# Patient Record
Sex: MALE | Race: WHITE | NOT HISPANIC OR LATINO | Employment: OTHER | URBAN - METROPOLITAN AREA
[De-identification: names, ages, dates, MRNs, and addresses within clinical notes are randomized per-mention and may not be internally consistent; named-entity substitution may affect disease eponyms.]

---

## 2017-04-10 DIAGNOSIS — E29.1 HYPOGONADISM IN MALE: ICD-10-CM

## 2017-04-10 RX ORDER — TESTOSTERONE GEL, 1% 10 MG/G
50 GEL TRANSDERMAL DAILY
Qty: 30 PACKAGE | Refills: 2 | OUTPATIENT
Start: 2017-04-10 | End: 2017-04-10

## 2017-04-10 RX ORDER — TESTOSTERONE 16.2 MG/G
GEL TRANSDERMAL
Qty: 1 G | Refills: 2 | Status: SHIPPED | OUTPATIENT
Start: 2017-04-10 | End: 2018-03-29

## 2017-04-10 NOTE — TELEPHONE ENCOUNTER
Okay to refill. Can you confirm the Rx. Last prescribed androgel 1%, 1 packet daily. If this is a formulary change, then 1.62% androgel 2 pumps daily is okay.

## 2018-01-24 ENCOUNTER — OFFICE VISIT (OUTPATIENT)
Dept: ORTHOPEDIC SURGERY | Facility: CLINIC | Age: 73
End: 2018-01-24

## 2018-01-24 VITALS
DIASTOLIC BLOOD PRESSURE: 95 MMHG | HEART RATE: 85 BPM | BODY MASS INDEX: 31.49 KG/M2 | SYSTOLIC BLOOD PRESSURE: 124 MMHG | HEIGHT: 67 IN | WEIGHT: 200.62 LBS

## 2018-01-24 DIAGNOSIS — R52 PAIN: ICD-10-CM

## 2018-01-24 DIAGNOSIS — S62.025A CLOSED NONDISPLACED FRACTURE OF MIDDLE THIRD OF SCAPHOID BONE OF LEFT WRIST, INITIAL ENCOUNTER: Primary | ICD-10-CM

## 2018-01-24 PROCEDURE — 25622 CLTX CARPL SCPHD FX W/O MNPJ: CPT | Performed by: ORTHOPAEDIC SURGERY

## 2018-01-24 PROCEDURE — 99204 OFFICE O/P NEW MOD 45 MIN: CPT | Performed by: ORTHOPAEDIC SURGERY

## 2018-01-24 RX ORDER — HYDROCODONE BITARTRATE AND ACETAMINOPHEN 5; 325 MG/1; MG/1
TABLET ORAL
Refills: 0 | COMMUNITY
Start: 2018-01-09 | End: 2018-03-29
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
## 2018-02-23 ENCOUNTER — OFFICE VISIT (OUTPATIENT)
Dept: ORTHOPEDIC SURGERY | Facility: CLINIC | Age: 73
End: 2018-02-23

## 2018-02-23 DIAGNOSIS — S62.025D CLOSED NONDISPLACED FRACTURE OF MIDDLE THIRD OF SCAPHOID OF LEFT WRIST WITH ROUTINE HEALING, SUBSEQUENT ENCOUNTER: Primary | ICD-10-CM

## 2018-02-23 PROCEDURE — 99024 POSTOP FOLLOW-UP VISIT: CPT | Performed by: ORTHOPAEDIC SURGERY
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
## 2018-03-23 ENCOUNTER — OFFICE VISIT (OUTPATIENT)
Dept: ORTHOPEDIC SURGERY | Facility: CLINIC | Age: 73
End: 2018-03-23

## 2018-03-23 VITALS — HEART RATE: 86 BPM | WEIGHT: 200.62 LBS | BODY MASS INDEX: 31.49 KG/M2 | HEIGHT: 67 IN

## 2018-03-23 DIAGNOSIS — M54.50 CHRONIC MIDLINE LOW BACK PAIN WITHOUT SCIATICA: ICD-10-CM

## 2018-03-23 DIAGNOSIS — S62.025D CLOSED NONDISPLACED FRACTURE OF MIDDLE THIRD OF SCAPHOID OF LEFT WRIST WITH ROUTINE HEALING, SUBSEQUENT ENCOUNTER: Primary | ICD-10-CM

## 2018-03-23 DIAGNOSIS — G89.29 CHRONIC MIDLINE LOW BACK PAIN WITHOUT SCIATICA: ICD-10-CM

## 2018-03-23 PROCEDURE — 99024 POSTOP FOLLOW-UP VISIT: CPT | Performed by: ORTHOPAEDIC SURGERY
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
## 2018-03-29 ENCOUNTER — HOSPITAL ENCOUNTER (EMERGENCY)
Facility: HOSPITAL | Age: 73
Discharge: HOME | End: 2018-03-29
Attending: EMERGENCY MEDICINE | Admitting: EMERGENCY MEDICINE

## 2018-03-29 ENCOUNTER — APPOINTMENT (OUTPATIENT)
Dept: GENERAL RADIOLOGY | Facility: HOSPITAL | Age: 73
End: 2018-03-29

## 2018-03-29 VITALS
HEIGHT: 69 IN | OXYGEN SATURATION: 94 % | WEIGHT: 205 LBS | BODY MASS INDEX: 30.36 KG/M2 | DIASTOLIC BLOOD PRESSURE: 72 MMHG | TEMPERATURE: 97.9 F | RESPIRATION RATE: 18 BRPM | HEART RATE: 79 BPM | SYSTOLIC BLOOD PRESSURE: 122 MMHG

## 2018-03-29 DIAGNOSIS — Z87.39 HISTORY OF OSTEOPOROSIS: ICD-10-CM

## 2018-03-29 DIAGNOSIS — S63.502A LEFT WRIST SPRAIN, INITIAL ENCOUNTER: Primary | ICD-10-CM

## 2018-03-29 PROCEDURE — 99283 EMERGENCY DEPT VISIT LOW MDM: CPT

## 2018-03-29 PROCEDURE — 73110 X-RAY EXAM OF WRIST: CPT

## 2018-03-29 RX ORDER — OXYCODONE HYDROCHLORIDE AND ACETAMINOPHEN 5; 325 MG/1; MG/1
1 TABLET ORAL ONCE
Status: COMPLETED | OUTPATIENT
Start: 2018-03-29 | End: 2018-03-29

## 2018-03-29 RX ORDER — AMLODIPINE BESYLATE 10 MG/1
10 TABLET ORAL DAILY
COMMUNITY
End: 2018-10-08 | Stop reason: SDUPTHER

## 2018-03-29 RX ORDER — OMEPRAZOLE 40 MG/1
40 CAPSULE, DELAYED RELEASE ORAL DAILY
COMMUNITY
End: 2018-05-03 | Stop reason: HOSPADM

## 2018-03-29 RX ADMIN — OXYCODONE AND ACETAMINOPHEN 1 TABLET: 5; 325 TABLET ORAL at 12:22

## 2018-04-23 ENCOUNTER — ANESTHESIA EVENT (OUTPATIENT)
Dept: PERIOP | Facility: HOSPITAL | Age: 73
End: 2018-04-23

## 2018-04-23 ENCOUNTER — ANESTHESIA (OUTPATIENT)
Dept: PERIOP | Facility: HOSPITAL | Age: 73
End: 2018-04-23

## 2018-04-23 ENCOUNTER — HOSPITAL ENCOUNTER (INPATIENT)
Facility: HOSPITAL | Age: 73
LOS: 10 days | Discharge: HOME HEALTH | End: 2018-05-03
Attending: EMERGENCY MEDICINE | Admitting: SURGERY

## 2018-04-23 ENCOUNTER — APPOINTMENT (OUTPATIENT)
Dept: CT IMAGING | Facility: HOSPITAL | Age: 73
End: 2018-04-23

## 2018-04-23 ENCOUNTER — APPOINTMENT (OUTPATIENT)
Dept: GENERAL RADIOLOGY | Facility: HOSPITAL | Age: 73
End: 2018-04-23

## 2018-04-23 ENCOUNTER — OFFICE VISIT (OUTPATIENT)
Dept: ORTHOPEDIC SURGERY | Facility: CLINIC | Age: 73
End: 2018-04-23

## 2018-04-23 VITALS
WEIGHT: 208.56 LBS | HEART RATE: 123 BPM | SYSTOLIC BLOOD PRESSURE: 97 MMHG | DIASTOLIC BLOOD PRESSURE: 81 MMHG | HEIGHT: 69 IN | BODY MASS INDEX: 30.89 KG/M2

## 2018-04-23 DIAGNOSIS — S62.025D CLOSED NONDISPLACED FRACTURE OF MIDDLE THIRD OF SCAPHOID OF LEFT WRIST WITH ROUTINE HEALING, SUBSEQUENT ENCOUNTER: Primary | ICD-10-CM

## 2018-04-23 DIAGNOSIS — R19.8 PERFORATED VISCUS: ICD-10-CM

## 2018-04-23 DIAGNOSIS — K63.1 PERFORATED BOWEL (HCC): ICD-10-CM

## 2018-04-23 DIAGNOSIS — R10.30 LOWER ABDOMINAL PAIN: Primary | ICD-10-CM

## 2018-04-23 DIAGNOSIS — Z74.09 IMPAIRED MOBILITY AND ADLS: ICD-10-CM

## 2018-04-23 DIAGNOSIS — Z78.9 IMPAIRED MOBILITY AND ADLS: ICD-10-CM

## 2018-04-23 DIAGNOSIS — D72.829 LEUKOCYTOSIS, UNSPECIFIED TYPE: ICD-10-CM

## 2018-04-23 DIAGNOSIS — K66.8 INTRA-ABDOMINAL FREE AIR OF UNKNOWN ETIOLOGY: ICD-10-CM

## 2018-04-23 DIAGNOSIS — Z74.09 IMPAIRED FUNCTIONAL MOBILITY, BALANCE, GAIT, AND ENDURANCE: ICD-10-CM

## 2018-04-23 PROBLEM — K21.9 GERD (GASTROESOPHAGEAL REFLUX DISEASE): Status: ACTIVE | Noted: 2018-04-23

## 2018-04-23 PROBLEM — J45.909 ASTHMA: Status: ACTIVE | Noted: 2018-04-23

## 2018-04-23 PROBLEM — Z87.19 H/O CHRONIC HEPATITIS: Status: ACTIVE | Noted: 2018-04-23

## 2018-04-23 PROBLEM — K25.5 PERFORATED GASTRIC ULCER (HCC): Status: ACTIVE | Noted: 2018-04-23

## 2018-04-23 PROBLEM — I25.10 CAD (CORONARY ARTERY DISEASE): Status: ACTIVE | Noted: 2018-04-23

## 2018-04-23 PROBLEM — F19.10 POLYSUBSTANCE ABUSE (HCC): Status: ACTIVE | Noted: 2018-04-23

## 2018-04-23 LAB
ABO GROUP BLD: NORMAL
ABO GROUP BLD: NORMAL
ALBUMIN SERPL-MCNC: 4 G/DL (ref 3.2–4.8)
ALBUMIN/GLOB SERPL: 1.3 G/DL (ref 1.5–2.5)
ALP SERPL-CCNC: 81 U/L (ref 25–100)
ALT SERPL W P-5'-P-CCNC: 34 U/L (ref 7–40)
ANION GAP SERPL CALCULATED.3IONS-SCNC: 7 MMOL/L (ref 3–11)
AST SERPL-CCNC: 33 U/L (ref 0–33)
BASOPHILS # BLD AUTO: 0.02 10*3/MM3 (ref 0–0.2)
BASOPHILS NFR BLD AUTO: 0.1 % (ref 0–1)
BILIRUB SERPL-MCNC: 0.4 MG/DL (ref 0.3–1.2)
BLD GP AB SCN SERPL QL: NEGATIVE
BUN BLD-MCNC: 12 MG/DL (ref 9–23)
BUN/CREAT SERPL: 10.9 (ref 7–25)
CALCIUM SPEC-SCNC: 8.7 MG/DL (ref 8.7–10.4)
CHLORIDE SERPL-SCNC: 98 MMOL/L (ref 99–109)
CO2 SERPL-SCNC: 28 MMOL/L (ref 20–31)
CREAT BLD-MCNC: 1.1 MG/DL (ref 0.6–1.3)
DEPRECATED RDW RBC AUTO: 45.8 FL (ref 37–54)
EOSINOPHIL # BLD AUTO: 0.29 10*3/MM3 (ref 0–0.3)
EOSINOPHIL NFR BLD AUTO: 2.1 % (ref 0–3)
ERYTHROCYTE [DISTWIDTH] IN BLOOD BY AUTOMATED COUNT: 13.7 % (ref 11.3–14.5)
GFR SERPL CREATININE-BSD FRML MDRD: 66 ML/MIN/1.73
GLOBULIN UR ELPH-MCNC: 3.1 GM/DL
GLUCOSE BLD-MCNC: 121 MG/DL (ref 70–100)
GLUCOSE BLDC GLUCOMTR-MCNC: 138 MG/DL (ref 70–130)
HCT VFR BLD AUTO: 37.6 % (ref 38.9–50.9)
HGB BLD-MCNC: 12.6 G/DL (ref 13.1–17.5)
HOLD SPECIMEN: NORMAL
HOLD SPECIMEN: NORMAL
IMM GRANULOCYTES # BLD: 0.03 10*3/MM3 (ref 0–0.03)
IMM GRANULOCYTES NFR BLD: 0.2 % (ref 0–0.6)
LIPASE SERPL-CCNC: 21 U/L (ref 6–51)
LYMPHOCYTES # BLD AUTO: 1.79 10*3/MM3 (ref 0.6–4.8)
LYMPHOCYTES NFR BLD AUTO: 12.7 % (ref 24–44)
MCH RBC QN AUTO: 31 PG (ref 27–31)
MCHC RBC AUTO-ENTMCNC: 33.5 G/DL (ref 32–36)
MCV RBC AUTO: 92.6 FL (ref 80–99)
MONOCYTES # BLD AUTO: 1.86 10*3/MM3 (ref 0–1)
MONOCYTES NFR BLD AUTO: 13.2 % (ref 0–12)
NEUTROPHILS # BLD AUTO: 10.08 10*3/MM3 (ref 1.5–8.3)
NEUTROPHILS NFR BLD AUTO: 71.9 % (ref 41–71)
PLATELET # BLD AUTO: 235 10*3/MM3 (ref 150–450)
PMV BLD AUTO: 10.8 FL (ref 6–12)
POTASSIUM BLD-SCNC: 4.1 MMOL/L (ref 3.5–5.5)
PROT SERPL-MCNC: 7.1 G/DL (ref 5.7–8.2)
RBC # BLD AUTO: 4.06 10*6/MM3 (ref 4.2–5.76)
RH BLD: POSITIVE
RH BLD: POSITIVE
SODIUM BLD-SCNC: 133 MMOL/L (ref 132–146)
T&S EXPIRATION DATE: NORMAL
TROPONIN I SERPL-MCNC: 0 NG/ML (ref 0–0.07)
WBC NRBC COR # BLD: 14.04 10*3/MM3 (ref 3.5–10.8)
WHOLE BLOOD HOLD SPECIMEN: NORMAL
WHOLE BLOOD HOLD SPECIMEN: NORMAL

## 2018-04-23 PROCEDURE — 93005 ELECTROCARDIOGRAM TRACING: CPT | Performed by: EMERGENCY MEDICINE

## 2018-04-23 PROCEDURE — 25010000002 PIPERACILLIN-TAZOBACTAM: Performed by: EMERGENCY MEDICINE

## 2018-04-23 PROCEDURE — 0DB80ZZ EXCISION OF SMALL INTESTINE, OPEN APPROACH: ICD-10-PCS | Performed by: SURGERY

## 2018-04-23 PROCEDURE — 84145 PROCALCITONIN (PCT): CPT | Performed by: INTERNAL MEDICINE

## 2018-04-23 PROCEDURE — 99291 CRITICAL CARE FIRST HOUR: CPT | Performed by: INTERNAL MEDICINE

## 2018-04-23 PROCEDURE — 0DN80ZZ RELEASE SMALL INTESTINE, OPEN APPROACH: ICD-10-PCS | Performed by: SURGERY

## 2018-04-23 PROCEDURE — 25010000002 MIDAZOLAM PER 1 MG: Performed by: ANESTHESIOLOGY

## 2018-04-23 PROCEDURE — 85025 COMPLETE CBC W/AUTO DIFF WBC: CPT

## 2018-04-23 PROCEDURE — 86901 BLOOD TYPING SEROLOGIC RH(D): CPT | Performed by: SURGERY

## 2018-04-23 PROCEDURE — 25010000002 NEOSTIGMINE 10 MG/10ML SOLUTION: Performed by: ANESTHESIOLOGY

## 2018-04-23 PROCEDURE — 25010000002 FENTANYL CITRATE (PF) 100 MCG/2ML SOLUTION: Performed by: ANESTHESIOLOGY

## 2018-04-23 PROCEDURE — 25010000002 ONDANSETRON PER 1 MG: Performed by: ANESTHESIOLOGY

## 2018-04-23 PROCEDURE — 25010000002 PIPERACILLIN SOD-TAZOBACTAM PER 1 G: Performed by: ANESTHESIOLOGY

## 2018-04-23 PROCEDURE — 80053 COMPREHEN METABOLIC PANEL: CPT

## 2018-04-23 PROCEDURE — 99024 POSTOP FOLLOW-UP VISIT: CPT | Performed by: ORTHOPAEDIC SURGERY

## 2018-04-23 PROCEDURE — 87040 BLOOD CULTURE FOR BACTERIA: CPT | Performed by: INTERNAL MEDICINE

## 2018-04-23 PROCEDURE — 88307 TISSUE EXAM BY PATHOLOGIST: CPT | Performed by: SURGERY

## 2018-04-23 PROCEDURE — 74176 CT ABD & PELVIS W/O CONTRAST: CPT

## 2018-04-23 PROCEDURE — 84443 ASSAY THYROID STIM HORMONE: CPT | Performed by: INTERNAL MEDICINE

## 2018-04-23 PROCEDURE — 83690 ASSAY OF LIPASE: CPT

## 2018-04-23 PROCEDURE — 25010000002 SUCCINYLCHOLINE PER 20 MG: Performed by: ANESTHESIOLOGY

## 2018-04-23 PROCEDURE — 99285 EMERGENCY DEPT VISIT HI MDM: CPT

## 2018-04-23 PROCEDURE — 25010000002 ONDANSETRON PER 1 MG: Performed by: EMERGENCY MEDICINE

## 2018-04-23 PROCEDURE — 83036 HEMOGLOBIN GLYCOSYLATED A1C: CPT | Performed by: INTERNAL MEDICINE

## 2018-04-23 PROCEDURE — 86850 RBC ANTIBODY SCREEN: CPT | Performed by: SURGERY

## 2018-04-23 PROCEDURE — 86901 BLOOD TYPING SEROLOGIC RH(D): CPT

## 2018-04-23 PROCEDURE — G0432 EIA HIV-1/HIV-2 SCREEN: HCPCS | Performed by: INTERNAL MEDICINE

## 2018-04-23 PROCEDURE — 25010000002 HYDROMORPHONE PER 4 MG: Performed by: EMERGENCY MEDICINE

## 2018-04-23 PROCEDURE — 71045 X-RAY EXAM CHEST 1 VIEW: CPT

## 2018-04-23 PROCEDURE — 82607 VITAMIN B-12: CPT | Performed by: INTERNAL MEDICINE

## 2018-04-23 PROCEDURE — 0DNU0ZZ RELEASE OMENTUM, OPEN APPROACH: ICD-10-PCS | Performed by: SURGERY

## 2018-04-23 PROCEDURE — 82962 GLUCOSE BLOOD TEST: CPT

## 2018-04-23 PROCEDURE — 86900 BLOOD TYPING SEROLOGIC ABO: CPT | Performed by: SURGERY

## 2018-04-23 PROCEDURE — 85610 PROTHROMBIN TIME: CPT | Performed by: INTERNAL MEDICINE

## 2018-04-23 PROCEDURE — 25010000002 PROPOFOL 10 MG/ML EMULSION: Performed by: ANESTHESIOLOGY

## 2018-04-23 PROCEDURE — 93005 ELECTROCARDIOGRAM TRACING: CPT

## 2018-04-23 PROCEDURE — 84484 ASSAY OF TROPONIN QUANT: CPT

## 2018-04-23 PROCEDURE — 0DJ08ZZ INSPECTION OF UPPER INTESTINAL TRACT, VIA NATURAL OR ARTIFICIAL OPENING ENDOSCOPIC: ICD-10-PCS | Performed by: SURGERY

## 2018-04-23 PROCEDURE — 86900 BLOOD TYPING SEROLOGIC ABO: CPT

## 2018-04-23 RX ORDER — MAGNESIUM HYDROXIDE 1200 MG/15ML
LIQUID ORAL AS NEEDED
Status: DISCONTINUED | OUTPATIENT
Start: 2018-04-23 | End: 2018-04-23 | Stop reason: HOSPADM

## 2018-04-23 RX ORDER — IPRATROPIUM BROMIDE AND ALBUTEROL SULFATE 2.5; .5 MG/3ML; MG/3ML
3 SOLUTION RESPIRATORY (INHALATION)
Status: DISCONTINUED | OUTPATIENT
Start: 2018-04-23 | End: 2018-04-23

## 2018-04-23 RX ORDER — DEXMEDETOMIDINE HYDROCHLORIDE 4 UG/ML
INJECTION, SOLUTION INTRAVENOUS
Status: COMPLETED
Start: 2018-04-23 | End: 2018-04-23

## 2018-04-23 RX ORDER — HYDROMORPHONE HYDROCHLORIDE 1 MG/ML
0.25 INJECTION, SOLUTION INTRAMUSCULAR; INTRAVENOUS; SUBCUTANEOUS
Status: DISCONTINUED | OUTPATIENT
Start: 2018-04-23 | End: 2018-04-23

## 2018-04-23 RX ORDER — ACETAMINOPHEN 325 MG/1
650 TABLET ORAL EVERY 4 HOURS PRN
Status: DISCONTINUED | OUTPATIENT
Start: 2018-04-23 | End: 2018-04-23

## 2018-04-23 RX ORDER — HYDROMORPHONE HYDROCHLORIDE 1 MG/ML
0.5 INJECTION, SOLUTION INTRAMUSCULAR; INTRAVENOUS; SUBCUTANEOUS ONCE
Status: COMPLETED | OUTPATIENT
Start: 2018-04-23 | End: 2018-04-23

## 2018-04-23 RX ORDER — DEXMEDETOMIDINE HYDROCHLORIDE 4 UG/ML
.2-1.5 INJECTION, SOLUTION INTRAVENOUS
Status: DISCONTINUED | OUTPATIENT
Start: 2018-04-24 | End: 2018-04-26

## 2018-04-23 RX ORDER — SODIUM CHLORIDE 0.9 % (FLUSH) 0.9 %
10 SYRINGE (ML) INJECTION AS NEEDED
Status: DISCONTINUED | OUTPATIENT
Start: 2018-04-23 | End: 2018-04-23

## 2018-04-23 RX ORDER — NEOSTIGMINE METHYLSULFATE 1 MG/ML
INJECTION, SOLUTION INTRAVENOUS AS NEEDED
Status: DISCONTINUED | OUTPATIENT
Start: 2018-04-23 | End: 2018-04-23 | Stop reason: SURG

## 2018-04-23 RX ORDER — SODIUM CHLORIDE 9 MG/ML
250 INJECTION, SOLUTION INTRAVENOUS CONTINUOUS
Status: DISCONTINUED | OUTPATIENT
Start: 2018-04-23 | End: 2018-04-23

## 2018-04-23 RX ORDER — PROPOFOL 10 MG/ML
VIAL (ML) INTRAVENOUS AS NEEDED
Status: DISCONTINUED | OUTPATIENT
Start: 2018-04-23 | End: 2018-04-23 | Stop reason: SURG

## 2018-04-23 RX ORDER — SUCCINYLCHOLINE CHLORIDE 20 MG/ML
INJECTION INTRAMUSCULAR; INTRAVENOUS AS NEEDED
Status: DISCONTINUED | OUTPATIENT
Start: 2018-04-23 | End: 2018-04-23 | Stop reason: SURG

## 2018-04-23 RX ORDER — MIDAZOLAM HYDROCHLORIDE 1 MG/ML
INJECTION INTRAMUSCULAR; INTRAVENOUS AS NEEDED
Status: DISCONTINUED | OUTPATIENT
Start: 2018-04-23 | End: 2018-04-23 | Stop reason: SURG

## 2018-04-23 RX ORDER — MAGNESIUM SULFATE HEPTAHYDRATE 40 MG/ML
2 INJECTION, SOLUTION INTRAVENOUS AS NEEDED
Status: DISCONTINUED | OUTPATIENT
Start: 2018-04-23 | End: 2018-05-03 | Stop reason: HOSPADM

## 2018-04-23 RX ORDER — PANTOPRAZOLE SODIUM 40 MG/10ML
40 INJECTION, POWDER, LYOPHILIZED, FOR SOLUTION INTRAVENOUS EVERY 12 HOURS SCHEDULED
Status: DISCONTINUED | OUTPATIENT
Start: 2018-04-24 | End: 2018-05-01

## 2018-04-23 RX ORDER — PIPERACILLIN SODIUM, TAZOBACTAM SODIUM 3; .375 G/15ML; G/15ML
3.38 INJECTION, POWDER, LYOPHILIZED, FOR SOLUTION INTRAVENOUS ONCE
Status: DISCONTINUED | OUTPATIENT
Start: 2018-04-23 | End: 2018-04-23 | Stop reason: SDUPTHER

## 2018-04-23 RX ORDER — HEPARIN SODIUM 5000 [USP'U]/ML
5000 INJECTION, SOLUTION INTRAVENOUS; SUBCUTANEOUS EVERY 8 HOURS SCHEDULED
Status: DISCONTINUED | OUTPATIENT
Start: 2018-04-24 | End: 2018-04-23 | Stop reason: SDUPTHER

## 2018-04-23 RX ORDER — SODIUM CHLORIDE 0.9 % (FLUSH) 0.9 %
1-10 SYRINGE (ML) INJECTION AS NEEDED
Status: DISCONTINUED | OUTPATIENT
Start: 2018-04-23 | End: 2018-05-03 | Stop reason: HOSPADM

## 2018-04-23 RX ORDER — SODIUM CHLORIDE 0.9 % (FLUSH) 0.9 %
1-10 SYRINGE (ML) INJECTION AS NEEDED
Status: DISCONTINUED | OUTPATIENT
Start: 2018-04-23 | End: 2018-04-25

## 2018-04-23 RX ORDER — SODIUM CHLORIDE, SODIUM LACTATE, POTASSIUM CHLORIDE, CALCIUM CHLORIDE 600; 310; 30; 20 MG/100ML; MG/100ML; MG/100ML; MG/100ML
INJECTION, SOLUTION INTRAVENOUS CONTINUOUS PRN
Status: DISCONTINUED | OUTPATIENT
Start: 2018-04-23 | End: 2018-04-23 | Stop reason: SURG

## 2018-04-23 RX ORDER — MAGNESIUM SULFATE HEPTAHYDRATE 40 MG/ML
4 INJECTION, SOLUTION INTRAVENOUS AS NEEDED
Status: DISCONTINUED | OUTPATIENT
Start: 2018-04-23 | End: 2018-05-03 | Stop reason: HOSPADM

## 2018-04-23 RX ORDER — ONDANSETRON 2 MG/ML
INJECTION INTRAMUSCULAR; INTRAVENOUS AS NEEDED
Status: DISCONTINUED | OUTPATIENT
Start: 2018-04-23 | End: 2018-04-23 | Stop reason: SURG

## 2018-04-23 RX ORDER — FLUCONAZOLE 2 MG/ML
800 INJECTION, SOLUTION INTRAVENOUS ONCE
Status: COMPLETED | OUTPATIENT
Start: 2018-04-23 | End: 2018-04-24

## 2018-04-23 RX ORDER — FLUCONAZOLE 2 MG/ML
400 INJECTION, SOLUTION INTRAVENOUS DAILY
Status: DISCONTINUED | OUTPATIENT
Start: 2018-04-24 | End: 2018-04-23 | Stop reason: SDUPTHER

## 2018-04-23 RX ORDER — NICOTINE POLACRILEX 4 MG
15 LOZENGE BUCCAL
Status: DISCONTINUED | OUTPATIENT
Start: 2018-04-23 | End: 2018-04-24

## 2018-04-23 RX ORDER — FLUCONAZOLE 2 MG/ML
400 INJECTION, SOLUTION INTRAVENOUS DAILY
Status: COMPLETED | OUTPATIENT
Start: 2018-04-24 | End: 2018-04-29

## 2018-04-23 RX ORDER — PANTOPRAZOLE SODIUM 40 MG/10ML
40 INJECTION, POWDER, LYOPHILIZED, FOR SOLUTION INTRAVENOUS ONCE
Status: COMPLETED | OUTPATIENT
Start: 2018-04-23 | End: 2018-04-23

## 2018-04-23 RX ORDER — FENTANYL CITRATE 50 UG/ML
INJECTION, SOLUTION INTRAMUSCULAR; INTRAVENOUS AS NEEDED
Status: DISCONTINUED | OUTPATIENT
Start: 2018-04-23 | End: 2018-04-23 | Stop reason: SURG

## 2018-04-23 RX ORDER — HEPARIN SODIUM 5000 [USP'U]/ML
5000 INJECTION, SOLUTION INTRAVENOUS; SUBCUTANEOUS EVERY 8 HOURS SCHEDULED
Status: DISCONTINUED | OUTPATIENT
Start: 2018-04-24 | End: 2018-05-03 | Stop reason: HOSPADM

## 2018-04-23 RX ORDER — IPRATROPIUM BROMIDE AND ALBUTEROL SULFATE 2.5; .5 MG/3ML; MG/3ML
3 SOLUTION RESPIRATORY (INHALATION) ONCE
Status: DISCONTINUED | OUTPATIENT
Start: 2018-04-23 | End: 2018-04-23

## 2018-04-23 RX ORDER — PROPOFOL 10 MG/ML
VIAL (ML) INTRAVENOUS CONTINUOUS PRN
Status: DISCONTINUED | OUTPATIENT
Start: 2018-04-23 | End: 2018-04-23

## 2018-04-23 RX ORDER — ONDANSETRON 2 MG/ML
4 INJECTION INTRAMUSCULAR; INTRAVENOUS ONCE
Status: COMPLETED | OUTPATIENT
Start: 2018-04-23 | End: 2018-04-23

## 2018-04-23 RX ORDER — FENTANYL CITRATE 50 UG/ML
50 INJECTION, SOLUTION INTRAMUSCULAR; INTRAVENOUS
Status: DISCONTINUED | OUTPATIENT
Start: 2018-04-23 | End: 2018-04-23

## 2018-04-23 RX ORDER — LIDOCAINE HYDROCHLORIDE 10 MG/ML
INJECTION, SOLUTION INFILTRATION; PERINEURAL AS NEEDED
Status: DISCONTINUED | OUTPATIENT
Start: 2018-04-23 | End: 2018-04-23 | Stop reason: SURG

## 2018-04-23 RX ORDER — ATRACURIUM BESYLATE 10 MG/ML
INJECTION, SOLUTION INTRAVENOUS AS NEEDED
Status: DISCONTINUED | OUTPATIENT
Start: 2018-04-23 | End: 2018-04-23 | Stop reason: SURG

## 2018-04-23 RX ORDER — GLYCOPYRROLATE 0.2 MG/ML
INJECTION INTRAMUSCULAR; INTRAVENOUS AS NEEDED
Status: DISCONTINUED | OUTPATIENT
Start: 2018-04-23 | End: 2018-04-23 | Stop reason: SURG

## 2018-04-23 RX ORDER — SODIUM CHLORIDE, SODIUM LACTATE, POTASSIUM CHLORIDE, CALCIUM CHLORIDE 600; 310; 30; 20 MG/100ML; MG/100ML; MG/100ML; MG/100ML
150 INJECTION, SOLUTION INTRAVENOUS CONTINUOUS
Status: DISCONTINUED | OUTPATIENT
Start: 2018-04-23 | End: 2018-04-23

## 2018-04-23 RX ORDER — SODIUM CHLORIDE 9 MG/ML
100 INJECTION, SOLUTION INTRAVENOUS CONTINUOUS
Status: DISCONTINUED | OUTPATIENT
Start: 2018-04-24 | End: 2018-04-24

## 2018-04-23 RX ORDER — DEXTROSE MONOHYDRATE, SODIUM CHLORIDE, SODIUM LACTATE, POTASSIUM CHLORIDE, CALCIUM CHLORIDE 5; 600; 310; 179; 20 G/100ML; MG/100ML; MG/100ML; MG/100ML; MG/100ML
150 INJECTION, SOLUTION INTRAVENOUS CONTINUOUS
Status: DISCONTINUED | OUTPATIENT
Start: 2018-04-23 | End: 2018-04-23

## 2018-04-23 RX ORDER — DEXTROSE MONOHYDRATE 25 G/50ML
25 INJECTION, SOLUTION INTRAVENOUS
Status: DISCONTINUED | OUTPATIENT
Start: 2018-04-23 | End: 2018-05-03 | Stop reason: HOSPADM

## 2018-04-23 RX ADMIN — TAZOBACTAM SODIUM AND PIPERACILLIN SODIUM 3.38 G: 375; 3 INJECTION, SOLUTION INTRAVENOUS at 22:06

## 2018-04-23 RX ADMIN — DEXMEDETOMIDINE HYDROCHLORIDE 0.2 MCG/KG/HR: 4 INJECTION, SOLUTION INTRAVENOUS at 23:33

## 2018-04-23 RX ADMIN — FENTANYL CITRATE 50 MCG: 50 INJECTION, SOLUTION INTRAMUSCULAR; INTRAVENOUS at 23:05

## 2018-04-23 RX ADMIN — LIDOCAINE HYDROCHLORIDE 50 MG: 10 INJECTION, SOLUTION INFILTRATION; PERINEURAL at 17:31

## 2018-04-23 RX ADMIN — ATRACURIUM BESYLATE 10 MG: 10 INJECTION, SOLUTION INTRAVENOUS at 20:42

## 2018-04-23 RX ADMIN — TAZOBACTAM SODIUM AND PIPERACILLIN SODIUM 4.5 G: .5; 4 INJECTION, POWDER, LYOPHILIZED, FOR SOLUTION INTRAVENOUS at 16:17

## 2018-04-23 RX ADMIN — GLYCOPYRROLATE 0.8 MG: 0.2 INJECTION INTRAMUSCULAR; INTRAVENOUS at 22:18

## 2018-04-23 RX ADMIN — ONDANSETRON 4 MG: 2 INJECTION INTRAMUSCULAR; INTRAVENOUS at 21:38

## 2018-04-23 RX ADMIN — PROPOFOL 150 MG: 10 INJECTION, EMULSION INTRAVENOUS at 17:31

## 2018-04-23 RX ADMIN — SUCCINYLCHOLINE CHLORIDE 140 MG: 20 INJECTION, SOLUTION INTRAMUSCULAR; INTRAVENOUS at 17:31

## 2018-04-23 RX ADMIN — ATRACURIUM BESYLATE 10 MG: 10 INJECTION, SOLUTION INTRAVENOUS at 21:09

## 2018-04-23 RX ADMIN — ATRACURIUM BESYLATE 30 MG: 10 INJECTION, SOLUTION INTRAVENOUS at 17:42

## 2018-04-23 RX ADMIN — HYDROMORPHONE HYDROCHLORIDE 0.5 MG: 10 INJECTION INTRAMUSCULAR; INTRAVENOUS; SUBCUTANEOUS at 15:06

## 2018-04-23 RX ADMIN — FENTANYL CITRATE 150 MCG: 50 INJECTION, SOLUTION INTRAMUSCULAR; INTRAVENOUS at 17:40

## 2018-04-23 RX ADMIN — SODIUM CHLORIDE, POTASSIUM CHLORIDE, SODIUM LACTATE AND CALCIUM CHLORIDE: 600; 310; 30; 20 INJECTION, SOLUTION INTRAVENOUS at 17:20

## 2018-04-23 RX ADMIN — SODIUM CHLORIDE 1000 ML: 9 INJECTION, SOLUTION INTRAVENOUS at 12:45

## 2018-04-23 RX ADMIN — ATRACURIUM BESYLATE 20 MG: 10 INJECTION, SOLUTION INTRAVENOUS at 18:36

## 2018-04-23 RX ADMIN — PANTOPRAZOLE SODIUM 40 MG: 40 INJECTION, POWDER, FOR SOLUTION INTRAVENOUS at 15:07

## 2018-04-23 RX ADMIN — SODIUM CHLORIDE 250 ML/HR: 9 INJECTION, SOLUTION INTRAVENOUS at 15:15

## 2018-04-23 RX ADMIN — FENTANYL CITRATE 50 MCG: 50 INJECTION, SOLUTION INTRAMUSCULAR; INTRAVENOUS at 22:53

## 2018-04-23 RX ADMIN — ONDANSETRON 4 MG: 2 INJECTION INTRAMUSCULAR; INTRAVENOUS at 13:01

## 2018-04-23 RX ADMIN — ATRACURIUM BESYLATE 10 MG: 10 INJECTION, SOLUTION INTRAVENOUS at 19:00

## 2018-04-23 RX ADMIN — MIDAZOLAM HYDROCHLORIDE 2 MG: 1 INJECTION, SOLUTION INTRAMUSCULAR; INTRAVENOUS at 17:31

## 2018-04-23 RX ADMIN — FENTANYL CITRATE 100 MCG: 50 INJECTION, SOLUTION INTRAMUSCULAR; INTRAVENOUS at 17:31

## 2018-04-23 RX ADMIN — NEOSTIGMINE METHYLSULFATE 4 MG: 1 INJECTION, SOLUTION INTRAVENOUS at 22:18

## 2018-04-23 NOTE — ANESTHESIA PROCEDURE NOTES
Airway  Urgency: elective    Airway not difficult    General Information and Staff    Patient location during procedure: OR  Anesthesiologist: CONCHITA CHAUDHARI    Indications and Patient Condition  Indications for airway management: airway protection    Preoxygenated: yes  Mask difficulty assessment: 0 - not attempted    Final Airway Details  Final airway type: endotracheal airway      Successful airway: ETT  Cuffed: yes   Successful intubation technique: direct laryngoscopy  Facilitating devices/methods: cricoid pressure  Endotracheal tube insertion site: oral  Blade: Carmelo  Blade size: #3  ETT size: 8.0 mm  Cormack-Lehane Classification: grade I - full view of glottis  Placement verified by: chest auscultation and capnometry   Measured from: gums  Number of attempts at approach: 1

## 2018-04-23 NOTE — ANESTHESIA PREPROCEDURE EVALUATION
Anesthesia Evaluation     NPO Solid Status: > 8 hours  NPO Liquid Status: > 6 hours           Airway   Mallampati: II  TM distance: >3 FB  Neck ROM: full  No difficulty expected  Dental    (+) edentulous    Pulmonary - normal exam   (+) a smoker Former,   Cardiovascular     ECG reviewed  Rhythm: regular  Rate: normal    (+) hypertension,   (-) pacemaker, angina, murmur, cardiac stents      Neuro/Psych  (-) seizures, TIA, CVA  GI/Hepatic/Renal/Endo    (-) liver disease, no renal disease, diabetes    Musculoskeletal     Abdominal    Substance History      OB/GYN          Other        ROS/Med Hx Other: c 5-6 fusion  Denies CP, SOB or heart problems                  Anesthesia Plan    ASA 3     general   (GA, tap block)  intravenous induction   Anesthetic plan and risks discussed with patient.
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
## 2018-04-24 PROBLEM — R19.8 PERFORATION OF VISCUS: Status: ACTIVE | Noted: 2018-04-23

## 2018-04-24 LAB
ALBUMIN SERPL-MCNC: 2.8 G/DL (ref 3.2–4.8)
ALBUMIN/GLOB SERPL: 1.2 G/DL (ref 1.5–2.5)
ALP SERPL-CCNC: 50 U/L (ref 25–100)
ALT SERPL W P-5'-P-CCNC: 39 U/L (ref 7–40)
AMPHET+METHAMPHET UR QL: NEGATIVE
AMPHETAMINES UR QL: POSITIVE
ANION GAP SERPL CALCULATED.3IONS-SCNC: 6 MMOL/L (ref 3–11)
AST SERPL-CCNC: 50 U/L (ref 0–33)
BACTERIA UR QL AUTO: ABNORMAL /HPF
BARBITURATES UR QL SCN: NEGATIVE
BASOPHILS # BLD AUTO: 0 10*3/MM3 (ref 0–0.2)
BASOPHILS NFR BLD AUTO: 0 % (ref 0–1)
BENZODIAZ UR QL SCN: POSITIVE
BILIRUB SERPL-MCNC: 0.8 MG/DL (ref 0.3–1.2)
BILIRUB UR QL STRIP: NEGATIVE
BUN BLD-MCNC: 12 MG/DL (ref 9–23)
BUN/CREAT SERPL: 13.3 (ref 7–25)
BUPRENORPHINE SERPL-MCNC: POSITIVE NG/ML
CALCIUM SPEC-SCNC: 7.8 MG/DL (ref 8.7–10.4)
CANNABINOIDS SERPL QL: NEGATIVE
CHLORIDE SERPL-SCNC: 106 MMOL/L (ref 99–109)
CLARITY UR: CLEAR
CO2 SERPL-SCNC: 24 MMOL/L (ref 20–31)
COCAINE UR QL: NEGATIVE
COLOR UR: ABNORMAL
CREAT BLD-MCNC: 0.9 MG/DL (ref 0.6–1.3)
DEPRECATED RDW RBC AUTO: 45.4 FL (ref 37–54)
EOSINOPHIL # BLD AUTO: 0 10*3/MM3 (ref 0–0.3)
EOSINOPHIL NFR BLD AUTO: 0 % (ref 0–3)
ERYTHROCYTE [DISTWIDTH] IN BLOOD BY AUTOMATED COUNT: 13.6 % (ref 11.3–14.5)
GFR SERPL CREATININE-BSD FRML MDRD: 83 ML/MIN/1.73
GLOBULIN UR ELPH-MCNC: 2.3 GM/DL
GLUCOSE BLD-MCNC: 161 MG/DL (ref 70–100)
GLUCOSE BLDC GLUCOMTR-MCNC: 132 MG/DL (ref 70–130)
GLUCOSE BLDC GLUCOMTR-MCNC: 161 MG/DL (ref 70–130)
GLUCOSE BLDC GLUCOMTR-MCNC: 228 MG/DL (ref 70–130)
GLUCOSE BLDC GLUCOMTR-MCNC: 99 MG/DL (ref 70–130)
GLUCOSE UR STRIP-MCNC: NEGATIVE MG/DL
HBA1C MFR BLD: 5.8 % (ref 4.8–5.6)
HCT VFR BLD AUTO: 35.2 % (ref 38.9–50.9)
HGB BLD-MCNC: 11.7 G/DL (ref 13.1–17.5)
HGB UR QL STRIP.AUTO: ABNORMAL
HIV1+2 AB SER QL: NORMAL
HYALINE CASTS UR QL AUTO: ABNORMAL /LPF
IMM GRANULOCYTES # BLD: 0.03 10*3/MM3 (ref 0–0.03)
IMM GRANULOCYTES NFR BLD: 0.3 % (ref 0–0.6)
INR PPP: 1.07 (ref 0.91–1.09)
KETONES UR QL STRIP: ABNORMAL
LEUKOCYTE ESTERASE UR QL STRIP.AUTO: NEGATIVE
LYMPHOCYTES # BLD AUTO: 0.65 10*3/MM3 (ref 0.6–4.8)
LYMPHOCYTES NFR BLD AUTO: 5.6 % (ref 24–44)
MAGNESIUM SERPL-MCNC: 1.5 MG/DL (ref 1.3–2.7)
MCH RBC QN AUTO: 30.5 PG (ref 27–31)
MCHC RBC AUTO-ENTMCNC: 33.2 G/DL (ref 32–36)
MCV RBC AUTO: 91.9 FL (ref 80–99)
METHADONE UR QL SCN: NEGATIVE
MONOCYTES # BLD AUTO: 0.47 10*3/MM3 (ref 0–1)
MONOCYTES NFR BLD AUTO: 4 % (ref 0–12)
NEUTROPHILS # BLD AUTO: 10.49 10*3/MM3 (ref 1.5–8.3)
NEUTROPHILS NFR BLD AUTO: 90.4 % (ref 41–71)
NITRITE UR QL STRIP: NEGATIVE
OPIATES UR QL: NEGATIVE
OXYCODONE UR QL SCN: NEGATIVE
PCP UR QL SCN: NEGATIVE
PH UR STRIP.AUTO: 5.5 [PH] (ref 5–8)
PHOSPHATE SERPL-MCNC: 2.7 MG/DL (ref 2.4–5.1)
PLATELET # BLD AUTO: 215 10*3/MM3 (ref 150–450)
PMV BLD AUTO: 11.1 FL (ref 6–12)
POTASSIUM BLD-SCNC: 4.5 MMOL/L (ref 3.5–5.5)
PROCALCITONIN SERPL-MCNC: 0.29 NG/ML
PROPOXYPH UR QL: NEGATIVE
PROT SERPL-MCNC: 5.1 G/DL (ref 5.7–8.2)
PROT UR QL STRIP: ABNORMAL
PROTHROMBIN TIME: 11.2 SECONDS (ref 9.6–11.5)
RBC # BLD AUTO: 3.83 10*6/MM3 (ref 4.2–5.76)
RBC # UR: ABNORMAL /HPF
REF LAB TEST METHOD: ABNORMAL
SODIUM BLD-SCNC: 136 MMOL/L (ref 132–146)
SP GR UR STRIP: 1.03 (ref 1–1.03)
SQUAMOUS #/AREA URNS HPF: ABNORMAL /HPF
TRICYCLICS UR QL SCN: NEGATIVE
TSH SERPL DL<=0.05 MIU/L-ACNC: 0.41 MIU/ML (ref 0.35–5.35)
UROBILINOGEN UR QL STRIP: ABNORMAL
VIT B12 BLD-MCNC: 998 PG/ML (ref 211–911)
WBC NRBC COR # BLD: 11.61 10*3/MM3 (ref 3.5–10.8)
WBC UR QL AUTO: ABNORMAL /HPF

## 2018-04-24 PROCEDURE — 81001 URINALYSIS AUTO W/SCOPE: CPT

## 2018-04-24 PROCEDURE — 80306 DRUG TEST PRSMV INSTRMNT: CPT | Performed by: NURSE PRACTITIONER

## 2018-04-24 PROCEDURE — 83735 ASSAY OF MAGNESIUM: CPT | Performed by: NURSE PRACTITIONER

## 2018-04-24 PROCEDURE — 25010000002 PIPERACILLIN SOD-TAZOBACTAM PER 1 G: Performed by: SURGERY

## 2018-04-24 PROCEDURE — 63710000001 INSULIN REGULAR HUMAN PER 5 UNITS: Performed by: INTERNAL MEDICINE

## 2018-04-24 PROCEDURE — 82962 GLUCOSE BLOOD TEST: CPT

## 2018-04-24 PROCEDURE — 80053 COMPREHEN METABOLIC PANEL: CPT | Performed by: NURSE PRACTITIONER

## 2018-04-24 PROCEDURE — 25010000002 FLUCONAZOLE PER 200 MG: Performed by: INTERNAL MEDICINE

## 2018-04-24 PROCEDURE — 25010000002 HEPARIN (PORCINE) PER 1000 UNITS: Performed by: SURGERY

## 2018-04-24 PROCEDURE — 25010000002 MAGNESIUM SULFATE 2 GM/50ML SOLUTION: Performed by: INTERNAL MEDICINE

## 2018-04-24 PROCEDURE — 85025 COMPLETE CBC W/AUTO DIFF WBC: CPT | Performed by: NURSE PRACTITIONER

## 2018-04-24 PROCEDURE — 84100 ASSAY OF PHOSPHORUS: CPT | Performed by: NURSE PRACTITIONER

## 2018-04-24 PROCEDURE — 25010000002 LORAZEPAM PER 2 MG: Performed by: INTERNAL MEDICINE

## 2018-04-24 PROCEDURE — 25010000002 PIPERACILLIN SOD-TAZOBACTAM PER 1 G

## 2018-04-24 PROCEDURE — 25010000002 HYDROMORPHONE PER 4 MG: Performed by: NURSE PRACTITIONER

## 2018-04-24 PROCEDURE — 97162 PT EVAL MOD COMPLEX 30 MIN: CPT

## 2018-04-24 PROCEDURE — 97165 OT EVAL LOW COMPLEX 30 MIN: CPT

## 2018-04-24 PROCEDURE — 25010000002 THIAMINE PER 100 MG: Performed by: INTERNAL MEDICINE

## 2018-04-24 PROCEDURE — 99233 SBSQ HOSP IP/OBS HIGH 50: CPT | Performed by: INTERNAL MEDICINE

## 2018-04-24 RX ORDER — LORAZEPAM 2 MG/ML
0.5 INJECTION INTRAMUSCULAR EVERY 6 HOURS
Status: DISCONTINUED | OUTPATIENT
Start: 2018-04-24 | End: 2018-04-25

## 2018-04-24 RX ORDER — HYDROMORPHONE HYDROCHLORIDE 1 MG/ML
0.5 INJECTION, SOLUTION INTRAMUSCULAR; INTRAVENOUS; SUBCUTANEOUS
Status: DISCONTINUED | OUTPATIENT
Start: 2018-04-24 | End: 2018-05-03 | Stop reason: HOSPADM

## 2018-04-24 RX ORDER — LORAZEPAM 2 MG/ML
1 INJECTION INTRAMUSCULAR
Status: DISCONTINUED | OUTPATIENT
Start: 2018-04-24 | End: 2018-04-25

## 2018-04-24 RX ORDER — LORAZEPAM 2 MG/ML
2 INJECTION INTRAMUSCULAR
Status: DISCONTINUED | OUTPATIENT
Start: 2018-04-24 | End: 2018-04-25

## 2018-04-24 RX ORDER — LORAZEPAM 1 MG/1
1 TABLET ORAL
Status: DISCONTINUED | OUTPATIENT
Start: 2018-04-24 | End: 2018-04-25

## 2018-04-24 RX ORDER — LORAZEPAM 2 MG/ML
0.5 INJECTION INTRAMUSCULAR EVERY 8 HOURS
Status: DISCONTINUED | OUTPATIENT
Start: 2018-04-25 | End: 2018-04-25

## 2018-04-24 RX ORDER — SODIUM CHLORIDE, SODIUM LACTATE, POTASSIUM CHLORIDE, CALCIUM CHLORIDE 600; 310; 30; 20 MG/100ML; MG/100ML; MG/100ML; MG/100ML
150 INJECTION, SOLUTION INTRAVENOUS ONCE
Status: COMPLETED | OUTPATIENT
Start: 2018-04-24 | End: 2018-04-24

## 2018-04-24 RX ORDER — LORAZEPAM 0.5 MG/1
0.5 TABLET ORAL
Status: DISCONTINUED | OUTPATIENT
Start: 2018-04-24 | End: 2018-04-25

## 2018-04-24 RX ORDER — DEXTROSE MONOHYDRATE, SODIUM CHLORIDE, SODIUM LACTATE, POTASSIUM CHLORIDE, CALCIUM CHLORIDE 5; 600; 310; 179; 20 G/100ML; MG/100ML; MG/100ML; MG/100ML; MG/100ML
125 INJECTION, SOLUTION INTRAVENOUS CONTINUOUS
Status: DISCONTINUED | OUTPATIENT
Start: 2018-04-24 | End: 2018-04-24 | Stop reason: ALTCHOICE

## 2018-04-24 RX ORDER — DIAZEPAM 5 MG/1
5 TABLET ORAL EVERY 6 HOURS SCHEDULED
Status: DISCONTINUED | OUTPATIENT
Start: 2018-04-24 | End: 2018-04-24

## 2018-04-24 RX ORDER — DEXTROSE, SODIUM CHLORIDE, SODIUM LACTATE, POTASSIUM CHLORIDE, AND CALCIUM CHLORIDE 5; .6; .31; .03; .02 G/100ML; G/100ML; G/100ML; G/100ML; G/100ML
125 INJECTION, SOLUTION INTRAVENOUS CONTINUOUS
Status: DISCONTINUED | OUTPATIENT
Start: 2018-04-24 | End: 2018-04-26

## 2018-04-24 RX ORDER — LORAZEPAM 1 MG/1
2 TABLET ORAL
Status: DISCONTINUED | OUTPATIENT
Start: 2018-04-24 | End: 2018-04-25

## 2018-04-24 RX ORDER — LORAZEPAM 2 MG/ML
1 INJECTION INTRAMUSCULAR EVERY 4 HOURS
Status: DISCONTINUED | OUTPATIENT
Start: 2018-04-24 | End: 2018-04-25

## 2018-04-24 RX ORDER — LORAZEPAM 2 MG/ML
0.5 INJECTION INTRAMUSCULAR
Status: DISCONTINUED | OUTPATIENT
Start: 2018-04-24 | End: 2018-04-25

## 2018-04-24 RX ADMIN — PANTOPRAZOLE SODIUM 40 MG: 40 INJECTION, POWDER, FOR SOLUTION INTRAVENOUS at 00:25

## 2018-04-24 RX ADMIN — SODIUM CHLORIDE 100 ML/HR: 9 INJECTION, SOLUTION INTRAVENOUS at 00:36

## 2018-04-24 RX ADMIN — TAZOBACTAM SODIUM AND PIPERACILLIN SODIUM 3.38 G: 375; 3 INJECTION, SOLUTION INTRAVENOUS at 05:57

## 2018-04-24 RX ADMIN — FLUCONAZOLE 400 MG: 2 INJECTION, SOLUTION INTRAVENOUS at 22:32

## 2018-04-24 RX ADMIN — LORAZEPAM 1 MG: 2 INJECTION INTRAMUSCULAR; INTRAVENOUS at 15:26

## 2018-04-24 RX ADMIN — SODIUM CHLORIDE, POTASSIUM CHLORIDE, SODIUM LACTATE AND CALCIUM CHLORIDE 150 ML/HR: 600; 310; 30; 20 INJECTION, SOLUTION INTRAVENOUS at 17:55

## 2018-04-24 RX ADMIN — MAGNESIUM SULFATE HEPTAHYDRATE 2 G: 40 INJECTION, SOLUTION INTRAVENOUS at 10:52

## 2018-04-24 RX ADMIN — HYDROMORPHONE HYDROCHLORIDE 0.5 MG: 1 INJECTION, SOLUTION INTRAMUSCULAR; INTRAVENOUS; SUBCUTANEOUS at 22:32

## 2018-04-24 RX ADMIN — TAZOBACTAM SODIUM AND PIPERACILLIN SODIUM 3.38 G: 375; 3 INJECTION, SOLUTION INTRAVENOUS at 22:01

## 2018-04-24 RX ADMIN — PANTOPRAZOLE SODIUM 40 MG: 40 INJECTION, POWDER, FOR SOLUTION INTRAVENOUS at 08:35

## 2018-04-24 RX ADMIN — THIAMINE HYDROCHLORIDE 100 MG: 100 INJECTION, SOLUTION INTRAMUSCULAR; INTRAVENOUS at 08:35

## 2018-04-24 RX ADMIN — SODIUM CHLORIDE, SODIUM LACTATE, POTASSIUM CHLORIDE, CALCIUM CHLORIDE AND DEXTROSE MONOHYDRATE 125 ML/HR: 5; 600; 310; 30; 20 INJECTION, SOLUTION INTRAVENOUS at 10:29

## 2018-04-24 RX ADMIN — TAZOBACTAM SODIUM AND PIPERACILLIN SODIUM 3.38 G: 375; 3 INJECTION, SOLUTION INTRAVENOUS at 13:48

## 2018-04-24 RX ADMIN — SODIUM CHLORIDE 100 ML/HR: 9 INJECTION, SOLUTION INTRAVENOUS at 05:56

## 2018-04-24 RX ADMIN — TAZOBACTAM SODIUM AND PIPERACILLIN SODIUM 3.38 G: 375; 3 INJECTION, SOLUTION INTRAVENOUS at 00:33

## 2018-04-24 RX ADMIN — PANTOPRAZOLE SODIUM 40 MG: 40 INJECTION, POWDER, FOR SOLUTION INTRAVENOUS at 20:17

## 2018-04-24 RX ADMIN — FLUCONAZOLE 800 MG: 400 INJECTION, SOLUTION INTRAVENOUS at 00:19

## 2018-04-24 RX ADMIN — LORAZEPAM 1 MG: 2 INJECTION INTRAMUSCULAR; INTRAVENOUS at 12:08

## 2018-04-24 RX ADMIN — HEPARIN SODIUM 5000 UNITS: 5000 INJECTION, SOLUTION INTRAVENOUS; SUBCUTANEOUS at 13:47

## 2018-04-24 RX ADMIN — INSULIN HUMAN 2 UNITS: 100 INJECTION, SOLUTION PARENTERAL at 05:56

## 2018-04-24 RX ADMIN — HYDROMORPHONE HYDROCHLORIDE 0.5 MG: 1 INJECTION, SOLUTION INTRAMUSCULAR; INTRAVENOUS; SUBCUTANEOUS at 18:42

## 2018-04-24 RX ADMIN — MAGNESIUM SULFATE HEPTAHYDRATE 2 G: 40 INJECTION, SOLUTION INTRAVENOUS at 06:12

## 2018-04-24 RX ADMIN — HEPARIN SODIUM 5000 UNITS: 5000 INJECTION, SOLUTION INTRAVENOUS; SUBCUTANEOUS at 00:24

## 2018-04-24 RX ADMIN — INSULIN HUMAN 3 UNITS: 100 INJECTION, SOLUTION PARENTERAL at 12:00

## 2018-04-24 RX ADMIN — DEXMEDETOMIDINE HYDROCHLORIDE 0.6 MCG/KG/HR: 4 INJECTION, SOLUTION INTRAVENOUS at 05:06

## 2018-04-24 RX ADMIN — HEPARIN SODIUM 5000 UNITS: 5000 INJECTION, SOLUTION INTRAVENOUS; SUBCUTANEOUS at 05:56

## 2018-04-24 RX ADMIN — DEXTROSE MONOHYDRATE, SODIUM CHLORIDE, SODIUM LACTATE, POTASSIUM CHLORIDE, CALCIUM CHLORIDE 125 ML/HR: 5; 600; 310; 179; 20 INJECTION, SOLUTION INTRAVENOUS at 06:14

## 2018-04-24 RX ADMIN — HEPARIN SODIUM 5000 UNITS: 5000 INJECTION, SOLUTION INTRAVENOUS; SUBCUTANEOUS at 22:02

## 2018-04-24 NOTE — ANESTHESIA PROCEDURE NOTES
Peripheral Block    Patient location during procedure: OR  Reason for block: at surgeon's request and post-op pain management  Performed by  Anesthesiologist: CONCHITA CHAUDHARI  Preanesthetic Checklist  Completed: patient identified, site marked, surgical consent, pre-op evaluation, timeout performed, IV checked, risks and benefits discussed and monitors and equipment checked  Prep:  Pt Position: supine  Sterile barriers:cap, gloves, sterile barriers and mask  Prep: ChloraPrep  Patient monitoring: blood pressure monitoring, continuous pulse oximetry and EKG  Procedure  Sedation:yes  Performed under: general  Guidance:ultrasound guided  ULTRASOUND INTERPRETATION. Using ultrasound guidance a 20 G gauge needle was placed in close proximity to the nerve, at which point, under ultrasound guidance anesthetic was injected in the area of the nerve and spread of the anesthesia was seen on ultrasound in close proximity thereto.  There were no abnormalities seen on ultrasound; a digital image was taken; and the patient tolerated the procedure with no complications. Images:still images obtained    Laterality:Bilateral  Block Type:TAP  Injection Technique:single-shot  Needle Type:short-bevel and echogenic  Needle Gauge:20 G    Medications  Comment:Block Injection:  LA dose divided between Right and Left block       Adjuncts:  Decadron 4mg PSF, Buprenex 0.3mg (Per total volume of LA)  Local Injected:bupivacaine 0.25% Local Amount Injected:60mL  Post Assessment  Injection Assessment: negative aspiration for heme, incremental injection and no paresthesia on injection  Patient Tolerance:comfortable throughout block  Complications:no  Additional Notes      Under Ultrasound guidance, a BBraun 4inch 360 degree needle was advanced with Normal Saline hydro dissection of tissue.  The Internal Oblique and Transversus Abdominus muscles where visualized.  At or before the aponeurosis of Internal Oblique, local anesthetic spread was visualized  in the Transversus Abdominus Plane. Injection was made incrementally with aspiration every 5 mls.  There was no  intravascular injection,  injection pressure was normal, there was no neural injection, and the procedure was completed without difficulty.  Thank You.

## 2018-04-24 NOTE — ANESTHESIA POSTPROCEDURE EVALUATION
Patient: Ronald Bond    Procedure Summary     Date:  04/23/18 Room / Location:   [facility]    Anesthesia Start:  1720 Anesthesia Stop:      Procedures:       LAPAROTOMY EXPLORATORY, SMALL BOWEL RESECTION,  WITH EGD (N/A Abdomen)      COLON RESECTION SMALL BOWEL (N/A Abdomen) Diagnosis:      Surgeon:  Indy Owen MD Provider:  Arron Dodge MD    Anesthesia Type:  general ASA Status:  3          Anesthesia Type: general  Last vitals  BP   (!) 177/112 (04/23/18 2239)   Temp   97.1 °F (36.2 °C) (04/23/18 2239)   Pulse   100 (04/23/18 2239)   Resp   17 (04/23/18 2239)     SpO2   94 % (04/23/18 2239)     Post Anesthesia Care and Evaluation    Patient location during evaluation: PACU  Patient participation: complete - patient cannot participate  Level of consciousness: awake  Pain score: 2  Pain management: adequate  Anesthetic complications: No anesthetic complications  PONV Status: none  Cardiovascular status: acceptable and hemodynamically stable  Respiratory status: acceptable and nasal cannula    Comments: Extubated awake in the OR, to RR, report to RN, stable

## 2018-04-25 LAB
ALBUMIN SERPL-MCNC: 2.5 G/DL (ref 3.2–4.8)
ANION GAP SERPL CALCULATED.3IONS-SCNC: 3 MMOL/L (ref 3–11)
BASOPHILS # BLD AUTO: 0 10*3/MM3 (ref 0–0.2)
BASOPHILS NFR BLD AUTO: 0 % (ref 0–1)
BUN BLD-MCNC: 13 MG/DL (ref 9–23)
BUN/CREAT SERPL: 16.3 (ref 7–25)
CALCIUM SPEC-SCNC: 7.7 MG/DL (ref 8.7–10.4)
CHLORIDE SERPL-SCNC: 107 MMOL/L (ref 99–109)
CO2 SERPL-SCNC: 26 MMOL/L (ref 20–31)
CREAT BLD-MCNC: 0.8 MG/DL (ref 0.6–1.3)
CYTO UR: NORMAL
DEPRECATED RDW RBC AUTO: 46.6 FL (ref 37–54)
EOSINOPHIL # BLD AUTO: 0 10*3/MM3 (ref 0–0.3)
EOSINOPHIL NFR BLD AUTO: 0 % (ref 0–3)
ERYTHROCYTE [DISTWIDTH] IN BLOOD BY AUTOMATED COUNT: 14 % (ref 11.3–14.5)
GFR SERPL CREATININE-BSD FRML MDRD: 95 ML/MIN/1.73
GLUCOSE BLD-MCNC: 139 MG/DL (ref 70–100)
GLUCOSE BLDC GLUCOMTR-MCNC: 101 MG/DL (ref 70–130)
GLUCOSE BLDC GLUCOMTR-MCNC: 109 MG/DL (ref 70–130)
GLUCOSE BLDC GLUCOMTR-MCNC: 118 MG/DL (ref 70–130)
GLUCOSE BLDC GLUCOMTR-MCNC: 145 MG/DL (ref 70–130)
HCT VFR BLD AUTO: 27.4 % (ref 38.9–50.9)
HGB BLD-MCNC: 9.2 G/DL (ref 13.1–17.5)
IMM GRANULOCYTES # BLD: 0.03 10*3/MM3 (ref 0–0.03)
IMM GRANULOCYTES NFR BLD: 0.2 % (ref 0–0.6)
LAB AP CASE REPORT: NORMAL
LAB AP CLINICAL INFORMATION: NORMAL
LAB AP DIAGNOSIS COMMENT: NORMAL
LYMPHOCYTES # BLD AUTO: 0.93 10*3/MM3 (ref 0.6–4.8)
LYMPHOCYTES NFR BLD AUTO: 7.2 % (ref 24–44)
Lab: NORMAL
MAGNESIUM SERPL-MCNC: 2.2 MG/DL (ref 1.3–2.7)
MCH RBC QN AUTO: 31.1 PG (ref 27–31)
MCHC RBC AUTO-ENTMCNC: 33.6 G/DL (ref 32–36)
MCV RBC AUTO: 92.6 FL (ref 80–99)
MONOCYTES # BLD AUTO: 1.03 10*3/MM3 (ref 0–1)
MONOCYTES NFR BLD AUTO: 8 % (ref 0–12)
NEUTROPHILS # BLD AUTO: 10.89 10*3/MM3 (ref 1.5–8.3)
NEUTROPHILS NFR BLD AUTO: 84.8 % (ref 41–71)
PATH REPORT.FINAL DX SPEC: NORMAL
PATH REPORT.GROSS SPEC: NORMAL
PHOSPHATE SERPL-MCNC: 2.7 MG/DL (ref 2.4–5.1)
PLATELET # BLD AUTO: 189 10*3/MM3 (ref 150–450)
PMV BLD AUTO: 10.9 FL (ref 6–12)
POTASSIUM BLD-SCNC: 4.1 MMOL/L (ref 3.5–5.5)
RBC # BLD AUTO: 2.96 10*6/MM3 (ref 4.2–5.76)
SODIUM BLD-SCNC: 136 MMOL/L (ref 132–146)
WBC NRBC COR # BLD: 12.85 10*3/MM3 (ref 3.5–10.8)

## 2018-04-25 PROCEDURE — 99233 SBSQ HOSP IP/OBS HIGH 50: CPT | Performed by: INTERNAL MEDICINE

## 2018-04-25 PROCEDURE — 85025 COMPLETE CBC W/AUTO DIFF WBC: CPT | Performed by: INTERNAL MEDICINE

## 2018-04-25 PROCEDURE — 97110 THERAPEUTIC EXERCISES: CPT

## 2018-04-25 PROCEDURE — 25010000002 PIPERACILLIN SOD-TAZOBACTAM PER 1 G

## 2018-04-25 PROCEDURE — 25010000002 HEPARIN (PORCINE) PER 1000 UNITS: Performed by: SURGERY

## 2018-04-25 PROCEDURE — 25010000002 LORAZEPAM PER 2 MG: Performed by: INTERNAL MEDICINE

## 2018-04-25 PROCEDURE — 97116 GAIT TRAINING THERAPY: CPT

## 2018-04-25 PROCEDURE — 25010000002 HYDROMORPHONE PER 4 MG: Performed by: NURSE PRACTITIONER

## 2018-04-25 PROCEDURE — 82962 GLUCOSE BLOOD TEST: CPT

## 2018-04-25 PROCEDURE — 83735 ASSAY OF MAGNESIUM: CPT | Performed by: INTERNAL MEDICINE

## 2018-04-25 PROCEDURE — 80069 RENAL FUNCTION PANEL: CPT | Performed by: INTERNAL MEDICINE

## 2018-04-25 RX ADMIN — LORAZEPAM 0.5 MG: 2 INJECTION INTRAMUSCULAR; INTRAVENOUS at 07:54

## 2018-04-25 RX ADMIN — METOPROLOL TARTRATE 25 MG: 25 TABLET ORAL at 20:00

## 2018-04-25 RX ADMIN — HYDROMORPHONE HYDROCHLORIDE 0.5 MG: 1 INJECTION, SOLUTION INTRAMUSCULAR; INTRAVENOUS; SUBCUTANEOUS at 11:40

## 2018-04-25 RX ADMIN — HYDROMORPHONE HYDROCHLORIDE 0.5 MG: 1 INJECTION, SOLUTION INTRAMUSCULAR; INTRAVENOUS; SUBCUTANEOUS at 17:54

## 2018-04-25 RX ADMIN — HYDROMORPHONE HYDROCHLORIDE 0.5 MG: 1 INJECTION, SOLUTION INTRAMUSCULAR; INTRAVENOUS; SUBCUTANEOUS at 09:40

## 2018-04-25 RX ADMIN — HYDROMORPHONE HYDROCHLORIDE 0.5 MG: 1 INJECTION, SOLUTION INTRAMUSCULAR; INTRAVENOUS; SUBCUTANEOUS at 04:21

## 2018-04-25 RX ADMIN — HYDROMORPHONE HYDROCHLORIDE 0.5 MG: 1 INJECTION, SOLUTION INTRAMUSCULAR; INTRAVENOUS; SUBCUTANEOUS at 16:07

## 2018-04-25 RX ADMIN — HYDROMORPHONE HYDROCHLORIDE 0.5 MG: 1 INJECTION, SOLUTION INTRAMUSCULAR; INTRAVENOUS; SUBCUTANEOUS at 00:55

## 2018-04-25 RX ADMIN — TAZOBACTAM SODIUM AND PIPERACILLIN SODIUM 3.38 G: 375; 3 INJECTION, SOLUTION INTRAVENOUS at 05:41

## 2018-04-25 RX ADMIN — HYDROMORPHONE HYDROCHLORIDE 0.5 MG: 1 INJECTION, SOLUTION INTRAMUSCULAR; INTRAVENOUS; SUBCUTANEOUS at 13:43

## 2018-04-25 RX ADMIN — HEPARIN SODIUM 5000 UNITS: 5000 INJECTION, SOLUTION INTRAVENOUS; SUBCUTANEOUS at 05:41

## 2018-04-25 RX ADMIN — PANTOPRAZOLE SODIUM 40 MG: 40 INJECTION, POWDER, FOR SOLUTION INTRAVENOUS at 20:00

## 2018-04-25 RX ADMIN — HYDROMORPHONE HYDROCHLORIDE 0.5 MG: 1 INJECTION, SOLUTION INTRAMUSCULAR; INTRAVENOUS; SUBCUTANEOUS at 22:04

## 2018-04-25 RX ADMIN — PANTOPRAZOLE SODIUM 40 MG: 40 INJECTION, POWDER, FOR SOLUTION INTRAVENOUS at 08:06

## 2018-04-25 RX ADMIN — TAZOBACTAM SODIUM AND PIPERACILLIN SODIUM 3.38 G: 375; 3 INJECTION, SOLUTION INTRAVENOUS at 20:00

## 2018-04-25 RX ADMIN — TAZOBACTAM SODIUM AND PIPERACILLIN SODIUM 3.38 G: 375; 3 INJECTION, SOLUTION INTRAVENOUS at 14:17

## 2018-04-25 RX ADMIN — SODIUM CHLORIDE, SODIUM LACTATE, POTASSIUM CHLORIDE, CALCIUM CHLORIDE AND DEXTROSE MONOHYDRATE 125 ML/HR: 5; 600; 310; 30; 20 INJECTION, SOLUTION INTRAVENOUS at 01:08

## 2018-04-25 RX ADMIN — HEPARIN SODIUM 5000 UNITS: 5000 INJECTION, SOLUTION INTRAVENOUS; SUBCUTANEOUS at 14:17

## 2018-04-25 RX ADMIN — HEPARIN SODIUM 5000 UNITS: 5000 INJECTION, SOLUTION INTRAVENOUS; SUBCUTANEOUS at 20:00

## 2018-04-25 RX ADMIN — METOPROLOL TARTRATE 25 MG: 25 TABLET ORAL at 14:18

## 2018-04-25 RX ADMIN — LORAZEPAM 1 MG: 2 INJECTION INTRAMUSCULAR; INTRAVENOUS at 00:05

## 2018-04-25 RX ADMIN — HYDROMORPHONE HYDROCHLORIDE 0.5 MG: 1 INJECTION, SOLUTION INTRAMUSCULAR; INTRAVENOUS; SUBCUTANEOUS at 20:00

## 2018-04-26 LAB
ALBUMIN SERPL-MCNC: 3.1 G/DL (ref 3.2–4.8)
ALBUMIN/GLOB SERPL: 1.3 G/DL (ref 1.5–2.5)
ALP SERPL-CCNC: 50 U/L (ref 25–100)
ALT SERPL W P-5'-P-CCNC: 25 U/L (ref 7–40)
ANION GAP SERPL CALCULATED.3IONS-SCNC: 7 MMOL/L (ref 3–11)
AST SERPL-CCNC: 23 U/L (ref 0–33)
BASOPHILS # BLD AUTO: 0.01 10*3/MM3 (ref 0–0.2)
BASOPHILS NFR BLD AUTO: 0.1 % (ref 0–1)
BILIRUB SERPL-MCNC: 0.3 MG/DL (ref 0.3–1.2)
BUN BLD-MCNC: 9 MG/DL (ref 9–23)
BUN/CREAT SERPL: 11.3 (ref 7–25)
CALCIUM SPEC-SCNC: 7.8 MG/DL (ref 8.7–10.4)
CHLORIDE SERPL-SCNC: 100 MMOL/L (ref 99–109)
CO2 SERPL-SCNC: 32 MMOL/L (ref 20–31)
CREAT BLD-MCNC: 0.8 MG/DL (ref 0.6–1.3)
DEPRECATED RDW RBC AUTO: 46.7 FL (ref 37–54)
EOSINOPHIL # BLD AUTO: 0.13 10*3/MM3 (ref 0–0.3)
EOSINOPHIL NFR BLD AUTO: 1.2 % (ref 0–3)
ERYTHROCYTE [DISTWIDTH] IN BLOOD BY AUTOMATED COUNT: 13.7 % (ref 11.3–14.5)
GFR SERPL CREATININE-BSD FRML MDRD: 95 ML/MIN/1.73
GLOBULIN UR ELPH-MCNC: 2.4 GM/DL
GLUCOSE BLD-MCNC: 110 MG/DL (ref 70–100)
GLUCOSE BLDC GLUCOMTR-MCNC: 103 MG/DL (ref 70–130)
GLUCOSE BLDC GLUCOMTR-MCNC: 104 MG/DL (ref 70–130)
GLUCOSE BLDC GLUCOMTR-MCNC: 111 MG/DL (ref 70–130)
GLUCOSE BLDC GLUCOMTR-MCNC: 99 MG/DL (ref 70–130)
HCT VFR BLD AUTO: 32.6 % (ref 38.9–50.9)
HGB BLD-MCNC: 10.8 G/DL (ref 13.1–17.5)
IMM GRANULOCYTES # BLD: 0.05 10*3/MM3 (ref 0–0.03)
IMM GRANULOCYTES NFR BLD: 0.5 % (ref 0–0.6)
LYMPHOCYTES # BLD AUTO: 1.16 10*3/MM3 (ref 0.6–4.8)
LYMPHOCYTES NFR BLD AUTO: 11.1 % (ref 24–44)
MCH RBC QN AUTO: 30.8 PG (ref 27–31)
MCHC RBC AUTO-ENTMCNC: 33.1 G/DL (ref 32–36)
MCV RBC AUTO: 92.9 FL (ref 80–99)
MONOCYTES # BLD AUTO: 1.16 10*3/MM3 (ref 0–1)
MONOCYTES NFR BLD AUTO: 11.1 % (ref 0–12)
NEUTROPHILS # BLD AUTO: 7.93 10*3/MM3 (ref 1.5–8.3)
NEUTROPHILS NFR BLD AUTO: 76 % (ref 41–71)
PLAT MORPH BLD: NORMAL
PLATELET # BLD AUTO: 237 10*3/MM3 (ref 150–450)
PMV BLD AUTO: 10.5 FL (ref 6–12)
POTASSIUM BLD-SCNC: 3.8 MMOL/L (ref 3.5–5.5)
PROT SERPL-MCNC: 5.5 G/DL (ref 5.7–8.2)
RBC # BLD AUTO: 3.51 10*6/MM3 (ref 4.2–5.76)
RBC MORPH BLD: NORMAL
SODIUM BLD-SCNC: 139 MMOL/L (ref 132–146)
WBC MORPH BLD: NORMAL
WBC NRBC COR # BLD: 10.44 10*3/MM3 (ref 3.5–10.8)

## 2018-04-26 PROCEDURE — 82962 GLUCOSE BLOOD TEST: CPT

## 2018-04-26 PROCEDURE — 85007 BL SMEAR W/DIFF WBC COUNT: CPT | Performed by: INTERNAL MEDICINE

## 2018-04-26 PROCEDURE — 97530 THERAPEUTIC ACTIVITIES: CPT

## 2018-04-26 PROCEDURE — 25010000002 HEPARIN (PORCINE) PER 1000 UNITS: Performed by: SURGERY

## 2018-04-26 PROCEDURE — 85025 COMPLETE CBC W/AUTO DIFF WBC: CPT | Performed by: INTERNAL MEDICINE

## 2018-04-26 PROCEDURE — 25010000002 FLUCONAZOLE PER 200 MG: Performed by: INTERNAL MEDICINE

## 2018-04-26 PROCEDURE — 99233 SBSQ HOSP IP/OBS HIGH 50: CPT | Performed by: INTERNAL MEDICINE

## 2018-04-26 PROCEDURE — 25010000002 PIPERACILLIN SOD-TAZOBACTAM PER 1 G

## 2018-04-26 PROCEDURE — 97110 THERAPEUTIC EXERCISES: CPT

## 2018-04-26 PROCEDURE — 80053 COMPREHEN METABOLIC PANEL: CPT | Performed by: INTERNAL MEDICINE

## 2018-04-26 PROCEDURE — 25010000002 HYDROMORPHONE PER 4 MG: Performed by: NURSE PRACTITIONER

## 2018-04-26 RX ORDER — DEXTROSE, SODIUM CHLORIDE, AND POTASSIUM CHLORIDE 5; .45; .15 G/100ML; G/100ML; G/100ML
75 INJECTION INTRAVENOUS CONTINUOUS
Status: DISCONTINUED | OUTPATIENT
Start: 2018-04-26 | End: 2018-05-01

## 2018-04-26 RX ORDER — NALOXONE HCL 0.4 MG/ML
0.1 VIAL (ML) INJECTION
Status: DISCONTINUED | OUTPATIENT
Start: 2018-04-26 | End: 2018-05-03 | Stop reason: HOSPADM

## 2018-04-26 RX ORDER — METOPROLOL TARTRATE 5 MG/5ML
5 INJECTION INTRAVENOUS EVERY 6 HOURS
Status: DISCONTINUED | OUTPATIENT
Start: 2018-04-26 | End: 2018-04-28

## 2018-04-26 RX ORDER — DIPHENHYDRAMINE HYDROCHLORIDE 50 MG/ML
25 INJECTION INTRAMUSCULAR; INTRAVENOUS EVERY 6 HOURS PRN
Status: DISCONTINUED | OUTPATIENT
Start: 2018-04-26 | End: 2018-05-03 | Stop reason: HOSPADM

## 2018-04-26 RX ADMIN — METOPROLOL TARTRATE 25 MG: 25 TABLET ORAL at 08:31

## 2018-04-26 RX ADMIN — METOPROLOL TARTRATE 5 MG: 1 INJECTION, SOLUTION INTRAVENOUS at 17:07

## 2018-04-26 RX ADMIN — HYDROMORPHONE HYDROCHLORIDE: 10 INJECTION, SOLUTION INTRAMUSCULAR; INTRAVENOUS; SUBCUTANEOUS at 21:25

## 2018-04-26 RX ADMIN — HYDROMORPHONE HYDROCHLORIDE 0.5 MG: 1 INJECTION, SOLUTION INTRAMUSCULAR; INTRAVENOUS; SUBCUTANEOUS at 08:32

## 2018-04-26 RX ADMIN — HEPARIN SODIUM 5000 UNITS: 5000 INJECTION, SOLUTION INTRAVENOUS; SUBCUTANEOUS at 14:38

## 2018-04-26 RX ADMIN — HYDROMORPHONE HYDROCHLORIDE 0.5 MG: 1 INJECTION, SOLUTION INTRAMUSCULAR; INTRAVENOUS; SUBCUTANEOUS at 00:11

## 2018-04-26 RX ADMIN — TAZOBACTAM SODIUM AND PIPERACILLIN SODIUM 3.38 G: 375; 3 INJECTION, SOLUTION INTRAVENOUS at 14:39

## 2018-04-26 RX ADMIN — HEPARIN SODIUM 5000 UNITS: 5000 INJECTION, SOLUTION INTRAVENOUS; SUBCUTANEOUS at 05:12

## 2018-04-26 RX ADMIN — POTASSIUM CHLORIDE, DEXTROSE MONOHYDRATE AND SODIUM CHLORIDE 75 ML/HR: 150; 5; 450 INJECTION, SOLUTION INTRAVENOUS at 12:19

## 2018-04-26 RX ADMIN — PANTOPRAZOLE SODIUM 40 MG: 40 INJECTION, POWDER, FOR SOLUTION INTRAVENOUS at 21:33

## 2018-04-26 RX ADMIN — PANTOPRAZOLE SODIUM 40 MG: 40 INJECTION, POWDER, FOR SOLUTION INTRAVENOUS at 08:31

## 2018-04-26 RX ADMIN — HYDROMORPHONE HYDROCHLORIDE: 10 INJECTION, SOLUTION INTRAMUSCULAR; INTRAVENOUS; SUBCUTANEOUS at 10:06

## 2018-04-26 RX ADMIN — TAZOBACTAM SODIUM AND PIPERACILLIN SODIUM 3.38 G: 375; 3 INJECTION, SOLUTION INTRAVENOUS at 05:12

## 2018-04-26 RX ADMIN — HEPARIN SODIUM 5000 UNITS: 5000 INJECTION, SOLUTION INTRAVENOUS; SUBCUTANEOUS at 21:33

## 2018-04-26 RX ADMIN — HYDROMORPHONE HYDROCHLORIDE 0.5 MG: 1 INJECTION, SOLUTION INTRAMUSCULAR; INTRAVENOUS; SUBCUTANEOUS at 04:15

## 2018-04-26 RX ADMIN — HYDROMORPHONE HYDROCHLORIDE 0.5 MG: 1 INJECTION, SOLUTION INTRAMUSCULAR; INTRAVENOUS; SUBCUTANEOUS at 02:05

## 2018-04-26 RX ADMIN — TAZOBACTAM SODIUM AND PIPERACILLIN SODIUM 3.38 G: 375; 3 INJECTION, SOLUTION INTRAVENOUS at 21:33

## 2018-04-26 RX ADMIN — FLUCONAZOLE 400 MG: 2 INJECTION, SOLUTION INTRAVENOUS at 00:11

## 2018-04-26 RX ADMIN — SODIUM CHLORIDE, SODIUM LACTATE, POTASSIUM CHLORIDE, CALCIUM CHLORIDE AND DEXTROSE MONOHYDRATE 125 ML/HR: 5; 600; 310; 30; 20 INJECTION, SOLUTION INTRAVENOUS at 06:09

## 2018-04-26 RX ADMIN — HYDROMORPHONE HYDROCHLORIDE 0.5 MG: 1 INJECTION, SOLUTION INTRAMUSCULAR; INTRAVENOUS; SUBCUTANEOUS at 06:08

## 2018-04-27 ENCOUNTER — APPOINTMENT (OUTPATIENT)
Dept: GENERAL RADIOLOGY | Facility: HOSPITAL | Age: 73
End: 2018-04-27
Attending: SURGERY

## 2018-04-27 LAB
ALBUMIN SERPL-MCNC: 3.6 G/DL (ref 3.2–4.8)
ALBUMIN/GLOB SERPL: 1.2 G/DL (ref 1.5–2.5)
ALP SERPL-CCNC: 69 U/L (ref 25–100)
ALT SERPL W P-5'-P-CCNC: 47 U/L (ref 7–40)
ANION GAP SERPL CALCULATED.3IONS-SCNC: 4 MMOL/L (ref 3–11)
AST SERPL-CCNC: 61 U/L (ref 0–33)
BASOPHILS # BLD AUTO: 0.03 10*3/MM3 (ref 0–0.2)
BASOPHILS NFR BLD AUTO: 0.3 % (ref 0–1)
BILIRUB SERPL-MCNC: 0.5 MG/DL (ref 0.3–1.2)
BUN BLD-MCNC: 7 MG/DL (ref 9–23)
BUN/CREAT SERPL: 8.8 (ref 7–25)
CA-I SERPL ISE-MCNC: 1.18 MMOL/L (ref 1.12–1.32)
CALCIUM SPEC-SCNC: 8.9 MG/DL (ref 8.7–10.4)
CHLORIDE SERPL-SCNC: 95 MMOL/L (ref 99–109)
CO2 SERPL-SCNC: 38 MMOL/L (ref 20–31)
CREAT BLD-MCNC: 0.8 MG/DL (ref 0.6–1.3)
DEPRECATED RDW RBC AUTO: 46.2 FL (ref 37–54)
EOSINOPHIL # BLD AUTO: 0.65 10*3/MM3 (ref 0–0.3)
EOSINOPHIL NFR BLD AUTO: 7 % (ref 0–3)
ERYTHROCYTE [DISTWIDTH] IN BLOOD BY AUTOMATED COUNT: 13.6 % (ref 11.3–14.5)
GFR SERPL CREATININE-BSD FRML MDRD: 95 ML/MIN/1.73
GLOBULIN UR ELPH-MCNC: 2.9 GM/DL
GLUCOSE BLD-MCNC: 104 MG/DL (ref 70–100)
GLUCOSE BLDC GLUCOMTR-MCNC: 104 MG/DL (ref 70–130)
GLUCOSE BLDC GLUCOMTR-MCNC: 107 MG/DL (ref 70–130)
GLUCOSE BLDC GLUCOMTR-MCNC: 116 MG/DL (ref 70–130)
GLUCOSE BLDC GLUCOMTR-MCNC: 138 MG/DL (ref 70–130)
HCT VFR BLD AUTO: 35.4 % (ref 38.9–50.9)
HGB BLD-MCNC: 11.5 G/DL (ref 13.1–17.5)
IMM GRANULOCYTES # BLD: 0.05 10*3/MM3 (ref 0–0.03)
IMM GRANULOCYTES NFR BLD: 0.5 % (ref 0–0.6)
LYMPHOCYTES # BLD AUTO: 1.67 10*3/MM3 (ref 0.6–4.8)
LYMPHOCYTES NFR BLD AUTO: 18.1 % (ref 24–44)
MAGNESIUM SERPL-MCNC: 1.8 MG/DL (ref 1.3–2.7)
MAGNESIUM SERPL-MCNC: 2.7 MG/DL (ref 1.3–2.7)
MCH RBC QN AUTO: 30.4 PG (ref 27–31)
MCHC RBC AUTO-ENTMCNC: 32.5 G/DL (ref 32–36)
MCV RBC AUTO: 93.7 FL (ref 80–99)
MONOCYTES # BLD AUTO: 1.24 10*3/MM3 (ref 0–1)
MONOCYTES NFR BLD AUTO: 13.4 % (ref 0–12)
NEUTROPHILS # BLD AUTO: 5.65 10*3/MM3 (ref 1.5–8.3)
NEUTROPHILS NFR BLD AUTO: 61.2 % (ref 41–71)
PLATELET # BLD AUTO: 277 10*3/MM3 (ref 150–450)
PMV BLD AUTO: 10.5 FL (ref 6–12)
POTASSIUM BLD-SCNC: 3.2 MMOL/L (ref 3.5–5.5)
POTASSIUM BLD-SCNC: 4.8 MMOL/L (ref 3.5–5.5)
PROT SERPL-MCNC: 6.5 G/DL (ref 5.7–8.2)
RBC # BLD AUTO: 3.78 10*6/MM3 (ref 4.2–5.76)
SODIUM BLD-SCNC: 137 MMOL/L (ref 132–146)
WBC NRBC COR # BLD: 9.24 10*3/MM3 (ref 3.5–10.8)

## 2018-04-27 PROCEDURE — 0 DIATRIZOATE MEGLUMINE & SODIUM PER 1 ML: Performed by: INTERNAL MEDICINE

## 2018-04-27 PROCEDURE — 25010000002 PIPERACILLIN SOD-TAZOBACTAM PER 1 G

## 2018-04-27 PROCEDURE — 84132 ASSAY OF SERUM POTASSIUM: CPT | Performed by: INTERNAL MEDICINE

## 2018-04-27 PROCEDURE — 25010000002 FLUCONAZOLE PER 200 MG: Performed by: INTERNAL MEDICINE

## 2018-04-27 PROCEDURE — 82330 ASSAY OF CALCIUM: CPT | Performed by: INTERNAL MEDICINE

## 2018-04-27 PROCEDURE — 83735 ASSAY OF MAGNESIUM: CPT | Performed by: INTERNAL MEDICINE

## 2018-04-27 PROCEDURE — 99232 SBSQ HOSP IP/OBS MODERATE 35: CPT | Performed by: INTERNAL MEDICINE

## 2018-04-27 PROCEDURE — 25010000003 POTASSIUM CHLORIDE 20 MEQ/250ML SOLUTION: Performed by: INTERNAL MEDICINE

## 2018-04-27 PROCEDURE — 82962 GLUCOSE BLOOD TEST: CPT

## 2018-04-27 PROCEDURE — 85025 COMPLETE CBC W/AUTO DIFF WBC: CPT | Performed by: INTERNAL MEDICINE

## 2018-04-27 PROCEDURE — C1894 INTRO/SHEATH, NON-LASER: HCPCS

## 2018-04-27 PROCEDURE — 74245: CPT

## 2018-04-27 PROCEDURE — 80053 COMPREHEN METABOLIC PANEL: CPT | Performed by: INTERNAL MEDICINE

## 2018-04-27 PROCEDURE — C1751 CATH, INF, PER/CENT/MIDLINE: HCPCS

## 2018-04-27 PROCEDURE — 25010000002 HEPARIN (PORCINE) PER 1000 UNITS: Performed by: SURGERY

## 2018-04-27 PROCEDURE — 02HV33Z INSERTION OF INFUSION DEVICE INTO SUPERIOR VENA CAVA, PERCUTANEOUS APPROACH: ICD-10-PCS | Performed by: INTERNAL MEDICINE

## 2018-04-27 RX ORDER — SODIUM CHLORIDE 0.9 % (FLUSH) 0.9 %
10 SYRINGE (ML) INJECTION AS NEEDED
Status: DISCONTINUED | OUTPATIENT
Start: 2018-04-27 | End: 2018-05-03 | Stop reason: HOSPADM

## 2018-04-27 RX ADMIN — TAZOBACTAM SODIUM AND PIPERACILLIN SODIUM 3.38 G: 375; 3 INJECTION, SOLUTION INTRAVENOUS at 14:15

## 2018-04-27 RX ADMIN — HYDROMORPHONE HYDROCHLORIDE: 10 INJECTION, SOLUTION INTRAMUSCULAR; INTRAVENOUS; SUBCUTANEOUS at 13:23

## 2018-04-27 RX ADMIN — PANTOPRAZOLE SODIUM 40 MG: 40 INJECTION, POWDER, FOR SOLUTION INTRAVENOUS at 09:00

## 2018-04-27 RX ADMIN — POTASSIUM CHLORIDE 20 MEQ: 149 INJECTION, SOLUTION, CONCENTRATE INTRAVENOUS at 13:17

## 2018-04-27 RX ADMIN — TAZOBACTAM SODIUM AND PIPERACILLIN SODIUM 3.38 G: 375; 3 INJECTION, SOLUTION INTRAVENOUS at 05:54

## 2018-04-27 RX ADMIN — METOPROLOL TARTRATE 5 MG: 1 INJECTION, SOLUTION INTRAVENOUS at 17:58

## 2018-04-27 RX ADMIN — PANTOPRAZOLE SODIUM 40 MG: 40 INJECTION, POWDER, FOR SOLUTION INTRAVENOUS at 21:05

## 2018-04-27 RX ADMIN — METOPROLOL TARTRATE 5 MG: 1 INJECTION, SOLUTION INTRAVENOUS at 12:25

## 2018-04-27 RX ADMIN — TAZOBACTAM SODIUM AND PIPERACILLIN SODIUM 3.38 G: 375; 3 INJECTION, SOLUTION INTRAVENOUS at 21:09

## 2018-04-27 RX ADMIN — METOPROLOL TARTRATE 5 MG: 1 INJECTION, SOLUTION INTRAVENOUS at 00:01

## 2018-04-27 RX ADMIN — HEPARIN SODIUM 5000 UNITS: 5000 INJECTION, SOLUTION INTRAVENOUS; SUBCUTANEOUS at 21:05

## 2018-04-27 RX ADMIN — MAGNESIUM SULFATE HEPTAHYDRATE 4 G: 40 INJECTION, SOLUTION INTRAVENOUS at 07:54

## 2018-04-27 RX ADMIN — DIATRIZOATE MEGLUMINE AND DIATRIZOATE SODIUM 240 ML: 660; 100 LIQUID ORAL; RECTAL at 12:04

## 2018-04-27 RX ADMIN — METOPROLOL TARTRATE 5 MG: 1 INJECTION, SOLUTION INTRAVENOUS at 05:56

## 2018-04-27 RX ADMIN — FLUCONAZOLE 400 MG: 2 INJECTION, SOLUTION INTRAVENOUS at 23:55

## 2018-04-27 RX ADMIN — METOPROLOL TARTRATE 5 MG: 1 INJECTION, SOLUTION INTRAVENOUS at 23:55

## 2018-04-27 RX ADMIN — HEPARIN SODIUM 5000 UNITS: 5000 INJECTION, SOLUTION INTRAVENOUS; SUBCUTANEOUS at 15:54

## 2018-04-27 RX ADMIN — HYDROMORPHONE HYDROCHLORIDE: 10 INJECTION, SOLUTION INTRAMUSCULAR; INTRAVENOUS; SUBCUTANEOUS at 22:04

## 2018-04-27 RX ADMIN — DIATRIZOATE MEGLUMINE AND DIATRIZOATE SODIUM 240 ML: 660; 100 LIQUID ORAL; RECTAL at 19:30

## 2018-04-27 RX ADMIN — FLUCONAZOLE 400 MG: 2 INJECTION, SOLUTION INTRAVENOUS at 00:02

## 2018-04-27 RX ADMIN — POTASSIUM CHLORIDE 20 MEQ: 149 INJECTION, SOLUTION, CONCENTRATE INTRAVENOUS at 10:35

## 2018-04-27 RX ADMIN — HEPARIN SODIUM 5000 UNITS: 5000 INJECTION, SOLUTION INTRAVENOUS; SUBCUTANEOUS at 05:56

## 2018-04-27 RX ADMIN — POTASSIUM CHLORIDE, DEXTROSE MONOHYDRATE AND SODIUM CHLORIDE 75 ML/HR: 150; 5; 450 INJECTION, SOLUTION INTRAVENOUS at 15:55

## 2018-04-28 LAB
ANION GAP SERPL CALCULATED.3IONS-SCNC: 6 MMOL/L (ref 3–11)
BASOPHILS # BLD AUTO: 0.02 10*3/MM3 (ref 0–0.2)
BASOPHILS NFR BLD AUTO: 0.2 % (ref 0–1)
BUN BLD-MCNC: 9 MG/DL (ref 9–23)
BUN/CREAT SERPL: 10 (ref 7–25)
CALCIUM SPEC-SCNC: 9 MG/DL (ref 8.7–10.4)
CHLORIDE SERPL-SCNC: 100 MMOL/L (ref 99–109)
CO2 SERPL-SCNC: 33 MMOL/L (ref 20–31)
CREAT BLD-MCNC: 0.9 MG/DL (ref 0.6–1.3)
DEPRECATED RDW RBC AUTO: 47 FL (ref 37–54)
EOSINOPHIL # BLD AUTO: 0.32 10*3/MM3 (ref 0–0.3)
EOSINOPHIL NFR BLD AUTO: 3.5 % (ref 0–3)
ERYTHROCYTE [DISTWIDTH] IN BLOOD BY AUTOMATED COUNT: 13.8 % (ref 11.3–14.5)
GFR SERPL CREATININE-BSD FRML MDRD: 83 ML/MIN/1.73
GLUCOSE BLD-MCNC: 110 MG/DL (ref 70–100)
GLUCOSE BLDC GLUCOMTR-MCNC: 105 MG/DL (ref 70–130)
GLUCOSE BLDC GLUCOMTR-MCNC: 105 MG/DL (ref 70–130)
GLUCOSE BLDC GLUCOMTR-MCNC: 108 MG/DL (ref 70–130)
GLUCOSE BLDC GLUCOMTR-MCNC: 112 MG/DL (ref 70–130)
HCT VFR BLD AUTO: 39.5 % (ref 38.9–50.9)
HGB BLD-MCNC: 12.9 G/DL (ref 13.1–17.5)
IMM GRANULOCYTES # BLD: 0.09 10*3/MM3 (ref 0–0.03)
IMM GRANULOCYTES NFR BLD: 1 % (ref 0–0.6)
LYMPHOCYTES # BLD AUTO: 1.65 10*3/MM3 (ref 0.6–4.8)
LYMPHOCYTES NFR BLD AUTO: 17.9 % (ref 24–44)
MAGNESIUM SERPL-MCNC: 2.6 MG/DL (ref 1.3–2.7)
MCH RBC QN AUTO: 30.7 PG (ref 27–31)
MCHC RBC AUTO-ENTMCNC: 32.7 G/DL (ref 32–36)
MCV RBC AUTO: 94 FL (ref 80–99)
MONOCYTES # BLD AUTO: 1.32 10*3/MM3 (ref 0–1)
MONOCYTES NFR BLD AUTO: 14.3 % (ref 0–12)
NEUTROPHILS # BLD AUTO: 5.89 10*3/MM3 (ref 1.5–8.3)
NEUTROPHILS NFR BLD AUTO: 64.1 % (ref 41–71)
PHOSPHATE SERPL-MCNC: 3.7 MG/DL (ref 2.4–5.1)
PLATELET # BLD AUTO: 313 10*3/MM3 (ref 150–450)
PMV BLD AUTO: 10.5 FL (ref 6–12)
POTASSIUM BLD-SCNC: 3.7 MMOL/L (ref 3.5–5.5)
RBC # BLD AUTO: 4.2 10*6/MM3 (ref 4.2–5.76)
SODIUM BLD-SCNC: 139 MMOL/L (ref 132–146)
WBC NRBC COR # BLD: 9.2 10*3/MM3 (ref 3.5–10.8)

## 2018-04-28 PROCEDURE — 84100 ASSAY OF PHOSPHORUS: CPT | Performed by: INTERNAL MEDICINE

## 2018-04-28 PROCEDURE — 99233 SBSQ HOSP IP/OBS HIGH 50: CPT | Performed by: INTERNAL MEDICINE

## 2018-04-28 PROCEDURE — 83735 ASSAY OF MAGNESIUM: CPT | Performed by: INTERNAL MEDICINE

## 2018-04-28 PROCEDURE — 85025 COMPLETE CBC W/AUTO DIFF WBC: CPT | Performed by: INTERNAL MEDICINE

## 2018-04-28 PROCEDURE — 80048 BASIC METABOLIC PNL TOTAL CA: CPT | Performed by: INTERNAL MEDICINE

## 2018-04-28 PROCEDURE — 25010000002 PIPERACILLIN SOD-TAZOBACTAM PER 1 G

## 2018-04-28 PROCEDURE — 82962 GLUCOSE BLOOD TEST: CPT

## 2018-04-28 PROCEDURE — 25010000002 HEPARIN (PORCINE) PER 1000 UNITS: Performed by: SURGERY

## 2018-04-28 RX ORDER — METOPROLOL TARTRATE 5 MG/5ML
10 INJECTION INTRAVENOUS EVERY 6 HOURS
Status: DISCONTINUED | OUTPATIENT
Start: 2018-04-28 | End: 2018-04-29

## 2018-04-28 RX ADMIN — HYDROMORPHONE HYDROCHLORIDE: 10 INJECTION, SOLUTION INTRAMUSCULAR; INTRAVENOUS; SUBCUTANEOUS at 12:17

## 2018-04-28 RX ADMIN — METOPROLOL TARTRATE 10 MG: 1 INJECTION, SOLUTION INTRAVENOUS at 17:49

## 2018-04-28 RX ADMIN — METOPROLOL TARTRATE 5 MG: 1 INJECTION, SOLUTION INTRAVENOUS at 06:02

## 2018-04-28 RX ADMIN — METOPROLOL TARTRATE 5 MG: 1 INJECTION, SOLUTION INTRAVENOUS at 11:53

## 2018-04-28 RX ADMIN — HEPARIN SODIUM 5000 UNITS: 5000 INJECTION, SOLUTION INTRAVENOUS; SUBCUTANEOUS at 14:00

## 2018-04-28 RX ADMIN — HEPARIN SODIUM 5000 UNITS: 5000 INJECTION, SOLUTION INTRAVENOUS; SUBCUTANEOUS at 06:02

## 2018-04-28 RX ADMIN — PANTOPRAZOLE SODIUM 40 MG: 40 INJECTION, POWDER, FOR SOLUTION INTRAVENOUS at 20:55

## 2018-04-28 RX ADMIN — HYDROMORPHONE HYDROCHLORIDE: 10 INJECTION, SOLUTION INTRAMUSCULAR; INTRAVENOUS; SUBCUTANEOUS at 20:52

## 2018-04-28 RX ADMIN — POTASSIUM CHLORIDE, DEXTROSE MONOHYDRATE AND SODIUM CHLORIDE 75 ML/HR: 150; 5; 450 INJECTION, SOLUTION INTRAVENOUS at 21:03

## 2018-04-28 RX ADMIN — TAZOBACTAM SODIUM AND PIPERACILLIN SODIUM 3.38 G: 375; 3 INJECTION, SOLUTION INTRAVENOUS at 06:02

## 2018-04-28 RX ADMIN — TAZOBACTAM SODIUM AND PIPERACILLIN SODIUM 3.38 G: 375; 3 INJECTION, SOLUTION INTRAVENOUS at 13:58

## 2018-04-28 RX ADMIN — HEPARIN SODIUM 5000 UNITS: 5000 INJECTION, SOLUTION INTRAVENOUS; SUBCUTANEOUS at 21:00

## 2018-04-28 RX ADMIN — POTASSIUM CHLORIDE, DEXTROSE MONOHYDRATE AND SODIUM CHLORIDE 75 ML/HR: 150; 5; 450 INJECTION, SOLUTION INTRAVENOUS at 08:09

## 2018-04-28 RX ADMIN — TAZOBACTAM SODIUM AND PIPERACILLIN SODIUM 3.38 G: 375; 3 INJECTION, SOLUTION INTRAVENOUS at 21:00

## 2018-04-28 RX ADMIN — PANTOPRAZOLE SODIUM 40 MG: 40 INJECTION, POWDER, FOR SOLUTION INTRAVENOUS at 08:08

## 2018-04-29 LAB
ALBUMIN SERPL-MCNC: 3.4 G/DL (ref 3.2–4.8)
ALBUMIN/GLOB SERPL: 1.1 G/DL (ref 1.5–2.5)
ALP SERPL-CCNC: 67 U/L (ref 25–100)
ALT SERPL W P-5'-P-CCNC: 77 U/L (ref 7–40)
ANION GAP SERPL CALCULATED.3IONS-SCNC: 7 MMOL/L (ref 3–11)
AST SERPL-CCNC: 51 U/L (ref 0–33)
BACTERIA SPEC AEROBE CULT: NORMAL
BACTERIA SPEC AEROBE CULT: NORMAL
BASOPHILS # BLD AUTO: 0.04 10*3/MM3 (ref 0–0.2)
BASOPHILS NFR BLD AUTO: 0.4 % (ref 0–1)
BILIRUB SERPL-MCNC: 0.4 MG/DL (ref 0.3–1.2)
BUN BLD-MCNC: 8 MG/DL (ref 9–23)
BUN/CREAT SERPL: 8.9 (ref 7–25)
CALCIUM SPEC-SCNC: 8.8 MG/DL (ref 8.7–10.4)
CHLORIDE SERPL-SCNC: 100 MMOL/L (ref 99–109)
CHOLEST SERPL-MCNC: 182 MG/DL (ref 0–200)
CO2 SERPL-SCNC: 32 MMOL/L (ref 20–31)
CREAT BLD-MCNC: 0.9 MG/DL (ref 0.6–1.3)
CRP SERPL-MCNC: 3.22 MG/DL (ref 0–1)
DEPRECATED RDW RBC AUTO: 46.8 FL (ref 37–54)
EOSINOPHIL # BLD AUTO: 0.77 10*3/MM3 (ref 0–0.3)
EOSINOPHIL NFR BLD AUTO: 7.9 % (ref 0–3)
ERYTHROCYTE [DISTWIDTH] IN BLOOD BY AUTOMATED COUNT: 13.7 % (ref 11.3–14.5)
GFR SERPL CREATININE-BSD FRML MDRD: 83 ML/MIN/1.73
GLOBULIN UR ELPH-MCNC: 3 GM/DL
GLUCOSE BLD-MCNC: 102 MG/DL (ref 70–100)
GLUCOSE BLDC GLUCOMTR-MCNC: 104 MG/DL (ref 70–130)
GLUCOSE BLDC GLUCOMTR-MCNC: 114 MG/DL (ref 70–130)
GLUCOSE BLDC GLUCOMTR-MCNC: 116 MG/DL (ref 70–130)
GLUCOSE BLDC GLUCOMTR-MCNC: 135 MG/DL (ref 70–130)
GLUCOSE BLDC GLUCOMTR-MCNC: 323 MG/DL (ref 70–130)
HCT VFR BLD AUTO: 36.8 % (ref 38.9–50.9)
HGB BLD-MCNC: 12 G/DL (ref 13.1–17.5)
IMM GRANULOCYTES # BLD: 0.08 10*3/MM3 (ref 0–0.03)
IMM GRANULOCYTES NFR BLD: 0.8 % (ref 0–0.6)
LYMPHOCYTES # BLD AUTO: 1.83 10*3/MM3 (ref 0.6–4.8)
LYMPHOCYTES NFR BLD AUTO: 18.7 % (ref 24–44)
MAGNESIUM SERPL-MCNC: 2 MG/DL (ref 1.3–2.7)
MCH RBC QN AUTO: 30.7 PG (ref 27–31)
MCHC RBC AUTO-ENTMCNC: 32.6 G/DL (ref 32–36)
MCV RBC AUTO: 94.1 FL (ref 80–99)
MONOCYTES # BLD AUTO: 1.05 10*3/MM3 (ref 0–1)
MONOCYTES NFR BLD AUTO: 10.7 % (ref 0–12)
NEUTROPHILS # BLD AUTO: 6.08 10*3/MM3 (ref 1.5–8.3)
NEUTROPHILS NFR BLD AUTO: 62.3 % (ref 41–71)
PHOSPHATE SERPL-MCNC: 3.8 MG/DL (ref 2.4–5.1)
PLATELET # BLD AUTO: 297 10*3/MM3 (ref 150–450)
PMV BLD AUTO: 10.3 FL (ref 6–12)
POTASSIUM BLD-SCNC: 3.4 MMOL/L (ref 3.5–5.5)
POTASSIUM BLD-SCNC: 3.7 MMOL/L (ref 3.5–5.5)
PREALB SERPL-MCNC: 14.3 MG/DL (ref 10–40)
PROT SERPL-MCNC: 6.4 G/DL (ref 5.7–8.2)
RBC # BLD AUTO: 3.91 10*6/MM3 (ref 4.2–5.76)
SODIUM BLD-SCNC: 139 MMOL/L (ref 132–146)
TRIGL SERPL-MCNC: 226 MG/DL (ref 0–150)
WBC NRBC COR # BLD: 9.77 10*3/MM3 (ref 3.5–10.8)

## 2018-04-29 PROCEDURE — 3E0336Z INTRODUCTION OF NUTRITIONAL SUBSTANCE INTO PERIPHERAL VEIN, PERCUTANEOUS APPROACH: ICD-10-PCS | Performed by: INTERNAL MEDICINE

## 2018-04-29 PROCEDURE — 86140 C-REACTIVE PROTEIN: CPT

## 2018-04-29 PROCEDURE — 25010000002 FLUCONAZOLE PER 200 MG: Performed by: INTERNAL MEDICINE

## 2018-04-29 PROCEDURE — 25010000002 POTASSIUM CHLORIDE PER 2 MEQ OF POTASSIUM

## 2018-04-29 PROCEDURE — 25010000002 HEPARIN (PORCINE) PER 1000 UNITS: Performed by: SURGERY

## 2018-04-29 PROCEDURE — 25010000002 PIPERACILLIN SOD-TAZOBACTAM PER 1 G

## 2018-04-29 PROCEDURE — 82962 GLUCOSE BLOOD TEST: CPT

## 2018-04-29 PROCEDURE — 84132 ASSAY OF SERUM POTASSIUM: CPT | Performed by: INTERNAL MEDICINE

## 2018-04-29 PROCEDURE — 25010000002 MAGNESIUM SULFATE PER 500 MG OF MAGNESIUM

## 2018-04-29 PROCEDURE — 82465 ASSAY BLD/SERUM CHOLESTEROL: CPT

## 2018-04-29 PROCEDURE — 80053 COMPREHEN METABOLIC PANEL: CPT | Performed by: INTERNAL MEDICINE

## 2018-04-29 PROCEDURE — 25010000002 DIPHENHYDRAMINE PER 50 MG: Performed by: SURGERY

## 2018-04-29 PROCEDURE — 83735 ASSAY OF MAGNESIUM: CPT | Performed by: INTERNAL MEDICINE

## 2018-04-29 PROCEDURE — 84478 ASSAY OF TRIGLYCERIDES: CPT

## 2018-04-29 PROCEDURE — 97116 GAIT TRAINING THERAPY: CPT

## 2018-04-29 PROCEDURE — 25010000002 CALCIUM GLUCONATE PER 10 ML

## 2018-04-29 PROCEDURE — 84134 ASSAY OF PREALBUMIN: CPT

## 2018-04-29 PROCEDURE — 99233 SBSQ HOSP IP/OBS HIGH 50: CPT | Performed by: INTERNAL MEDICINE

## 2018-04-29 PROCEDURE — 85025 COMPLETE CBC W/AUTO DIFF WBC: CPT | Performed by: INTERNAL MEDICINE

## 2018-04-29 PROCEDURE — 84100 ASSAY OF PHOSPHORUS: CPT | Performed by: INTERNAL MEDICINE

## 2018-04-29 PROCEDURE — 25010000002 HYDROMORPHONE PER 4 MG: Performed by: NURSE PRACTITIONER

## 2018-04-29 PROCEDURE — 25010000003 POTASSIUM CHLORIDE 20 MEQ/250ML SOLUTION: Performed by: INTERNAL MEDICINE

## 2018-04-29 RX ORDER — PAROXETINE HYDROCHLORIDE 20 MG/1
20 TABLET, FILM COATED ORAL EVERY MORNING
Status: DISCONTINUED | OUTPATIENT
Start: 2018-04-29 | End: 2018-05-03 | Stop reason: HOSPADM

## 2018-04-29 RX ORDER — AMLODIPINE BESYLATE 10 MG/1
10 TABLET ORAL DAILY
Status: DISCONTINUED | OUTPATIENT
Start: 2018-04-29 | End: 2018-05-03 | Stop reason: HOSPADM

## 2018-04-29 RX ORDER — METOPROLOL TARTRATE 100 MG/1
100 TABLET ORAL 2 TIMES DAILY
Status: DISCONTINUED | OUTPATIENT
Start: 2018-04-29 | End: 2018-05-03 | Stop reason: HOSPADM

## 2018-04-29 RX ADMIN — HYDROMORPHONE HYDROCHLORIDE: 10 INJECTION, SOLUTION INTRAMUSCULAR; INTRAVENOUS; SUBCUTANEOUS at 09:34

## 2018-04-29 RX ADMIN — PANTOPRAZOLE SODIUM 40 MG: 40 INJECTION, POWDER, FOR SOLUTION INTRAVENOUS at 09:35

## 2018-04-29 RX ADMIN — AMLODIPINE BESYLATE 10 MG: 10 TABLET ORAL at 14:03

## 2018-04-29 RX ADMIN — TAZOBACTAM SODIUM AND PIPERACILLIN SODIUM 3.38 G: 375; 3 INJECTION, SOLUTION INTRAVENOUS at 21:47

## 2018-04-29 RX ADMIN — METOPROLOL TARTRATE 10 MG: 1 INJECTION, SOLUTION INTRAVENOUS at 05:49

## 2018-04-29 RX ADMIN — FLUCONAZOLE 400 MG: 2 INJECTION, SOLUTION INTRAVENOUS at 00:02

## 2018-04-29 RX ADMIN — POTASSIUM CHLORIDE 20 MEQ: 149 INJECTION, SOLUTION, CONCENTRATE INTRAVENOUS at 06:24

## 2018-04-29 RX ADMIN — PANTOPRAZOLE SODIUM 40 MG: 40 INJECTION, POWDER, FOR SOLUTION INTRAVENOUS at 21:19

## 2018-04-29 RX ADMIN — METOPROLOL TARTRATE 100 MG: 100 TABLET ORAL at 21:16

## 2018-04-29 RX ADMIN — HEPARIN SODIUM 5000 UNITS: 5000 INJECTION, SOLUTION INTRAVENOUS; SUBCUTANEOUS at 14:04

## 2018-04-29 RX ADMIN — HEPARIN SODIUM 5000 UNITS: 5000 INJECTION, SOLUTION INTRAVENOUS; SUBCUTANEOUS at 21:19

## 2018-04-29 RX ADMIN — HEPARIN SODIUM 5000 UNITS: 5000 INJECTION, SOLUTION INTRAVENOUS; SUBCUTANEOUS at 05:49

## 2018-04-29 RX ADMIN — TAZOBACTAM SODIUM AND PIPERACILLIN SODIUM 3.38 G: 375; 3 INJECTION, SOLUTION INTRAVENOUS at 14:21

## 2018-04-29 RX ADMIN — POTASSIUM CHLORIDE 20 MEQ: 149 INJECTION, SOLUTION, CONCENTRATE INTRAVENOUS at 09:35

## 2018-04-29 RX ADMIN — FLUCONAZOLE 400 MG: 2 INJECTION, SOLUTION INTRAVENOUS at 21:47

## 2018-04-29 RX ADMIN — HYDROMORPHONE HYDROCHLORIDE: 10 INJECTION, SOLUTION INTRAMUSCULAR; INTRAVENOUS; SUBCUTANEOUS at 16:55

## 2018-04-29 RX ADMIN — DIPHENHYDRAMINE HYDROCHLORIDE 25 MG: 50 INJECTION INTRAMUSCULAR; INTRAVENOUS at 21:47

## 2018-04-29 RX ADMIN — SMOFLIPID 100 ML: 6; 6; 5; 3 INJECTION, EMULSION INTRAVENOUS at 17:57

## 2018-04-29 RX ADMIN — POTASSIUM CHLORIDE, DEXTROSE MONOHYDRATE AND SODIUM CHLORIDE 75 ML/HR: 150; 5; 450 INJECTION, SOLUTION INTRAVENOUS at 16:55

## 2018-04-29 RX ADMIN — TAZOBACTAM SODIUM AND PIPERACILLIN SODIUM 3.38 G: 375; 3 INJECTION, SOLUTION INTRAVENOUS at 05:48

## 2018-04-29 RX ADMIN — CALCIUM GLUCONATE: 94 INJECTION, SOLUTION INTRAVENOUS at 17:57

## 2018-04-29 RX ADMIN — METOPROLOL TARTRATE 10 MG: 1 INJECTION, SOLUTION INTRAVENOUS at 00:02

## 2018-04-29 RX ADMIN — HYDROMORPHONE HYDROCHLORIDE 0.5 MG: 1 INJECTION, SOLUTION INTRAMUSCULAR; INTRAVENOUS; SUBCUTANEOUS at 16:22

## 2018-04-29 RX ADMIN — PAROXETINE HYDROCHLORIDE 20 MG: 20 TABLET, FILM COATED ORAL at 14:03

## 2018-04-30 LAB
ALBUMIN SERPL-MCNC: 3.6 G/DL (ref 3.2–4.8)
ALBUMIN/GLOB SERPL: 1.2 G/DL (ref 1.5–2.5)
ALP SERPL-CCNC: 62 U/L (ref 25–100)
ALT SERPL W P-5'-P-CCNC: 56 U/L (ref 7–40)
ANION GAP SERPL CALCULATED.3IONS-SCNC: 8 MMOL/L (ref 3–11)
AST SERPL-CCNC: 31 U/L (ref 0–33)
BASOPHILS # BLD AUTO: 0.04 10*3/MM3 (ref 0–0.2)
BASOPHILS NFR BLD AUTO: 0.4 % (ref 0–1)
BILIRUB SERPL-MCNC: 0.3 MG/DL (ref 0.3–1.2)
BUN BLD-MCNC: 9 MG/DL (ref 9–23)
BUN/CREAT SERPL: 10 (ref 7–25)
CA-I SERPL ISE-MCNC: 1.23 MMOL/L (ref 1.12–1.32)
CA-I SERPL ISE-MCNC: 1.25 MMOL/L (ref 1.12–1.32)
CALCIUM SPEC-SCNC: 9 MG/DL (ref 8.7–10.4)
CHLORIDE SERPL-SCNC: 101 MMOL/L (ref 99–109)
CO2 SERPL-SCNC: 29 MMOL/L (ref 20–31)
CREAT BLD-MCNC: 0.9 MG/DL (ref 0.6–1.3)
CRP SERPL-MCNC: 3.21 MG/DL (ref 0–1)
DEPRECATED RDW RBC AUTO: 44.8 FL (ref 37–54)
EOSINOPHIL # BLD AUTO: 0.97 10*3/MM3 (ref 0–0.3)
EOSINOPHIL NFR BLD AUTO: 9.1 % (ref 0–3)
ERYTHROCYTE [DISTWIDTH] IN BLOOD BY AUTOMATED COUNT: 13.6 % (ref 11.3–14.5)
GFR SERPL CREATININE-BSD FRML MDRD: 83 ML/MIN/1.73
GLOBULIN UR ELPH-MCNC: 3.1 GM/DL
GLUCOSE BLD-MCNC: 114 MG/DL (ref 70–100)
GLUCOSE BLDC GLUCOMTR-MCNC: 120 MG/DL (ref 70–130)
GLUCOSE BLDC GLUCOMTR-MCNC: 123 MG/DL (ref 70–130)
GLUCOSE BLDC GLUCOMTR-MCNC: 141 MG/DL (ref 70–130)
HCT VFR BLD AUTO: 37.2 % (ref 38.9–50.9)
HGB BLD-MCNC: 12.3 G/DL (ref 13.1–17.5)
IMM GRANULOCYTES # BLD: 0.15 10*3/MM3 (ref 0–0.03)
IMM GRANULOCYTES NFR BLD: 1.4 % (ref 0–0.6)
LYMPHOCYTES # BLD AUTO: 1.72 10*3/MM3 (ref 0.6–4.8)
LYMPHOCYTES NFR BLD AUTO: 16.1 % (ref 24–44)
MAGNESIUM SERPL-MCNC: 2 MG/DL (ref 1.3–2.7)
MCH RBC QN AUTO: 30.3 PG (ref 27–31)
MCHC RBC AUTO-ENTMCNC: 33.1 G/DL (ref 32–36)
MCV RBC AUTO: 91.6 FL (ref 80–99)
MONOCYTES # BLD AUTO: 1.23 10*3/MM3 (ref 0–1)
MONOCYTES NFR BLD AUTO: 11.5 % (ref 0–12)
NEUTROPHILS # BLD AUTO: 6.56 10*3/MM3 (ref 1.5–8.3)
NEUTROPHILS NFR BLD AUTO: 61.5 % (ref 41–71)
PHOSPHATE SERPL-MCNC: 3.8 MG/DL (ref 2.4–5.1)
PLATELET # BLD AUTO: 309 10*3/MM3 (ref 150–450)
PMV BLD AUTO: 10 FL (ref 6–12)
POTASSIUM BLD-SCNC: 3.3 MMOL/L (ref 3.5–5.5)
PREALB SERPL-MCNC: 14.9 MG/DL (ref 10–40)
PROT SERPL-MCNC: 6.7 G/DL (ref 5.7–8.2)
RBC # BLD AUTO: 4.06 10*6/MM3 (ref 4.2–5.76)
SODIUM BLD-SCNC: 138 MMOL/L (ref 132–146)
WBC NRBC COR # BLD: 10.67 10*3/MM3 (ref 3.5–10.8)

## 2018-04-30 PROCEDURE — 25010000002 HYDROMORPHONE PER 4 MG: Performed by: NURSE PRACTITIONER

## 2018-04-30 PROCEDURE — 82962 GLUCOSE BLOOD TEST: CPT

## 2018-04-30 PROCEDURE — 25010000002 HEPARIN (PORCINE) PER 1000 UNITS: Performed by: SURGERY

## 2018-04-30 PROCEDURE — 25010000002 CALCIUM GLUCONATE PER 10 ML

## 2018-04-30 PROCEDURE — 25010000003 POTASSIUM CHLORIDE 20 MEQ/250ML SOLUTION: Performed by: INTERNAL MEDICINE

## 2018-04-30 PROCEDURE — 84134 ASSAY OF PREALBUMIN: CPT

## 2018-04-30 PROCEDURE — 82330 ASSAY OF CALCIUM: CPT

## 2018-04-30 PROCEDURE — 86140 C-REACTIVE PROTEIN: CPT

## 2018-04-30 PROCEDURE — 25010000002 DIPHENHYDRAMINE PER 50 MG: Performed by: SURGERY

## 2018-04-30 PROCEDURE — 25010000002 MAGNESIUM SULFATE PER 500 MG OF MAGNESIUM

## 2018-04-30 PROCEDURE — 99233 SBSQ HOSP IP/OBS HIGH 50: CPT | Performed by: HOSPITALIST

## 2018-04-30 PROCEDURE — 80053 COMPREHEN METABOLIC PANEL: CPT | Performed by: INTERNAL MEDICINE

## 2018-04-30 PROCEDURE — 83735 ASSAY OF MAGNESIUM: CPT | Performed by: INTERNAL MEDICINE

## 2018-04-30 PROCEDURE — 97530 THERAPEUTIC ACTIVITIES: CPT

## 2018-04-30 PROCEDURE — 84100 ASSAY OF PHOSPHORUS: CPT | Performed by: INTERNAL MEDICINE

## 2018-04-30 PROCEDURE — 25010000002 POTASSIUM CHLORIDE PER 2 MEQ OF POTASSIUM

## 2018-04-30 PROCEDURE — 85025 COMPLETE CBC W/AUTO DIFF WBC: CPT | Performed by: INTERNAL MEDICINE

## 2018-04-30 PROCEDURE — 25010000002 PIPERACILLIN SOD-TAZOBACTAM PER 1 G

## 2018-04-30 RX ADMIN — HYDROMORPHONE HYDROCHLORIDE 0.5 MG: 1 INJECTION, SOLUTION INTRAMUSCULAR; INTRAVENOUS; SUBCUTANEOUS at 17:43

## 2018-04-30 RX ADMIN — POTASSIUM CHLORIDE 20 MEQ: 149 INJECTION, SOLUTION, CONCENTRATE INTRAVENOUS at 10:41

## 2018-04-30 RX ADMIN — SMOFLIPID 100 ML: 6; 6; 5; 3 INJECTION, EMULSION INTRAVENOUS at 17:26

## 2018-04-30 RX ADMIN — AMLODIPINE BESYLATE 10 MG: 10 TABLET ORAL at 08:23

## 2018-04-30 RX ADMIN — POTASSIUM CHLORIDE 20 MEQ: 149 INJECTION, SOLUTION, CONCENTRATE INTRAVENOUS at 21:37

## 2018-04-30 RX ADMIN — HEPARIN SODIUM 5000 UNITS: 5000 INJECTION, SOLUTION INTRAVENOUS; SUBCUTANEOUS at 06:25

## 2018-04-30 RX ADMIN — TAZOBACTAM SODIUM AND PIPERACILLIN SODIUM 3.38 G: 375; 3 INJECTION, SOLUTION INTRAVENOUS at 14:51

## 2018-04-30 RX ADMIN — HEPARIN SODIUM 5000 UNITS: 5000 INJECTION, SOLUTION INTRAVENOUS; SUBCUTANEOUS at 21:37

## 2018-04-30 RX ADMIN — HEPARIN SODIUM 5000 UNITS: 5000 INJECTION, SOLUTION INTRAVENOUS; SUBCUTANEOUS at 17:26

## 2018-04-30 RX ADMIN — PAROXETINE HYDROCHLORIDE 20 MG: 20 TABLET, FILM COATED ORAL at 06:25

## 2018-04-30 RX ADMIN — DIPHENHYDRAMINE HYDROCHLORIDE 25 MG: 50 INJECTION INTRAMUSCULAR; INTRAVENOUS at 21:46

## 2018-04-30 RX ADMIN — PANTOPRAZOLE SODIUM 40 MG: 40 INJECTION, POWDER, FOR SOLUTION INTRAVENOUS at 08:23

## 2018-04-30 RX ADMIN — CALCIUM GLUCONATE: 94 INJECTION, SOLUTION INTRAVENOUS at 17:26

## 2018-04-30 RX ADMIN — PANTOPRAZOLE SODIUM 40 MG: 40 INJECTION, POWDER, FOR SOLUTION INTRAVENOUS at 21:38

## 2018-04-30 RX ADMIN — METOPROLOL TARTRATE 100 MG: 100 TABLET ORAL at 08:23

## 2018-04-30 RX ADMIN — CALCIUM GLUCONATE: 94 INJECTION, SOLUTION INTRAVENOUS at 10:42

## 2018-04-30 RX ADMIN — HYDROMORPHONE HYDROCHLORIDE: 10 INJECTION, SOLUTION INTRAMUSCULAR; INTRAVENOUS; SUBCUTANEOUS at 11:16

## 2018-04-30 RX ADMIN — TAZOBACTAM SODIUM AND PIPERACILLIN SODIUM 3.38 G: 375; 3 INJECTION, SOLUTION INTRAVENOUS at 06:25

## 2018-04-30 RX ADMIN — TAZOBACTAM SODIUM AND PIPERACILLIN SODIUM 3.38 G: 375; 3 INJECTION, SOLUTION INTRAVENOUS at 21:38

## 2018-04-30 RX ADMIN — HYDROMORPHONE HYDROCHLORIDE: 10 INJECTION, SOLUTION INTRAMUSCULAR; INTRAVENOUS; SUBCUTANEOUS at 18:38

## 2018-04-30 RX ADMIN — METOPROLOL TARTRATE 100 MG: 100 TABLET ORAL at 21:37

## 2018-05-01 LAB
ANION GAP SERPL CALCULATED.3IONS-SCNC: 5 MMOL/L (ref 3–11)
BASOPHILS # BLD AUTO: 0.04 10*3/MM3 (ref 0–0.2)
BASOPHILS NFR BLD AUTO: 0.4 % (ref 0–1)
BUN BLD-MCNC: 19 MG/DL (ref 9–23)
BUN/CREAT SERPL: 21.1 (ref 7–25)
CA-I SERPL ISE-MCNC: 1.27 MMOL/L (ref 1.12–1.32)
CALCIUM SPEC-SCNC: 9 MG/DL (ref 8.7–10.4)
CHLORIDE SERPL-SCNC: 105 MMOL/L (ref 99–109)
CO2 SERPL-SCNC: 27 MMOL/L (ref 20–31)
CREAT BLD-MCNC: 0.9 MG/DL (ref 0.6–1.3)
DEPRECATED RDW RBC AUTO: 45.9 FL (ref 37–54)
EOSINOPHIL # BLD AUTO: 1.01 10*3/MM3 (ref 0–0.3)
EOSINOPHIL NFR BLD AUTO: 9.5 % (ref 0–3)
ERYTHROCYTE [DISTWIDTH] IN BLOOD BY AUTOMATED COUNT: 13.8 % (ref 11.3–14.5)
GFR SERPL CREATININE-BSD FRML MDRD: 83 ML/MIN/1.73
GLUCOSE BLD-MCNC: 121 MG/DL (ref 70–100)
GLUCOSE BLDC GLUCOMTR-MCNC: 114 MG/DL (ref 70–130)
GLUCOSE BLDC GLUCOMTR-MCNC: 118 MG/DL (ref 70–130)
GLUCOSE BLDC GLUCOMTR-MCNC: 128 MG/DL (ref 70–130)
GLUCOSE BLDC GLUCOMTR-MCNC: 130 MG/DL (ref 70–130)
HCT VFR BLD AUTO: 37.4 % (ref 38.9–50.9)
HGB BLD-MCNC: 12.6 G/DL (ref 13.1–17.5)
IMM GRANULOCYTES # BLD: 0.11 10*3/MM3 (ref 0–0.03)
IMM GRANULOCYTES NFR BLD: 1 % (ref 0–0.6)
LYMPHOCYTES # BLD AUTO: 1.68 10*3/MM3 (ref 0.6–4.8)
LYMPHOCYTES NFR BLD AUTO: 15.7 % (ref 24–44)
MCH RBC QN AUTO: 31.3 PG (ref 27–31)
MCHC RBC AUTO-ENTMCNC: 33.7 G/DL (ref 32–36)
MCV RBC AUTO: 93 FL (ref 80–99)
MONOCYTES # BLD AUTO: 1.31 10*3/MM3 (ref 0–1)
MONOCYTES NFR BLD AUTO: 12.3 % (ref 0–12)
NEUTROPHILS # BLD AUTO: 6.63 10*3/MM3 (ref 1.5–8.3)
NEUTROPHILS NFR BLD AUTO: 62.1 % (ref 41–71)
PLATELET # BLD AUTO: 316 10*3/MM3 (ref 150–450)
PMV BLD AUTO: 10.6 FL (ref 6–12)
POTASSIUM BLD-SCNC: 4 MMOL/L (ref 3.5–5.5)
RBC # BLD AUTO: 4.02 10*6/MM3 (ref 4.2–5.76)
SODIUM BLD-SCNC: 137 MMOL/L (ref 132–146)
WBC NRBC COR # BLD: 10.67 10*3/MM3 (ref 3.5–10.8)

## 2018-05-01 PROCEDURE — 82330 ASSAY OF CALCIUM: CPT

## 2018-05-01 PROCEDURE — 82962 GLUCOSE BLOOD TEST: CPT

## 2018-05-01 PROCEDURE — 85025 COMPLETE CBC W/AUTO DIFF WBC: CPT | Performed by: HOSPITALIST

## 2018-05-01 PROCEDURE — 80048 BASIC METABOLIC PNL TOTAL CA: CPT | Performed by: HOSPITALIST

## 2018-05-01 PROCEDURE — 99233 SBSQ HOSP IP/OBS HIGH 50: CPT | Performed by: HOSPITALIST

## 2018-05-01 PROCEDURE — 25010000002 HEPARIN (PORCINE) PER 1000 UNITS: Performed by: SURGERY

## 2018-05-01 RX ORDER — PANTOPRAZOLE SODIUM 40 MG/1
40 TABLET, DELAYED RELEASE ORAL
Status: DISCONTINUED | OUTPATIENT
Start: 2018-05-01 | End: 2018-05-03 | Stop reason: HOSPADM

## 2018-05-01 RX ADMIN — METOPROLOL TARTRATE 100 MG: 100 TABLET ORAL at 20:21

## 2018-05-01 RX ADMIN — PAROXETINE HYDROCHLORIDE 20 MG: 20 TABLET, FILM COATED ORAL at 06:06

## 2018-05-01 RX ADMIN — HYDROMORPHONE HYDROCHLORIDE: 10 INJECTION, SOLUTION INTRAMUSCULAR; INTRAVENOUS; SUBCUTANEOUS at 09:17

## 2018-05-01 RX ADMIN — HEPARIN SODIUM 5000 UNITS: 5000 INJECTION, SOLUTION INTRAVENOUS; SUBCUTANEOUS at 20:21

## 2018-05-01 RX ADMIN — PANTOPRAZOLE SODIUM 40 MG: 40 INJECTION, POWDER, FOR SOLUTION INTRAVENOUS at 08:04

## 2018-05-01 RX ADMIN — HEPARIN SODIUM 5000 UNITS: 5000 INJECTION, SOLUTION INTRAVENOUS; SUBCUTANEOUS at 06:06

## 2018-05-01 RX ADMIN — METOPROLOL TARTRATE 100 MG: 100 TABLET ORAL at 08:04

## 2018-05-01 RX ADMIN — AMLODIPINE BESYLATE 10 MG: 10 TABLET ORAL at 08:04

## 2018-05-01 RX ADMIN — PANTOPRAZOLE SODIUM 40 MG: 40 TABLET, DELAYED RELEASE ORAL at 17:51

## 2018-05-01 RX ADMIN — HYDROMORPHONE HYDROCHLORIDE: 10 INJECTION, SOLUTION INTRAMUSCULAR; INTRAVENOUS; SUBCUTANEOUS at 17:35

## 2018-05-01 RX ADMIN — HEPARIN SODIUM 5000 UNITS: 5000 INJECTION, SOLUTION INTRAVENOUS; SUBCUTANEOUS at 15:11

## 2018-05-01 NOTE — PROGRESS NOTES
[facility] Progress Note    Admission Date: 4/23/2018    Ronald Bond  1945  [id number]    Date: 5/1/2018    Meds:    Anti-Infectives     Ordered     Dose/Rate Route Frequency Start Stop    04/23/18 2340  fluconazole (DIFLUCAN) IVPB 400 mg     Ordering Provider:  Cem Harding MD    400 mg  over 120 Minutes Intravenous Daily 04/24/18 2300 04/29/18 2347    04/24/18 0311  piperacillin-tazobactam (ZOSYN) 3.375 g in iso-osmotic dextrose 50 ml (premix)     Ordering Provider:  Kali Rodriguez RPH    3.375 g  over 4 Hours Intravenous Every 8 Hours 04/24/18 0600 05/01/18 0138    04/23/18 2310  fluconazole (DIFLUCAN) IVPB 800 mg     Ordering Provider:  Cem Harding MD    800 mg  100 mL/hr over 240 Minutes Intravenous Once 04/23/18 2345 04/24/18 0419    04/23/18 2147  piperacillin-tazobactam (ZOSYN) 3.375 g in iso-osmotic dextrose 50 ml (premix)     Ordering Provider:  Indy Owen MD    3.375 g  over 30 Minutes Intravenous Once 04/23/18 2230 04/24/18 0103    04/23/18 1556  piperacillin-tazobactam (ZOSYN) 4.5 g/100 mL 0.9% NS IVPB (mbp)     Ordering Provider:  Abdoul Rain MD    4.5 g Intravenous Once 04/23/18 1558 04/23/18 1708          CC: IA infection       SUBJECTIVE: 4/3018:  Patient is a 72 y.o. male who was admitted with abdominal pain and found to have pneumoperitoneum. He underwent exploratory laparotomy, enteorlysis, small bowel resection on 4/23/18.  Although preoperatively he appeared to have evidence of bowel perforation, no actual perforation was identified as surgery.  He was stabilized and improved transferred to the floor, and we were consulted.  He has been on Zosyn and Fluconazole.  He has been afebrile, wtihout leukocytosis.  Recent upper GI without obstruction, mass, stricture, or perforation.    5/1/18:  Feeling better today.  Was able to ambulate yesterday. Tolerating clear liquids.  His antibiotic therapy has timed out.    ROS:  No f/c/s. No n/v/d. No rash. No new  ADR to Abx.     PE:   Vital Signs  Temp (24hrs), Av.9 °F (36.6 °C), Min:97.6 °F (36.4 °C), Max:98.2 °F (36.8 °C)    Temp  Min: 97.6 °F (36.4 °C)  Max: 98.2 °F (36.8 °C)  BP  Min: 132/85  Max: 134/90  Pulse  Min: 66  Max: 73  Resp  Min: 16  Max: 18  No Data Recorded    GENERAL: Awake and alert, in no acute distress.   HEENT:  No conjunctival injection. No icterus. Oropharynx clear without evidence of thrush or exudate.  NECK: Supple, no JVD     HEART: RRR; No murmur, rubs, gallops.   LUNGS: Clear to auscultation bilaterally without wheezing, rales, rhonchi. Normal respiratory effort. Nonlabored. No dullness.  ABDOMEN: Soft, tender, miuldly distended. Positive bowel sounds. Incision with slight erythema mid incision  EXT:  No cyanosis, clubbing or edema. No cord.  : Genitalia generally unremarkable.  Without Al catheter.  SKIN: Warm and dry without cutaneous eruptions on Inspection/palpation.    NEURO: Alert and oriented x 3, Motor 5/5 bilaterally  Right PICC without redness     Laboratory Data      Results from last 7 days  Lab Units 18  0418   WBC 10*3/mm3 10.67 10.67 9.77   HEMOGLOBIN g/dL 12.6* 12.3* 12.0*   HEMATOCRIT % 37.4* 37.2* 36.8*   PLATELETS 10*3/mm3 316 309 297       Results from last 7 days  Lab Units 18  045   SODIUM mmol/L 137   POTASSIUM mmol/L 4.0   CHLORIDE mmol/L 105   CO2 mmol/L 27.0   BUN mg/dL 19   CREATININE mg/dL 0.90   GLUCOSE mg/dL 121*   CALCIUM mg/dL 9.0       Results from last 7 days  Lab Units 18  044   ALK PHOS U/L 62   BILIRUBIN mg/dL 0.3   ALT (SGPT) U/L 56*   AST (SGOT) U/L 31           Results from last 7 days  Lab Units 18  1339   CRP mg/dL 3.21*                 Estimated Creatinine Clearance: 85.2 mL/min (by C-G formula based on SCr of 0.9 mg/dL).    Microbiology:         :  BC no growth                    Radiology:  Imaging Results (last 72 hours)     ** No results found for the last 72 hours. **               IMPRESSION:  1.  Intra-abdominal infection-secondary to a probable perforated viscus, S/P exploratory laparotomy with no definite focus of perforation identified. I discussed his situation with Dr. Owen.  There was no actual overt peritoneal soilage.  We can discontinue antibiotic therapy.  2.  Hepatitis C, he has been treated with Interferon approximately 20 years ago  3.  Polysubstance abuse  4.  History of endocarditis    RECOMMENDATIONS;  1.  Discontinue antibiotic therapy  2.  Advance diet       Dr. Campbell has obtained the history, performed the physical exam and formulated the above treatment plan.   I discussed his situation with Dr. Owen today.    Obed Campbell MD  5/1/2018  6:13 PM

## 2018-05-01 NOTE — PROGRESS NOTES
[facility] Medicine Services  PROGRESS NOTE    Patient Name: Ronald Bond  : 1945  MRN: [id number]    Date of Admission: 2018  Length of Stay: 8  Primary Care Physician: Elder Shipley MD    Subjective   Subjective     CC:  Abd pain    HPI:  Just abdominal soreness. Tolerating liquids. No f/c. No n/v. Voiding ok. Walking around unit.    Review of Systems    Otherwise ROS is negative except as mentioned in the HPI.    Objective   Objective     Vital Signs:   Temp:  [97.6 °F (36.4 °C)-98.2 °F (36.8 °C)] 97.6 °F (36.4 °C)  Heart Rate:  [66-73] 73  Resp:  [16-18] 16  BP: (132-134)/(85-90) 134/90        Physical Exam:  NAD, alert and oriented  OP clear, dry MM  PERRL  Neck supple  No LAD  RRR  CTAB  +BS, mild distention, tender, but appropriately post-op  Incisions clean, no erythema or drainage  No c/c/e  No rashes  Normal affect  No change     Results Reviewed:  I have personally reviewed current lab, radiology, and data and agree.      Results from last 7 days  Lab Units 18  0418   WBC 10*3/mm3 10.67 10.67 9.77   HEMOGLOBIN g/dL 12.6* 12.3* 12.0*   HEMATOCRIT % 37.4* 37.2* 36.8*   PLATELETS 10*3/mm3 316 309 297       Results from last 7 days  Lab Units 18  0457 187 18  1426 18  0418  18  0516   SODIUM mmol/L 137 138  --  139  < > 137   POTASSIUM mmol/L 4.0 3.3* 3.7 3.4*  < > 3.2*   CHLORIDE mmol/L 105 101  --  100  < > 95*   CO2 mmol/L 27.0 29.0  --  32.0*  < > 38.0*   BUN mg/dL 19 9  --  8*  < > 7*   CREATININE mg/dL 0.90 0.90  --  0.90  < > 0.80   GLUCOSE mg/dL 121* 114*  --  102*  < > 104*   CALCIUM mg/dL 9.0 9.0  --  8.8  < > 8.9   ALT (SGPT) U/L  --  56*  --  77*  --  47*   AST (SGOT) U/L  --  31  --  51*  --  61*   < > = values in this interval not displayed.  Estimated Creatinine Clearance: 85.2 mL/min (by C-G formula based on SCr of 0.9 mg/dL).  No results found for: BNP  No results found  "for: PHART    Microbiology Results Abnormal     Procedure Component Value - Date/Time    Blood Culture - Blood, Blood, Venous Line [[id number]]  (Normal) Collected:  04/23/18 2345    Lab Status:  Final result Specimen:  Blood from Blood, Venous Line Updated:  04/29/18 0131     Blood Culture No growth at 5 days    Blood Culture - Blood, Blood, Venous Line [[id number]]  (Normal) Collected:  04/23/18 2330    Lab Status:  Final result Specimen:  Blood from Blood, Venous Line Updated:  04/29/18 0131     Blood Culture No growth at 5 days          Imaging Results (last 24 hours)     ** No results found for the last 24 hours. **             I have reviewed the medications.    Assessment/Plan   Assessment / Plan     Hospital Problem List     * (Principal)Likely microperforation of viscus    Overview Signed 4/24/2018  3:25 PM by Max Don MD     Laparotomy negative for macro perforation 4/24/18         Depression with anxiety    Hypertension    Asthma    H/O chronic hepatitis    Overview Signed 4/23/2018  4:23 PM by KELLEN Way     s/p treatment. Confirmed clearance of virus by  hepatology         Polysubstance abuse    Overview Addendum 4/23/2018  4:25 PM by KELLEN Way     h/o opioid abuse per chart review, on suboxone now. He admits to using \"anything\" he can get off the street.          GERD (gastroesophageal reflux disease)    CAD (coronary artery disease)    Overview Signed 4/23/2018  9:26 PM by KELLEN Christensen     non-obstructive, 2012 CT chest at  comments on CAD                  Brief Hospital Course to date:  Ronald Bond is a 72 y.o. male here with diarrhea and abdominal pain and found to have pneumoperitoneum and portal venous gas on CT s/p exploratory laparotomy with intraoperative EGD, drain placement, enteroclysis, SBR with primary anastomosis, but no perforation seen.      Assessment & Plan:  Possible perforated viscus s/p surgery  --IV abx per " ID  Nutrition  --ON TPN, but bowel function seemingly return and UGI with no leak  --PO per surgery, tolerating liquids, advance per surgery  Polysubstance abuse  Hep C s/p treatment 20 years ago  GERD  HTN  Depression/Anxiety  Hx of CAD    DVT Prophylaxis:  NICOLE    Mobilize. PO per surgery. Consult [facility] for long term IV abx if necessary.    CODE STATUS: Full Code    Disposition: I expect the patient to be discharged 2-3 days.      Electronically signed by Jared Glasgow MD, 05/01/18, 9:25 AM.

## 2018-05-01 NOTE — PLAN OF CARE
Problem: Fall Risk (Adult)  Goal: Absence of Fall  Outcome: Ongoing (interventions implemented as appropriate)   05/01/18 1849   Fall Risk (Adult)   Absence of Fall making progress toward outcome       Problem: Skin Injury Risk (Adult)  Goal: Skin Health and Integrity  Outcome: Ongoing (interventions implemented as appropriate)   05/01/18 1849   Skin Injury Risk (Adult)   Skin Health and Integrity making progress toward outcome

## 2018-05-01 NOTE — PLAN OF CARE
Problem: Patient Care Overview  Goal: Plan of Care Review  Outcome: Ongoing (interventions implemented as appropriate)   04/30/18 1557 04/30/18 2000 05/01/18 0447   Coping/Psychosocial   Plan of Care Reviewed With --  patient --    Plan of Care Review   Progress no change --  --    OTHER   Outcome Summary --  --  VSS, pt continues on pca for pain control, continues on tpn @ 80, tolerated clears throughout shift w/o N/V, rested well throughout shift, no other concerns/issues     Goal: Discharge Needs Assessment  Outcome: Ongoing (interventions implemented as appropriate)      Problem: Fall Risk (Adult)  Goal: Absence of Fall  Outcome: Ongoing (interventions implemented as appropriate)      Problem: Skin Injury Risk (Adult)  Goal: Skin Health and Integrity  Outcome: Ongoing (interventions implemented as appropriate)      Problem: Pain, Acute (Adult)  Goal: Identify Related Risk Factors and Signs and Symptoms  Outcome: Outcome(s) achieved Date Met: 05/01/18    Goal: Acceptable Pain Control/Comfort Level  Outcome: Ongoing (interventions implemented as appropriate)

## 2018-05-01 NOTE — PAYOR COMM NOTE
"Ronald Bond (72 y.o. Male)     Date of Birth Social Security Number Address Home Phone MRN    1945  [address] [facility]  [address] [place] [address] [phone] [id number]    Gnosticist Marital Status          None Single       Admission Date Admission Type Admitting Provider Attending Provider Department, Room/Bed    4/23/18 Emergency Jared Glasgow MD Russell, Marc P, MD [facility] 5G, S566/1    Discharge Date Discharge Disposition Discharge Destination                       Attending Provider:  Jared Glasgow MD    Allergies:  No Known Allergies    Isolation:  None   Infection:  None   Code Status:  FULL    Ht:  177.8 cm (70\")   Wt:  90.6 kg (199 lb 12.8 oz)    Admission Cmt:  None   Principal Problem:  Likely microperforation of viscus [R19.8] More...                 Active Insurance as of 4/23/2018     Primary Coverage     Payor Plan Insurance Group Employer/Plan Group    ANTHEM MEDICARE REPLACEMENT ANTHEM MEDICARE ADVANTAGE [id number]     Payor Plan Address Payor Plan Phone Number Effective From Effective To    [address] [phone] 1/1/2018     [place] [address]       Subscriber Name Subscriber Birth Date Member ID       RONALD BOND 1945 [id number]           Secondary Coverage     Payor Plan Insurance Group Employer/Plan Group    PASSPORT PASSPORT MEDICAID     Payor Plan Address Payor Plan Phone Number Effective From Effective To    [address] [phone] 1/1/2016     [place] [address]       Subscriber Name Subscriber Birth Date Member ID       RONALD BOND 1945 [id number]                 Emergency Contacts      (Rel.) Home Phone Work Phone Mobile Phone    BethanyOtilia (Sister) [phone] -- --               History & Physical      Cem Harding MD at 4/23/2018  4:40 PM            CRITICAL CARE ADMISSION NOTE    Chief Complaint     Perforated gastric ulcer    History of Present Illness     Ronald Bond is a 72 y.o. male, " "former smoker, Since to [facility] with a complaint initially of diarrhea since yesterday.  At the time he reported 10-12 episodes per day.  He denied blood that time and did not have any recent travel.  He reports a mild abdominal discomfort with this.  He denies a recent history of fever or chills, chest pain or shortness of air, but does report nausea but no vomiting.  Patient initially reported because he \"felt dehydrated and dizzy\".  CT scan was obtained the results of which showed pneumoperitoneum and portal venous gas concerning for perforated ulcer vs other etiologies.  Dr. Scott was notified and the patient is being taken to the operating room.  Patient was seen in the emergency department prior to transfer to operating room.  He was awake, alert, and in mild distress with significant abdominal pain.  Patient admits use of street drugs \"whatever I can get my hands on\", as well as Suboxone.  His drug screen is positive for benzodiazepines, Suboxone, methadone, opiates, oxycodone, and THC in August 2015. He denies use of Alcohol.  White blood cell count is 14.04, hemoglobin is 12.6.    Has a past medical history significant for hypertension, asthma, hepatitis c which was apparently treated, polysubstance abuse, and traumatic SDH.    Problem List, Surgical History, Family, Social History, and ROS     Patient Active Problem List   Diagnosis   • Depression with anxiety   • Hypertension   • Hypogonadism in male   • Lower abdominal pain   • Perforated gastric ulcer   • Asthma   • H/O chronic hepatitis   • Polysubstance abuse   • GERD (gastroesophageal reflux disease)     Past Surgical History:   Procedure Laterality Date   • APPENDECTOMY     • BONE GRAFT Right 2008    right arm   • CERVICAL LAMINECTOMY     • CHOLECYSTECTOMY     • FRACTURE SURGERY Left     left arm fracture repair   • LYSIS OF ABDOMINAL ADHESIONS  10/2011    h/o SBO   • NISSEN FUNDOPLICATION  06/2016   • VENTRAL HERNIA REPAIR  08/2011 " "  • WRIST SURGERY Right 05/2014    Right Wrist partial ulnar head excision       No Known Allergies  No current facility-administered medications on file prior to encounter.      Current Outpatient Prescriptions on File Prior to Encounter   Medication Sig   • amLODIPine (NORVASC) 10 MG tablet Take 10 mg by mouth Daily.   • metoprolol tartrate (LOPRESSOR) 100 MG tablet Take 100 mg by mouth 2 (two) times a day.   • omeprazole (priLOSEC) 40 MG capsule Take 40 mg by mouth Daily.   • PARoxetine (PAXIL) 20 MG tablet Take 20 mg by mouth every morning.     MEDICATION LIST AND ALLERGIES REVIEWED.    Family History   Problem Relation Age of Onset   • Stroke Mother    • No Known Problems Brother    • Prostate cancer Maternal Uncle    • Heart attack Neg Hx      Social History   Substance Use Topics   • Smoking status: Former Smoker     Types: Cigarettes     Quit date: 1968   • Smokeless tobacco: Never Used   • Alcohol use No     Social History     Social History Narrative   • No narrative on file     FAMILY AND SOCIAL HISTORY REVIEWED.    Review of Systems  ALL OTHER SYSTEMS REVIEWED AND ARE NEGATIVE.    Physical Exam and Clinical Information   /75   Pulse 84   Temp 98.2 °F (36.8 °C) (Oral)   Resp 16   Ht 177.8 cm (70\")   Wt 94.3 kg (208 lb)   SpO2 96%   BMI 29.84 kg/m²    Physical Exam:    General Appearance:    Alert, cooperative, mild distress, appears stated age   Head:    Normocephalic, without obvious abnormality, atraumatic   Eyes:    PERRL, conjunctiva/corneas clear        Ears:    Normal external ear canals, both ears   Nose:   Nares normal, septum midline, mucosa normal, no drainage    or sinus tenderness   Throat:   Lips, mucosa, and tongue normal; teeth and gums normal   Neck:   Supple, symmetrical, trachea midline, no adenopathy;        thyroid:  No enlargement/tenderness/nodules; no carotid    bruit or JVD   Lungs:     Clear to auscultation bilaterally, respirations unlabored   Heart:    Regular rate " "and rhythm, S1 and S2 normal, no murmur, rub    or gallop   Abdomen:     Soft, tender, distended, bowel sounds hypoactive all four quadrants, no masses, no organomegaly, guarding   Extremities:   Extremities normal, atraumatic, no cyanosis or edema   Pulses:   2+ and symmetric all extremities   Skin:   Skin color, texture, turgor normal, no rashes or lesions   Lymph nodes:   Cervical, supraclavicular, and axillary nodes normal   Neurologic:   Grossly normal, speech somewhat garbled but easy to understand,WARREN         Results from last 7 days  Lab Units 04/23/18  1124   WBC 10*3/mm3 14.04*   HEMOGLOBIN g/dL 12.6*   PLATELETS 10*3/mm3 235       Results from last 7 days  Lab Units 04/23/18  1124   SODIUM mmol/L 133   POTASSIUM mmol/L 4.1   CO2 mmol/L 28.0   BUN mg/dL 12   CREATININE mg/dL 1.10   GLUCOSE mg/dL 121*     Estimated Creatinine Clearance: 71 mL/min (by C-G formula based on SCr of 1.1 mg/dL).          No results found for: LACTATE     IMAGES:    CT SCAN Pending            EXAMINATION: XR CHEST 1 VW-      INDICATION: Upper abdominal pain triage protocol.      COMPARISON: 08/15/2015.     FINDINGS: Portable chest reveals cardiac and mediastinal silhouettes to  be within normal limits. There is a hiatal hernia identified with no new  focal parenchymal opacification present. Pulmonary vascularity is within  normal limits. The lung fields are clear. Mild elevation of the  hemidiaphragm. Degenerative change is seen within the spine.         IMPRESSION:  No acute cardiopulmonary disease.        I reviewed the patient's results and images.     [facility] Problem List     * (Principal)Perforated gastric ulcer    Polysubstance abuse    Overview Addendum 4/23/2018  4:25 PM by KELLEN Way     h/o opioid abuse per chart review, on suboxone now. He admits to using \"anything\" he can get off the street.          Depression with anxiety    Hypertension    Hypogonadism in male    Lower abdominal pain    " Asthma    H/O chronic hepatitis    Overview Signed 4/23/2018  4:23 PM by KELLEN Way     s/p treatment. Confirmed clearance of virus by [facility]ology         GERD (gastroesophageal reflux disease)        Plan/Recommendations     1. Admit to ICU after Surgery  2. Orders Per Surgery  3. Watch for Withdrawal symptoms  4. Restart home medications when able  5. Am labs, Chest Xray  6. Replace Electrolytes as needed.    Critical Care time spent in direct patient care: 45 minutes (excluding procedure time, if applicable) including high complexity decision making to assess, manipulate, and support vital organ system failure in this individual who has impairment of one or more vital organ systems such that there is a high probability of imminent or life threatening deterioration in the patient’s condition.    KELLEN Wren, [facility]  Pulmonary and Critical Care Service  4/23/2018 5:01 PM    CC: Elder Shipley MD    Please note that portions of this note were completed with a voice recognition program. Efforts were made to edit the dictations, but occasionally words are mistranscribed      Patient seen and examined by me immediately post op in PACU.  71 y/o WM w/ h/o polysubstance abuse, prior ritchie / hernia surgery / Nissen / SBO w/ lysis of adhesions, presented with diarrhea and abdominal pain w/ signs of peritonitis on exam and CT abdomen with pneumoperitoneum and portal venous gas.  Went to OR (op note pending) for E-lap.  No perforation was identified.  Discussed with Dr. Scott, who recommends NPO diet, & IV abx and anti-fungals.  Possibly small perf that sealed vs other etiologies.  Will send C. Diff for his presenting complaint of diarrhea.  He is diffusely tremulous and confused though he is immediately post op.  He could be having withdrawal from one of the many substances he endorced using.  Will use precedex to control behavior.  Don't think these are seizures as he will respond and make  eye contact.  LFT's normal, don't think ammonia level will be altered.  Give IVF, monitor closely in ICU.    Critical Care Time: 60 min    Cem Harding MD  Pulmonology and Critical Care Medicine  04/23/18 11:17 PM  Electronically Signed      Electronically signed by Cem Harding MD at 4/23/2018 11:18 PM          Operative/Procedure Notes (all)      Indy Owen MD at 4/23/2018  5:35 PM  Version 1 of 1         Exploratory Laparotomy + EGD    Ronald Bond     Date of Surgery:4/23/2018    Pre-op Diagnosis:   Pneumoperitoneum       Post-Op Diagnosis Codes:  Pneumoperitoneum presumed secondary to gastric perforation      Procedure(s):  1.  Exploratory laparotomy with intraoperative EGD and drain placement  2.  Small bowel resection and primary anastomosis  3.  Lysis of adhesions    Surgeon(s):  MD Alfonso Wheeler MD    Anesthesia: General with Block    Staff:   Circulator: Mitali Nava RN; Sonia Victor RN; Magaly Hernández RN  Scrub Person: Lenore Laguna; Amalia Dumas; Arsh Lamb, RN  Nursing Assistant: Trevor Gomez  Assistant: MABEL Chadwick    Estimated Blood Loss: 100 mL    Intravenous Fluids:  4300 mL    Urine Voided: 550 mL    Specimens:                ID Type Source Tests Collected by Time   A : SMALL BOWEL Tissue Small Intestine TISSUE PATHOLOGY EXAM Indy Owen MD 4/23/2018 2118         Drains:   Closed/Suction Drain 1 Left Abdomen Bulb 10 Fr. (Active)   Dressing Status Clean;Dry;Intact 4/23/2018 10:39 PM   Drainage Appearance Bloody 4/23/2018 10:39 PM   Status To bulb suction 4/23/2018 10:39 PM       Urethral Catheter Non-latex 16 Fr. (Active)   Daily Indications Selected surgeries ( tract, abdomen) 4/23/2018 10:39 PM   Site Assessment Clean;Skin intact 4/23/2018 10:39 PM   Collection Container Standard drainage bag 4/23/2018 10:39 PM   Securement Method Securing device 4/23/2018 10:39 PM       Findings:   1.   No perforation identified in stomach, small intestine, colon or duodenum  2.  Intraoperative EGD showed gastritis in stomach without perforation    Complications: None      Indy Owen MD     Date: 4/23/2018  Time: 10:56 PM        Electronically signed by Indy Owen MD at 4/23/2018 10:59 PM     Indy Owen MD at 4/23/2018  5:35 PM  Version 2 of 2         General Surgery Operative Note    Ronald Bond      Date of Surgery:4/23/2018     Pre-op Diagnosis:   Pneumoperitoneum       Post-Op Diagnosis Codes:  Pneumoperitoneum presumed secondary to gastric perforation        Procedure(s):  1.  Exploratory laparotomy with intraoperative EGD and drain placement  2.  Small bowel resection and primary anastomosis  3.  Lysis of adhesions     Surgeon(s):  MD Alfonso Wheeler MD     Anesthesia: General with Block     Staff:   Circulator: Mitali Nava RN; Sonia Victor RN; Magaly Hernández RN  Scrub Person: Lenore Laguna; Amalia Dumas; Arsh Lamb RN  Nursing Assistant: Trevor Gomez  Assistant: MABEL Chadwick     Estimated Blood Loss: 100 mL     Intravenous Fluids:  4300 mL     Urine Voided: 550 mL     Specimens:                ID Type Source Tests Collected by Time   A : SMALL BOWEL Tissue Small Intestine TISSUE PATHOLOGY EXAM Indy Owen MD 4/23/2018 2118          Drains:        Closed/Suction Drain 1 Left Abdomen Bulb 10 Fr. (Active)   Dressing Status Clean;Dry;Intact 4/23/2018 10:39 PM   Drainage Appearance Bloody 4/23/2018 10:39 PM   Status To bulb suction 4/23/2018 10:39 PM       Urethral Catheter Non-latex 16 Fr. (Active)   Daily Indications Selected surgeries ( tract, abdomen) 4/23/2018 10:39 PM   Site Assessment Clean;Skin intact 4/23/2018 10:39 PM   Collection Container Standard drainage bag 4/23/2018 10:39 PM   Securement Method Securing device 4/23/2018 10:39 PM         Findings:   1.  No perforation identified in stomach, small  intestine, colon or duodenum  2.  Intraoperative EGD showed gastritis/dusky mucosa in stomach without perforation     Complications: None         History:  Mr. Bond is a 72-year-old male with a history of multiple abdominal surgeries who presents to the emergency department with complaints of lower abdominal pain and diarrhea.  Blood work shows a mild leukocytosis of 14K. His examination is concerning for peritonitis as there is diffuse abdominal tenderness with involuntary guarding. CT scan shows pneumoperitoneum in the upper abdomen with thickening of the stomach as well as the right colon.  There is portal venous gas present.  There is no free fluid or inflammatory stranding around any viscus to suggest the location of the perforation, although the preliminary read is concerning for a gastric perforation.  I have informed the patient of the need for emergency exploratory laparotomy for surgical treatment of what is likely a perforated viscus.  I have counseled him as to the benefits and risks of the procedure, including but not limited to: Bleeding, infection, need for additional procedures or surgeries, injury to the surrounding organs and structures, need for ostomy, cardiopulmonary complications, stroke, deep venous thrombosis, and death. The patient understands these risks and wishes to proceed.  He will be taken to the operating room today for this emergent procedure.    Procedure:  After informed consent was obtained, the patient was brought to the operating room and placed in the supine position the operating room table. A Al catheter was placed. General anesthesia was induced, and the patient was intubated without difficulty. The abdomen was prepped and draped in the standard sterile fashion. Perioperative antibiotics were administered. Timeout was observed, indicating the patient's name and intended procedure.    The patient's prior midline abdominal incision was incised sharply, and the fascia was  entered in the upper abdomen through the patient's previously placed abdominal wall mesh.  The incision was extended down to the lower abdomen. Dense adhesions were encountered to the anterior abdominal wall at the umbilicus, and an enterotomy was made entering the fascia in this location. Using Metzenbaum scissors, the injured small bowel was removed from off the anterior abdominal wall and the enterotomy was sewn closed with a 3-0 Vicryl suture in order to identify it for future repair. At this point, adhesions were then taken down from the both the right and left lateral abdominal walls. The Bookwalter retractor was placed. The transverse colon was quite dilated and this was positioned caudally in order to identify the stomach. There were omental adhesions overlying the upper abdomen to the lateral anterior abdominal wall that prevented full visualization of the stomach. When exploring the abdomen, there was noted to be no purulence, inflammatory rind, or malodorous fluid to suggest an ongoing perforation. Given this absence of operative findings to suggest the location of the perforation, along with the patient's body habitus and extensive adhesions related to multiple prior surgeries, I called Dr. Barron Crawford in for assistance. He assisted with the entirety of the procedure from this point forward. Next, the omental adhesions to the left lateral abdominal wall were taken down. Following this, an adhesiolysis was performed of all the small bowel from the ligament of Treitz distally to the ileocecal valve. The small bowel was run and there were two serosal tears that were identified that were repaired with 3-0 silk suture in a Lembert fashion. At this point, the stomach was visualized. Given the patient's body habitus, it was difficult to gain exposure to the proximal stomach and GE junction. A Sweetheart retractor was placed to retract the liver. Examination of the stomach revealed no inflammatory rind or  ischemic appearing stomach to indicate perforation. Additionally, there was no fluid in the upper abdomen and also indicate a perforation.  The duodenum was kocherized, and there was noted to be no bile staining or succus concerning for a duodenal perforation. The right colon, transverse colon, sigmoid colon and rectum were also inspected and appeared healthy without any evidence of perforation including overlying inflammatory rind or adjacent purulence.  The enterotomy made during opening of the abdomen was then reinspected. I decided to resect this portion of small bowel.  This resection was followed by a stapled side-to-side functional end-to-end anastomosis using a 75 mm blue load of the MICHAEL. The common enterotomy was closed with a blue load of the TA 60. The edges of the staple line of the TA 60 were oversewn. The mesenteric defect was closed with a running 3-0 Vicryl.    Due to there being no perforation evident on exploration, it was decided to perform an intraoperative EGD in order to evaluate the stomach internally for a perforation.  Saline irrigation was placed in the abdomen in order to perform an air leak test.  The EGD was performed by Dr. Crawford. Briefly, the endoscope was inserted into the stomach, and the entirety of the stomach mucosa was examined. The upper portion of the stomach, which was presumed to be the intrathoracic portion of a hiatal hernia visualized on CT scan, was noted to have gastritis with a dusky appearance of the mucosa. There was no jameson perforation identified but given the appearance of the mucosa, concern was raised for this being the site of perforation. The scope was then advanced distally into the first portion of the duodenum. On gastric insufflation, there were no air bubbles within the surrounding saline concerning for an air leak which would indicate a perforation. The endoscope was retroflexed, and no perforation was identified. There was no perforation evident in the  duodenum. The scope was then withdrawn, and the stomach was desufflated.  At this point, the small bowel was run from the ligament of Treitz to the ileocecal valve and reinspected. There were no enterotomies noted. The small bowel anastomosis was intact.  Due to the fact that no perforation was identified, and the most likely source of the perforation was the stomach, a 10 mm flat AMY drain was placed in the left upper quadrant adjacent to the GE junction into the hiatal hernia sac. The AMY drain was secured at the skin with a 0 silk suture. At this point, the operation was concluded. The small bowel was brought into the abdomen. The lap and sponge counts were noted to be correct prior to closing the fascia.  The fascia was then closed with a combination of #1 PDS in a figure of 8 interrupted fashion along with 0 Vicryl simple interrupted sutures that served as internal retention sutures. The wound was closed with staples and cleansed. The incision was covered with a coviderm.  The patient was awakened from anesthesia after TAP blocks were performed and transported to the ICU in stable condition. Of note, all lap, sponge, needle counts were correct at the end of the case.      Indy Owen MD     Date: 4/24/2018  Time: 6:02 PM      Electronically signed by Indy Owen MD at 4/24/2018  6:28 PM     Indy Owen MD at 4/23/2018  5:35 PM  Version 1 of 2         General Surgery Operative Note    Ronald Bond      Date of Surgery:4/23/2018     Pre-op Diagnosis:   Pneumoperitoneum       Post-Op Diagnosis Codes:  Pneumoperitoneum presumed secondary to gastric perforation        Procedure(s):  1.  Exploratory laparotomy with intraoperative EGD and drain placement  2.  Small bowel resection and primary anastomosis  3.  Lysis of adhesions     Surgeon(s):  MD Alfonso Wheeler MD     Anesthesia: General with Block     Staff:   Circulator: Mitali Nava RN; Sonia Victor RN; Magaly Hernández  RN  Scrub Person: Lenore Laguna; Amalia Dumas; Arsh Lamb, RN  Nursing Assistant: Trevor Dean; Marizol Gomez  Assistant: MABEL Chadwick     Estimated Blood Loss: 100 mL     Intravenous Fluids:  4300 mL     Urine Voided: 550 mL     Specimens:                ID Type Source Tests Collected by Time   A : SMALL BOWEL Tissue Small Intestine TISSUE PATHOLOGY EXAM Indy Owen MD 4/23/2018 2118          Drains:        Closed/Suction Drain 1 Left Abdomen Bulb 10 Fr. (Active)   Dressing Status Clean;Dry;Intact 4/23/2018 10:39 PM   Drainage Appearance Bloody 4/23/2018 10:39 PM   Status To bulb suction 4/23/2018 10:39 PM       Urethral Catheter Non-latex 16 Fr. (Active)   Daily Indications Selected surgeries ( tract, abdomen) 4/23/2018 10:39 PM   Site Assessment Clean;Skin intact 4/23/2018 10:39 PM   Collection Container Standard drainage bag 4/23/2018 10:39 PM   Securement Method Securing device 4/23/2018 10:39 PM         Findings:   1.  No perforation identified in stomach, small intestine, colon or duodenum  2.  Intraoperative EGD showed gastritis/dusky mucosa in stomach without perforation     Complications: None         History:  Mr. Bond is a 72-year-old male with a history of multiple abdominal surgeries who presents to the emergency department with complaints of lower abdominal pain and diarrhea.  Blood work shows a mild leukocytosis of 14K. His examination is concerning for peritonitis as there is diffuse abdominal tenderness with involuntary guarding. CT scan shows pneumoperitoneum in the upper abdomen with thickening of the stomach as well as the right colon.  There is portal venous gas present.  There is no free fluid or inflammatory stranding around any viscus to suggest the location of the perforation, although the preliminary read is concerning for a gastric perforation.  I have informed the patient of the need for emergency exploratory laparotomy for surgical treatment of what  is likely a perforated viscus.  I have counseled him as to the benefits and risks of the procedure, including but not limited to: Bleeding, infection, need for additional procedures or surgeries, injury to the surrounding organs and structures, need for ostomy, cardiopulmonary complications, stroke, deep venous thrombosis, and death. The patient understands these risks and wishes to proceed.  He will be taken to the operating room today for this emergent procedure.    Procedure:  After informed consent was obtained, the patient was brought to the operating room and placed in the supine position the operating room table. A Al catheter was placed. General anesthesia was induced, and the patient was intubated without difficulty. The abdomen was prepped and draped in the standard sterile fashion. Perioperative antibiotics were administered. Timeout was observed, indicating the patient's name and intended procedure.    The patient's prior midline abdominal incision was incised sharply, and the fascia was entered in the upper abdomen through the patient's previously placed abdominal wall mesh.  The incision was extended down to the lower abdomen. Dense adhesions were encountered to the anterior abdominal wall at the umbilicus, and an enterotomy was made entering the fascia in this location. Using Metzenbaum scissors, the injured small bowel was removed from off the anterior abdominal wall and the enterotomy was sewn closed with a 3-0 Vicryl suture in order to identify it for future repair. At this point, adhesions were then taken down from the both the right and left lateral abdominal walls. The Bookwalter retractor was placed. The transverse colon was quite dilated and this was positioned caudally in order to identify the stomach. There were omental adhesions overlying the upper abdomen to the lateral anterior abdominal wall that prevented full visualization of the stomach. When exploring the abdomen, there was noted  to be no purulence, inflammatory rind, or malodorous fluid to suggest an ongoing perforation. Given this absence of operative findings to suggest the location of the perforation, along with the patient's body habitus and extensive adhesions related to multiple prior surgeries, I called Dr. Barron Crawford in for assistance. He assisted with the entirety of the procedure from this point forward. Next, the omental adhesions to the left lateral abdominal wall were taken down. Following this, an adhesiolysis was performed of all the small bowel from the ligament of Treitz distally to the ileocecal valve. The small bowel was run and there were two serosal tears that were identified that were repaired with 3-0 silk suture in a Lembert fashion. At this point, the stomach was visualized. Given the patient's body habitus, it was difficult to gain exposure to the proximal stomach and GE junction. A Sweetheart retractor was placed to retract the liver. Examination of the stomach revealed no inflammatory rind or ischemic appearing stomach to indicate perforation. Additionally, there was no fluid in the upper abdomen and also indicate a perforation.  The duodenum was kocherized, and there was noted to be no bile staining or succus concerning for a duodenal perforation. The right colon, transverse colon, sigmoid colon and rectum were also inspected and appeared healthy without any evidence of perforation including overlying inflammatory rind or adjacent purulence.  The enterotomy made during opening of the abdomen was then reinspected. I decided to resect this portion of small bowel.  This resection was followed by a stapled side-to-side functional end-to-end anastomosis using a 75 mm blue load of the MICHAEL. The common enterotomy was closed with a blue load of the TA 60. The edges of the staple line of the TA 60 were oversewn. The mesenteric defect was closed with a running 3-0 Vicryl.    Due to there being no perforation evident on  exploration, it was decided to perform an intraoperative EGD in order to evaluate the stomach internally for a perforation.  Saline irrigation was placed in the abdomen in order to perform an air leak test.  The EGD was performed by Dr. Crawford. Briefly, the endoscope was inserted into the stomach, and the entirety of the stomach mucosa was examined. The upper portion of the stomach, which was presumed to be the intrathoracic portion of a hiatal hernia visualized on CT scan, was noted to have gastritis with a dusky appearance of the mucosa. There was no jameson perforation identified. The scope was then advanced distally into the first portion of the duodenum. On gastric insufflation, there were no air bubbles within the surrounding saline concerning for an air leak which would indicate a perforation. The endoscope was retroflexed, and no perforation was identified. There was no perforation evident in the duodenum. The scope was then withdrawn, and the stomach was desufflated.  At this point, the small bowel was run from the ligament of Treitz to the ileocecal valve and reinspected. There were no enterotomies noted. The small bowel anastomosis was intact.  Due to the fact that no perforation was identified, and the most likely source of the perforation was the stomach, a 10 mm flat AMY drain was placed in the left upper quadrant adjacent to the GE junction into the hiatal hernia sac. The AMY drain was secured at the skin with a 0 silk suture. At this point, the operation was concluded. The small bowel was brought into the abdomen. The lap and sponge counts were noted to be correct prior to closing the fascia.  The fascia was then closed with a combination of #1 PDS in a figure of 8 interrupted fashion along with 0 Vicryl simple interrupted sutures that served as internal retention sutures. The wound was closed with staples and cleansed. The incision was covered with a coviderm.  The patient was awakened from anesthesia  "after TAP blocks were performed and transported to the ICU in stable condition. Of note, all lap, sponge, needle counts were correct at the end of the case.      Indy Owen MD     Date: 4/24/2018  Time: 6:02 PM      Electronically signed by Indy Owen MD at 4/24/2018  6:26 PM          Physician Progress Notes (last 24 hours) (Notes from 4/30/2018  2:16 PM through 5/1/2018  2:16 PM)      Indy Owen MD at 5/1/2018 10:42 AM          General Surgery Daily Progress Note    Subjective:  No complaints.  No nausea with clears.  (+) flatus.    Objective:  /90 (BP Location: Left arm, Patient Position: Lying)   Pulse 73   Temp 97.6 °F (36.4 °C) (Oral)   Resp 16   Ht 177.8 cm (70\")   Wt 90.6 kg (199 lb 12.8 oz) Comment: weighed pt x2  SpO2 96%   BMI 28.67 kg/m²      Physical Exam:  General: NAD, AAO x 3   Abdomen:  Soft, NT,ND.  Incision: c/d/i.  AMY with serosanguinous dressing.      Imaging Results (last 24 hours)     ** No results found for the last 24 hours. **          CBC:    Results from last 7 days  Lab Units 05/01/18  0457   WBC 10*3/mm3 10.67   HEMOGLOBIN g/dL 12.6*   HEMATOCRIT % 37.4*   PLATELETS 10*3/mm3 316       CMP:    Results from last 7 days  Lab Units 05/01/18  0457 04/30/18  0447   SODIUM mmol/L 137 138   POTASSIUM mmol/L 4.0 3.3*   CHLORIDE mmol/L 105 101   CO2 mmol/L 27.0 29.0   BUN mg/dL 19 9   CREATININE mg/dL 0.90 0.90   CALCIUM mg/dL 9.0 9.0   BILIRUBIN mg/dL  --  0.3   ALK PHOS U/L  --  62   ALT (SGPT) U/L  --  56*   AST (SGOT) U/L  --  31   GLUCOSE mg/dL 121* 114*         Assessment  POD #8 for 72 year old male s/p exploratory laparotomy, intraoperative EGD, and SBR for presumed gastric perforation.  No perforation was identified.  He remains clinically stable. UGI showed no leak.  Continue IV antibiotics, antifungal (for presumed UGI perforation), and BID PPI. Will advance diet and discontinue antibiotics today.    Plan:   - Advance to solids  - Discontinue antibiotics and " antifungal  - May discontinue TPN   - Will follow; Anticipate discharge by end of the week    Indy Owen MD - 2018, 10:42 AM            Electronically signed by Indy Owen MD at 2018 10:46 AM     Jared Glasgow MD at 2018  9:25 AM              [facility] Medicine Services  PROGRESS NOTE    Patient Name: Ronald Bond  : 1945  MRN: [id number]    Date of Admission: 2018  Length of Stay: 8  Primary Care Physician: Elder Shipley MD    Subjective   Subjective     CC:  Abd pain    HPI:  Just abdominal soreness. Tolerating liquids. No f/c. No n/v. Voiding ok. Walking around unit.    Review of Systems    Otherwise ROS is negative except as mentioned in the HPI.    Objective   Objective     Vital Signs:   Temp:  [97.6 °F (36.4 °C)-98.2 °F (36.8 °C)] 97.6 °F (36.4 °C)  Heart Rate:  [66-73] 73  Resp:  [16-18] 16  BP: (132-134)/(85-90) 134/90        Physical Exam:  NAD, alert and oriented  OP clear, dry MM  PERRL  Neck supple  No LAD  RRR  CTAB  +BS, mild distention, tender, but appropriately post-op  Incisions clean, no erythema or drainage  No c/c/e  No rashes  Normal affect  No change     Results Reviewed:  I have personally reviewed current lab, radiology, and data and agree.      Results from last 7 days  Lab Units 18   WBC 10*3/mm3 10.67 10.67 9.77   HEMOGLOBIN g/dL 12.6* 12.3* 12.0*   HEMATOCRIT % 37.4* 37.2* 36.8*   PLATELETS 10*3/mm3 316 309 297       Results from last 7 days  Lab Units 18  1426 18  0418  18  0516   SODIUM mmol/L 137 138  --  139  < > 137   POTASSIUM mmol/L 4.0 3.3* 3.7 3.4*  < > 3.2*   CHLORIDE mmol/L 105 101  --  100  < > 95*   CO2 mmol/L 27.0 29.0  --  32.0*  < > 38.0*   BUN mg/dL 19 9  --  8*  < > 7*   CREATININE mg/dL 0.90 0.90  --  0.90  < > 0.80   GLUCOSE mg/dL 121* 114*  --  102*  < > 104*   CALCIUM mg/dL 9.0 9.0  --  8.8  < > 8.9   ALT  "(SGPT) U/L  --  56*  --  77*  --  47*   AST (SGOT) U/L  --  31  --  51*  --  61*   < > = values in this interval not displayed.  Estimated Creatinine Clearance: 85.2 mL/min (by C-G formula based on SCr of 0.9 mg/dL).  No results found for: BNP  No results found for: PHART    Microbiology Results Abnormal     Procedure Component Value - Date/Time    Blood Culture - Blood, Blood, Venous Line [[id number]]  (Normal) Collected:  04/23/18 2345    Lab Status:  Final result Specimen:  Blood from Blood, Venous Line Updated:  04/29/18 0131     Blood Culture No growth at 5 days    Blood Culture - Blood, Blood, Venous Line [[id number]]  (Normal) Collected:  04/23/18 2330    Lab Status:  Final result Specimen:  Blood from Blood, Venous Line Updated:  04/29/18 0131     Blood Culture No growth at 5 days          Imaging Results (last 24 hours)     ** No results found for the last 24 hours. **             I have reviewed the medications.    Assessment/Plan   Assessment / Plan     Hospital Problem List     * (Principal)Likely microperforation of viscus    Overview Signed 4/24/2018  3:25 PM by Max Don MD     Laparotomy negative for macro perforation 4/24/18         Depression with anxiety    Hypertension    Asthma    H/O chronic hepatitis    Overview Signed 4/23/2018  4:23 PM by KELLEN Way     s/p treatment. Confirmed clearance of virus by  hepatology         Polysubstance abuse    Overview Addendum 4/23/2018  4:25 PM by KELLEN Way     h/o opioid abuse per chart review, on suboxone now. He admits to using \"anything\" he can get off the street.          GERD (gastroesophageal reflux disease)    CAD (coronary artery disease)    Overview Signed 4/23/2018  9:26 PM by KELLEN Christensen     non-obstructive, 2012 CT chest at  comments on CAD                  Brief Hospital Course to date:  Ronald Bond is a 72 y.o. male here with diarrhea and abdominal pain and found to have " pneumoperitoneum and portal venous gas on CT s/p exploratory laparotomy with intraoperative EGD, drain placement, enteroclysis, SBR with primary anastomosis, but no perforation seen.      Assessment & Plan:  Possible perforated viscus s/p surgery  --IV abx per ID  Nutrition  --ON TPN, but bowel function seemingly return and UGI with no leak  --PO per surgery, tolerating liquids, advance per surgery  Polysubstance abuse  Hep C s/p treatment 20 years ago  GERD  HTN  Depression/Anxiety  Hx of CAD    DVT Prophylaxis:  NICOLE    Mobilize. PO per surgery. Consult [facility] for long term IV abx if necessary.    CODE STATUS: Full Code    Disposition: I expect the patient to be discharged 2-3 days.      Electronically signed by Jared Glasgow MD, 05/01/18, 9:25 AM.        Electronically signed by Jared Glasgow MD at 5/1/2018  9:26 AM     KELLEN Melissa at 5/1/2018  8:10 AM          [facility] Progress Note    Admission Date: 4/23/2018    Ronald Bond  1945  [id number]    Date: 5/1/2018    Meds:    Anti-Infectives     Ordered     Dose/Rate Route Frequency Start Stop    04/23/18 2340  fluconazole (DIFLUCAN) IVPB 400 mg     Ordering Provider:  Cem Harding MD    400 mg  over 120 Minutes Intravenous Daily 04/24/18 2300 04/29/18 2347    04/24/18 0311  piperacillin-tazobactam (ZOSYN) 3.375 g in iso-osmotic dextrose 50 ml (premix)     Ordering Provider:  Kali Rodriguez RPH    3.375 g  over 4 Hours Intravenous Every 8 Hours 04/24/18 0600 05/01/18 0138    04/23/18 2310  fluconazole (DIFLUCAN) IVPB 800 mg     Ordering Provider:  Cem Harding MD    800 mg  100 mL/hr over 240 Minutes Intravenous Once 04/23/18 2345 04/24/18 0419    04/23/18 2147  piperacillin-tazobactam (ZOSYN) 3.375 g in iso-osmotic dextrose 50 ml (premix)     Ordering Provider:  Indy Owen MD    3.375 g  over 30 Minutes Intravenous Once 04/23/18 2230 04/24/18 0103    04/23/18 1556  piperacillin-tazobactam (ZOSYN) 4.5 g/100 mL  0.9% NS IVPB (mbp)     Ordering Provider:  Abdoul Rain MD    4.5 g Intravenous Once 18 1558 18 1708          CC: IA infection       SUBJECTIVE: 3018:  Patient is a 72 y.o. male who was admitted with abdominal pain and found to have pneumoperitoneum. He underwent exploratory laparotomy, enteorlysis, small bowel resection on 18.  Although preoperatively he appeared to have evidence of bowel perforation, no actual perforation was identified as surgery.  He was stabilized and improved transferred to the floor, and we were consulted.  He has been on Zosyn and Fluconazole.  He has been afebrile, wtihout leukocytosis.  Recent upper GI without obstruction, mass, stricture, or perforation.    18:  Feeling better today.  Was able to ambulate yesterday. Tolerating clears.    ROS:  No f/c/s. No n/v/d. No rash. No new ADR to Abx.     PE:   Vital Signs  Temp (24hrs), Av.9 °F (36.6 °C), Min:97.6 °F (36.4 °C), Max:98.2 °F (36.8 °C)    Temp  Min: 97.6 °F (36.4 °C)  Max: 98.2 °F (36.8 °C)  BP  Min: 132/85  Max: 138/94  Pulse  Min: 66  Max: 81  Resp  Min: 16  Max: 18  No Data Recorded    GENERAL: Awake and alert, in no acute distress.   HEENT:  No conjunctival injection. No icterus. Oropharynx clear without evidence of thrush or exudate.  NECK: Supple, no JVD     HEART: RRR; No murmur, rubs, gallops.   LUNGS: Clear to auscultation bilaterally without wheezing, rales, rhonchi. Normal respiratory effort. Nonlabored. No dullness.  ABDOMEN: Soft, tender, miuldly distended. Positive bowel sounds. Incision with slight erythema mid incision  EXT:  No cyanosis, clubbing or edema. No cord.  : Genitalia generally unremarkable.  Without Al catheter.  SKIN: Warm and dry without cutaneous eruptions on Inspection/palpation.    NEURO: Alert and oriented x 3, Motor 5/5 bilaterally  Right PICC without redness     Laboratory Data      Results from last 7 days  Lab Units 18  0457 18  0447 18  0418    WBC 10*3/mm3 10.67 10.67 9.77   HEMOGLOBIN g/dL 12.6* 12.3* 12.0*   HEMATOCRIT % 37.4* 37.2* 36.8*   PLATELETS 10*3/mm3 316 309 297       Results from last 7 days  Lab Units 05/01/18  0457   SODIUM mmol/L 137   POTASSIUM mmol/L 4.0   CHLORIDE mmol/L 105   CO2 mmol/L 27.0   BUN mg/dL 19   CREATININE mg/dL 0.90   GLUCOSE mg/dL 121*   CALCIUM mg/dL 9.0       Results from last 7 days  Lab Units 04/30/18  0447   ALK PHOS U/L 62   BILIRUBIN mg/dL 0.3   ALT (SGPT) U/L 56*   AST (SGOT) U/L 31           Results from last 7 days  Lab Units 04/30/18  1339   CRP mg/dL 3.21*                 Estimated Creatinine Clearance: 85.2 mL/min (by C-G formula based on SCr of 0.9 mg/dL).    Microbiology:         4/23:  BC no growth                    Radiology:  Imaging Results (last 72 hours)     ** No results found for the last 72 hours. **              IMPRESSION:  1.  Intra-abdominal infection-secondary to a probable perforated viscus, S/P exploratory laparotomy with no definite focus of perforation identified.  I will plan to give him 3-4 more days of intravenous antibiotic therapy and then discontinue antibiotic therapy.  2.  Hepatitis C, he has been treated with Interferon approximately 20 years ago  3.  Polysubstance abuse  4.  History of endocarditis    RECOMMENDATIONS;  1.  Angela Campbell has obtained the history, performed the physical exam and formulated the above treatment plan.     KELLEN Melissa  5/1/2018  8:11 AM        Electronically signed by KELLEN Melissa at 5/1/2018  8:24 AM       Consult Notes (last 24 hours) (Notes from 4/30/2018  2:16 PM through 5/1/2018  2:16 PM)     No notes of this type exist for this encounter.

## 2018-05-01 NOTE — PROGRESS NOTES
"General Surgery Daily Progress Note    Subjective:  No complaints.  No nausea with clears.  (+) flatus.    Objective:  /90 (BP Location: Left arm, Patient Position: Lying)   Pulse 73   Temp 97.6 °F (36.4 °C) (Oral)   Resp 16   Ht 177.8 cm (70\")   Wt 90.6 kg (199 lb 12.8 oz) Comment: weighed pt x2  SpO2 96%   BMI 28.67 kg/m²     Physical Exam:  General: NAD, AAO x 3   Abdomen:  Soft, NT,ND.  Incision: c/d/i.  AMY with serosanguinous dressing.      Imaging Results (last 24 hours)     ** No results found for the last 24 hours. **          CBC:    Results from last 7 days  Lab Units 05/01/18  0457   WBC 10*3/mm3 10.67   HEMOGLOBIN g/dL 12.6*   HEMATOCRIT % 37.4*   PLATELETS 10*3/mm3 316       CMP:    Results from last 7 days  Lab Units 05/01/18  0457 04/30/18  0447   SODIUM mmol/L 137 138   POTASSIUM mmol/L 4.0 3.3*   CHLORIDE mmol/L 105 101   CO2 mmol/L 27.0 29.0   BUN mg/dL 19 9   CREATININE mg/dL 0.90 0.90   CALCIUM mg/dL 9.0 9.0   BILIRUBIN mg/dL  --  0.3   ALK PHOS U/L  --  62   ALT (SGPT) U/L  --  56*   AST (SGOT) U/L  --  31   GLUCOSE mg/dL 121* 114*         Assessment  POD #8 for 72 year old male s/p exploratory laparotomy, intraoperative EGD, and SBR for presumed gastric perforation.  No perforation was identified.  He remains clinically stable. UGI showed no leak.  Continue IV antibiotics, antifungal (for presumed UGI perforation), and BID PPI. Will advance diet and discontinue antibiotics today.    Plan:   - Advance to solids  - Discontinue antibiotics and antifungal  - May discontinue TPN   - Will follow; Anticipate discharge by end of the week    Indy Owen MD - 5/1/2018, 10:42 AM          "

## 2018-05-01 NOTE — PROGRESS NOTES
Continued Stay Note  [facility]     Patient Name: Ronald Bond  MRN: [id number]  Today's Date: 5/1/2018    Admit Date: 4/23/2018    Consent obtained for the participation in the [facility] Transitions Program.  Kira Milan RN        Discharge Plan    No documentation.             Discharge Codes    No documentation.       Expected Discharge Date and Time     Expected Discharge Date Expected Discharge Time    May 2, 2018             Kira Milan RN

## 2018-05-02 LAB
CA-I SERPL ISE-MCNC: 1.26 MMOL/L (ref 1.12–1.32)
GLUCOSE BLDC GLUCOMTR-MCNC: 105 MG/DL (ref 70–130)
GLUCOSE BLDC GLUCOMTR-MCNC: 93 MG/DL (ref 70–130)

## 2018-05-02 PROCEDURE — 82962 GLUCOSE BLOOD TEST: CPT

## 2018-05-02 PROCEDURE — 25010000002 HEPARIN (PORCINE) PER 1000 UNITS: Performed by: SURGERY

## 2018-05-02 PROCEDURE — 25010000002 HYDROMORPHONE PER 4 MG: Performed by: NURSE PRACTITIONER

## 2018-05-02 PROCEDURE — 99232 SBSQ HOSP IP/OBS MODERATE 35: CPT | Performed by: NURSE PRACTITIONER

## 2018-05-02 PROCEDURE — 82330 ASSAY OF CALCIUM: CPT

## 2018-05-02 PROCEDURE — 25010000002 DIPHENHYDRAMINE PER 50 MG: Performed by: SURGERY

## 2018-05-02 RX ORDER — OXYCODONE HYDROCHLORIDE AND ACETAMINOPHEN 5; 325 MG/1; MG/1
1 TABLET ORAL EVERY 6 HOURS PRN
Status: DISCONTINUED | OUTPATIENT
Start: 2018-05-02 | End: 2018-05-03 | Stop reason: HOSPADM

## 2018-05-02 RX ORDER — DOCUSATE SODIUM 100 MG/1
100 CAPSULE, LIQUID FILLED ORAL 2 TIMES DAILY
Status: DISCONTINUED | OUTPATIENT
Start: 2018-05-02 | End: 2018-05-03 | Stop reason: HOSPADM

## 2018-05-02 RX ORDER — OXYCODONE HYDROCHLORIDE AND ACETAMINOPHEN 5; 325 MG/1; MG/1
2 TABLET ORAL EVERY 6 HOURS PRN
Status: DISCONTINUED | OUTPATIENT
Start: 2018-05-02 | End: 2018-05-03 | Stop reason: HOSPADM

## 2018-05-02 RX ADMIN — OXYCODONE AND ACETAMINOPHEN 2 TABLET: 5; 325 TABLET ORAL at 23:08

## 2018-05-02 RX ADMIN — DIPHENHYDRAMINE HYDROCHLORIDE 25 MG: 50 INJECTION INTRAMUSCULAR; INTRAVENOUS at 00:41

## 2018-05-02 RX ADMIN — HEPARIN SODIUM 5000 UNITS: 5000 INJECTION, SOLUTION INTRAVENOUS; SUBCUTANEOUS at 12:41

## 2018-05-02 RX ADMIN — HYDROMORPHONE HYDROCHLORIDE 0.5 MG: 1 INJECTION, SOLUTION INTRAMUSCULAR; INTRAVENOUS; SUBCUTANEOUS at 23:08

## 2018-05-02 RX ADMIN — DIPHENHYDRAMINE HYDROCHLORIDE 25 MG: 50 INJECTION INTRAMUSCULAR; INTRAVENOUS at 23:14

## 2018-05-02 RX ADMIN — HYDROMORPHONE HYDROCHLORIDE 0.5 MG: 1 INJECTION, SOLUTION INTRAMUSCULAR; INTRAVENOUS; SUBCUTANEOUS at 10:46

## 2018-05-02 RX ADMIN — AMLODIPINE BESYLATE 10 MG: 10 TABLET ORAL at 10:14

## 2018-05-02 RX ADMIN — HYDROMORPHONE HYDROCHLORIDE 0.5 MG: 1 INJECTION, SOLUTION INTRAMUSCULAR; INTRAVENOUS; SUBCUTANEOUS at 19:18

## 2018-05-02 RX ADMIN — HYDROMORPHONE HYDROCHLORIDE 0.5 MG: 1 INJECTION, SOLUTION INTRAMUSCULAR; INTRAVENOUS; SUBCUTANEOUS at 21:34

## 2018-05-02 RX ADMIN — HEPARIN SODIUM 5000 UNITS: 5000 INJECTION, SOLUTION INTRAVENOUS; SUBCUTANEOUS at 21:01

## 2018-05-02 RX ADMIN — DOCUSATE SODIUM 100 MG: 100 CAPSULE, LIQUID FILLED ORAL at 10:14

## 2018-05-02 RX ADMIN — OXYCODONE AND ACETAMINOPHEN 2 TABLET: 5; 325 TABLET ORAL at 16:41

## 2018-05-02 RX ADMIN — HYDROMORPHONE HYDROCHLORIDE 0.5 MG: 1 INJECTION, SOLUTION INTRAMUSCULAR; INTRAVENOUS; SUBCUTANEOUS at 16:41

## 2018-05-02 RX ADMIN — DOCUSATE SODIUM 100 MG: 100 CAPSULE, LIQUID FILLED ORAL at 21:01

## 2018-05-02 RX ADMIN — PAROXETINE HYDROCHLORIDE 20 MG: 20 TABLET, FILM COATED ORAL at 06:02

## 2018-05-02 RX ADMIN — PANTOPRAZOLE SODIUM 40 MG: 40 TABLET, DELAYED RELEASE ORAL at 10:14

## 2018-05-02 RX ADMIN — HYDROMORPHONE HYDROCHLORIDE 0.5 MG: 1 INJECTION, SOLUTION INTRAMUSCULAR; INTRAVENOUS; SUBCUTANEOUS at 14:50

## 2018-05-02 RX ADMIN — HEPARIN SODIUM 5000 UNITS: 5000 INJECTION, SOLUTION INTRAVENOUS; SUBCUTANEOUS at 05:51

## 2018-05-02 RX ADMIN — METOPROLOL TARTRATE 100 MG: 100 TABLET ORAL at 10:14

## 2018-05-02 RX ADMIN — HYDROMORPHONE HYDROCHLORIDE 0.5 MG: 1 INJECTION, SOLUTION INTRAMUSCULAR; INTRAVENOUS; SUBCUTANEOUS at 12:41

## 2018-05-02 RX ADMIN — PANTOPRAZOLE SODIUM 40 MG: 40 TABLET, DELAYED RELEASE ORAL at 16:41

## 2018-05-02 RX ADMIN — OXYCODONE AND ACETAMINOPHEN 1 TABLET: 5; 325 TABLET ORAL at 10:13

## 2018-05-02 NOTE — PLAN OF CARE
Problem: Fall Risk (Adult)  Goal: Absence of Fall   05/02/18 1719   Fall Risk (Adult)   Absence of Fall making progress toward outcome       Problem: Skin Injury Risk (Adult)  Intervention: Maintain Head of Bed Elevation Less Than 30 Degrees as Tolerated   05/02/18 1719   Positioning   Head of Bed (HOB) HOB at 20-30 degrees     Intervention: Prevent or Minimize Pressure   05/02/18 1719   Skin Interventions   Pressure Reduction Techniques frequent weight shift encouraged         Problem: Pain, Acute (Adult)  Goal: Acceptable Pain Control/Comfort Level   05/02/18 1719   Pain, Acute (Adult)   Acceptable Pain Control/Comfort Level making progress toward outcome

## 2018-05-02 NOTE — PROGRESS NOTES
"General Surgery Daily Progress Note    Subjective:  No acute events.  Tolerated solids.    Objective:  /79 (BP Location: Left arm, Patient Position: Lying)   Pulse 61   Temp 98.6 °F (37 °C) (Oral)   Resp 18   Ht 177.8 cm (70\")   Wt 90.6 kg (199 lb 12.8 oz) Comment: weighed pt x2  SpO2 96%   BMI 28.67 kg/m²     Physical Exam:  General: NAD, AAO x 3   Abdomen:  Soft, NT,ND.  Incision: c/d/i.  AMY with serosanguinous dressing.      Imaging Results (last 24 hours)     ** No results found for the last 24 hours. **          CBC:    Results from last 7 days  Lab Units 05/01/18  0457   WBC 10*3/mm3 10.67   HEMOGLOBIN g/dL 12.6*   HEMATOCRIT % 37.4*   PLATELETS 10*3/mm3 316       CMP:    Results from last 7 days  Lab Units 05/01/18  0457 04/30/18  0447   SODIUM mmol/L 137 138   POTASSIUM mmol/L 4.0 3.3*   CHLORIDE mmol/L 105 101   CO2 mmol/L 27.0 29.0   BUN mg/dL 19 9   CREATININE mg/dL 0.90 0.90   CALCIUM mg/dL 9.0 9.0   BILIRUBIN mg/dL  --  0.3   ALK PHOS U/L  --  62   ALT (SGPT) U/L  --  56*   AST (SGOT) U/L  --  31   GLUCOSE mg/dL 121* 114*         Assessment  POD #9 for 72 year old male s/p exploratory laparotomy, intraoperative EGD, and SBR for presumed gastric perforation.  No perforation was identified.  He remains clinically stable. UGI showed no leak.  Antibiotics discontinued.  He has been advanced to solids.     Plan:   - Discontinue PCA; Stool softener  - Continue AMY  - Anticipate discharge tomorrow or Friday    Indy Owen MD - 5/2/2018, 8:55 AM          "

## 2018-05-02 NOTE — PROGRESS NOTES
[facility] Progress Note    Admission Date: 4/23/2018    Ronald Bond  1945  [id number]    Date: 5/2/2018    Meds:    Anti-Infectives     Ordered     Dose/Rate Route Frequency Start Stop    04/23/18 2340  fluconazole (DIFLUCAN) IVPB 400 mg     Ordering Provider:  Cem Harding MD    400 mg  over 120 Minutes Intravenous Daily 04/24/18 2300 04/29/18 2347    04/24/18 0311  piperacillin-tazobactam (ZOSYN) 3.375 g in iso-osmotic dextrose 50 ml (premix)     Ordering Provider:  Kali Rodriguez RPH    3.375 g  over 4 Hours Intravenous Every 8 Hours 04/24/18 0600 05/01/18 0138    04/23/18 2310  fluconazole (DIFLUCAN) IVPB 800 mg     Ordering Provider:  Cem Harding MD    800 mg  100 mL/hr over 240 Minutes Intravenous Once 04/23/18 2345 04/24/18 0419    04/23/18 2147  piperacillin-tazobactam (ZOSYN) 3.375 g in iso-osmotic dextrose 50 ml (premix)     Ordering Provider:  Indy Owen MD    3.375 g  over 30 Minutes Intravenous Once 04/23/18 2230 04/24/18 0103    04/23/18 1556  piperacillin-tazobactam (ZOSYN) 4.5 g/100 mL 0.9% NS IVPB (mbp)     Ordering Provider:  Abdoul Rain MD    4.5 g Intravenous Once 04/23/18 1558 04/23/18 1708          CC: IA infection       SUBJECTIVE: 4/3018:  Patient is a 72 y.o. male who was admitted with abdominal pain and found to have pneumoperitoneum. He underwent exploratory laparotomy, enteorlysis, small bowel resection on 4/23/18.  Although preoperatively he appeared to have evidence of bowel perforation, no actual perforation was identified as surgery.  He was stabilized and improved transferred to the floor, and we were consulted.  He has been on Zosyn and Fluconazole.  He has been afebrile, wtihout leukocytosis.  Recent upper GI without obstruction, mass, stricture, or perforation.    5/1/18:  Feeling better today.  Was able to ambulate yesterday. Tolerating clear liquids.  His antibiotic therapy has timed out.  5/2/18: Thinks he is feeling better.  No  fever, chills, sweats.      ROS:  No f/c/s. No n/v/d. No rash. No new ADR to Abx.     PE:   Vital Signs  Temp (24hrs), Av.4 °F (36.9 °C), Min:98.1 °F (36.7 °C), Max:98.6 °F (37 °C)    Temp  Min: 98.1 °F (36.7 °C)  Max: 98.6 °F (37 °C)  BP  Min: 133/79  Max: 133/79  Pulse  Min: 61  Max: 69  Resp  Min: 17  Max: 18  No Data Recorded    GENERAL: Awake and alert, in no acute distress.   HEENT:  No conjunctival injection. No icterus. Oropharynx clear without evidence of thrush or exudate.  NECK: Supple, no JVD     HEART: RRR; No murmur, rubs, gallops.   LUNGS: Clear to auscultation bilaterally without wheezing, rales, rhonchi. Normal respiratory effort. Nonlabored. No dullness.  ABDOMEN: Soft, tender, miuldly distended. Positive bowel sounds. Incision without t erythema  EXT:  No cyanosis, clubbing or edema. No cord.  : Genitalia generally unremarkable.  Without Al catheter.  SKIN: Warm and dry without cutaneous eruptions on Inspection/palpation.    NEURO: Alert and oriented x 3, Motor 5/5 bilaterally  Right PICC without redness     Laboratory Data      Results from last 7 days  Lab Units 18  0418   WBC 10*3/mm3 10.67 10.67 9.77   HEMOGLOBIN g/dL 12.6* 12.3* 12.0*   HEMATOCRIT % 37.4* 37.2* 36.8*   PLATELETS 10*3/mm3 316 309 297       Results from last 7 days  Lab Units 18  045   SODIUM mmol/L 137   POTASSIUM mmol/L 4.0   CHLORIDE mmol/L 105   CO2 mmol/L 27.0   BUN mg/dL 19   CREATININE mg/dL 0.90   GLUCOSE mg/dL 121*   CALCIUM mg/dL 9.0       Results from last 7 days  Lab Units 18   ALK PHOS U/L 62   BILIRUBIN mg/dL 0.3   ALT (SGPT) U/L 56*   AST (SGOT) U/L 31           Results from last 7 days  Lab Units 18  1339   CRP mg/dL 3.21*                 Estimated Creatinine Clearance: 85.2 mL/min (by C-G formula based on SCr of 0.9 mg/dL).    Microbiology:         :  BC no growth                    Radiology:  Imaging Results (last 72 hours)     ** No  results found for the last 72 hours. **              IMPRESSION:  1.  Intra-abdominal infection-secondary to a probable perforated viscus, S/P exploratory laparotomy with no definite focus of perforation identified. I discussed his situation with Dr. Owen.  There was no actual overt peritoneal soilage.    2.  Hepatitis C, he has been treated with Interferon approximately 20 years ago  3.  Polysubstance abuse  4.  History of endocarditis    RECOMMENDATIONS;  1.  Monitor off antibiotics   2.  Advance diet       Dr. Campbell has obtained the history, performed the physical exam and formulated the above treatment plan.       Carlyn Null, KELLEN  5/2/2018  9:22 AM

## 2018-05-02 NOTE — PROGRESS NOTES
Continued Stay Note  [facility]     Patient Name: Ronald Bond  MRN: [id number]  Today's Date: 5/2/2018    Admit Date: 4/23/2018          Discharge Plan     Row Name 05/02/18 1103       Plan    Plan discharge plan    Patient/Family in Agreement with Plan yes    Plan Comments Spoke with pt in room with permission in regards to discharge planning. Pt interested in HH when medically ready for discharge. Pt requests [facility] and will need order. Pt not medically ready for discharge today. CM will cont to follow.              Discharge Codes    No documentation.       Expected Discharge Date and Time     Expected Discharge Date Expected Discharge Time    May 4, 2018             Deena Martinez RN

## 2018-05-02 NOTE — PLAN OF CARE
Problem: Patient Care Overview  Goal: Plan of Care Review  Outcome: Ongoing (interventions implemented as appropriate)   05/02/18 0502   Coping/Psychosocial   Plan of Care Reviewed With patient   OTHER   Outcome Summary VSS, Wound clean and intact, continue on pca for pain control, refused ambulation r/t tired and weakness.        Problem: Fall Risk (Adult)  Goal: Absence of Fall  Outcome: Ongoing (interventions implemented as appropriate)   05/02/18 0502   Fall Risk (Adult)   Absence of Fall achieves outcome       Problem: Pain, Acute (Adult)  Goal: Acceptable Pain Control/Comfort Level  Outcome: Ongoing (interventions implemented as appropriate)   05/02/18 0502   Pain, Acute (Adult)   Acceptable Pain Control/Comfort Level making progress toward outcome

## 2018-05-02 NOTE — PROGRESS NOTES
Adult Nutrition  Assessment/PES    Patient Name:  Ronald Bond  YOB: 1945  MRN: [id number]  Admit Date:  4/23/2018    Assessment Date:  5/2/2018    Comments:    Reason for Assessment   Reason For Assessment follow-up protocol;TF/PN   20 minutes   TPN discontinued, tolerating solids, UGI shows no leak.  Principal Problem:    Likely microperforation of viscus  Active Problems:    Depression with anxiety    Hypertension    Asthma    H/O chronic hepatitis    Polysubstance abuse    GERD (gastroesophageal reflux disease)    CAD (coronary artery disease)            Adult Nutrition Assessment     Row Name 05/02/18 1417       Labs/Procedures/Meds    Lab Results Reviewed Reviewed    Results from last 7 days  Lab Units 05/01/18  0457 04/30/18  0447   SODIUM mmol/L 137 138   POTASSIUM mmol/L 4.0 3.3*   CHLORIDE mmol/L 105 101   CO2 mmol/L 27.0 29.0   BUN mg/dL 19 9   CREATININE mg/dL 0.90 0.90   CALCIUM mg/dL 9.0 9.0   BILIRUBIN mg/dL  --  0.3   ALK PHOS U/L  --  62   ALT (SGPT) U/L  --  56*   AST (SGOT) U/L  --  31   GLUCOSE mg/dL 121* 114*            Nutrition Prescription PO    Current PO Diet Soft Texture    Texture Whole foods    Fluid Consistency Thin       PO Evaluation    Number of Meals 3    % PO Intake 58    Row Name 05/02/18 1413                   Nutrition/Diet History    Food Preferences Pt. requests gatorade every meal. Will drink any flavor except cucumber..          Problem/Interventions:                  Intervention Goal     Row Name 05/02/18 1418       Intervention Goal    General Nutrition support treatment    PO Increase intake            Nutrition Intervention     Row Name 05/02/18 1419       Nutrition Intervention    RD/Tech Action Follow Tx progress;Care plan reviewd   Send gatorade every meal.              Education/Evaluation     Row Name 05/02/18 1419       Education    Education --   MNT discussed with pt.       Monitor/Evaluation    Monitor Per protocol        Electronically signed  by:  Shawanda Hoff, NOEMI  05/02/18 2:27 PM

## 2018-05-02 NOTE — PROGRESS NOTES
[facility] Medicine Services  PROGRESS NOTE    Patient Name: Ronald Bond  : 1945  MRN: [id number]    Date of Admission: 2018  Length of Stay: 9  Primary Care Physician: Elder Shipley MD    Subjective   Subjective   CC:  Abd pain    HPI:  Patient lying in bed with eyes closed, but easily arousable.  TPN stopped yesterday, so patient encouraged to eat, which he said he would do on his own.  Did not eat much on his breakfast.  No adverse events overnight.  No family in the room.    Review of Systems  Gen- No fevers, chills  CV- No chest pain, palpitations  Resp- No cough, dyspnea  GI- No N/V/D, +abd incisional pain  Otherwise ROS is negative except as mentioned in the HPI.    Objective   Objective   Vital Signs:   Temp:  [98.1 °F (36.7 °C)-98.6 °F (37 °C)] 98.6 °F (37 °C)  Heart Rate:  [61-69] 61  Resp:  [17-18] 18  BP: (133)/(79-85) 133/79   Physical Exam:  General: Alert, well-developed well-nourished female in no acute distress    Head: Normocephalic atraumatic    Eyes: PERRLA, EOMI, nonicteric, conjunctiva normal    ENT: Pale, dry mucous membranes    Neck: Supple, nontender, trachea midline without lymphadenopathy, JVD, nuchal rigidity.      Cardiovascular: RRR  no M/R/G +2DP pulses bilaterally    Respiratory: Nonlabored, symmetrical chest expansion, clear to auscultation bilaterally    Abdomen: Firm, tender, distended,  positive bowel sounds in all 4 quadrants     Extremities: FROM in upper and lower extremities bilaterally. Negative for edema/cyanosis.  Negative calf pain    Skin: Pink/warm/dry.  No rash or lesions noted.  Abdominal incision CDI with staples still in place  Neuro: Oriented to person place time and situation, speech is clear, follows all commands, recent and remote memory intact    Psych: Patient is nasty and argumentative.  Depressed affect.      Results Reviewed:  I have personally reviewed current lab, radiology, and data and agree.    Results  from last 7 days  Lab Units 05/01/18  0457 04/30/18  0447 04/29/18  0418   WBC 10*3/mm3 10.67 10.67 9.77   HEMOGLOBIN g/dL 12.6* 12.3* 12.0*   HEMATOCRIT % 37.4* 37.2* 36.8*   PLATELETS 10*3/mm3 316 309 297       Results from last 7 days  Lab Units 05/01/18  0457 04/30/18  0447 04/29/18  1426 04/29/18  0418  04/27/18  0516   SODIUM mmol/L 137 138  --  139  < > 137   POTASSIUM mmol/L 4.0 3.3* 3.7 3.4*  < > 3.2*   CHLORIDE mmol/L 105 101  --  100  < > 95*   CO2 mmol/L 27.0 29.0  --  32.0*  < > 38.0*   BUN mg/dL 19 9  --  8*  < > 7*   CREATININE mg/dL 0.90 0.90  --  0.90  < > 0.80   GLUCOSE mg/dL 121* 114*  --  102*  < > 104*   CALCIUM mg/dL 9.0 9.0  --  8.8  < > 8.9   ALT (SGPT) U/L  --  56*  --  77*  --  47*   AST (SGOT) U/L  --  31  --  51*  --  61*   < > = values in this interval not displayed.  Estimated Creatinine Clearance: 85.2 mL/min (by C-G formula based on SCr of 0.9 mg/dL).  No results found for: BNP  No results found for: PHART    Microbiology Results Abnormal     Procedure Component Value - Date/Time    Blood Culture - Blood, Blood, Venous Line [[id number]]  (Normal) Collected:  04/23/18 2345    Lab Status:  Final result Specimen:  Blood from Blood, Venous Line Updated:  04/29/18 0131     Blood Culture No growth at 5 days    Blood Culture - Blood, Blood, Venous Line [[id number]]  (Normal) Collected:  04/23/18 2330    Lab Status:  Final result Specimen:  Blood from Blood, Venous Line Updated:  04/29/18 0131     Blood Culture No growth at 5 days        Imaging Results (last 24 hours)     ** No results found for the last 24 hours. **        I have reviewed the medications.    Assessment/Plan   Assessment / Plan     Hospital Problem List     * (Principal)Likely microperforation of viscus    Overview Signed 4/24/2018  3:25 PM by Max Don MD     Laparotomy negative for macro perforation 4/24/18         Depression with anxiety    Hypertension    Asthma    H/O chronic hepatitis    Overview Signed  "4/23/2018  4:23 PM by KELLEN Way     s/p treatment. Confirmed clearance of virus by  hepatology         Polysubstance abuse    Overview Addendum 4/23/2018  4:25 PM by KELLEN Way     h/o opioid abuse per chart review, on suboxone now. He admits to using \"anything\" he can get off the street.          GERD (gastroesophageal reflux disease)    CAD (coronary artery disease)    Overview Signed 4/23/2018  9:26 PM by KELLEN Christensen     non-obstructive, 2012 CT chest at  comments on CAD             Brief Hospital Course to date:  Ronald Bond is a 72 y.o. male here with diarrhea and abdominal pain and found to have pneumoperitoneum and portal venous gas on CT s/p exploratory laparotomy with intraoperative EGD, drain placement, enteroclysis, SBR with primary anastomosis, but no perforation seen.    Assessment & Plan:  Possible perforated viscus s/p surgery  --abx and antifungals stopped    Nutrition  --TPN stopped; soft diet started    Polysubstance abuse  Hep C s/p treatment 20 years ago  GERD  HTN  Depression/Anxiety  Hx of CAD    DVT Prophylaxis:  NICOLE    Mobilize. PO per surgery. Consult [facility]     CODE STATUS: Full Code    Disposition: I expect the patient to be discharged 2-3 days.    Electronically signed by KELLEN Girard, 05/02/18, 8:04 AM.  "

## 2018-05-03 VITALS
TEMPERATURE: 98.1 F | HEART RATE: 71 BPM | BODY MASS INDEX: 28.27 KG/M2 | RESPIRATION RATE: 18 BRPM | OXYGEN SATURATION: 96 % | WEIGHT: 197.5 LBS | DIASTOLIC BLOOD PRESSURE: 75 MMHG | SYSTOLIC BLOOD PRESSURE: 117 MMHG | HEIGHT: 70 IN

## 2018-05-03 PROBLEM — R19.8 PERFORATION OF VISCUS: Status: RESOLVED | Noted: 2018-04-23 | Resolved: 2018-05-03

## 2018-05-03 PROCEDURE — 25010000002 HYDROMORPHONE PER 4 MG: Performed by: NURSE PRACTITIONER

## 2018-05-03 PROCEDURE — 25010000002 HEPARIN (PORCINE) PER 1000 UNITS: Performed by: SURGERY

## 2018-05-03 PROCEDURE — 99239 HOSP IP/OBS DSCHRG MGMT >30: CPT | Performed by: NURSE PRACTITIONER

## 2018-05-03 PROCEDURE — 97110 THERAPEUTIC EXERCISES: CPT

## 2018-05-03 PROCEDURE — 97116 GAIT TRAINING THERAPY: CPT

## 2018-05-03 RX ORDER — OXYCODONE HYDROCHLORIDE AND ACETAMINOPHEN 5; 325 MG/1; MG/1
1 TABLET ORAL EVERY 6 HOURS PRN
Qty: 12 TABLET | Refills: 0 | Status: SHIPPED | OUTPATIENT
Start: 2018-05-03 | End: 2018-05-18

## 2018-05-03 RX ORDER — SIMETHICONE 80 MG
80 TABLET,CHEWABLE ORAL 4 TIMES DAILY PRN
Status: DISCONTINUED | OUTPATIENT
Start: 2018-05-03 | End: 2018-05-03 | Stop reason: HOSPADM

## 2018-05-03 RX ORDER — PANTOPRAZOLE SODIUM 40 MG/1
40 TABLET, DELAYED RELEASE ORAL
Qty: 60 TABLET | Refills: 0 | Status: SHIPPED | OUTPATIENT
Start: 2018-05-03 | End: 2018-05-18

## 2018-05-03 RX ORDER — PSEUDOEPHEDRINE HCL 30 MG
1 TABLET ORAL 2 TIMES DAILY
Qty: 30 CAPSULE | Refills: 0 | Status: SHIPPED | OUTPATIENT
Start: 2018-05-03 | End: 2018-05-18

## 2018-05-03 RX ADMIN — DOCUSATE SODIUM 100 MG: 100 CAPSULE, LIQUID FILLED ORAL at 09:04

## 2018-05-03 RX ADMIN — HYDROMORPHONE HYDROCHLORIDE 0.5 MG: 1 INJECTION, SOLUTION INTRAMUSCULAR; INTRAVENOUS; SUBCUTANEOUS at 09:04

## 2018-05-03 RX ADMIN — METOPROLOL TARTRATE 100 MG: 100 TABLET ORAL at 09:04

## 2018-05-03 RX ADMIN — AMLODIPINE BESYLATE 10 MG: 10 TABLET ORAL at 09:04

## 2018-05-03 RX ADMIN — PAROXETINE HYDROCHLORIDE 20 MG: 20 TABLET, FILM COATED ORAL at 06:13

## 2018-05-03 RX ADMIN — OXYCODONE AND ACETAMINOPHEN 2 TABLET: 5; 325 TABLET ORAL at 06:14

## 2018-05-03 RX ADMIN — HEPARIN SODIUM 5000 UNITS: 5000 INJECTION, SOLUTION INTRAVENOUS; SUBCUTANEOUS at 12:39

## 2018-05-03 RX ADMIN — OXYCODONE AND ACETAMINOPHEN 2 TABLET: 5; 325 TABLET ORAL at 12:39

## 2018-05-03 RX ADMIN — PANTOPRAZOLE SODIUM 40 MG: 40 TABLET, DELAYED RELEASE ORAL at 06:13

## 2018-05-03 RX ADMIN — HYDROMORPHONE HYDROCHLORIDE 0.5 MG: 1 INJECTION, SOLUTION INTRAMUSCULAR; INTRAVENOUS; SUBCUTANEOUS at 06:14

## 2018-05-03 RX ADMIN — HEPARIN SODIUM 5000 UNITS: 5000 INJECTION, SOLUTION INTRAVENOUS; SUBCUTANEOUS at 07:24

## 2018-05-03 NOTE — PLAN OF CARE
Problem: Patient Care Overview  Goal: Plan of Care Review  Outcome: Ongoing (interventions implemented as appropriate)   05/03/18 1100   Coping/Psychosocial   Plan of Care Reviewed With patient   Plan of Care Review   Progress improving   OTHER   Outcome Summary patient up ambulating in hallway, no AD needed. Some complaints of weakness and abdominal pain during mobility.

## 2018-05-03 NOTE — THERAPY TREATMENT NOTE
Acute Care - Physical Therapy Treatment Note  [facility]     Patient Name: Ronald Bond  : 1945  MRN: [id number]  Today's Date: 5/3/2018  Onset of Illness/Injury or Date of Surgery: 18  Date of Referral to PT: 18  Referring Physician: MD Mehdi    Admit Date: 2018    Visit Dx:    ICD-10-CM ICD-9-CM   1. Lower abdominal pain R10.30 789.09   2. Perforated viscus R19.8 799.89   3. Intra-abdominal free air of unknown etiology K66.8 568.89   4. Leukocytosis, unspecified type D72.829 288.60   5. Perforated bowel K63.1 569.83   6. Impaired functional mobility, balance, gait, and endurance Z74.09 V49.89   7. Impaired mobility and ADLs Z74.09 799.89     Patient Active Problem List   Diagnosis   • Depression with anxiety   • Hypertension   • Lower abdominal pain   • Likely microperforation of viscus   • Asthma   • H/O chronic hepatitis   • Polysubstance abuse   • GERD (gastroesophageal reflux disease)   • CAD (coronary artery disease)       Therapy Treatment          Rehabilitation Treatment Summary     Row Name 18 1032             Treatment Time/Intention    Discipline physical therapy assistant  -AS      Document Type therapy note (daily note)  -AS      Subjective Information complains of;weakness  -AS      Mode of Treatment physical therapy  -AS      Patient Effort good  -AS      Comment patient needs encouragement to participate in therapy but did agree.  -AS      Recorded by [AS] Jennifer Olvera, PTA 18 1100 18 1100      Row Name 18 1032             Cognitive Assessment/Intervention- PT/OT    Affect/Mental Status (Cognitive) WFL  -AS      Orientation Status (Cognition) oriented x 3  -AS      Follows Commands (Cognition) follows one step commands;over 90% accuracy  -AS      Safety Deficit (Cognitive) mild deficit  -AS      Personal Safety Interventions fall prevention program maintained;gait belt;nonskid shoes/slippers when out of bed;other (see comments)   exit  alarm  -AS      Recorded by [AS] Jennifer Olvera, [facility] 05/03/18 1100 05/03/18 1100      Row Name 05/03/18 1032             Bed Mobility Assessment/Treatment    Supine-Sit [place] (Bed Mobility) supervision  -AS      Sit-Supine [place] (Bed Mobility) supervision  -AS      Assistive Device (Bed Mobility) bed rails;head of bed elevated  -AS      Recorded by [AS] Jennifer Olvera, [facility] 05/03/18 1100 05/03/18 1100      Row Name 05/03/18 1032             Transfer Assessment/Treatment    Sit-Stand [place] (Transfers) verbal cues;contact guard  -AS      Stand-Sit [place] (Transfers) verbal cues;contact guard  -AS      Recorded by [AS] Jennifer Olvera, [facility] 05/03/18 1100 05/03/18 1100      Row Name 05/03/18 1032             Sit-Stand Transfer    Assistive Device (Sit-Stand Transfers) walker, front-wheeled  -AS      Recorded by [AS] Jennifer Olvera, [facility] 05/03/18 1100 05/03/18 1100      Row Name 05/03/18 1032             Stand-Sit Transfer    Assistive Device (Stand-Sit Transfers) walker, front-wheeled  -AS      Recorded by [AS] Jennifer Olvera, [facility] 05/03/18 1100 05/03/18 1100      Row Name 05/03/18 1032             Gait/Stairs Assessment/Training    Gait/Stairs Assessment/Training gait/ambulation assistive device  -AS      [place] Level (Gait) verbal cues;minimum assist (75% patient effort)  -AS      Assistive Device (Gait) other (see comments)   no AD needed  -AS      Distance in Feet (Gait) 250  -AS      Pattern (Gait) step-through  -AS      Comment (Gait/Stairs) patient requested no AD and did well without, no LOB but complaints of weaknesss and abdominal pain  -AS      Recorded by [AS] Jennifer Olvera, [facility] 05/03/18 1100 05/03/18 1100      Row Name 05/03/18 1032             Therapeutic Exercise    Lower Extremity Range of Motion (Therapeutic Exercise) hip flexion/extension, bilateral;knee flexion/extension, bilateral;ankle dorsiflexion/plantar flexion, bilateral  -AS      Exercise  Type (Therapeutic Exercise) AROM (active range of motion)  -AS      Position (Therapeutic Exercise) seated  -AS      Sets/Reps (Therapeutic Exercise) 1/10  -AS      Recorded by [AS] Jennifer Olvera, PTA 05/03/18 1100 05/03/18 1100      Row Name 05/03/18 1032             Positioning and Restraints    Pre-Treatment Position in bed  -AS      Post Treatment Position bed  -AS      In Bed supine;call light within reach;encouraged to call for assist;exit alarm on  -AS      Recorded by [AS] Jennifer Olvera, [facility] 05/03/18 1100 05/03/18 1100      Row Name 05/03/18 1032             Pain Scale: Numbers Pre/Post-Treatment    Pain Scale: Numbers, Pretreatment 3/10  -AS      Pain Scale: Numbers, Post-Treatment 3/10  -AS      Pain Location abdomen  -AS      Pain Intervention(s) Repositioned;Ambulation/increased activity  -AS      Recorded by [AS] Jennifer Olvera, [facility] 05/03/18 1100 05/03/18 1100      Row Name                Wound 04/23/18 2142 Other (See comments) abdomen incision    Wound - Properties Group Date first assessed: 04/23/18 [KM] Time first assessed: 2142 [KM] Side: Other (See comments) [KM] Location: abdomen [KM] Type: incision [KM] Recorded by:  [KM] Sonia Victor RN 04/23/18 2142 04/23/18 2142      User Key  (r) = Recorded By, (t) = Taken By, (c) = Cosigned By    Initials Name Effective Dates Discipline    AS Jennifer Chelle Olvera PTA 06/22/15 -  PT    KM Sonia Victor RN 06/16/16 -  Nurse                     Physical Therapy Education     Title: PT OT SLP Therapies (Active)     Topic: Physical Therapy (Active)     Point: Mobility training (Active)    Learning Progress Summary     Learner Status Readiness Method Response Comment Documented by    Patient Active Acceptance E NR  AS 05/03/18 1100     Done Acceptance E,D TENZIN HASSAN Reviewed gait mechanics, benefits of mobility MJ 04/29/18 1617     Active Acceptance E NR  KR 04/26/18 1459     Active Acceptance E,D NR  LS 04/25/18 1202     Done Acceptance E  NR,VU  AC 04/24/18 2256     Active Acceptance E,D NR   04/24/18 1610          Point: Home exercise program (Active)    Learning Progress Summary     Learner Status Readiness Method Response Comment Documented by    Patient Active Acceptance E NR  AS 05/03/18 1100     Done Acceptance E,D VU,NR Reviewed gait mechanics, benefits of mobility  04/29/18 1617     Active Acceptance E NR  KR 04/26/18 1459     Active Acceptance E,D NR   04/25/18 1202     Done Acceptance E NR,VU   04/24/18 2256     Active Acceptance E,D NR   04/24/18 1610          Point: Body mechanics (Active)    Learning Progress Summary     Learner Status Readiness Method Response Comment Documented by    Patient Active Acceptance E NR  AS 05/03/18 1100     Done Acceptance E,D VU,NR Reviewed gait mechanics, benefits of mobility  04/29/18 1617     Active Acceptance E NR   04/26/18 1459     Active Acceptance E,D NR   04/25/18 1202     Done Acceptance E NR,VU   04/24/18 2256     Active Acceptance E,D NR   04/24/18 1610          Point: Precautions (Active)    Learning Progress Summary     Learner Status Readiness Method Response Comment Documented by    Patient Active Acceptance E NR  AS 05/03/18 1100     Done Acceptance E,D VU,NR Reviewed gait mechanics, benefits of mobility  04/29/18 1617     Active Acceptance E NR   04/26/18 1459     Active Acceptance E,D NR   04/25/18 1202     Done Acceptance E NR,VU   04/24/18 2256     Active Acceptance E,D NR   04/24/18 1610                      User Key     Initials Effective Dates Name Provider Type Discipline    AS 06/22/15 -  Jennifer Olvera, PTA Physical Therapy Assistant PT     06/19/15 -  Isi Londono, PT Physical Therapist PT     07/03/17 -  Sarai Coe, RN Registered Nurse Nurse     04/03/18 -  [place]o [place], PT Physical Therapist PT     04/03/18 -  Martha Camara, PT Physical Therapist PT                    PT Recommendation and Plan     Plan of Care Reviewed With:  patient  Progress: improving  Outcome Summary: patient up ambulating in hallway, no AD needed. Some complaints of weakness and abdominal pain during mobility.          Outcome Measures     Row Name 05/03/18 1032 04/30/18 1505          How much help from another person do you currently need...    Turning from your back to your side while in flat bed without using bedrails? 4  -AS  --     Moving from lying on back to sitting on the side of a flat bed without bedrails? 4  -AS  --     Moving to and from a bed to a chair (including a wheelchair)? 3  -AS  --     Standing up from a chair using your arms (e.g., wheelchair, bedside chair)? 3  -AS  --     Climbing 3-5 steps with a railing? 3  -AS  --     To walk in hospital room? 3  -AS  --     AM-PAC 6 Clicks Score 20  -AS  --        How much help from another is currently needed...    Putting on and taking off regular lower body clothing?  -- 3  -AC     Bathing (including washing, rinsing, and drying)  -- 3  -AC     Toileting (which includes using toilet bed pan or urinal)  -- 2  -AC     Putting on and taking off regular upper body clothing  -- 3  -AC     Taking care of personal grooming (such as brushing teeth)  -- 3  -AC     Eating meals  -- 3  -AC     Score  -- 17  -AC        Functional Assessment    Outcome Measure Options AM-PAC 6 Clicks Basic Mobility (PT)  -AS  --       User Key  (r) = Recorded By, (t) = Taken By, (c) = Cosigned By    Initials Name Provider Type    KRISTIAN Villegas, OT Occupational Therapist    AS Jennifer Olvera, [facility] Physical Therapy Assistant           Time Calculation:         PT Charges     Row Name 05/03/18 1101             Time Calculation    Start Time 1032  -AS      PT Received On 05/03/18  -AS      PT Goal Re-Cert Due Date 05/04/18  -AS         Time Calculation- PT    Total Timed Code Minutes- PT 23 minute(s)  -AS        User Key  (r) = Recorded By, (t) = Taken By, (c) = Cosigned By    Initials Name Provider Type    AS Jennifer Olvera,  PTA Physical Therapy Assistant          Therapy Charges for Today     Code Description Service Date Service Provider Modifiers Qty    19275736801 HC GAIT TRAINING EA 15 MIN 5/3/2018 Jennifer Olvera, CORY GP 1    68470758801 HC PT THER PROC EA 15 MIN 5/3/2018 Jennifer Olvera PTA GP 1          PT G-Codes  Outcome Measure Options: AM-PAC 6 Clicks Basic Mobility (PT)    Jennifer Olvera PTA  5/3/2018

## 2018-05-03 NOTE — PROGRESS NOTES
Continued Stay Note  [facility]     Patient Name: Ronald Bond  MRN: [id number]  Today's Date: 5/3/2018    Admit Date: 4/23/2018          Discharge Plan     Row Name 05/03/18 1459       Plan    Plan discharge plan    Patient/Family in Agreement with Plan yes    Plan Comments Discharge order in epic. Referral made to [facility] per pt request for  for skilled nursing and PT. Spoke with Geoffrey at [facility] and and she accepted referral and will retrieve clinical information from [facility]. Geoffrey is aware pt medically ready for discharge today and will be in contact with pt to arrange home visits. Pt is aware and agreeable. No further discharge needs.     Final Discharge Disposition Code 06 - home with home health care              Discharge Codes    No documentation.       Expected Discharge Date and Time     Expected Discharge Date Expected Discharge Time    May 3, 2018             Deena Martinez RN

## 2018-05-03 NOTE — PROGRESS NOTES
[facility] Progress Note    Admission Date: 4/23/2018    Ronald Bond  1945  [id number]    Date: 5/3/2018    Meds:    Anti-Infectives     Ordered     Dose/Rate Route Frequency Start Stop    04/23/18 2340  fluconazole (DIFLUCAN) IVPB 400 mg     Ordering Provider:  Cem Harding MD    400 mg  over 120 Minutes Intravenous Daily 04/24/18 2300 04/29/18 2347    04/24/18 0311  piperacillin-tazobactam (ZOSYN) 3.375 g in iso-osmotic dextrose 50 ml (premix)     Ordering Provider:  Kali Rodriguez RPH    3.375 g  over 4 Hours Intravenous Every 8 Hours 04/24/18 0600 05/01/18 0138    04/23/18 2310  fluconazole (DIFLUCAN) IVPB 800 mg     Ordering Provider:  Cem Harding MD    800 mg  100 mL/hr over 240 Minutes Intravenous Once 04/23/18 2345 04/24/18 0419    04/23/18 2147  piperacillin-tazobactam (ZOSYN) 3.375 g in iso-osmotic dextrose 50 ml (premix)     Ordering Provider:  Indy Owen MD    3.375 g  over 30 Minutes Intravenous Once 04/23/18 2230 04/24/18 0103    04/23/18 1556  piperacillin-tazobactam (ZOSYN) 4.5 g/100 mL 0.9% NS IVPB (mbp)     Ordering Provider:  Abdoul Rain MD    4.5 g Intravenous Once 04/23/18 1558 04/23/18 1708          CC: IA infection       SUBJECTIVE: 4/3018:  Patient is a 72 y.o. male who was admitted with abdominal pain and found to have pneumoperitoneum. He underwent exploratory laparotomy, enteorlysis, small bowel resection on 4/23/18.  Although preoperatively he appeared to have evidence of bowel perforation, no actual perforation was identified as surgery.  He was stabilized and improved transferred to the floor, and we were consulted.  He has been on Zosyn and Fluconazole.  He has been afebrile, wtihout leukocytosis.  Recent upper GI without obstruction, mass, stricture, or perforation.    5/1/18:  Feeling better today.  Was able to ambulate yesterday. Tolerating clear liquids.  His antibiotic therapy has timed out.  5/2/18: Thinks he is feeling better.  No  fever, chills, sweats.    5/3/18;  Ambulating, eating. Feels better and wants to go home.  Remains afebrile.    ROS:  No f/c/s. No n/v/d. No rash. No new ADR to Abx.     PE:   Vital Signs  Temp (24hrs), Av.3 °F (36.8 °C), Min:98.1 °F (36.7 °C), Max:98.4 °F (36.9 °C)    Temp  Min: 98.1 °F (36.7 °C)  Max: 98.4 °F (36.9 °C)  BP  Min: 107/75  Max: 117/75  Pulse  Min: 59  Max: 71  Resp  Min: 17  Max: 18  No Data Recorded    GENERAL: Awake and alert, in no acute distress.   HEENT:  No conjunctival injection. No icterus. Oropharynx clear without evidence of thrush or exudate.  NECK: Supple, no JVD     HEART: RRR; No murmur, rubs, gallops.   LUNGS: Clear to auscultation bilaterally without wheezing, rales, rhonchi. Normal respiratory effort. Nonlabored. No dullness.  ABDOMEN: Soft, tender, miuldly distended. Positive bowel sounds. Incision without t erythema  EXT:  No cyanosis, clubbing or edema. No cord.  : Genitalia generally unremarkable.  Without Al catheter.  SKIN: Warm and dry without cutaneous eruptions on Inspection/palpation.    NEURO: Alert and oriented x 3, Motor 5/5 bilaterally  Right PICC without redness     Laboratory Data      Results from last 7 days  Lab Units 18  041   WBC 10*3/mm3 10.67 10.67 9.77   HEMOGLOBIN g/dL 12.6* 12.3* 12.0*   HEMATOCRIT % 37.4* 37.2* 36.8*   PLATELETS 10*3/mm3 316 309 297       Results from last 7 days  Lab Units 18   SODIUM mmol/L 137   POTASSIUM mmol/L 4.0   CHLORIDE mmol/L 105   CO2 mmol/L 27.0   BUN mg/dL 19   CREATININE mg/dL 0.90   GLUCOSE mg/dL 121*   CALCIUM mg/dL 9.0       Results from last 7 days  Lab Units 18   ALK PHOS U/L 62   BILIRUBIN mg/dL 0.3   ALT (SGPT) U/L 56*   AST (SGOT) U/L 31           Results from last 7 days  Lab Units 18  1339   CRP mg/dL 3.21*                 Estimated Creatinine Clearance: 84.8 mL/min (by C-G formula based on SCr of 0.9 mg/dL).    Microbiology:         :   BC no growth                    Radiology:  Imaging Results (last 72 hours)     ** No results found for the last 72 hours. **              IMPRESSION:  1.  Intra-abdominal infection-secondary to a probable perforated viscus, S/P exploratory laparotomy with no definite focus of perforation identified.  He is clinically improved and stable off of antibiotic therapy.   2.  Hepatitis C, he has been treated with Interferon approximately 20 years ago  3.  Polysubstance abuse  4.  History of endocarditis    RECOMMENDATIONS;  1.  Continue off of antibiotic therapy  2.  Discharge to home     Dr. Campbell has obtained the history, performed the physical exam and formulated the above treatment plan.       Obed Campbell MD  5/3/2018  6:58 PM

## 2018-05-03 NOTE — DISCHARGE SUMMARY
"    [facility]  DISCHARGE SUMMARY    Patient Name: Ronald Bond  : 1945  MRN: [id number]    Date of Admission: 2018  Date of Discharge:  5/3/2018  Primary Care Physician: Elder Shipley MD    Consults     Date and Time Order Name Status Description    2018 0836 Inpatient Infectious Diseases Consult Completed         Hospital Course     Presenting Problem:   Lower abdominal pain [R10.30]    Active Hospital Problems (** Indicates Principal Problem)    Diagnosis Date Noted   • Asthma [J45.909] 2018   • H/O chronic hepatitis [Z87.19] 2018   • Polysubstance abuse [F19.10] 2018   • GERD (gastroesophageal reflux disease) [K21.9] 2018   • CAD (coronary artery disease) [I25.10] 2018   • Hypertension [I10]    • Depression with anxiety [F41.8]       Resolved Hospital Problems    Diagnosis Date Noted Date Resolved   • **Likely microperforation of viscus [R19.8] 2018          Hospital Course:  Ronald Bond is a 72 y.o. male here with diarrhea and abdominal pain and found to have pneumoperitoneum and portal venous gas on CT s/p exploratory laparotomy with intraoperative EGD, drain placement, enterolysis, SBR with primary anastomosis, but no perforation seen.He was managed by Dr. Sonia Owen with general surgery.   with ID managed antibiotic therapy.  He had a right upper extremity PICC line.  She'll ahead TPN infusing and was able to transition off and slowly advance diet.  Symptoms improved and he remained clinically stable.  Antibiotics were discontinued and monitored off antibiotics per ID.  He since been tolerating solid diet and his AMY drain was discontinued this morning.    Today he seen resting upright in bed awake and alert.  States he feels \"real good\" and is ready to go home.  He is tolerating diet.  He is hemodynamically stable and afebrile.  He is having formed stools.  His midline vertical " "incision is open to air and intact with staples.  AMY has been removed with dry dressing in place.  He's been cleared for discharge from surgery standpoint to follow up in 2 weeks.  Cleared to discharge from an infectious disease standpoint off of antibiotics with a follow-up as needed.  PICC line will be removed prior to discharge.  Activity and wound care per Dr. Owen's recommendations.  Pt is requesting home health and skilled nursing due to weakness which case management has arranged.  Ends are to discharge home today with home health as above.  Follow-up with PCP within 1 week of discharge.  Instructed to return to ER for any worsening of symptoms.     Procedure(s):  LAPAROTOMY EXPLORATORY, SMALL BOWEL RESECTION,  WITH EGD  COLON RESECTION SMALL BOWEL       Day of Discharge     HPI:   Pt is seen resting up in bed in [facility].  NO visitors at bs.  States \"I feel real good\" and is asking for dc home.  Tolerating solid diet.  Sx just dcd his AMY drain.  States no n/v, cp or soa.  Minimal incision pain but controlled on meds.  Having BMs and voiding well.  Ambulating.  Asking for HH/PT on dc.  No new issues.       Review of Systems  Gen- No fevers, chills  CV- No chest pain, palpitations  Resp- No cough, dyspnea  GI- No N/V/D, abd pain    Otherwise ROS is negative except as mentioned in the HPI.    Vital Signs:   Temp:  [98.1 °F (36.7 °C)-98.4 °F (36.9 °C)] 98.1 °F (36.7 °C)  Heart Rate:  [59-71] 71  Resp:  [17-18] 18  BP: (107-117)/(70-75) 117/75     Physical Exam:  Constitutional: No acute distress, awake, alert.  Sitting up in bed.  NO visitors at bs  HENT: NCAT, mucous membranes moist  Respiratory: Clear to auscultation bilaterally, slightly decreased at bases, respiratory effort normal.  Cardiovascular: RRR, no murmurs, rubs, or gallops, palpable pedal pulses bilaterally  Gastrointestinal: Positive bowel sounds, soft, nontender, nondistended.  Obese  Musculoskeletal: No bilateral ankle edema.  WARREN " spont  Psychiatric: Appropriate affect, cooperative  Neurologic: Oriented x 3, strength symmetric in all extremities, Cranial Nerves grossly intact to confrontation, speech clear.  Follows commands   Skin: No rashes.  Midline vertical abd incision c/d/i and sammie with staples in place.  Drsg to lt abd to old AMY site (just pulled this am).  NO drainage noted.        Pertinent  and/or Most Recent Results       Results from last 7 days  Lab Units 05/01/18  0457 04/30/18 0447 04/29/18  1426 04/29/18  0418 04/28/18  0418 04/27/18  1649 04/27/18  0516   WBC 10*3/mm3 10.67 10.67  --  9.77 9.20  --  9.24   HEMOGLOBIN g/dL 12.6* 12.3*  --  12.0* 12.9*  --  11.5*   HEMATOCRIT % 37.4* 37.2*  --  36.8* 39.5  --  35.4*   PLATELETS 10*3/mm3 316 309  --  297 313  --  277   SODIUM mmol/L 137 138  --  139 139  --  137   POTASSIUM mmol/L 4.0 3.3* 3.7 3.4* 3.7 4.8 3.2*   CHLORIDE mmol/L 105 101  --  100 100  --  95*   CO2 mmol/L 27.0 29.0  --  32.0* 33.0*  --  38.0*   BUN mg/dL 19 9  --  8* 9  --  7*   CREATININE mg/dL 0.90 0.90  --  0.90 0.90  --  0.80   GLUCOSE mg/dL 121* 114*  --  102* 110*  --  104*   CALCIUM mg/dL 9.0 9.0  --  8.8 9.0  --  8.9       Results from last 7 days  Lab Units 04/30/18 0447 04/29/18 0418 04/27/18  0516   BILIRUBIN mg/dL 0.3 0.4 0.5   ALK PHOS U/L 62 67 69   ALT (SGPT) U/L 56* 77* 47*   AST (SGOT) U/L 31 51* 61*       Results from last 7 days  Lab Units 04/29/18  1426   CHOLESTEROL mg/dL 182   TRIGLYCERIDES mg/dL 226*         Brief Urine Lab Results  (Last result in the past 365 days)      Color   Clarity   Blood   Leuk Est   Nitrite   Protein   CREAT   Urine HCG        04/24/18 0057 Dark Yellow(A) Clear Moderate (2+)(A) Negative Negative 30 mg/dL (1+)(A)               Microbiology Results Abnormal     Procedure Component Value - Date/Time    Blood Culture - Blood, Blood, Venous Line [[id number]]  (Normal) Collected:  04/23/18 1986    Lab Status:  Final result Specimen:  Blood from Blood, Venous Line  Updated:  04/29/18 0131     Blood Culture No growth at 5 days    Blood Culture - Blood, Blood, Venous Line [[id number]]  (Normal) Collected:  04/23/18 2330    Lab Status:  Final result Specimen:  Blood from Blood, Venous Line Updated:  04/29/18 0131     Blood Culture No growth at 5 days          Imaging Results (all)     Procedure Component Value Units Date/Time    FL Upper GI Single Contrast SBFT [[id number]] Collected:  04/27/18 1608     Updated:  04/27/18 1649    Narrative:       EXAMINATION: FL UPPER GI SINGLE CONTRAST SBFT - 04/27/2018     INDICATION: Evaluate for esophageal, stomach, or duodenal leak.;  R10.30-Lower abdominal pain, unspecified; R19.8-Other specified symptoms  and signs involving the digestive system and abdomen; K66.8-Other  specified disorders of peritoneum; D72.829-Elevated white blood cell  count, unspecified; K63.1-Perforation of intestine (nontraumatic);  Z74.09-Other reduced mobility. 72-year-old patient presents with marked  free intraperitoneal air and marked portal vein air and pneumatosis, and  evaluate for bowel perforation or ischemic bowel disease.     TECHNIQUE: Water-soluble contrast was used.  1 minute 50 seconds of fluoroscopic time was used.     COMPARISON: Compared to previous CT datasets of the abdomen and pelvis  of 04/23/2018.     FINDINGS:   1.  view of the abdomen demonstrates a mildly gaseous abdomen,  however, there is no definite cystic or linear pneumatosis. Pleural  effusions are seen at the lung bases which are minimal. There is a  Ivan-Lees drain in place in the left upper quadrant.     2. The patient ingested contrast in a normal fashion. The swallowing  mechanism is normal. The esophagus is intact and there is no obvious  esophageal leak, esophagitis, fistulization, esophageal obstruction,  encasement, stricture or hernia.     3. The stomach examination is unremarkable. Gastric mass or pneumatosis  is not identified. There is no obvious ulcer and there  is no outlet  obstruction. The pylorus, duodenal bulb and c-loop are otherwise  unremarkable for age. There is mild irregularity of the c-loop but this  is distensible.     4. The small bowel follow-through is normal. Small bowel caliber is  normal. The mucosal folds are normal. There is no small bowel edema,  mass, nodularity, dilatation, transition zone or obstruction. The right  colon is identified within 2 hours and 20 minutes.       Impression:       Normal water-soluble examination of the esophagus  considering the patient's age. There is no esophageal obstruction, mass,  stricture or evidence of perforation and no leak of contrast is seen  from the esophagus.     Swallowing mechanism is normal.     Gastroesophageal junction is unremarkable. The stomach,  pylorus,  duodenum and gastric outlet are normal. There is no evidence of gastric  mass or outlet obstruction and there is no definite ulcer.     Small bowel follow-through mucosal integrity is normal. There is no  evidence of small bowel dilatation, mass, transition zone or  inflammation. Transit time from the ligament of Treitz to the right  colon is slightly less than 2 hours 20 minutes.     Therefore, no acute focal abnormalities are identified and there is no  detectable ischemic disease from a radiographic standpoint.  Specifically, cystic or linear pneumatosis is not appreciated.      DICTATED:     04/27/2018  EDITED/ls :     04/27/2018      This report was finalized on 4/27/2018 4:47 PM by Dr. Kali Devries MD.       CT Abdomen Pelvis Without Contrast [[id number]] Collected:  04/24/18 0754     Updated:  04/25/18 1311    Narrative:       EXAMINATION: CT ABDOMEN AND PELVIS WO CONTRAST-      INDICATION: Diffuse abdominal pain.     TECHNIQUE: Multiple axial CT imaging was obtained of the abdomen and  pelvis without the administration of oral or intravenous contrast.     The radiation dose reduction device was turned on for each scan per the  ALARA (As Low  as Reasonably Achievable) protocol.     COMPARISON: 08/17/2015.     FINDINGS: ABDOMEN: The lung bases reveal atelectatic changes seen at the  lung bases bilaterally. The liver is homogeneous. Pneumobilia is  identified as well is air in the common bile duct. There is spiculated  air seen scattered throughout the upper abdomen representing  extraluminal air. No abnormal wall thickening is seen of the bowel.  Findings are suggesting a perforation likely from a gastric or duodenal  ulcer. There is no significant wall thickening of the stomach or  duodenum. Upper endoscopy is recommended for further evaluation. Kidneys  and adrenal glands are within normal limits. The spleen is homogeneous.  The pancreas is unremarkable. No abdominal or retroperitoneal  lymphadenopathy. No free fluid or free air. No abnormal mass or fluid  collection is identified. Vascular calcification is seen within the  abdominal aorta and iliac vessels. Incidental small umbilical hernia and  ventral abdominal wall hernia containing air and fat with no evidence of  strangulation.     PELVIS: There is fluid seen within the colon suggesting clinical  presentation of diarrhea. No wall thickening identified. No pelvic  adenopathy. The pelvic organs are unremarkable. Bony structures reveal  degenerative changes seen within the spine and pelvis.       Impression:       1. Abnormal extraluminal air seen scattered throughout the upper abdomen  and anterior abdomen suggesting perforation. Findings likely originate  from gastric or duodenal ulcer. Upper endoscopy is recommended for  further evaluation.  2. Ventral abdominal wall hernia and umbilical hernia containing fat and  air with no evidence of strangulation.     D:  04/23/2018  E:  04/24/2018     This report was finalized on 4/25/2018 1:09 PM by Dr. Fatmata Yu MD.       XR Chest 1 View [[id number]] Collected:  04/23/18 1334     Updated:  04/23/18 1532    Narrative:       EXAMINATION: XR CHEST 1  VW-      INDICATION: Upper abdominal pain triage protocol.      COMPARISON: 08/15/2015.     FINDINGS: Portable chest reveals cardiac and mediastinal silhouettes to  be within normal limits. There is a hiatal hernia identified with no new  focal parenchymal opacification present. Pulmonary vascularity is within  normal limits. The lung fields are clear. Mild elevation of the  hemidiaphragm. Degenerative change is seen within the spine.           Impression:       No acute cardiopulmonary disease.     D:  04/23/2018  E:  04/23/2018     This report was finalized on 4/23/2018 3:30 PM by Dr. Fatmata Yu MD.                              Discharge Details      Ronald Bond   Home Medication Instructions YAZMIN:[id number]    Printed on:05/03/18 1234   Medication Information                      amLODIPine (NORVASC) 10 MG tablet  Take 10 mg by mouth Daily. Per Mauricio last filled 12/2017             docusate sodium 100 MG capsule  Take 100 mg by mouth 2 (Two) Times a Day.             metoprolol tartrate (LOPRESSOR) 100 MG tablet  Take 100 mg by mouth 2 (Two) Times a Day. Per Higiniooger not filled this year but patient states he takes this             oxyCODONE-acetaminophen (PERCOCET) 5-325 MG per tablet  Take 1 tablet by mouth Every 6 (Six) Hours As Needed for Moderate Pain .             pantoprazole (PROTONIX) 40 MG EC tablet  Take 1 tablet by mouth 2 (Two) Times a Day Before Meals.             PARoxetine (PAXIL) 20 MG tablet  Take 20 mg by mouth Every Morning. Per Higiniooger not filled this year but patient states he takes this                   Discharge Disposition:  Home or Self Care    Discharge Diet:  Diet Instructions     Diet: Regular, Cardiac, Soft Texture; Thin Liquids, No Restrictions; Whole; Thin       Discharge Diet:   Regular  Cardiac  Soft Texture       Fluid Consistency:  Thin Liquids, No Restrictions    Soft Options:  Whole    Fluid Consistency:  Thin          Discharge Activity:   Activity Instructions      Other Instructions (Specify)       1. You may shower in 48 hours.  Gently rinse your incisions with soapy water.  Do not immerse your incisions in water (i.e...tub baths, hot tubs, etc...) until cleared by a physician.  2.  You may supplement your narcotic pain medications with Ibuprofen.  Take a stool softener when taking narcotic pain medications.  3.  Do not drive while on narcotic pain medications.  4.  No heavy lifting greater than 10 pounds for 6 weeks after your surgery.  No strenuous activity (i.e... pushing, pulling, etc...) for 6 weeks after your surgery.  5.  Call the office for any fevers greater than 101.5, nausea, vomiting, pain not relieved by pain medications, redness/swelling/drainage from your incisions, inability to pass flatus or bowel movements, or any other concerning signs or symptoms.  6.  Follow up with [facility] at [facility] in two weeks (ph: [phone]).            Future Appointments  Date Time Provider Department [place]   5/7/2018 11:15 AM Debra Cai MD [facility]  [facility] None       Additional Instructions for the Follow-ups that You Need to Schedule     Discharge Follow-up with PCP    As directed      Follow Up Details:  1 week         Discharge Follow-up with Specialty: Dr Anaya Owen per her recs in 2 weeks; 2 Weeks    As directed      Specialty:  Dr Anaya Owen per her recs in 2 weeks    Follow Up:  2 Weeks         Discharge Follow-up with Specified Provider: Dr Dancia coyne    As directed      To:  Dr Danica coyne               Time Spent on Discharge:  45 minutes    Electronically signed by KELLEN Ann, 05/03/18, 12:34 PM.

## 2018-05-03 NOTE — PLAN OF CARE
Problem: Patient Care Overview  Goal: Plan of Care Review  Outcome: Ongoing (interventions implemented as appropriate)   04/30/18 1557 05/02/18 0800   Coping/Psychosocial   Plan of Care Reviewed With --  patient   Plan of Care Review   Progress no change --        Problem: Fall Risk (Adult)  Goal: Absence of Fall  Outcome: Ongoing (interventions implemented as appropriate)      Problem: Skin Injury Risk (Adult)  Goal: Skin Health and Integrity  Outcome: Ongoing (interventions implemented as appropriate)      Problem: Surgery Nonspecified (Adult)  Goal: Anesthesia/Sedation Recovery  Outcome: Ongoing (interventions implemented as appropriate)      Problem: Pain, Acute (Adult)  Goal: Acceptable Pain Control/Comfort Level  Outcome: Ongoing (interventions implemented as appropriate)

## 2018-05-03 NOTE — PLAN OF CARE
Problem: Patient Care Overview  Goal: Plan of Care Review  Outcome: Ongoing (interventions implemented as appropriate)   04/30/18 1557 05/02/18 0800   Coping/Psychosocial   Plan of Care Reviewed With --  patient   Plan of Care Review   Progress no change --       04/30/18 1557 05/02/18 0800   Coping/Psychosocial   Plan of Care Reviewed With --  patient   Plan of Care Review   Progress no change --        Problem: Fall Risk (Adult)  Goal: Absence of Fall  Outcome: Ongoing (interventions implemented as appropriate)      Problem: Skin Injury Risk (Adult)  Goal: Skin Health and Integrity  Outcome: Ongoing (interventions implemented as appropriate)      Problem: Surgery Nonspecified (Adult)  Goal: Signs and Symptoms of Listed Potential Problems Will be Absent, Minimized or Managed (Surgery Nonspecified)  Outcome: Outcome(s) achieved Date Met: 05/03/18    Goal: Anesthesia/Sedation Recovery  Outcome: Ongoing (interventions implemented as appropriate)      Problem: Pain, Acute (Adult)  Goal: Acceptable Pain Control/Comfort Level  Outcome: Ongoing (interventions implemented as appropriate)

## 2018-05-03 NOTE — PROGRESS NOTES
Continued Stay Note  [facility]     Patient Name: Ronald Bond  MRN: [id number]  Today's Date: 5/3/2018    Admit Date: 4/23/2018          Discharge Plan     Row Name 05/03/18 1547       Plan    Plan discharge plan    Patient/Family in Agreement with Plan yes    Plan Comments Pt asking for taxi for  transportation home.  Pt's friend in room and reports he has no vehicle to transport pt. Cab voucher left on pt's chart. Primary RN aware.     Final Discharge Disposition Code 06 - home with home health care    Row Name 05/03/18 0929       Plan    Plan discharge plan    Patient/Family in Agreement with Plan yes    Plan Comments Discharge order in epic. Referral made to [facility] per pt request for  for skilled nursing and PT. Spoke with Geoffrey at [facility] and and she accepted referral and will retrieve clinical information from [facility]. Geoffrey is aware pt medically ready for discharge today and will be in contact with pt to arrange home visits. Pt is aware and agreeable. No further discharge needs.     Final Discharge Disposition Code 06 - home with home health care              Discharge Codes    No documentation.       Expected Discharge Date and Time     Expected Discharge Date Expected Discharge Time    May 3, 2018             Deena Martinez, RN

## 2018-05-03 NOTE — PROGRESS NOTES
"General Surgery Daily Progress Note    Subjective:  PCA d/c'd yesterday, and patient with minimal pain, controlled with narcotics.  (+) BM and flatus.    Objective:  /75   Pulse 71   Temp 98.1 °F (36.7 °C) (Oral)   Resp 18   Ht 177.8 cm (70\")   Wt 89.6 kg (197 lb 8 oz)   SpO2 96%   BMI 28.34 kg/m²     Physical Exam:  General: NAD, AAO x 3   Abdomen:  Soft, NT,ND.   Incision: c/d/i.  AMY with serosanguinous drainage.    Imaging Results (last 24 hours)     ** No results found for the last 24 hours. **          CBC:    Results from last 7 days  Lab Units 05/01/18  0457   WBC 10*3/mm3 10.67   HEMOGLOBIN g/dL 12.6*   HEMATOCRIT % 37.4*   PLATELETS 10*3/mm3 316       CMP:    Results from last 7 days  Lab Units 05/01/18  0457 04/30/18  0447   SODIUM mmol/L 137 138   POTASSIUM mmol/L 4.0 3.3*   CHLORIDE mmol/L 105 101   CO2 mmol/L 27.0 29.0   BUN mg/dL 19 9   CREATININE mg/dL 0.90 0.90   CALCIUM mg/dL 9.0 9.0   BILIRUBIN mg/dL  --  0.3   ALK PHOS U/L  --  62   ALT (SGPT) U/L  --  56*   AST (SGOT) U/L  --  31   GLUCOSE mg/dL 121* 114*         Assessment  POD #10 for 72 year old male s/p exploratory laparotomy, intraoperative EGD, and SBR for presumed gastric perforation.  No perforation was identified.  He remains clinically stable. UGI showed no leak.  Antibiotics discontinued.  He has been tolerating solids without a change in character of AMY output.  AMY discontinued.  Please incorporate the following in the patient's discharge summary:    1. You may shower in 48 hours.  Gently rinse your incisions with soapy water.  Do not immerse your incisions in water (i.e...tub baths, hot tubs, etc...) until cleared by a physician.  2.  You may supplement your narcotic pain medications with Ibuprofen.  Take a stool softener when taking narcotic pain medications.  3.  Do not drive while on narcotic pain medications.  4.  No heavy lifting greater than 10 pounds for 6 weeks after your surgery.  No strenuous activity (i.e... " pushing, pulling, etc...) for 6 weeks after your surgery.  5.  Call the office for any fevers greater than 101.5, nausea, vomiting, pain not relieved by pain medications, redness/swelling/drainage from your incisions, inability to pass flatus or bowel movements, or any other concerning signs or symptoms.  6.  Follow up with Dr. Owen at [facility] in two weeks (ph: [phone]).      Plan:   - From a surgical perspective, patient is ok for discharge  - Follow up with me in two weeks.    Indy Owen MD - 5/3/2018, 10:38 AM

## 2018-05-03 NOTE — PLAN OF CARE
Problem: Fall Risk (Adult)  Goal: Absence of Fall   05/03/18 1521   Fall Risk (Adult)   Absence of Fall making progress toward outcome       Problem: Pain, Acute (Adult)  Goal: Acceptable Pain Control/Comfort Level   05/03/18 1521   Pain, Acute (Adult)   Acceptable Pain Control/Comfort Level making progress toward outcome

## 2018-05-04 ENCOUNTER — TRANSITIONAL CARE MANAGEMENT TELEPHONE ENCOUNTER (OUTPATIENT)
Dept: INTERNAL MEDICINE | Facility: CLINIC | Age: 73
End: 2018-05-04

## 2018-05-04 ENCOUNTER — TELEPHONE (OUTPATIENT)
Dept: INTERNAL MEDICINE | Facility: CLINIC | Age: 73
End: 2018-05-04

## 2018-05-18 ENCOUNTER — OFFICE VISIT (OUTPATIENT)
Dept: INTERNAL MEDICINE | Facility: CLINIC | Age: 73
End: 2018-05-18

## 2018-05-18 VITALS
HEIGHT: 70 IN | HEART RATE: 108 BPM | TEMPERATURE: 98.5 F | RESPIRATION RATE: 20 BRPM | DIASTOLIC BLOOD PRESSURE: 90 MMHG | SYSTOLIC BLOOD PRESSURE: 122 MMHG | BODY MASS INDEX: 27.77 KG/M2 | OXYGEN SATURATION: 98 % | WEIGHT: 194 LBS

## 2018-05-18 DIAGNOSIS — Z53.21 PATIENT LEFT WITHOUT BEING SEEN: Primary | ICD-10-CM

## 2018-05-18 NOTE — PROGRESS NOTES
"Patient presented to follow-up after recent hospitalization at [facility].  Upon discussing his past medical history, patient stated that he had previously been taking Lyrica for chronic pain and was requesting a refill today.  He reports that his last PCP had previously referred him to a pain specialist, but he states that \"I didn't fill out the paperwork\" and so he did not establish care with this specialist.  I advised the patient that I do not prescribe Lyrica or other controlled substances.  After telling him this, patient became acutely upset and stated \"how do you stay in business?\"  He then left before the end of his office visit and stated on his way out, \"I won't be coming back here\".  "
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
## 2018-06-04 ENCOUNTER — OFFICE VISIT (OUTPATIENT)
Dept: ORTHOPEDIC SURGERY | Facility: CLINIC | Age: 73
End: 2018-06-04

## 2018-06-04 VITALS — OXYGEN SATURATION: 94 % | HEIGHT: 70 IN | HEART RATE: 88 BPM | BODY MASS INDEX: 27.71 KG/M2 | WEIGHT: 193.56 LBS

## 2018-06-04 DIAGNOSIS — S63.259A FINGER DISLOCATION, INITIAL ENCOUNTER: ICD-10-CM

## 2018-06-04 DIAGNOSIS — R52 PAIN: Primary | ICD-10-CM

## 2018-06-04 DIAGNOSIS — M20.011 MALLET DEFORMITY OF RIGHT MIDDLE FINGER: ICD-10-CM

## 2018-06-04 PROCEDURE — 99214 OFFICE O/P EST MOD 30 MIN: CPT | Performed by: ORTHOPAEDIC SURGERY
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
## 2018-10-08 ENCOUNTER — OFFICE VISIT (OUTPATIENT)
Dept: FAMILY MEDICINE CLINIC | Facility: CLINIC | Age: 73
End: 2018-10-08

## 2018-10-08 VITALS
OXYGEN SATURATION: 97 % | HEART RATE: 74 BPM | BODY MASS INDEX: 24.77 KG/M2 | DIASTOLIC BLOOD PRESSURE: 60 MMHG | WEIGHT: 173 LBS | RESPIRATION RATE: 18 BRPM | TEMPERATURE: 98.3 F | HEIGHT: 70 IN | SYSTOLIC BLOOD PRESSURE: 122 MMHG

## 2018-10-08 DIAGNOSIS — F41.8 DEPRESSION WITH ANXIETY: Primary | ICD-10-CM

## 2018-10-08 DIAGNOSIS — M81.0 OSTEOPOROSIS WITHOUT CURRENT PATHOLOGICAL FRACTURE, UNSPECIFIED OSTEOPOROSIS TYPE: ICD-10-CM

## 2018-10-08 DIAGNOSIS — K21.00 GERD WITH ESOPHAGITIS: ICD-10-CM

## 2018-10-08 DIAGNOSIS — I10 HYPERTENSION, UNSPECIFIED TYPE: ICD-10-CM

## 2018-10-08 DIAGNOSIS — Z59.01 LIVING IN HOMELESS SHELTER: ICD-10-CM

## 2018-10-08 DIAGNOSIS — S62.91XA RIGHT HAND FRACTURE, CLOSED, INITIAL ENCOUNTER: ICD-10-CM

## 2018-10-08 DIAGNOSIS — F19.10 POLYSUBSTANCE ABUSE (HCC): ICD-10-CM

## 2018-10-08 DIAGNOSIS — Z86.19 HISTORY OF POSITIVE HEPATITIS C: ICD-10-CM

## 2018-10-08 DIAGNOSIS — Z76.89 ENCOUNTER TO ESTABLISH CARE: ICD-10-CM

## 2018-10-08 LAB
ALBUMIN SERPL-MCNC: 4.63 G/DL (ref 3.2–4.8)
ALBUMIN/GLOB SERPL: 1.6 G/DL (ref 1.5–2.5)
ALP SERPL-CCNC: 75 U/L (ref 25–100)
ALT SERPL W P-5'-P-CCNC: 10 U/L (ref 7–40)
ANION GAP SERPL CALCULATED.3IONS-SCNC: 9 MMOL/L (ref 3–11)
ARTICHOKE IGE QN: 141 MG/DL (ref 0–130)
AST SERPL-CCNC: 20 U/L (ref 0–33)
BILIRUB BLD-MCNC: NEGATIVE MG/DL
BILIRUB SERPL-MCNC: 0.3 MG/DL (ref 0.3–1.2)
BUN BLD-MCNC: 15 MG/DL (ref 9–23)
BUN/CREAT SERPL: 13.4 (ref 7–25)
CALCIUM SPEC-SCNC: 9.4 MG/DL (ref 8.7–10.4)
CHLORIDE SERPL-SCNC: 96 MMOL/L (ref 99–109)
CHOLEST SERPL-MCNC: 195 MG/DL (ref 0–200)
CLARITY, POC: CLEAR
CO2 SERPL-SCNC: 30 MMOL/L (ref 20–31)
COLOR UR: YELLOW
CREAT BLD-MCNC: 1.12 MG/DL (ref 0.6–1.3)
DEPRECATED RDW RBC AUTO: 47.4 FL (ref 37–54)
ERYTHROCYTE [DISTWIDTH] IN BLOOD BY AUTOMATED COUNT: 15.7 % (ref 11.3–14.5)
GFR SERPL CREATININE-BSD FRML MDRD: 64 ML/MIN/1.73
GLOBULIN UR ELPH-MCNC: 2.9 GM/DL
GLUCOSE BLD-MCNC: 102 MG/DL (ref 70–100)
GLUCOSE UR STRIP-MCNC: NEGATIVE MG/DL
HCT VFR BLD AUTO: 34.8 % (ref 38.9–50.9)
HDLC SERPL-MCNC: 45 MG/DL (ref 40–60)
HGB BLD-MCNC: 10.4 G/DL (ref 13.1–17.5)
KETONES UR QL: NEGATIVE
LEUKOCYTE EST, POC: NEGATIVE
MCH RBC QN AUTO: 24.6 PG (ref 27–31)
MCHC RBC AUTO-ENTMCNC: 29.9 G/DL (ref 32–36)
MCV RBC AUTO: 82.5 FL (ref 80–99)
NITRITE UR-MCNC: NEGATIVE MG/ML
PH UR: 7 [PH] (ref 5–8)
PLATELET # BLD AUTO: 315 10*3/MM3 (ref 150–450)
PMV BLD AUTO: 11.1 FL (ref 6–12)
POTASSIUM BLD-SCNC: 4.7 MMOL/L (ref 3.5–5.5)
PROT SERPL-MCNC: 7.5 G/DL (ref 5.7–8.2)
PROT UR STRIP-MCNC: NEGATIVE MG/DL
RBC # BLD AUTO: 4.22 10*6/MM3 (ref 4.2–5.76)
RBC # UR STRIP: NEGATIVE /UL
SODIUM BLD-SCNC: 135 MMOL/L (ref 132–146)
SP GR UR: 1.01 (ref 1–1.03)
TRIGL SERPL-MCNC: 151 MG/DL (ref 0–150)
TSH SERPL DL<=0.05 MIU/L-ACNC: 1.51 MIU/ML (ref 0.35–5.35)
UROBILINOGEN UR QL: NORMAL
WBC NRBC COR # BLD: 7.53 10*3/MM3 (ref 3.5–10.8)

## 2018-10-08 PROCEDURE — 80053 COMPREHEN METABOLIC PANEL: CPT | Performed by: NURSE PRACTITIONER

## 2018-10-08 PROCEDURE — 85027 COMPLETE CBC AUTOMATED: CPT | Performed by: NURSE PRACTITIONER

## 2018-10-08 PROCEDURE — 80061 LIPID PANEL: CPT | Performed by: NURSE PRACTITIONER

## 2018-10-08 PROCEDURE — 81003 URINALYSIS AUTO W/O SCOPE: CPT | Performed by: NURSE PRACTITIONER

## 2018-10-08 PROCEDURE — 99397 PER PM REEVAL EST PAT 65+ YR: CPT | Performed by: NURSE PRACTITIONER

## 2018-10-08 PROCEDURE — 36415 COLL VENOUS BLD VENIPUNCTURE: CPT | Performed by: NURSE PRACTITIONER

## 2018-10-08 PROCEDURE — 84443 ASSAY THYROID STIM HORMONE: CPT | Performed by: NURSE PRACTITIONER

## 2018-10-08 RX ORDER — PAROXETINE HYDROCHLORIDE 20 MG/1
20 TABLET, FILM COATED ORAL EVERY MORNING
Qty: 30 TABLET | Refills: 3 | Status: SHIPPED | OUTPATIENT
Start: 2018-10-08 | End: 2018-11-05 | Stop reason: SDUPTHER

## 2018-10-08 RX ORDER — DOCUSATE SODIUM 250 MG/1
CAPSULE, LIQUID FILLED ORAL
Refills: 0 | COMMUNITY
Start: 2018-09-20 | End: 2019-03-26 | Stop reason: SDUPTHER

## 2018-10-08 RX ORDER — AMLODIPINE BESYLATE 10 MG/1
10 TABLET ORAL DAILY
Qty: 30 TABLET | Refills: 3 | Status: SHIPPED | OUTPATIENT
Start: 2018-10-08 | End: 2018-10-10

## 2018-10-08 RX ORDER — HYDROCODONE BITARTRATE AND ACETAMINOPHEN 5; 325 MG/1; MG/1
TABLET ORAL
Refills: 0 | COMMUNITY
Start: 2018-07-17 | End: 2018-10-08

## 2018-10-08 SDOH — ECONOMIC STABILITY - HOUSING INSECURITY: SHELTERED HOMELESSNESS: Z59.01

## 2018-10-08 NOTE — PATIENT INSTRUCTIONS

## 2018-10-10 ENCOUNTER — TELEPHONE (OUTPATIENT)
Dept: FAMILY MEDICINE CLINIC | Facility: CLINIC | Age: 73
End: 2018-10-10

## 2018-10-10 DIAGNOSIS — I10 HYPERTENSION, UNSPECIFIED TYPE: Primary | ICD-10-CM

## 2018-10-10 RX ORDER — AMLODIPINE BESYLATE 5 MG/1
5 TABLET ORAL DAILY
Qty: 30 TABLET | Refills: 2 | Status: SHIPPED | OUTPATIENT
Start: 2018-10-10 | End: 2019-02-12 | Stop reason: SDUPTHER

## 2018-10-10 NOTE — TELEPHONE ENCOUNTER
Called [facility]. Reporting patient has only been taking 5 mg of amlodipine daily.     Will change dose to 5 mg daily.     Eri Baez, APRN

## 2018-10-14 ENCOUNTER — TELEPHONE (OUTPATIENT)
Dept: FAMILY MEDICINE CLINIC | Facility: CLINIC | Age: 73
End: 2018-10-14

## 2018-10-14 NOTE — TELEPHONE ENCOUNTER
Called patient discussed his labs. His H/H is low - he denies any noticeable bleeding from his bowels. He reports he had a surgery a month ago.   Discussed all lab results.     He reports he does not want to start taking iron due to constipation from the methadone.     Encourage him to keep his appointment and will recheck and discuss a referral to GI.     He is agreeable.     Unable to mail lab results. Patient has not listed an address.

## 2018-11-05 ENCOUNTER — OFFICE VISIT (OUTPATIENT)
Dept: FAMILY MEDICINE CLINIC | Facility: CLINIC | Age: 73
End: 2018-11-05

## 2018-11-05 VITALS
DIASTOLIC BLOOD PRESSURE: 72 MMHG | OXYGEN SATURATION: 95 % | SYSTOLIC BLOOD PRESSURE: 120 MMHG | HEART RATE: 89 BPM | HEIGHT: 70 IN | BODY MASS INDEX: 23.48 KG/M2 | TEMPERATURE: 97 F | RESPIRATION RATE: 18 BRPM | WEIGHT: 164 LBS

## 2018-11-05 DIAGNOSIS — E78.5 HYPERLIPIDEMIA LDL GOAL <130: Primary | ICD-10-CM

## 2018-11-05 DIAGNOSIS — F41.8 DEPRESSION WITH ANXIETY: ICD-10-CM

## 2018-11-05 DIAGNOSIS — E78.5 HYPERLIPIDEMIA LDL GOAL <130: ICD-10-CM

## 2018-11-05 PROCEDURE — 99213 OFFICE O/P EST LOW 20 MIN: CPT | Performed by: NURSE PRACTITIONER

## 2018-11-05 RX ORDER — ATORVASTATIN CALCIUM 10 MG/1
10 TABLET, FILM COATED ORAL DAILY
Qty: 30 TABLET | Refills: 0 | Status: SHIPPED | OUTPATIENT
Start: 2018-11-05 | End: 2018-11-05

## 2018-11-05 RX ORDER — PAROXETINE HYDROCHLORIDE 20 MG/1
40 TABLET, FILM COATED ORAL EVERY MORNING
Qty: 30 TABLET | Refills: 5 | Status: SHIPPED | OUTPATIENT
Start: 2018-11-05 | End: 2019-05-24

## 2018-11-05 NOTE — PROGRESS NOTES
"Subjective   Ronald Bond is a 73 y.o. male.   Chief Complaint   Patient presents with   • Depression     fu on meds and results from labs.      Depression   Visit Type: follow-up  Patient presents with the following symptoms: excessive worry, palpitations and panic.  Frequency of symptoms: most days   Severity: mild   Sleep quality: good  Nighttime awakenings: occasional  Compliance with medications:  0-25%  Side effects:  Dry mouth   Patient is homeless and lives at [facility].   He states he is eating good, sleeping since moving upstairs.   He reports he has been volunteering at the [employer].     Patient is here to follow up on his lab results.   His hgb and hct are low. He reports he does not want to take any iron supplements due to constipation from the methadone.     He denies any visible bleeding.     The following portions of the patient's history were reviewed and updated as appropriate: allergies, current medications, past family history, past medical history, past social history, past surgical history and problem list.    Review of Systems   Cardiovascular: Positive for palpitations.       Objective   Allergies   Allergen Reactions   • Acetaminophen Other (See Comments)     Pt has Hx hep c     Visit Vitals  /72   Pulse 89   Temp 97 °F (36.1 °C)   Resp 18   Ht 177.8 cm (70\")   Wt 74.4 kg (164 lb)   SpO2 95%   BMI 23.53 kg/m²       Physical Exam   Constitutional: He is oriented to person, place, and time. Vital signs are normal. He appears well-developed and well-nourished. He is cooperative. He does not appear ill.   HENT:   Head: Normocephalic.   Eyes: Pupils are equal, round, and reactive to light.   Cardiovascular: Normal rate and regular rhythm.    Pulmonary/Chest: Effort normal and breath sounds normal.   Abdominal: Soft. Bowel sounds are normal.   Neurological: He is alert and oriented to person, place, and time.   Skin: Skin is warm and dry. Capillary refill takes less than 2 " "seconds.   Psychiatric: He has a normal mood and affect. His behavior is normal.   Vitals reviewed.      Assessment/Plan   Ronald was seen today for depression.    Diagnoses and all orders for this visit:    Hyperlipidemia LDL goal <130  -     Discontinue: atorvastatin (LIPITOR) 10 MG tablet; Take 1 tablet by mouth Daily.  -     aspirin 81 MG tablet; Take 1 tablet by mouth Daily.    Depression with anxiety  -     PARoxetine (PAXIL) 20 MG tablet; Take 2 tablets by mouth Every Morning.  -     Ambulatory Referral to Psychiatry    Patient is declining the atorvastin at this time he would like to follow up in 6 months and have it rechecked.   Patient declined:  Colonoscopy or GI referral and declines taking any iron supplements due to constipation for the methadone.     Patient is here for follow up in laboratory results.   Patient reports he had his Hepatitis A - vaccine and his influenza vaccine.   Follow up for medicare wellness in 4 weeks.     Increasing the paxil to 40 mg daily.     See patient instructions for elevated triglycerides.          Patient Instructions   Food Choices to Lower Your Triglycerides  Triglycerides are a type of fat in your blood. High levels of triglycerides can increase the risk of heart disease and stroke. If your triglyceride levels are high, the foods you eat and your eating habits are very important. Choosing the right foods can help lower your triglycerides.  What general guidelines do I need to follow?  · Lose weight if you are overweight.  · Limit or avoid alcohol.  · Fill one half of your plate with vegetables and green salads.  · Limit fruit to two servings a day. Choose fruit instead of juice.  · Make one fourth of your plate whole grains. Look for the word \"whole\" as the first word in the ingredient list.  · Fill one fourth of your plate with lean protein foods.  · Enjoy fatty fish (such as salmon, mackerel, sardines, and tuna) three times a week.  · Choose healthy fats.  · Limit " foods high in starch and sugar.  · Eat more home-cooked food and less restaurant, buffet, and fast food.  · Limit fried foods.  · Cook foods using methods other than frying.  · Limit saturated fats.  · Check ingredient lists to avoid foods with partially hydrogenated oils (trans fats) in them.  What foods can I eat?  Grains  Whole grains, such as whole wheat or whole grain breads, crackers, cereals, and pasta. Unsweetened oatmeal, bulgur, barley, quinoa, or brown rice. Corn or whole wheat flour tortillas.  Vegetables  Fresh or frozen vegetables (raw, steamed, roasted, or grilled). Green salads.  Fruits  All fresh, canned (in natural juice), or frozen fruits.  Meat and Other Protein Products  Ground beef (85% or leaner), grass-fed beef, or beef trimmed of fat. Skinless chicken or turkey. Ground chicken or turkey. Pork trimmed of fat. All fish and seafood. Eggs. Dried beans, peas, or lentils. Unsalted nuts or seeds. Unsalted canned or dry beans.  Dairy  Low-fat dairy products, such as skim or 1% milk, 2% or reduced-fat cheeses, low-fat ricotta or cottage cheese, or plain low-fat yogurt.  Fats and Oils  Tub margarines without trans fats. Light or reduced-fat mayonnaise and salad dressings. Avocado. Safflower, olive, or canola oils. Natural peanut or almond butter.  The items listed above may not be a complete list of recommended foods or beverages. Contact your dietitian for more options.  What foods are not recommended?  Grains  White bread. White pasta. White rice. Cornbread. Bagels, pastries, and croissants. Crackers that contain trans fat.  Vegetables  White potatoes. Corn. Creamed or fried vegetables. Vegetables in a cheese sauce.  Fruits  Dried fruits. Canned fruit in light or heavy syrup. Fruit juice.  Meat and Other Protein Products  Fatty cuts of meat. Ribs, chicken wings, redmond, sausage, bologna, salami, chitterlings, fatback, hot dogs, bratwurst, and packaged luncheon meats.  Dairy  Whole or 2% milk, cream,  half-and-half, and cream cheese. Whole-fat or sweetened yogurt. Full-fat cheeses. Nondairy creamers and whipped toppings. Processed cheese, cheese spreads, or cheese curds.  Sweets and Desserts  Corn syrup, sugars, honey, and molasses. Candy. Jam and jelly. Syrup. Sweetened cereals. Cookies, pies, cakes, donuts, muffins, and ice cream.  Fats and Oils  Butter, stick margarine, lard, shortening, ghee, or redmond fat. Coconut, palm kernel, or palm oils.  Beverages  Alcohol. Sweetened drinks (such as sodas, lemonade, and fruit drinks or punches).  The items listed above may not be a complete list of foods and beverages to avoid. Contact your dietitian for more information.  This information is not intended to replace advice given to you by your health care provider. Make sure you discuss any questions you have with your health care provider.  Document Released: 10/05/2005 Document Revised: 05/25/2017 Document Reviewed: 10/22/2014  [employer] Interactive Patient Education © 2017 [employer]        Eri Baez APRN

## 2018-11-05 NOTE — PATIENT INSTRUCTIONS
"Food Choices to Lower Your Triglycerides  Triglycerides are a type of fat in your blood. High levels of triglycerides can increase the risk of heart disease and stroke. If your triglyceride levels are high, the foods you eat and your eating habits are very important. Choosing the right foods can help lower your triglycerides.  What general guidelines do I need to follow?  · Lose weight if you are overweight.  · Limit or avoid alcohol.  · Fill one half of your plate with vegetables and green salads.  · Limit fruit to two servings a day. Choose fruit instead of juice.  · Make one fourth of your plate whole grains. Look for the word \"whole\" as the first word in the ingredient list.  · Fill one fourth of your plate with lean protein foods.  · Enjoy fatty fish (such as salmon, mackerel, sardines, and tuna) three times a week.  · Choose healthy fats.  · Limit foods high in starch and sugar.  · Eat more home-cooked food and less restaurant, buffet, and fast food.  · Limit fried foods.  · Cook foods using methods other than frying.  · Limit saturated fats.  · Check ingredient lists to avoid foods with partially hydrogenated oils (trans fats) in them.  What foods can I eat?  Grains  Whole grains, such as whole wheat or whole grain breads, crackers, cereals, and pasta. Unsweetened oatmeal, bulgur, barley, quinoa, or brown rice. Corn or whole wheat flour tortillas.  Vegetables  Fresh or frozen vegetables (raw, steamed, roasted, or grilled). Green salads.  Fruits  All fresh, canned (in natural juice), or frozen fruits.  Meat and Other Protein Products  Ground beef (85% or leaner), grass-fed beef, or beef trimmed of fat. Skinless chicken or turkey. Ground chicken or turkey. Pork trimmed of fat. All fish and seafood. Eggs. Dried beans, peas, or lentils. Unsalted nuts or seeds. Unsalted canned or dry beans.  Dairy  Low-fat dairy products, such as skim or 1% milk, 2% or reduced-fat cheeses, low-fat ricotta or cottage cheese, or " plain low-fat yogurt.  Fats and Oils  Tub margarines without trans fats. Light or reduced-fat mayonnaise and salad dressings. Avocado. Safflower, olive, or canola oils. Natural peanut or almond butter.  The items listed above may not be a complete list of recommended foods or beverages. Contact your dietitian for more options.  What foods are not recommended?  Grains  White bread. White pasta. White rice. Cornbread. Bagels, pastries, and croissants. Crackers that contain trans fat.  Vegetables  White potatoes. Corn. Creamed or fried vegetables. Vegetables in a cheese sauce.  Fruits  Dried fruits. Canned fruit in light or heavy syrup. Fruit juice.  Meat and Other Protein Products  Fatty cuts of meat. Ribs, chicken wings, redmond, sausage, bologna, salami, chitterlings, fatback, hot dogs, bratwurst, and packaged luncheon meats.  Dairy  Whole or 2% milk, cream, half-and-half, and cream cheese. Whole-fat or sweetened yogurt. Full-fat cheeses. Nondairy creamers and whipped toppings. Processed cheese, cheese spreads, or cheese curds.  Sweets and Desserts  Corn syrup, sugars, honey, and molasses. Candy. Jam and jelly. Syrup. Sweetened cereals. Cookies, pies, cakes, donuts, muffins, and ice cream.  Fats and Oils  Butter, stick margarine, lard, shortening, ghee, or redmond fat. Coconut, palm kernel, or palm oils.  Beverages  Alcohol. Sweetened drinks (such as sodas, lemonade, and fruit drinks or punches).  The items listed above may not be a complete list of foods and beverages to avoid. Contact your dietitian for more information.  This information is not intended to replace advice given to you by your health care provider. Make sure you discuss any questions you have with your health care provider.  Document Released: 10/05/2005 Document Revised: 05/25/2017 Document Reviewed: 10/22/2014  [employer] Interactive Patient Education © 2017 [employer]

## 2018-11-06 DIAGNOSIS — F41.8 DEPRESSION WITH ANXIETY: ICD-10-CM

## 2018-11-06 RX ORDER — PAROXETINE HYDROCHLORIDE 40 MG/1
40 TABLET, FILM COATED ORAL EVERY MORNING
Qty: 30 TABLET | Refills: 5 | Status: SHIPPED | OUTPATIENT
Start: 2018-11-06 | End: 2019-03-26 | Stop reason: SDUPTHER

## 2018-12-04 DIAGNOSIS — E78.5 HYPERLIPIDEMIA LDL GOAL <130: ICD-10-CM

## 2018-12-04 RX ORDER — ATORVASTATIN CALCIUM 10 MG/1
10 TABLET, FILM COATED ORAL DAILY
Qty: 30 TABLET | Refills: 5 | Status: SHIPPED | OUTPATIENT
Start: 2018-12-04 | End: 2019-03-26 | Stop reason: SDUPTHER

## 2019-02-12 DIAGNOSIS — I10 HYPERTENSION, UNSPECIFIED TYPE: ICD-10-CM

## 2019-02-12 RX ORDER — AMLODIPINE BESYLATE 5 MG/1
5 TABLET ORAL DAILY
Qty: 30 TABLET | Refills: 2 | Status: SHIPPED | OUTPATIENT
Start: 2019-02-12 | End: 2019-03-26 | Stop reason: SDUPTHER

## 2019-03-26 ENCOUNTER — OFFICE VISIT (OUTPATIENT)
Dept: FAMILY MEDICINE CLINIC | Facility: CLINIC | Age: 74
End: 2019-03-26

## 2019-03-26 ENCOUNTER — TELEPHONE (OUTPATIENT)
Dept: FAMILY MEDICINE CLINIC | Facility: CLINIC | Age: 74
End: 2019-03-26

## 2019-03-26 VITALS
TEMPERATURE: 99.1 F | DIASTOLIC BLOOD PRESSURE: 76 MMHG | SYSTOLIC BLOOD PRESSURE: 134 MMHG | HEART RATE: 82 BPM | HEIGHT: 70 IN | OXYGEN SATURATION: 93 % | BODY MASS INDEX: 24.05 KG/M2 | RESPIRATION RATE: 16 BRPM | WEIGHT: 168 LBS

## 2019-03-26 DIAGNOSIS — E78.5 HYPERLIPIDEMIA LDL GOAL <130: ICD-10-CM

## 2019-03-26 DIAGNOSIS — J40 BRONCHITIS: ICD-10-CM

## 2019-03-26 DIAGNOSIS — R30.0 DYSURIA: ICD-10-CM

## 2019-03-26 DIAGNOSIS — F41.8 DEPRESSION WITH ANXIETY: ICD-10-CM

## 2019-03-26 DIAGNOSIS — I10 HYPERTENSION, UNSPECIFIED TYPE: ICD-10-CM

## 2019-03-26 DIAGNOSIS — Z59.01 LIVING IN HOMELESS SHELTER: Primary | ICD-10-CM

## 2019-03-26 DIAGNOSIS — K59.00 CONSTIPATION, UNSPECIFIED CONSTIPATION TYPE: ICD-10-CM

## 2019-03-26 LAB
BILIRUB BLD-MCNC: NEGATIVE MG/DL
CLARITY, POC: CLEAR
COLOR UR: YELLOW
GLUCOSE UR STRIP-MCNC: NEGATIVE MG/DL
KETONES UR QL: NEGATIVE
LEUKOCYTE EST, POC: NEGATIVE
NITRITE UR-MCNC: NEGATIVE MG/ML
PH UR: 6 [PH] (ref 5–8)
PROT UR STRIP-MCNC: ABNORMAL MG/DL
RBC # UR STRIP: NEGATIVE /UL
SP GR UR: 1.02 (ref 1–1.03)
UROBILINOGEN UR QL: NORMAL

## 2019-03-26 PROCEDURE — 96372 THER/PROPH/DIAG INJ SC/IM: CPT | Performed by: NURSE PRACTITIONER

## 2019-03-26 PROCEDURE — 81003 URINALYSIS AUTO W/O SCOPE: CPT | Performed by: NURSE PRACTITIONER

## 2019-03-26 PROCEDURE — 99214 OFFICE O/P EST MOD 30 MIN: CPT | Performed by: NURSE PRACTITIONER

## 2019-03-26 RX ORDER — ALBUTEROL SULFATE 90 UG/1
2 AEROSOL, METERED RESPIRATORY (INHALATION) EVERY 6 HOURS PRN
Qty: 1 INHALER | Refills: 1 | Status: SHIPPED | OUTPATIENT
Start: 2019-03-26 | End: 2019-03-26 | Stop reason: SDUPTHER

## 2019-03-26 RX ORDER — METHYLPREDNISOLONE ACETATE 40 MG/ML
40 INJECTION, SUSPENSION INTRA-ARTICULAR; INTRALESIONAL; INTRAMUSCULAR; SOFT TISSUE ONCE
Status: COMPLETED | OUTPATIENT
Start: 2019-03-26 | End: 2019-03-26

## 2019-03-26 RX ORDER — BENZONATATE 100 MG/1
100 CAPSULE ORAL 3 TIMES DAILY PRN
Qty: 30 CAPSULE | Refills: 0 | Status: SHIPPED | OUTPATIENT
Start: 2019-03-26 | End: 2019-04-05

## 2019-03-26 RX ORDER — SULFAMETHOXAZOLE AND TRIMETHOPRIM 800; 160 MG/1; MG/1
1 TABLET ORAL 2 TIMES DAILY
Qty: 14 TABLET | Refills: 0 | Status: SHIPPED | OUTPATIENT
Start: 2019-03-26 | End: 2019-03-26 | Stop reason: SDUPTHER

## 2019-03-26 RX ORDER — AMLODIPINE BESYLATE 5 MG/1
5 TABLET ORAL DAILY
Qty: 90 TABLET | Refills: 1 | Status: SHIPPED | OUTPATIENT
Start: 2019-03-26 | End: 2020-01-28 | Stop reason: SDUPTHER

## 2019-03-26 RX ORDER — PREDNISONE 10 MG/1
TABLET ORAL
Qty: 21 TABLET | Refills: 0 | Status: SHIPPED | OUTPATIENT
Start: 2019-03-26 | End: 2019-03-26 | Stop reason: SDUPTHER

## 2019-03-26 RX ORDER — BENZONATATE 100 MG/1
100 CAPSULE ORAL 3 TIMES DAILY PRN
Qty: 30 CAPSULE | Refills: 0 | Status: SHIPPED | OUTPATIENT
Start: 2019-03-26 | End: 2019-03-26 | Stop reason: SDUPTHER

## 2019-03-26 RX ORDER — DOCUSATE SODIUM 250 MG/1
250 CAPSULE, LIQUID FILLED ORAL 2 TIMES DAILY PRN
Qty: 180 CAPSULE | Refills: 1 | Status: SHIPPED | OUTPATIENT
Start: 2019-03-26 | End: 2019-06-24

## 2019-03-26 RX ORDER — PREDNISONE 10 MG/1
TABLET ORAL
Qty: 21 TABLET | Refills: 0 | Status: SHIPPED | OUTPATIENT
Start: 2019-03-26 | End: 2019-05-24

## 2019-03-26 RX ORDER — ATORVASTATIN CALCIUM 10 MG/1
10 TABLET, FILM COATED ORAL DAILY
Qty: 90 TABLET | Refills: 1 | Status: SHIPPED | OUTPATIENT
Start: 2019-03-26 | End: 2019-09-30

## 2019-03-26 RX ORDER — ALBUTEROL SULFATE 90 UG/1
2 AEROSOL, METERED RESPIRATORY (INHALATION) EVERY 6 HOURS PRN
Qty: 1 INHALER | Refills: 1 | Status: SHIPPED | OUTPATIENT
Start: 2019-03-26 | End: 2019-09-30

## 2019-03-26 RX ORDER — PAROXETINE HYDROCHLORIDE 40 MG/1
40 TABLET, FILM COATED ORAL EVERY MORNING
Qty: 90 TABLET | Refills: 1 | Status: SHIPPED | OUTPATIENT
Start: 2019-03-26 | End: 2019-10-08 | Stop reason: SDUPTHER

## 2019-03-26 RX ORDER — SULFAMETHOXAZOLE AND TRIMETHOPRIM 800; 160 MG/1; MG/1
1 TABLET ORAL 2 TIMES DAILY
Qty: 14 TABLET | Refills: 0 | Status: SHIPPED | OUTPATIENT
Start: 2019-03-26 | End: 2019-04-02

## 2019-03-26 RX ADMIN — METHYLPREDNISOLONE ACETATE 40 MG: 40 INJECTION, SUSPENSION INTRA-ARTICULAR; INTRALESIONAL; INTRAMUSCULAR; SOFT TISSUE at 17:51

## 2019-03-26 SDOH — ECONOMIC STABILITY - HOUSING INSECURITY: SHELTERED HOMELESSNESS: Z59.01

## 2019-03-26 NOTE — PATIENT INSTRUCTIONS
Dysuria  Dysuria is pain or discomfort while urinating. The pain or discomfort may be felt in the tube that carries urine out of the bladder (urethra) or in the surrounding tissue of the genitals. The pain may also be felt in the groin area, lower abdomen, and lower back. You may have to urinate frequently or have the sudden feeling that you have to urinate (urgency). Dysuria can affect both men and women, but is more common in women.  Dysuria can be caused by many different things, including:  · Urinary tract infection in women.  · Infection of the kidney or bladder.  · Kidney stones or bladder stones.  · Certain sexually transmitted infections (STIs), such as chlamydia.  · Dehydration.  · Inflammation of the vagina.  · Use of certain medicines.  · Use of certain soaps or scented products that cause irritation.    Follow these instructions at home:  Watch your dysuria for any changes. The following actions may help to reduce any discomfort you are feeling:  · Drink enough fluid to keep your urine clear or pale yellow.  · Empty your bladder often. Avoid holding urine for long periods of time.  · After a bowel movement or urination, women should cleanse from front to back, using each tissue only once.  · Empty your bladder after sexual intercourse.  · Take medicines only as directed by your health care provider.  · If you were prescribed an antibiotic medicine, finish it all even if you start to feel better.  · Avoid caffeine, tea, and alcohol. They can irritate the bladder and make dysuria worse. In men, alcohol may irritate the prostate.  · Keep all follow-up visits as directed by your health care provider. This is important.  · If you had any tests done to find the cause of dysuria, it is your responsibility to obtain your test results. Ask the lab or department performing the test when and how you will get your results. Talk with your health care provider if you have any questions about your results.    Contact a  health care provider if:  · You develop pain in your back or sides.  · You have a fever.  · You have nausea or vomiting.  · You have blood in your urine.  · You are not urinating as often as you usually do.  Get help right away if:  · You pain is severe and not relieved with medicines.  · You are unable to hold down any fluids.  · You or someone else notices a change in your mental function.  · You have a rapid heartbeat at rest.  · You have shaking or chills.  · You feel extremely weak.  This information is not intended to replace advice given to you by your health care provider. Make sure you discuss any questions you have with your health care provider.  Document Released: 09/15/2005 Document Revised: 05/25/2017 Document Reviewed: 08/13/2015  [employer] Interactive Patient Education © 2018 [employer]  Acute Bronchitis, Adult  Acute bronchitis is sudden (acute) swelling of the air tubes (bronchi) in the lungs. Acute bronchitis causes these tubes to fill with mucus, which can make it hard to breathe. It can also cause coughing or wheezing.  In adults, acute bronchitis usually goes away within 2 weeks. A cough caused by bronchitis may last up to 3 weeks. Smoking, allergies, and asthma can make the condition worse. Repeated episodes of bronchitis may cause further lung problems, such as chronic obstructive pulmonary disease (COPD).  What are the causes?  This condition can be caused by germs and by substances that irritate the lungs, including:  · Cold and flu viruses. This condition is most often caused by the same virus that causes a cold.  · Bacteria.  · Exposure to tobacco smoke, dust, fumes, and air pollution.    What increases the risk?  This condition is more likely to develop in people who:  · Have close contact with someone with acute bronchitis.  · Are exposed to lung irritants, such as tobacco smoke, dust, fumes, and vapors.  · Have a weak immune system.  · Have a respiratory condition such as  asthma.    What are the signs or symptoms?  Symptoms of this condition include:  · A cough.  · Coughing up clear, yellow, or green mucus.  · Wheezing.  · Chest congestion.  · Shortness of breath.  · A fever.  · Body aches.  · Chills.  · A sore throat.    How is this diagnosed?  This condition is usually diagnosed with a physical exam. During the exam, your health care provider may order tests, such as chest X-rays, to rule out other conditions. He or she may also:  · Test a sample of your mucus for bacterial infection.  · Check the level of oxygen in your blood. This is done to check for pneumonia.  · Do a chest X-ray or lung function testing to rule out pneumonia and other conditions.  · Perform blood tests.    Your health care provider will also ask about your symptoms and medical history.  How is this treated?  Most cases of acute bronchitis clear up over time without treatment. Your health care provider may recommend:  · Drinking more fluids. Drinking more makes your mucus thinner, which may make it easier to breathe.  · Taking a medicine for a fever or cough.  · Taking an antibiotic medicine.  · Using an inhaler to help improve shortness of breath and to control a cough.  · Using a cool mist vaporizer or humidifier to make it easier to breathe.    Follow these instructions at home:  Medicines  · Take over-the-counter and prescription medicines only as told by your health care provider.  · If you were prescribed an antibiotic, take it as told by your health care provider. Do not stop taking the antibiotic even if you start to feel better.  General instructions  · Get plenty of rest.  · Drink enough fluids to keep your urine pale yellow.  · Avoid smoking and secondhand smoke. Exposure to cigarette smoke or irritating chemicals will make bronchitis worse. If you smoke and you need help quitting, ask your health care provider. Quitting smoking will help your lungs heal faster.  · Use an inhaler, cool mist  vaporizer, or humidifier as told by your health care provider.  · Keep all follow-up visits as told by your health care provider. This is important.  How is this prevented?  To lower your risk of getting this condition again:  · Wash your hands often with soap and water. If soap and water are not available, use hand .  · Avoid contact with people who have cold symptoms.  · Try not to touch your hands to your mouth, nose, or eyes.  · Make sure to get the flu shot every year.    Contact a health care provider if:  · Your symptoms do not improve in 2 weeks of treatment.  Get help right away if:  · You cough up blood.  · You have chest pain.  · You have severe shortness of breath.  · You become dehydrated.  · You faint or keep feeling like you are going to faint.  · You keep vomiting.  · You have a severe headache.  · Your fever or chills gets worse.  This information is not intended to replace advice given to you by your health care provider. Make sure you discuss any questions you have with your health care provider.  Document Released: 01/25/2006 Document Revised: 08/01/2018 Document Reviewed: 06/07/2017  [employer] Interactive Patient Education © 2019 [employer]

## 2019-03-26 NOTE — TELEPHONE ENCOUNTER
Called patient regarding abnormal labs from [facility].He is involved in a testosterone study. His urinalysis is abnormal. Labs were from 03/14/19.     Dr. Ángel Ordoñez has flagged the labs.   Called patient. He has been incontinent of urine one yesterday and is experiencing urgency and frequency.   He is agreeable to come in to be seen and retested for UTI.     Eri Baez, APRN
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
## 2019-05-24 ENCOUNTER — OFFICE VISIT (OUTPATIENT)
Dept: FAMILY MEDICINE CLINIC | Facility: CLINIC | Age: 74
End: 2019-05-24

## 2019-05-24 VITALS
TEMPERATURE: 98.8 F | HEIGHT: 70 IN | WEIGHT: 156.8 LBS | RESPIRATION RATE: 19 BRPM | DIASTOLIC BLOOD PRESSURE: 71 MMHG | HEART RATE: 99 BPM | SYSTOLIC BLOOD PRESSURE: 142 MMHG | BODY MASS INDEX: 22.45 KG/M2 | OXYGEN SATURATION: 97 %

## 2019-05-24 DIAGNOSIS — K21.9 GASTROESOPHAGEAL REFLUX DISEASE, ESOPHAGITIS PRESENCE NOT SPECIFIED: Primary | ICD-10-CM

## 2019-05-24 DIAGNOSIS — F32.A DEPRESSION, UNSPECIFIED DEPRESSION TYPE: ICD-10-CM

## 2019-05-24 DIAGNOSIS — G89.4 CHRONIC PAIN SYNDROME: ICD-10-CM

## 2019-05-24 DIAGNOSIS — K44.9 HIATAL HERNIA: ICD-10-CM

## 2019-05-24 PROCEDURE — 99213 OFFICE O/P EST LOW 20 MIN: CPT | Performed by: NURSE PRACTITIONER

## 2019-05-24 RX ORDER — PANTOPRAZOLE SODIUM 20 MG/1
20 TABLET, DELAYED RELEASE ORAL DAILY
Qty: 30 TABLET | Refills: 5 | Status: SHIPPED | OUTPATIENT
Start: 2019-05-24 | End: 2019-07-25 | Stop reason: SDUPTHER

## 2019-05-24 RX ORDER — BUPROPION HYDROCHLORIDE 150 MG/1
150 TABLET, EXTENDED RELEASE ORAL DAILY
Qty: 30 TABLET | Refills: 1 | Status: SHIPPED | OUTPATIENT
Start: 2019-05-24 | End: 2019-06-23

## 2019-05-24 RX ORDER — ONDANSETRON 4 MG/1
TABLET, ORALLY DISINTEGRATING ORAL
Refills: 0 | COMMUNITY
Start: 2019-05-04 | End: 2020-02-04
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
## 2019-05-31 ENCOUNTER — OUTSIDE FACILITY SERVICE (OUTPATIENT)
Dept: GASTROENTEROLOGY | Facility: CLINIC | Age: 74
End: 2019-05-31

## 2019-05-31 PROCEDURE — 43235 EGD DIAGNOSTIC BRUSH WASH: CPT | Performed by: INTERNAL MEDICINE

## 2019-06-28 ENCOUNTER — TRANSITIONAL CARE MANAGEMENT TELEPHONE ENCOUNTER (OUTPATIENT)
Dept: FAMILY MEDICINE CLINIC | Facility: CLINIC | Age: 74
End: 2019-06-28
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
## 2019-07-01 ENCOUNTER — OFFICE VISIT (OUTPATIENT)
Dept: FAMILY MEDICINE CLINIC | Facility: CLINIC | Age: 74
End: 2019-07-01

## 2019-07-01 VITALS
BODY MASS INDEX: 23.91 KG/M2 | HEIGHT: 70 IN | SYSTOLIC BLOOD PRESSURE: 138 MMHG | HEART RATE: 82 BPM | WEIGHT: 167 LBS | OXYGEN SATURATION: 98 % | TEMPERATURE: 97.8 F | DIASTOLIC BLOOD PRESSURE: 82 MMHG

## 2019-07-01 DIAGNOSIS — S80.12XD TRAUMATIC ECCHYMOSIS OF LEFT LOWER LEG, SUBSEQUENT ENCOUNTER: ICD-10-CM

## 2019-07-01 DIAGNOSIS — L03.116 CELLULITIS OF LEFT LOWER EXTREMITY: Primary | ICD-10-CM

## 2019-07-01 PROBLEM — S80.12XA TRAUMATIC ECCHYMOSIS OF LEFT LOWER LEG: Status: ACTIVE | Noted: 2019-07-01

## 2019-07-01 PROCEDURE — 99213 OFFICE O/P EST LOW 20 MIN: CPT | Performed by: FAMILY MEDICINE

## 2019-07-01 RX ORDER — CEPHALEXIN 250 MG/1
250 CAPSULE ORAL 2 TIMES DAILY
Qty: 20 CAPSULE | Refills: 0 | Status: SHIPPED | OUTPATIENT
Start: 2019-07-01 | End: 2019-07-01 | Stop reason: SDUPTHER

## 2019-07-01 RX ORDER — CEPHALEXIN 250 MG/1
250 CAPSULE ORAL 2 TIMES DAILY
Qty: 20 CAPSULE | Refills: 0 | Status: SHIPPED | OUTPATIENT
Start: 2019-07-01 | End: 2019-07-25
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
## 2019-07-25 ENCOUNTER — OFFICE VISIT (OUTPATIENT)
Dept: FAMILY MEDICINE CLINIC | Facility: CLINIC | Age: 74
End: 2019-07-25

## 2019-07-25 VITALS
BODY MASS INDEX: 22.19 KG/M2 | TEMPERATURE: 97.7 F | WEIGHT: 155 LBS | OXYGEN SATURATION: 96 % | HEIGHT: 70 IN | HEART RATE: 81 BPM | SYSTOLIC BLOOD PRESSURE: 106 MMHG | RESPIRATION RATE: 17 BRPM | DIASTOLIC BLOOD PRESSURE: 60 MMHG

## 2019-07-25 DIAGNOSIS — E78.5 HYPERLIPIDEMIA LDL GOAL <130: ICD-10-CM

## 2019-07-25 DIAGNOSIS — R79.89 LOW TESTOSTERONE IN MALE: ICD-10-CM

## 2019-07-25 DIAGNOSIS — Z76.0 ENCOUNTER FOR MEDICATION REFILL: ICD-10-CM

## 2019-07-25 DIAGNOSIS — R79.89 LOW VITAMIN D LEVEL: ICD-10-CM

## 2019-07-25 DIAGNOSIS — S89.92XS LEFT LEG INJURY, SEQUELA: Primary | ICD-10-CM

## 2019-07-25 DIAGNOSIS — K59.00 CONSTIPATION, UNSPECIFIED CONSTIPATION TYPE: ICD-10-CM

## 2019-07-25 DIAGNOSIS — M79.89 SWOLLEN LEG: ICD-10-CM

## 2019-07-25 DIAGNOSIS — R23.8 SCALP IRRITATION: ICD-10-CM

## 2019-07-25 DIAGNOSIS — Z12.5 SCREENING PSA (PROSTATE SPECIFIC ANTIGEN): ICD-10-CM

## 2019-07-25 DIAGNOSIS — K21.9 GASTROESOPHAGEAL REFLUX DISEASE, ESOPHAGITIS PRESENCE NOT SPECIFIED: ICD-10-CM

## 2019-07-25 DIAGNOSIS — K44.9 HIATAL HERNIA: ICD-10-CM

## 2019-07-25 LAB
BILIRUB BLD-MCNC: ABNORMAL MG/DL
CLARITY, POC: CLEAR
COLOR UR: YELLOW
GLUCOSE UR STRIP-MCNC: NEGATIVE MG/DL
KETONES UR QL: ABNORMAL
LEUKOCYTE EST, POC: NEGATIVE
NITRITE UR-MCNC: NEGATIVE MG/ML
PH UR: 6 [PH] (ref 5–8)
PROT UR STRIP-MCNC: ABNORMAL MG/DL
RBC # UR STRIP: NEGATIVE /UL
SP GR UR: 1.02 (ref 1–1.03)
UROBILINOGEN UR QL: NORMAL

## 2019-07-25 PROCEDURE — 84403 ASSAY OF TOTAL TESTOSTERONE: CPT | Performed by: NURSE PRACTITIONER

## 2019-07-25 PROCEDURE — 99214 OFFICE O/P EST MOD 30 MIN: CPT | Performed by: NURSE PRACTITIONER

## 2019-07-25 PROCEDURE — 80053 COMPREHEN METABOLIC PANEL: CPT | Performed by: NURSE PRACTITIONER

## 2019-07-25 PROCEDURE — 84402 ASSAY OF FREE TESTOSTERONE: CPT | Performed by: NURSE PRACTITIONER

## 2019-07-25 PROCEDURE — 36415 COLL VENOUS BLD VENIPUNCTURE: CPT | Performed by: NURSE PRACTITIONER

## 2019-07-25 PROCEDURE — 81003 URINALYSIS AUTO W/O SCOPE: CPT | Performed by: NURSE PRACTITIONER

## 2019-07-25 PROCEDURE — G0103 PSA SCREENING: HCPCS | Performed by: NURSE PRACTITIONER

## 2019-07-25 RX ORDER — POLYETHYLENE GLYCOL 3350 17 G/17G
17 POWDER, FOR SOLUTION ORAL DAILY
Qty: 30 PACKET | Refills: 5 | Status: SHIPPED | OUTPATIENT
Start: 2019-07-25 | End: 2019-10-22 | Stop reason: SDUPTHER

## 2019-07-25 RX ORDER — PANTOPRAZOLE SODIUM 20 MG/1
20 TABLET, DELAYED RELEASE ORAL DAILY
Qty: 30 TABLET | Refills: 5 | Status: SHIPPED | OUTPATIENT
Start: 2019-07-25 | End: 2019-10-22 | Stop reason: SDUPTHER

## 2019-07-25 RX ORDER — DIAPER,BRIEF,INFANT-TODD,DISP
EACH MISCELLANEOUS 3 TIMES DAILY
Qty: 28 G | Refills: 0 | Status: SHIPPED | OUTPATIENT
Start: 2019-07-25 | End: 2019-09-30
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
## 2019-07-25 NOTE — PATIENT INSTRUCTIONS
Hematoma  A hematoma is a collection of blood. A hematoma can happen:  · Under the skin.  · In an organ.  · In a body space.  · In a joint space.  · In other tissues.    The blood can clot to form a lump that you can see and feel. The lump is often hard and may become sore and tender. Most hematomas get better in a few days to weeks. However, some hematomas may be serious and require medical care. Hematomas can range from very small to very large.  Follow these instructions at home:  Managing pain, stiffness, and swelling  · If directed, apply ice to the affected area:  ? Put ice in a plastic bag.  ? Place a towel between your skin and the bag.  ? Leave the ice on for 20 minutes, 2-3 times a day for the first couple of days.  · After applying ice for a couple of days, you may place warm compresses. Do this as told by your doctor. Remove the heat if your skin turns bright red. This is especially important if you cannot feel pain, heat, or cold. You have a greater risk of getting burned.  · Raise (elevate) the affected area above the level of your heart while you are sitting or lying down.  · Wrap the affected area with an elastic bandage, if told by your doctor. Make sure the bandage is not wrapped too tightly.  · If your hematoma is on a leg and is painful, your doctor may suggest crutches. Use the crutches as told by your doctor.  General instructions  · Take over-the-counter and prescription medicines only as told by your doctor.  · Keep all follow-up visits as told by your doctor. This is important.  Contact a doctor if:  · You have a fever.  · The swelling or bruising gets worse.  · You develop more hematomas.  Get help right away if:  · Your pain gets worse.  · Your pain is not controlled with medicine.  · The skin over the hematoma breaks or starts bleeding.  · Your hematoma is in your chest or belly (abdomen), and you:  ? Pass out.  ? Feel weak.  ? Become short of breath.  · You have a hematoma on your scalp  that is caused by a fall or injury, and you:  ? Have a headache that gets worse.  ? Become less alert or pass out.  Summary  · Hematomas can happen in different parts of your body.  · Most hematomas get better in a few days to weeks. Some may require medical care.  · Contact a doctor if the swelling or bruising gets worse.  This information is not intended to replace advice given to you by your health care provider. Make sure you discuss any questions you have with your health care provider.  Document Released: 01/25/2006 Document Revised: 01/23/2018 Document Reviewed: 01/23/2018  [employer]vier Interactive Patient Education © 2019 Elsevier Inc.

## 2019-07-26 LAB
ALBUMIN SERPL-MCNC: 4.7 G/DL (ref 3.5–5.2)
ALBUMIN/GLOB SERPL: 1.4 G/DL
ALP SERPL-CCNC: 80 U/L (ref 39–117)
ALT SERPL W P-5'-P-CCNC: 10 U/L (ref 1–41)
ANION GAP SERPL CALCULATED.3IONS-SCNC: 12.8 MMOL/L (ref 5–15)
AST SERPL-CCNC: 22 U/L (ref 1–40)
BILIRUB SERPL-MCNC: 0.4 MG/DL (ref 0.2–1.2)
BUN BLD-MCNC: 15 MG/DL (ref 8–23)
BUN/CREAT SERPL: 11 (ref 7–25)
CALCIUM SPEC-SCNC: 9.9 MG/DL (ref 8.6–10.5)
CHLORIDE SERPL-SCNC: 90 MMOL/L (ref 98–107)
CO2 SERPL-SCNC: 32.2 MMOL/L (ref 22–29)
CREAT BLD-MCNC: 1.36 MG/DL (ref 0.76–1.27)
GFR SERPL CREATININE-BSD FRML MDRD: 52 ML/MIN/1.73
GLOBULIN UR ELPH-MCNC: 3.3 GM/DL
GLUCOSE BLD-MCNC: 104 MG/DL (ref 65–99)
POTASSIUM BLD-SCNC: 4.8 MMOL/L (ref 3.5–5.2)
PROT SERPL-MCNC: 8 G/DL (ref 6–8.5)
PSA SERPL-MCNC: 1.93 NG/ML (ref 0–4)
SODIUM BLD-SCNC: 135 MMOL/L (ref 136–145)

## 2019-07-28 LAB
TESTOST FREE SERPL-MCNC: 0.5 PG/ML (ref 6.6–18.1)
TESTOST SERPL-MCNC: <3 NG/DL (ref 264–916)

## 2019-07-30 ENCOUNTER — HOSPITAL ENCOUNTER (OUTPATIENT)
Dept: CARDIOLOGY | Facility: HOSPITAL | Age: 74
Discharge: HOME | End: 2019-07-30
Admitting: NURSE PRACTITIONER

## 2019-07-30 VITALS — BODY MASS INDEX: 22.19 KG/M2 | HEIGHT: 70 IN | WEIGHT: 154.98 LBS

## 2019-07-30 DIAGNOSIS — K59.00 CONSTIPATION, UNSPECIFIED CONSTIPATION TYPE: ICD-10-CM

## 2019-07-30 DIAGNOSIS — E78.5 HYPERLIPIDEMIA LDL GOAL <130: ICD-10-CM

## 2019-07-30 DIAGNOSIS — K44.9 HIATAL HERNIA: ICD-10-CM

## 2019-07-30 DIAGNOSIS — R79.89 LOW TESTOSTERONE IN MALE: ICD-10-CM

## 2019-07-30 DIAGNOSIS — R79.89 LOW VITAMIN D LEVEL: ICD-10-CM

## 2019-07-30 DIAGNOSIS — M79.89 SWOLLEN LEG: ICD-10-CM

## 2019-07-30 DIAGNOSIS — K21.9 GASTROESOPHAGEAL REFLUX DISEASE, ESOPHAGITIS PRESENCE NOT SPECIFIED: ICD-10-CM

## 2019-07-30 DIAGNOSIS — Z12.5 SCREENING PSA (PROSTATE SPECIFIC ANTIGEN): ICD-10-CM

## 2019-07-30 DIAGNOSIS — Z76.0 ENCOUNTER FOR MEDICATION REFILL: ICD-10-CM

## 2019-07-30 DIAGNOSIS — S89.92XS LEFT LEG INJURY, SEQUELA: ICD-10-CM

## 2019-07-30 DIAGNOSIS — R23.8 SCALP IRRITATION: ICD-10-CM

## 2019-07-30 LAB
BH CV LOWER VASCULAR LEFT COMMON FEMORAL AUGMENT: NORMAL
BH CV LOWER VASCULAR LEFT COMMON FEMORAL COMPRESS: NORMAL
BH CV LOWER VASCULAR LEFT COMMON FEMORAL PHASIC: NORMAL
BH CV LOWER VASCULAR LEFT COMMON FEMORAL SPONT: NORMAL
BH CV LOWER VASCULAR LEFT DISTAL FEMORAL AUGMENT: NORMAL
BH CV LOWER VASCULAR LEFT DISTAL FEMORAL COMPRESS: NORMAL
BH CV LOWER VASCULAR LEFT DISTAL FEMORAL PHASIC: NORMAL
BH CV LOWER VASCULAR LEFT DISTAL FEMORAL SPONT: NORMAL
BH CV LOWER VASCULAR LEFT GASTRONEMIUS COMPRESS: NORMAL
BH CV LOWER VASCULAR LEFT GREATER SAPH AK COMPRESS: NORMAL
BH CV LOWER VASCULAR LEFT GREATER SAPH BK COMPRESS: NORMAL
BH CV LOWER VASCULAR LEFT LESSER SAPH COMPRESS: NORMAL
BH CV LOWER VASCULAR LEFT MID FEMORAL AUGMENT: NORMAL
BH CV LOWER VASCULAR LEFT MID FEMORAL COMPRESS: NORMAL
BH CV LOWER VASCULAR LEFT MID FEMORAL PHASIC: NORMAL
BH CV LOWER VASCULAR LEFT MID FEMORAL SPONT: NORMAL
BH CV LOWER VASCULAR LEFT PERONEAL COMPRESS: NORMAL
BH CV LOWER VASCULAR LEFT POPLITEAL AUGMENT: NORMAL
BH CV LOWER VASCULAR LEFT POPLITEAL COMPRESS: NORMAL
BH CV LOWER VASCULAR LEFT POPLITEAL PHASIC: NORMAL
BH CV LOWER VASCULAR LEFT POPLITEAL SPONT: NORMAL
BH CV LOWER VASCULAR LEFT POSTERIOR TIBIAL COMPRESS: NORMAL
BH CV LOWER VASCULAR LEFT PROFUNDA FEMORAL COMPRESS: NORMAL
BH CV LOWER VASCULAR LEFT PROFUNDA FEMORAL PHASIC: NORMAL
BH CV LOWER VASCULAR LEFT PROFUNDA FEMORAL SPONT: NORMAL
BH CV LOWER VASCULAR LEFT PROXIMAL FEMORAL AUGMENT: NORMAL
BH CV LOWER VASCULAR LEFT PROXIMAL FEMORAL COMPRESS: NORMAL
BH CV LOWER VASCULAR LEFT PROXIMAL FEMORAL PHASIC: NORMAL
BH CV LOWER VASCULAR LEFT PROXIMAL FEMORAL SPONT: NORMAL
BH CV LOWER VASCULAR LEFT SAPHENOFEMORAL JUNCTION COMPRESS: NORMAL
BH CV LOWER VASCULAR LEFT SAPHENOFEMORAL JUNCTION PHASIC: NORMAL
BH CV LOWER VASCULAR LEFT SAPHENOFEMORAL JUNCTION SPONT: NORMAL
BH CV LOWER VASCULAR RIGHT COMMON FEMORAL AUGMENT: NORMAL
BH CV LOWER VASCULAR RIGHT COMMON FEMORAL COMPRESS: NORMAL
BH CV LOWER VASCULAR RIGHT COMMON FEMORAL PHASIC: NORMAL
BH CV LOWER VASCULAR RIGHT COMMON FEMORAL SPONT: NORMAL

## 2019-07-30 PROCEDURE — 93971 EXTREMITY STUDY: CPT | Performed by: INTERNAL MEDICINE

## 2019-07-30 PROCEDURE — 93971 EXTREMITY STUDY: CPT

## 2019-08-01 ENCOUNTER — TELEPHONE (OUTPATIENT)
Dept: FAMILY MEDICINE CLINIC | Facility: CLINIC | Age: 74
End: 2019-08-01

## 2019-08-01 NOTE — TELEPHONE ENCOUNTER
Contacted patient with scan results. Patient states he understands and has no questions at this time.

## 2019-08-16 ENCOUNTER — TELEPHONE (OUTPATIENT)
Dept: INTERNAL MEDICINE | Facility: CLINIC | Age: 74
End: 2019-08-16

## 2019-08-16 NOTE — TELEPHONE ENCOUNTER
Form completed and sent to Physician's Office electronically via [employer] messaging for review and signature.     Completed form will be faxed to Local  at [phone]   lvm for pt to return my call to schedule with Dr. Neal.

## 2019-08-30 ENCOUNTER — TELEPHONE (OUTPATIENT)
Dept: INTERNAL MEDICINE | Facility: CLINIC | Age: 74
End: 2019-08-30

## 2019-09-30 ENCOUNTER — OFFICE VISIT (OUTPATIENT)
Dept: ENDOCRINOLOGY | Facility: CLINIC | Age: 74
End: 2019-09-30

## 2019-09-30 VITALS
OXYGEN SATURATION: 99 % | WEIGHT: 158 LBS | HEIGHT: 70 IN | HEART RATE: 88 BPM | BODY MASS INDEX: 22.62 KG/M2 | SYSTOLIC BLOOD PRESSURE: 110 MMHG | DIASTOLIC BLOOD PRESSURE: 90 MMHG

## 2019-09-30 DIAGNOSIS — E29.1 SECONDARY MALE HYPOGONADISM: Primary | ICD-10-CM

## 2019-09-30 PROCEDURE — 99213 OFFICE O/P EST LOW 20 MIN: CPT | Performed by: INTERNAL MEDICINE

## 2019-09-30 RX ORDER — METHADONE HYDROCHLORIDE 40 MG/1
80 TABLET ORAL DAILY
COMMUNITY

## 2019-09-30 RX ORDER — TESTOSTERONE 20.25 MG/1.25G
GEL TOPICAL
Qty: 75 G | Refills: 5 | Status: SHIPPED | OUTPATIENT
Start: 2019-09-30 | End: 2020-03-02 | Stop reason: SDUPTHER

## 2019-10-04 ENCOUNTER — TELEPHONE (OUTPATIENT)
Dept: ENDOCRINOLOGY | Facility: CLINIC | Age: 74
End: 2019-10-04

## 2019-10-08 DIAGNOSIS — F41.8 DEPRESSION WITH ANXIETY: ICD-10-CM

## 2019-10-10 RX ORDER — PAROXETINE HYDROCHLORIDE 40 MG/1
40 TABLET, FILM COATED ORAL EVERY MORNING
Qty: 90 TABLET | Refills: 1 | Status: SHIPPED | OUTPATIENT
Start: 2019-10-10 | End: 2019-10-22 | Stop reason: SDUPTHER

## 2019-10-14 PROBLEM — E29.1 SECONDARY MALE HYPOGONADISM: Status: ACTIVE | Noted: 2019-10-14

## 2019-10-22 ENCOUNTER — OFFICE VISIT (OUTPATIENT)
Dept: FAMILY MEDICINE CLINIC | Facility: CLINIC | Age: 74
End: 2019-10-22

## 2019-10-22 ENCOUNTER — RESULTS ENCOUNTER (OUTPATIENT)
Dept: FAMILY MEDICINE CLINIC | Facility: CLINIC | Age: 74
End: 2019-10-22

## 2019-10-22 VITALS
WEIGHT: 163 LBS | OXYGEN SATURATION: 94 % | HEART RATE: 90 BPM | RESPIRATION RATE: 19 BRPM | TEMPERATURE: 97.6 F | SYSTOLIC BLOOD PRESSURE: 138 MMHG | HEIGHT: 70 IN | DIASTOLIC BLOOD PRESSURE: 88 MMHG | BODY MASS INDEX: 23.34 KG/M2

## 2019-10-22 DIAGNOSIS — Z23 INFLUENZA VACCINE ADMINISTERED: ICD-10-CM

## 2019-10-22 DIAGNOSIS — S89.92XS LEFT LEG INJURY, SEQUELA: ICD-10-CM

## 2019-10-22 DIAGNOSIS — Z12.11 COLON CANCER SCREENING: ICD-10-CM

## 2019-10-22 DIAGNOSIS — K21.9 GASTROESOPHAGEAL REFLUX DISEASE, ESOPHAGITIS PRESENCE NOT SPECIFIED: ICD-10-CM

## 2019-10-22 DIAGNOSIS — K59.00 CONSTIPATION, UNSPECIFIED CONSTIPATION TYPE: ICD-10-CM

## 2019-10-22 DIAGNOSIS — R79.89 ELEVATED SERUM CREATININE: Primary | ICD-10-CM

## 2019-10-22 DIAGNOSIS — Z23 NEED FOR PNEUMOCOCCAL VACCINE: ICD-10-CM

## 2019-10-22 DIAGNOSIS — Z76.0 ENCOUNTER FOR MEDICATION REFILL: ICD-10-CM

## 2019-10-22 DIAGNOSIS — K44.9 HIATAL HERNIA: ICD-10-CM

## 2019-10-22 DIAGNOSIS — R89.9 ABNORMAL LABORATORY TEST: ICD-10-CM

## 2019-10-22 DIAGNOSIS — F41.8 DEPRESSION WITH ANXIETY: ICD-10-CM

## 2019-10-22 PROCEDURE — 36415 COLL VENOUS BLD VENIPUNCTURE: CPT | Performed by: NURSE PRACTITIONER

## 2019-10-22 PROCEDURE — G0008 ADMIN INFLUENZA VIRUS VAC: HCPCS | Performed by: NURSE PRACTITIONER

## 2019-10-22 PROCEDURE — 90653 IIV ADJUVANT VACCINE IM: CPT | Performed by: NURSE PRACTITIONER

## 2019-10-22 PROCEDURE — 99213 OFFICE O/P EST LOW 20 MIN: CPT | Performed by: NURSE PRACTITIONER

## 2019-10-22 RX ORDER — POLYETHYLENE GLYCOL 3350 17 G/17G
17 POWDER, FOR SOLUTION ORAL DAILY
Qty: 90 PACKET | Refills: 2 | Status: SHIPPED | OUTPATIENT
Start: 2019-10-22 | End: 2020-01-28 | Stop reason: SDUPTHER

## 2019-10-22 RX ORDER — PAROXETINE HYDROCHLORIDE 40 MG/1
40 TABLET, FILM COATED ORAL EVERY MORNING
Qty: 90 TABLET | Refills: 2 | Status: SHIPPED | OUTPATIENT
Start: 2019-10-22 | End: 2020-06-24 | Stop reason: SDUPTHER

## 2019-10-22 RX ORDER — PANTOPRAZOLE SODIUM 20 MG/1
20 TABLET, DELAYED RELEASE ORAL DAILY
Qty: 90 TABLET | Refills: 2 | Status: SHIPPED | OUTPATIENT
Start: 2019-10-22 | End: 2020-01-28 | Stop reason: SDUPTHER
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
## 2019-10-23 LAB
ALBUMIN SERPL-MCNC: 4.5 G/DL (ref 3.5–5.2)
ALBUMIN/GLOB SERPL: 1.5 G/DL
ALP SERPL-CCNC: 99 U/L (ref 39–117)
ALT SERPL-CCNC: 7 U/L (ref 1–41)
AST SERPL-CCNC: 16 U/L (ref 1–40)
BASOPHILS # BLD AUTO: 0.06 10*3/MM3 (ref 0–0.2)
BASOPHILS NFR BLD AUTO: 1 % (ref 0–1.5)
BILIRUB SERPL-MCNC: 0.3 MG/DL (ref 0.2–1.2)
BUN SERPL-MCNC: 12 MG/DL (ref 8–23)
BUN/CREAT SERPL: 11.1 (ref 7–25)
CALCIUM SERPL-MCNC: 9 MG/DL (ref 8.6–10.5)
CHLORIDE SERPL-SCNC: 92 MMOL/L (ref 98–107)
CO2 SERPL-SCNC: 34.3 MMOL/L (ref 22–29)
CREAT SERPL-MCNC: 1.08 MG/DL (ref 0.76–1.27)
EOSINOPHIL # BLD AUTO: 0.27 10*3/MM3 (ref 0–0.4)
EOSINOPHIL NFR BLD AUTO: 4.7 % (ref 0.3–6.2)
ERYTHROCYTE [DISTWIDTH] IN BLOOD BY AUTOMATED COUNT: 14.7 % (ref 12.3–15.4)
GLOBULIN SER CALC-MCNC: 3.1 GM/DL
GLUCOSE SERPL-MCNC: 84 MG/DL (ref 65–99)
HCT VFR BLD AUTO: 36.1 % (ref 37.5–51)
HGB BLD-MCNC: 12.1 G/DL (ref 13–17.7)
IMM GRANULOCYTES # BLD AUTO: 0.02 10*3/MM3 (ref 0–0.05)
IMM GRANULOCYTES NFR BLD AUTO: 0.3 % (ref 0–0.5)
LYMPHOCYTES # BLD AUTO: 1.86 10*3/MM3 (ref 0.7–3.1)
LYMPHOCYTES NFR BLD AUTO: 32.3 % (ref 19.6–45.3)
MCH RBC QN AUTO: 30 PG (ref 26.6–33)
MCHC RBC AUTO-ENTMCNC: 33.5 G/DL (ref 31.5–35.7)
MCV RBC AUTO: 89.6 FL (ref 79–97)
MONOCYTES # BLD AUTO: 0.78 10*3/MM3 (ref 0.1–0.9)
MONOCYTES NFR BLD AUTO: 13.6 % (ref 5–12)
NEUTROPHILS # BLD AUTO: 2.76 10*3/MM3 (ref 1.7–7)
NEUTROPHILS NFR BLD AUTO: 48.1 % (ref 42.7–76)
NRBC BLD AUTO-RTO: 0 /100 WBC (ref 0–0.2)
PLATELET # BLD AUTO: 256 10*3/MM3 (ref 140–450)
POTASSIUM SERPL-SCNC: 4.3 MMOL/L (ref 3.5–5.2)
PROT SERPL-MCNC: 7.6 G/DL (ref 6–8.5)
RBC # BLD AUTO: 4.03 10*6/MM3 (ref 4.14–5.8)
SODIUM SERPL-SCNC: 138 MMOL/L (ref 136–145)
WBC # BLD AUTO: 5.75 10*3/MM3 (ref 3.4–10.8)

## 2019-11-20 ENCOUNTER — TELEPHONE (OUTPATIENT)
Dept: FAMILY MEDICINE CLINIC | Facility: CLINIC | Age: 74
End: 2019-11-20

## 2019-11-20 NOTE — TELEPHONE ENCOUNTER
Called pt today with a reminder to complete his Cologuard and return it. VM was left for pt to return my call.

## 2019-12-05 ENCOUNTER — PATIENT OUTREACH (OUTPATIENT)
Dept: CASE MANAGEMENT | Facility: OTHER | Age: 74
End: 2019-12-05

## 2019-12-05 NOTE — OUTREACH NOTE
Care Plan Note      Responses   Annual Wellness Visit:   Patient Refuses at This Time [Educated]   Care Gaps Addressed  Colon Cancer Screening   Colon Cancer Screening Type  Cologuard   Cologuard Status  Patient Will Complete [Has the kit and instructions]   Does patient have depression diagnosis?  Yes [[facility]   Advanced Directives:  Not Interested At This Time   Medication Adherence  Medications understood        The main concerns and/or symptoms the patient would like to address: Had brief conversation with Mr Bond, see above note.  He has [employer] [place] contact information on resources.     Education/instruction provided by Care Coordinator:  Informed of care advisor role, contact number. Pt not interested in future phone calls.    Follow Up Outreach Due: as needed    Abe Loaiza RN  Ambulatory     12/5/2019, 3:41 PM

## 2019-12-05 NOTE — OUTREACH NOTE
Care Coordination Assessment    Assessment completed with:  patient  Enrolled in care management program:  No  Living arrangement:  alone (Comment: pt lives in the shelter)  Support system:  friends, ,   Type of residence:  homeless  Home care services:  No  Equipment used at home:  none  Communication device:  No  Other issues:  visual problem  Bed or wheelchair confined:  No  Inadequate nutrition:  No  Medication adherence problem:  No  Experiencing side effects from current medications:  No  History of fall(s) in last 6 months:  Yes  Difficulty keeping appointments:  No  Family aware of the patient's advance care planning wishes:  No  Roman Catholic or spiritual beliefs that impact treatment:  No  Chronic pain:  Yes  Location of chronic pain:  spine  Chronic pain timing:  intermittent  Chronic pain severity:  3  Limitation of routine activities due to chronic pain:  moderate

## 2020-01-12 ENCOUNTER — EPISODE CHANGES (OUTPATIENT)
Dept: CASE MANAGEMENT | Facility: OTHER | Age: 75
End: 2020-01-12

## 2020-01-15 ENCOUNTER — TELEPHONE (OUTPATIENT)
Dept: FAMILY MEDICINE CLINIC | Facility: CLINIC | Age: 75
End: 2020-01-15

## 2020-01-15 NOTE — TELEPHONE ENCOUNTER
"Attempted contacting patient but \"the subscribed you dialed is not in service....\". If patient calls back please schedule him a appointment to be seen.   "

## 2020-01-16 NOTE — TELEPHONE ENCOUNTER
PATIENT CONTACTED DR. PALOMARES OFFICE TO TRY TO GET [facility] MEDICATION. WE ADVISED PATIENT TO CONTACT PCP OFFICE TO SCHEDULE APPOINTMENT TO DISCUSS GETTING NEW PRESCRIPTION. PATIENTS PHONE NUMBER [phone]. WE WILL UPDATE THE NUMBER IN PATIENTS CHART. PATIENT STATES HE WILL CALL PCP AND SCHEDULE THIS APPOINTMENT.

## 2020-01-28 ENCOUNTER — OFFICE VISIT (OUTPATIENT)
Dept: FAMILY MEDICINE CLINIC | Facility: CLINIC | Age: 75
End: 2020-01-28

## 2020-01-28 VITALS
RESPIRATION RATE: 18 BRPM | HEIGHT: 68 IN | DIASTOLIC BLOOD PRESSURE: 86 MMHG | SYSTOLIC BLOOD PRESSURE: 138 MMHG | BODY MASS INDEX: 26.98 KG/M2 | TEMPERATURE: 97.8 F | HEART RATE: 87 BPM | WEIGHT: 178 LBS | OXYGEN SATURATION: 97 %

## 2020-01-28 DIAGNOSIS — K21.9 GASTROESOPHAGEAL REFLUX DISEASE, ESOPHAGITIS PRESENCE NOT SPECIFIED: ICD-10-CM

## 2020-01-28 DIAGNOSIS — K44.9 HIATAL HERNIA: ICD-10-CM

## 2020-01-28 DIAGNOSIS — K59.00 CONSTIPATION, UNSPECIFIED CONSTIPATION TYPE: ICD-10-CM

## 2020-01-28 DIAGNOSIS — R79.89 LOW VITAMIN D LEVEL: ICD-10-CM

## 2020-01-28 DIAGNOSIS — N52.9 ERECTILE DYSFUNCTION, UNSPECIFIED ERECTILE DYSFUNCTION TYPE: ICD-10-CM

## 2020-01-28 DIAGNOSIS — Z76.0 ENCOUNTER FOR MEDICATION REFILL: ICD-10-CM

## 2020-01-28 DIAGNOSIS — F41.8 DEPRESSION WITH ANXIETY: ICD-10-CM

## 2020-01-28 DIAGNOSIS — I10 HYPERTENSION, UNSPECIFIED TYPE: Primary | ICD-10-CM

## 2020-01-28 LAB
BILIRUB BLD-MCNC: NEGATIVE MG/DL
CLARITY, POC: CLEAR
COLOR UR: YELLOW
GLUCOSE UR STRIP-MCNC: NEGATIVE MG/DL
KETONES UR QL: NEGATIVE
LEUKOCYTE EST, POC: NEGATIVE
NITRITE UR-MCNC: NEGATIVE MG/ML
PH UR: 7 [PH] (ref 5–8)
PROT UR STRIP-MCNC: NEGATIVE MG/DL
RBC # UR STRIP: NEGATIVE /UL
SP GR UR: 1.01 (ref 1–1.03)
UROBILINOGEN UR QL: NORMAL

## 2020-01-28 PROCEDURE — 99214 OFFICE O/P EST MOD 30 MIN: CPT | Performed by: NURSE PRACTITIONER

## 2020-01-28 PROCEDURE — 81003 URINALYSIS AUTO W/O SCOPE: CPT | Performed by: NURSE PRACTITIONER

## 2020-01-28 RX ORDER — AMLODIPINE BESYLATE 5 MG/1
5 TABLET ORAL DAILY
Qty: 90 TABLET | Refills: 2 | Status: SHIPPED | OUTPATIENT
Start: 2020-01-28 | End: 2020-06-08

## 2020-01-28 RX ORDER — PANTOPRAZOLE SODIUM 20 MG/1
20 TABLET, DELAYED RELEASE ORAL DAILY
Qty: 90 TABLET | Refills: 2 | Status: SHIPPED | OUTPATIENT
Start: 2020-01-28 | End: 2020-06-24 | Stop reason: SDUPTHER

## 2020-01-28 RX ORDER — SILDENAFIL 50 MG/1
50 TABLET, FILM COATED ORAL DAILY PRN
Qty: 30 TABLET | Refills: 2 | Status: SHIPPED | OUTPATIENT
Start: 2020-01-28 | End: 2020-02-04

## 2020-01-28 RX ORDER — TESTOSTERONE 16.2 MG/G
GEL TRANSDERMAL
COMMUNITY
Start: 2020-01-07 | End: 2020-01-28 | Stop reason: SDUPTHER

## 2020-01-28 RX ORDER — POLYETHYLENE GLYCOL 3350 17 G/17G
17 POWDER, FOR SOLUTION ORAL DAILY
Qty: 90 PACKET | Refills: 2 | Status: SHIPPED | OUTPATIENT
Start: 2020-01-28 | End: 2020-06-24 | Stop reason: SDUPTHER

## 2020-01-28 NOTE — PATIENT INSTRUCTIONS
Erectile Dysfunction  Erectile dysfunction (ED) is the inability to get or keep an erection in order to have sexual intercourse. Erectile dysfunction may include:  · Inability to get an erection.  · Lack of enough hardness of the erection to allow penetration.  · Loss of the erection before sex is finished.  What are the causes?  This condition may be caused by:  · Certain medicines, such as:  ? Pain relievers.  ? Antihistamines.  ? Antidepressants.  ? Blood pressure medicines.  ? Water pills (diuretics).  ? Ulcer medicines.  ? Muscle relaxants.  ? Drugs.  · Excessive drinking.  · Psychological causes, such as:  ? Anxiety.  ? Depression.  ? Sadness.  ? Exhaustion.  ? Performance fear.  ? Stress.  · Physical causes, such as:  ? Artery problems. This may include diabetes, smoking, liver disease, or atherosclerosis.  ? High blood pressure.  ? Hormonal problems, such as low testosterone.  ? Obesity.  ? Nerve problems. This may include back or pelvic injuries, diabetes mellitus, multiple sclerosis, or Parkinson disease.  What are the signs or symptoms?  Symptoms of this condition include:  · Inability to get an erection.  · Lack of enough hardness of the erection to allow penetration.  · Loss of the erection before sex is finished.  · Normal erections at some times, but with frequent unsatisfactory episodes.  · Low sexual satisfaction in either partner due to erection problems.  · A curved penis occurring with erection. The curve may cause pain or the penis may be too curved to allow for intercourse.  · Never having nighttime erections.  How is this diagnosed?  This condition is often diagnosed by:  · Performing a physical exam to find other diseases or specific problems with the penis.  · Asking you detailed questions about the problem.  · Performing blood tests to check for diabetes mellitus or to measure hormone levels.  · Performing other tests to check for underlying health conditions.  · Performing an ultrasound  exam to check for scarring.  · Performing a test to check blood flow to the penis.  · Doing a sleep study at home to measure nighttime erections.  How is this treated?  This condition may be treated by:  · Medicine taken by mouth to help you achieve an erection (oral medicine).  · Hormone replacement therapy to replace low testosterone levels.  · Medicine that is injected into the penis. Your health care provider may instruct you how to give yourself these injections at home.  · Vacuum pump. This is a pump with a ring on it. The pump and ring are placed on the penis and used to create pressure that helps the penis become erect.  · Penile implant surgery. In this procedure, you may receive:  ? An inflatable implant. This consists of cylinders, a pump, and a reservoir. The cylinders can be inflated with a fluid that helps to create an erection, and they can be deflated after intercourse.  ? A semi-rigid implant. This consists of two silicone rubber rods. The rods provide some rigidity. They are also flexible, so the penis can both curve downward in its normal position and become straight for sexual intercourse.  · Blood vessel surgery, to improve blood flow to the penis. During this procedure, a blood vessel from a different part of the body is placed into the penis to allow blood to flow around (bypass) damaged or blocked blood vessels.  · Lifestyle changes, such as exercising more, losing weight, and quitting smoking.  Follow these instructions at home:  Medicines    · Take over-the-counter and prescription medicines only as told by your health care provider. Do not increase the dosage without first discussing it with your health care provider.  · If you are using self-injections, perform injections as directed by your health care provider. Make sure to avoid any veins that are on the surface of the penis. After giving an injection, apply pressure to the injection site for 5 minutes.  General  instructions  · Exercise regularly, as directed by your health care provider. Work with your health care provider to lose weight, if needed.  · Do not use any products that contain nicotine or tobacco, such as cigarettes and e-cigarettes. If you need help quitting, ask your health care provider.  · Before using a vacuum pump, read the instructions that come with the pump and discuss any questions with your health care provider.  · Keep all follow-up visits as told by your health care provider. This is important.  Contact a health care provider if:  · You feel nauseous.  · You vomit.  Get help right away if:  · You are taking oral or injectable medicines and you have an erection that lasts longer than 4 hours. If your health care provider is unavailable, go to the nearest emergency room for evaluation. An erection that lasts much longer than 4 hours can result in permanent damage to your penis.  · You have severe pain in your groin or abdomen.  · You develop redness or severe swelling of your penis.  · You have redness spreading up into your groin or lower abdomen.  · You are unable to urinate.  · You experience chest pain or a rapid heart beat (palpitations) after taking oral medicines.  Summary  · Erectile dysfunction (ED) is the inability to get or keep an erection during sexual intercourse. This problem can usually be treated successfully.  · This condition is diagnosed based on a physical exam, your symptoms, and tests to determine the cause. Treatment varies depending on the cause, and may include medicines, hormone therapy, surgery, or vacuum pump.  · You may need follow-up visits to make sure that you are using your medicines or devices correctly.  · Get help right away if you are taking or injecting medicines and you have an erection that lasts longer than 4 hours.  This information is not intended to replace advice given to you by your health care provider. Make sure you discuss any questions you have with  your health care provider.  Document Released: 12/15/2001 Document Revised: 01/03/2018 Document Reviewed: 01/03/2018  [employer] Interactive Patient Education © 2019 [employer]  Sildenafil tablets (Erectile Dysfunction)  What is this medicine?  SILDENAFIL (po DEN a rinku) is used to treat erection problems in men.  This medicine may be used for other purposes; ask your health care provider or pharmacist if you have questions.  COMMON BRAND NAME(S): Viagra  What should I tell my health care provider before I take this medicine?  They need to know if you have any of these conditions:  -bleeding disorders  -eye or vision problems, including a rare inherited eye disease called retinitis pigmentosa  -anatomical deformation of the penis, Peyronie's disease, or history of priapism (painful and prolonged erection)  -heart disease, angina, a history of heart attack, irregular heart beats, or other heart problems  -high or low blood pressure  -history of blood diseases, like sickle cell anemia or leukemia  -history of stomach bleeding  -kidney disease  -liver disease  -stroke  -an unusual or allergic reaction to sildenafil, other medicines, foods, dyes, or preservatives  -pregnant or trying to get pregnant  -breast-feeding  How should I use this medicine?  Take this medicine by mouth with a glass of water. Follow the directions on the prescription label. The dose is usually taken 1 hour before sexual activity. You should not take the dose more than once per day. Do not take your medicine more often than directed.  Talk to your pediatrician regarding the use of this medicine in children. This medicine is not used in children for this condition.  Overdosage: If you think you have taken too much of this medicine contact a poison control center or emergency room at once.  NOTE: This medicine is only for you. Do not share this medicine with others.  What if I miss a dose?  This does not apply. Do not take double or extra  doses.  What may interact with this medicine?  Do not take this medicine with any of the following medications:  -cisapride  -nitrates like amyl nitrite, isosorbide dinitrate, isosorbide mononitrate, nitroglycerin  -riociguat  This medicine may also interact with the following medications:  -antiviral medicines for HIV or AIDS  -bosentan  -certain medicines for benign prostatic hyperplasia (BPH)  -certain medicines for blood pressure  -certain medicines for fungal infections like ketoconazole and itraconazole  -cimetidine  -erythromycin  -rifampin  This list may not describe all possible interactions. Give your health care provider a list of all the medicines, herbs, non-prescription drugs, or dietary supplements you use. Also tell them if you smoke, drink alcohol, or use illegal drugs. Some items may interact with your medicine.  What should I watch for while using this medicine?  If you notice any changes in your vision while taking this drug, call your doctor or health care professional as soon as possible. Stop using this medicine and call your health care provider right away if you have a loss of sight in one or both eyes.  Contact your doctor or health care professional right away if you have an erection that lasts longer than 4 hours or if it becomes painful. This may be a sign of a serious problem and must be treated right away to prevent permanent damage.  If you experience symptoms of nausea, dizziness, chest pain or arm pain upon initiation of sexual activity after taking this medicine, you should refrain from further activity and call your doctor or health care professional as soon as possible.  Do not drink alcohol to excess (examples, 5 glasses of wine or 5 shots of whiskey) when taking this medicine. When taken in excess, alcohol can increase your chances of getting a headache or getting dizzy, increasing your heart rate or lowering your blood pressure.  Using this medicine does not protect you or  your partner against HIV infection (the virus that causes AIDS) or other sexually transmitted diseases.  What side effects may I notice from receiving this medicine?  Side effects that you should report to your doctor or health care professional as soon as possible:  -allergic reactions like skin rash, itching or hives, swelling of the face, lips, or tongue  -breathing problems  -changes in hearing  -changes in vision  -chest pain  -fast, irregular heartbeat  -prolonged or painful erection  -seizures  Side effects that usually do not require medical attention (report to your doctor or health care professional if they continue or are bothersome):  -back pain  -dizziness  -flushing  -headache  -indigestion  -muscle aches  -nausea  -stuffy or runny nose  This list may not describe all possible side effects. Call your doctor for medical advice about side effects. You may report side effects to FDA at 6-044-FDA-7748.  Where should I keep my medicine?  Keep out of reach of children.  Store at room temperature between 15 and 30 degrees C (59 and 86 degrees F). Throw away any unused medicine after the expiration date.  NOTE: This sheet is a summary. It may not cover all possible information. If you have questions about this medicine, talk to your doctor, pharmacist, or health care provider.  © 2019 [employer] (2016-11-30 12:00:25)
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
## 2020-01-29 LAB
ALBUMIN SERPL-MCNC: 5.1 G/DL (ref 3.7–4.7)
ALBUMIN/GLOB SERPL: 1.6 {RATIO} (ref 1.2–2.2)
ALP SERPL-CCNC: 98 IU/L (ref 39–117)
ALT SERPL-CCNC: 11 IU/L (ref 0–44)
AST SERPL-CCNC: 23 IU/L (ref 0–40)
BASOPHILS # BLD AUTO: 0.1 X10E3/UL (ref 0–0.2)
BASOPHILS NFR BLD AUTO: 1 %
BILIRUB SERPL-MCNC: 0.4 MG/DL (ref 0–1.2)
BUN SERPL-MCNC: 11 MG/DL (ref 8–27)
BUN/CREAT SERPL: 10 (ref 10–24)
CALCIUM SERPL-MCNC: 9.5 MG/DL (ref 8.6–10.2)
CHLORIDE SERPL-SCNC: 91 MMOL/L (ref 96–106)
CHOLEST SERPL-MCNC: 215 MG/DL (ref 100–199)
CO2 SERPL-SCNC: 28 MMOL/L (ref 20–29)
CREAT SERPL-MCNC: 1.14 MG/DL (ref 0.76–1.27)
EOSINOPHIL # BLD AUTO: 0.2 X10E3/UL (ref 0–0.4)
EOSINOPHIL NFR BLD AUTO: 3 %
ERYTHROCYTE [DISTWIDTH] IN BLOOD BY AUTOMATED COUNT: 13.6 % (ref 11.6–15.4)
GLOBULIN SER CALC-MCNC: 3.1 G/DL (ref 1.5–4.5)
GLUCOSE SERPL-MCNC: 91 MG/DL (ref 65–99)
HCT VFR BLD AUTO: 42 % (ref 37.5–51)
HDLC SERPL-MCNC: 48 MG/DL
HGB BLD-MCNC: 13.7 G/DL (ref 13–17.7)
IMM GRANULOCYTES # BLD AUTO: 0 X10E3/UL (ref 0–0.1)
IMM GRANULOCYTES NFR BLD AUTO: 0 %
LDLC SERPL CALC-MCNC: 132 MG/DL (ref 0–99)
LYMPHOCYTES # BLD AUTO: 2.5 X10E3/UL (ref 0.7–3.1)
LYMPHOCYTES NFR BLD AUTO: 33 %
MCH RBC QN AUTO: 30.1 PG (ref 26.6–33)
MCHC RBC AUTO-ENTMCNC: 32.6 G/DL (ref 31.5–35.7)
MCV RBC AUTO: 92 FL (ref 79–97)
MONOCYTES # BLD AUTO: 0.8 X10E3/UL (ref 0.1–0.9)
MONOCYTES NFR BLD AUTO: 10 %
NEUTROPHILS # BLD AUTO: 3.9 X10E3/UL (ref 1.4–7)
NEUTROPHILS NFR BLD AUTO: 53 %
PLATELET # BLD AUTO: 297 X10E3/UL (ref 150–450)
POTASSIUM SERPL-SCNC: 4.3 MMOL/L (ref 3.5–5.2)
PROT SERPL-MCNC: 8.2 G/DL (ref 6–8.5)
RBC # BLD AUTO: 4.55 X10E6/UL (ref 4.14–5.8)
SODIUM SERPL-SCNC: 138 MMOL/L (ref 134–144)
TRIGL SERPL-MCNC: 177 MG/DL (ref 0–149)
VLDLC SERPL CALC-MCNC: 35 MG/DL (ref 5–40)
WBC # BLD AUTO: 7.5 X10E3/UL (ref 3.4–10.8)

## 2020-01-30 PROCEDURE — 93000 ELECTROCARDIOGRAM COMPLETE: CPT | Performed by: NURSE PRACTITIONER

## 2020-02-04 ENCOUNTER — OFFICE VISIT (OUTPATIENT)
Dept: FAMILY MEDICINE CLINIC | Facility: CLINIC | Age: 75
End: 2020-02-04

## 2020-02-04 VITALS
OXYGEN SATURATION: 95 % | HEART RATE: 96 BPM | SYSTOLIC BLOOD PRESSURE: 132 MMHG | TEMPERATURE: 98.1 F | DIASTOLIC BLOOD PRESSURE: 81 MMHG | HEIGHT: 68 IN | RESPIRATION RATE: 16 BRPM | BODY MASS INDEX: 26.95 KG/M2 | WEIGHT: 177.8 LBS

## 2020-02-04 DIAGNOSIS — H60.01 ABSCESS OF RIGHT EAR CANAL: Primary | ICD-10-CM

## 2020-02-04 PROCEDURE — 69000 DRG XTRNL EAR ABSC/HEM SMPL: CPT | Performed by: NURSE PRACTITIONER

## 2020-02-04 PROCEDURE — 99213 OFFICE O/P EST LOW 20 MIN: CPT | Performed by: NURSE PRACTITIONER

## 2020-02-04 RX ORDER — AMOXICILLIN AND CLAVULANATE POTASSIUM 875; 125 MG/1; MG/1
1 TABLET, FILM COATED ORAL EVERY 12 HOURS SCHEDULED
Qty: 20 TABLET | Refills: 0 | Status: SHIPPED | OUTPATIENT
Start: 2020-02-04 | End: 2020-03-02

## 2020-02-04 NOTE — PROGRESS NOTES
Subjective   Ronald Bond is a 74 y.o. male.     History of Present Illness Patient with right ear pain for one week. Worse in last 2 days. Has a sore in the external canal. He tried to express discharge but was too painful to proceed. Has not seen any other providers for this. No fever. Decreased hearing.    Outpatient Encounter Medications as of 2/4/2020   Medication Sig Dispense Refill   • amLODIPine (NORVASC) 5 MG tablet Take 1 tablet by mouth Daily. 90 tablet 2   • METHADONE HCL PO Take 70 mg by mouth Daily.     • pantoprazole (PROTONIX) 20 MG EC tablet Take 1 tablet by mouth Daily. 90 tablet 2   • PARoxetine (PAXIL) 40 MG tablet Take 1 tablet by mouth Every Morning. 90 tablet 2   • polyethylene glycol (MIRALAX) packet Take 17 g by mouth Daily. 90 packet 2   • Testosterone (ANDROGEL) 20.25 MG/1.25GM (1.62%) gel Apply 2 pumps daily to arms and shoulders daily 75 g 5   • [DISCONTINUED] sildenafil (VIAGRA) 50 MG tablet Take 1 tablet by mouth Daily As Needed for Erectile Dysfunction. 30 tablet 2   • amoxicillin-clavulanate (AUGMENTIN) 875-125 MG per tablet Take 1 tablet by mouth Every 12 (Twelve) Hours. 20 tablet 0   • [DISCONTINUED] ondansetron ODT (ZOFRAN-ODT) 4 MG disintegrating tablet dissolve 1 tablet ON TONGUE every 8 hours  0     No facility-administered encounter medications on file as of 2/4/2020.        The following portions of the patient's history were reviewed and updated as appropriate: allergies, current medications, past family history, past medical history, past social history, past surgical history and problem list.    Review of Systems   Constitutional: Negative for appetite change, fever and unexpected weight loss.   HENT: Positive for ear pain. Negative for nosebleeds, sore throat and trouble swallowing.    Eyes: Negative for visual disturbance.   Respiratory: Negative for cough, shortness of breath and wheezing.    Cardiovascular: Negative for chest pain, palpitations and leg swelling.  "  Gastrointestinal: Negative for abdominal pain, blood in stool, constipation, diarrhea, nausea and vomiting.   Endocrine: Negative for polydipsia, polyphagia and polyuria.   Genitourinary: Negative for dysuria, frequency, hematuria and urinary incontinence.   Musculoskeletal: Negative for arthralgias, gait problem, joint swelling and myalgias.   Skin: Negative for rash.   Neurological: Negative for dizziness, seizures, syncope and numbness.   Hematological: Negative for adenopathy. Does not bruise/bleed easily.   Psychiatric/Behavioral: Negative for sleep disturbance and depressed mood. The patient is not nervous/anxious.        Objective     Visit Vitals  /81 (BP Location: Right arm, Patient Position: Sitting, Cuff Size: Adult)   Pulse 96   Temp 98.1 °F (36.7 °C) (Oral)   Resp 16   Ht 172.7 cm (68\")   Wt 80.6 kg (177 lb 12.8 oz)   SpO2 95%   BMI 27.03 kg/m²         Physical Exam   Constitutional: He is oriented to person, place, and time. He appears well-developed and well-nourished. No distress.   HENT:   Head: Normocephalic and atraumatic.   Right Ear: Tympanic membrane and external ear normal. There is swelling and tenderness.   Left Ear: Tympanic membrane and external ear normal.   Nose: Nose normal.   Mouth/Throat: Oropharynx is clear and moist. No oropharyngeal exudate.   Right external canal on anterior aspect is pea size mass with slight erythema and central pustule.   Eyes: Pupils are equal, round, and reactive to light. Conjunctivae are normal. Right eye exhibits no discharge. Left eye exhibits no discharge. No scleral icterus.   Neck: Neck supple.   Cardiovascular: Normal rate.   Pulmonary/Chest: No respiratory distress. He has no wheezes.   Abdominal: He exhibits no distension and no mass. There is no tenderness.   Musculoskeletal: He exhibits no edema or deformity.   Lymphadenopathy:     He has no cervical adenopathy.   Neurological: He is alert and oriented to person, place, and time. " Coordination normal.   Skin: Skin is warm and dry. Capillary refill takes less than 2 seconds. No rash noted. No erythema.   Psychiatric: He has a normal mood and affect. His speech is normal and behavior is normal. Judgment and thought content normal.   Nursing note and vitals reviewed.    Incision & Drainage  Date/Time: 2/4/2020 2:10 PM  Performed by: Barby Currie DNP, APRN  Authorized by: Barby Currie DNP, APRN   Consent: Verbal consent obtained.  Consent given by: patient  Patient understanding: patient states understanding of the procedure being performed  Patient identity confirmed: verbally with patient  Type: abscess  Body area: head/neck  Location details: right external ear    Sedation:  Patient sedated: no    Needle gauge: 25.  Complexity: simple  Drainage: purulent  Wound treatment: wound left open  Patient tolerance: Patient tolerated the procedure well with no immediate complications  Comments: I was able to express a small amount of thick green discharge but it was too painful for him. This could be an infected cyst.            Assessment/Plan   Ronald was seen today for earache.    Diagnoses and all orders for this visit:    Abscess of right ear canal  -     Ambulatory Referral to ENT (Otolaryngology)  -     amoxicillin-clavulanate (AUGMENTIN) 875-125 MG per tablet; Take 1 tablet by mouth Every 12 (Twelve) Hours.    Use peroxide in ear to keep puncture open to allow drainage.  Refer to ent.  Follow up fever or worsening symptoms.

## 2020-02-20 ENCOUNTER — TELEPHONE (OUTPATIENT)
Dept: FAMILY MEDICINE CLINIC | Facility: CLINIC | Age: 75
End: 2020-02-20

## 2020-03-02 ENCOUNTER — OFFICE VISIT (OUTPATIENT)
Dept: ENDOCRINOLOGY | Facility: CLINIC | Age: 75
End: 2020-03-02

## 2020-03-02 VITALS
HEART RATE: 80 BPM | SYSTOLIC BLOOD PRESSURE: 134 MMHG | HEIGHT: 68 IN | WEIGHT: 179 LBS | OXYGEN SATURATION: 98 % | BODY MASS INDEX: 27.13 KG/M2 | DIASTOLIC BLOOD PRESSURE: 80 MMHG

## 2020-03-02 DIAGNOSIS — Z12.5 SCREENING PSA (PROSTATE SPECIFIC ANTIGEN): ICD-10-CM

## 2020-03-02 DIAGNOSIS — M81.0 OSTEOPOROSIS WITHOUT CURRENT PATHOLOGICAL FRACTURE, UNSPECIFIED OSTEOPOROSIS TYPE: ICD-10-CM

## 2020-03-02 DIAGNOSIS — E29.1 SECONDARY MALE HYPOGONADISM: Primary | ICD-10-CM

## 2020-03-02 PROCEDURE — 99214 OFFICE O/P EST MOD 30 MIN: CPT | Performed by: INTERNAL MEDICINE

## 2020-03-02 RX ORDER — TESTOSTERONE 16.2 MG/G
GEL TRANSDERMAL
COMMUNITY
Start: 2020-02-25 | End: 2020-03-02 | Stop reason: SDUPTHER

## 2020-03-02 RX ORDER — TESTOSTERONE 20.25 MG/1.25G
GEL TOPICAL
Qty: 75 G | Refills: 5 | Status: SHIPPED | OUTPATIENT
Start: 2020-03-02 | End: 2020-09-10
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
## 2020-03-03 LAB
25(OH)D3+25(OH)D2 SERPL-MCNC: 28.5 NG/ML (ref 30–100)
ALBUMIN SERPL-MCNC: 4.4 G/DL (ref 3.5–5.2)
BUN SERPL-MCNC: 12 MG/DL (ref 8–23)
BUN/CREAT SERPL: 12.1 (ref 7–25)
CALCIUM SERPL-MCNC: 9.1 MG/DL (ref 8.6–10.5)
CHLORIDE SERPL-SCNC: 94 MMOL/L (ref 98–107)
CO2 SERPL-SCNC: 33.9 MMOL/L (ref 22–29)
CREAT SERPL-MCNC: 0.99 MG/DL (ref 0.76–1.27)
ERYTHROCYTE [DISTWIDTH] IN BLOOD BY AUTOMATED COUNT: 13.9 % (ref 12.3–15.4)
GLUCOSE SERPL-MCNC: 82 MG/DL (ref 65–99)
HCT VFR BLD AUTO: 41.4 % (ref 37.5–51)
HGB BLD-MCNC: 14 G/DL (ref 13–17.7)
MCH RBC QN AUTO: 31.2 PG (ref 26.6–33)
MCHC RBC AUTO-ENTMCNC: 33.8 G/DL (ref 31.5–35.7)
MCV RBC AUTO: 92.2 FL (ref 79–97)
PHOSPHATE SERPL-MCNC: 3.5 MG/DL (ref 2.5–4.5)
PLATELET # BLD AUTO: 259 10*3/MM3 (ref 140–450)
POTASSIUM SERPL-SCNC: 4.6 MMOL/L (ref 3.5–5.2)
PSA SERPL-MCNC: 1.75 NG/ML (ref 0–4)
PTH-INTACT SERPL-MCNC: 85 PG/ML (ref 15–65)
RBC # BLD AUTO: 4.49 10*6/MM3 (ref 4.14–5.8)
SODIUM SERPL-SCNC: 138 MMOL/L (ref 136–145)
WBC # BLD AUTO: 7.39 10*3/MM3 (ref 3.4–10.8)

## 2020-03-05 ENCOUNTER — TELEPHONE (OUTPATIENT)
Dept: FAMILY MEDICINE CLINIC | Facility: CLINIC | Age: 75
End: 2020-03-05

## 2020-03-05 NOTE — TELEPHONE ENCOUNTER
Called pt left message to return call.      Pt called because he has a knot on the back of his neck and is concerned as to what to do.    He would like a call back with medical advice     [phone]

## 2020-03-11 ENCOUNTER — OFFICE VISIT (OUTPATIENT)
Dept: FAMILY MEDICINE CLINIC | Facility: CLINIC | Age: 75
End: 2020-03-11

## 2020-03-11 VITALS
RESPIRATION RATE: 20 BRPM | SYSTOLIC BLOOD PRESSURE: 140 MMHG | HEART RATE: 90 BPM | OXYGEN SATURATION: 98 % | BODY MASS INDEX: 27.13 KG/M2 | DIASTOLIC BLOOD PRESSURE: 90 MMHG | TEMPERATURE: 97.6 F | HEIGHT: 68 IN | WEIGHT: 179 LBS

## 2020-03-11 DIAGNOSIS — L72.3 INFECTED SEBACEOUS CYST: Primary | ICD-10-CM

## 2020-03-11 DIAGNOSIS — L08.9 INFECTED SEBACEOUS CYST: Primary | ICD-10-CM

## 2020-03-11 PROCEDURE — 99213 OFFICE O/P EST LOW 20 MIN: CPT | Performed by: FAMILY MEDICINE

## 2020-03-11 RX ORDER — CEFUROXIME AXETIL 250 MG/1
250 TABLET ORAL 2 TIMES DAILY
Qty: 14 TABLET | Refills: 0 | Status: SHIPPED | OUTPATIENT
Start: 2020-03-11 | End: 2020-04-21 | Stop reason: SDUPTHER

## 2020-03-11 NOTE — PROGRESS NOTES
"Subjective   Ronald Bond is a 74 y.o. male seen today for Cyst.     Cyst   This is a recurrent problem. The current episode started in the past 7 days. The problem has been gradually improving. Associated symptoms include nausea. Pertinent negatives include no chest pain, chills or fever. Associated symptoms comments: Clear/sticky drainage expressed several times.. Nothing aggravates the symptoms. Treatments tried: Augmentin and self drainage. The treatment provided mild relief.        The following portions of the patient's history were reviewed and updated as appropriate: allergies, current medications, past social history and problem list.    Review of Systems   Constitutional: Negative for chills and fever.   Respiratory: Negative for shortness of breath.    Cardiovascular: Negative for chest pain.   Gastrointestinal: Positive for nausea.   Skin: Positive for wound (infected cyst of the back of the neck.).       Objective   /90   Pulse 90   Temp 97.6 °F (36.4 °C)   Resp 20   Ht 172.7 cm (68\")   Wt 81.2 kg (179 lb)   SpO2 98%   BMI 27.22 kg/m²   Physical Exam   Constitutional: He appears well-developed and well-nourished.   Skin:   Tender Sebaceous cyst 1 inch x 2 inch back of neck.  The lesion has been fully drained.  There is minimal amount of drainage remaining on the skin.  This is cleansed and dressed with a Band-Aid.  There does appear to be some secondary infection.   Nursing note and vitals reviewed.      Assessment/Plan   Problem List Items Addressed This Visit     None      Visit Diagnoses     Infected sebaceous cyst    -  Primary    neck                Do not squeeze the cyst. Use warm compress.    Discontinue Augmentin.    Rx for Cefuroxime 250 mg twice a day #14+0.    Follow up as needed.  Should this recur then early on in the process I recommend referral for surgical excision.          Scribed for Dr Andrade Dubose by Karissa Clark CMA.          I, Andrade Dubose MD, personally " performed the services described in this documentation, as scribed by Karissa Clark in my presence, and is both accurate and complete.        (Please note that portions of this note were completed with a voice recognition program. Efforts were made to edit the dictations,but occasionally words are mis transcribed.)

## 2020-04-07 DIAGNOSIS — I10 HYPERTENSION, UNSPECIFIED TYPE: ICD-10-CM

## 2020-04-07 RX ORDER — AMLODIPINE BESYLATE 5 MG/1
TABLET ORAL
Qty: 90 TABLET | Refills: 2 | OUTPATIENT
Start: 2020-04-07

## 2020-04-21 ENCOUNTER — TELEPHONE (OUTPATIENT)
Dept: FAMILY MEDICINE CLINIC | Facility: CLINIC | Age: 75
End: 2020-04-21

## 2020-04-21 DIAGNOSIS — L72.3 SEBACEOUS CYST: Primary | ICD-10-CM

## 2020-04-21 DIAGNOSIS — L72.3 INFECTED SEBACEOUS CYST: ICD-10-CM

## 2020-04-21 DIAGNOSIS — L08.9 INFECTED SEBACEOUS CYST: ICD-10-CM

## 2020-04-21 RX ORDER — CEFUROXIME AXETIL 250 MG/1
250 TABLET ORAL 2 TIMES DAILY
Qty: 20 TABLET | Refills: 0 | Status: SHIPPED | OUTPATIENT
Start: 2020-04-21 | End: 2020-05-05

## 2020-04-21 NOTE — TELEPHONE ENCOUNTER
Called patient.   He is reporting he has a recurrent cyst on the back of his neck. He was treated on 03/11/20 with ceftin. The cyst was drainage.   He was to apply warm compresses. He completed the antibiotics and it seemed to have gone away. It is back and is getting worse. Over the past week.   Denies fever, or chills.   Dr. Dubose had discussed possible referral to surgery for excision if reoccurred.     Will reorder antibiotics and refer to general surgery.    continue warm compresses.     We are currently under COVID-19 Pandemic and are having a national emergency.     Discussed coming in if worsens prior to getting in with the surgeon.     He is agreeable.   Eri Baez, APRN

## 2020-04-21 NOTE — TELEPHONE ENCOUNTER
Patient states that he had a cyst on his neck about 1 month ago and he was given antibiotics and it was cleared up and it has come back and was wanting to see if he could get another prescription.  He can be reached at [phone]    [facility]

## 2020-04-29 ENCOUNTER — OFFICE VISIT (OUTPATIENT)
Dept: FAMILY MEDICINE CLINIC | Facility: CLINIC | Age: 75
End: 2020-04-29

## 2020-04-29 DIAGNOSIS — R53.1 WEAKNESS GENERALIZED: ICD-10-CM

## 2020-04-29 DIAGNOSIS — R53.83 FATIGUE, UNSPECIFIED TYPE: Primary | ICD-10-CM

## 2020-04-29 PROCEDURE — 99443 PR PHYS/QHP TELEPHONE EVALUATION 21-30 MIN: CPT | Performed by: NURSE PRACTITIONER
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
## 2020-04-30 ENCOUNTER — OFFICE VISIT (OUTPATIENT)
Dept: FAMILY MEDICINE CLINIC | Facility: CLINIC | Age: 75
End: 2020-04-30

## 2020-04-30 VITALS
BODY MASS INDEX: 29.77 KG/M2 | TEMPERATURE: 97.1 F | HEIGHT: 68 IN | OXYGEN SATURATION: 95 % | WEIGHT: 196.4 LBS | RESPIRATION RATE: 16 BRPM | SYSTOLIC BLOOD PRESSURE: 150 MMHG | HEART RATE: 100 BPM | DIASTOLIC BLOOD PRESSURE: 82 MMHG

## 2020-04-30 DIAGNOSIS — R60.0 MILD PERIPHERAL EDEMA: ICD-10-CM

## 2020-04-30 DIAGNOSIS — Z20.2 POSSIBLE EXPOSURE TO STD: ICD-10-CM

## 2020-04-30 DIAGNOSIS — R53.83 FATIGUE, UNSPECIFIED TYPE: Primary | ICD-10-CM

## 2020-04-30 DIAGNOSIS — R53.1 WEAKNESS GENERALIZED: ICD-10-CM

## 2020-04-30 DIAGNOSIS — I10 HYPERTENSION, UNSPECIFIED TYPE: ICD-10-CM

## 2020-04-30 LAB
ALBUMIN SERPL-MCNC: 4.7 G/DL (ref 3.5–5.2)
ALBUMIN/GLOB SERPL: 1.3 G/DL
ALP SERPL-CCNC: 99 U/L (ref 39–117)
ALT SERPL-CCNC: 14 U/L (ref 1–41)
AST SERPL-CCNC: 24 U/L (ref 1–40)
BASOPHILS # BLD AUTO: 0.05 10*3/MM3 (ref 0–0.2)
BASOPHILS NFR BLD AUTO: 0.8 % (ref 0–1.5)
BILIRUB BLD-MCNC: NEGATIVE MG/DL
BILIRUB SERPL-MCNC: 0.3 MG/DL (ref 0.2–1.2)
BUN SERPL-MCNC: 10 MG/DL (ref 8–23)
BUN/CREAT SERPL: 10 (ref 7–25)
CALCIUM SERPL-MCNC: 8.9 MG/DL (ref 8.6–10.5)
CHLORIDE SERPL-SCNC: 94 MMOL/L (ref 98–107)
CLARITY, POC: CLEAR
CO2 SERPL-SCNC: 31.1 MMOL/L (ref 22–29)
COLOR UR: YELLOW
CREAT SERPL-MCNC: 1 MG/DL (ref 0.76–1.27)
EOSINOPHIL # BLD AUTO: 0.36 10*3/MM3 (ref 0–0.4)
EOSINOPHIL NFR BLD AUTO: 5.6 % (ref 0.3–6.2)
ERYTHROCYTE [DISTWIDTH] IN BLOOD BY AUTOMATED COUNT: 13.3 % (ref 12.3–15.4)
GLOBULIN SER CALC-MCNC: 3.5 GM/DL
GLUCOSE SERPL-MCNC: 101 MG/DL (ref 65–99)
GLUCOSE UR STRIP-MCNC: NEGATIVE MG/DL
HCT VFR BLD AUTO: 40.3 % (ref 37.5–51)
HGB BLD-MCNC: 13.7 G/DL (ref 13–17.7)
IMM GRANULOCYTES # BLD AUTO: 0.03 10*3/MM3 (ref 0–0.05)
IMM GRANULOCYTES NFR BLD AUTO: 0.5 % (ref 0–0.5)
KETONES UR QL: NEGATIVE
LEUKOCYTE EST, POC: NEGATIVE
LYMPHOCYTES # BLD AUTO: 1.37 10*3/MM3 (ref 0.7–3.1)
LYMPHOCYTES NFR BLD AUTO: 21.5 % (ref 19.6–45.3)
MCH RBC QN AUTO: 31.5 PG (ref 26.6–33)
MCHC RBC AUTO-ENTMCNC: 34 G/DL (ref 31.5–35.7)
MCV RBC AUTO: 92.6 FL (ref 79–97)
MONOCYTES # BLD AUTO: 0.81 10*3/MM3 (ref 0.1–0.9)
MONOCYTES NFR BLD AUTO: 12.7 % (ref 5–12)
NEUTROPHILS # BLD AUTO: 3.76 10*3/MM3 (ref 1.7–7)
NEUTROPHILS NFR BLD AUTO: 58.9 % (ref 42.7–76)
NITRITE UR-MCNC: NEGATIVE MG/ML
NRBC BLD AUTO-RTO: 0 /100 WBC (ref 0–0.2)
PH UR: 7 [PH] (ref 5–8)
PLATELET # BLD AUTO: 262 10*3/MM3 (ref 140–450)
POTASSIUM SERPL-SCNC: 3.9 MMOL/L (ref 3.5–5.2)
PROT SERPL-MCNC: 8.2 G/DL (ref 6–8.5)
PROT UR STRIP-MCNC: ABNORMAL MG/DL
RBC # BLD AUTO: 4.35 10*6/MM3 (ref 4.14–5.8)
RBC # UR STRIP: NEGATIVE /UL
SODIUM SERPL-SCNC: 139 MMOL/L (ref 136–145)
SP GR UR: 1.02 (ref 1–1.03)
TSH SERPL DL<=0.005 MIU/L-ACNC: 2.02 UIU/ML (ref 0.27–4.2)
UROBILINOGEN UR QL: NORMAL
WBC # BLD AUTO: 6.38 10*3/MM3 (ref 3.4–10.8)

## 2020-04-30 PROCEDURE — 99214 OFFICE O/P EST MOD 30 MIN: CPT | Performed by: NURSE PRACTITIONER

## 2020-04-30 PROCEDURE — 36415 COLL VENOUS BLD VENIPUNCTURE: CPT | Performed by: NURSE PRACTITIONER

## 2020-04-30 PROCEDURE — 81003 URINALYSIS AUTO W/O SCOPE: CPT | Performed by: NURSE PRACTITIONER

## 2020-04-30 RX ORDER — HYDROCHLOROTHIAZIDE 12.5 MG/1
12.5 CAPSULE, GELATIN COATED ORAL DAILY
Qty: 30 CAPSULE | Refills: 5 | Status: SHIPPED | OUTPATIENT
Start: 2020-04-30 | End: 2020-06-24

## 2020-04-30 RX ORDER — SULFAMETHOXAZOLE AND TRIMETHOPRIM 800; 160 MG/1; MG/1
TABLET ORAL
COMMUNITY
Start: 2020-04-23 | End: 2020-05-05
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
## 2020-05-05 ENCOUNTER — TELEPHONE (OUTPATIENT)
Dept: FAMILY MEDICINE CLINIC | Facility: CLINIC | Age: 75
End: 2020-05-05

## 2020-05-05 DIAGNOSIS — N34.1 NONGONOCOCCAL URETHRITIS DUE TO UREAPLASMA UREALYTICUM: Primary | ICD-10-CM

## 2020-05-05 DIAGNOSIS — A49.3 NONGONOCOCCAL URETHRITIS DUE TO UREAPLASMA UREALYTICUM: Primary | ICD-10-CM

## 2020-05-05 RX ORDER — DOXYCYCLINE 100 MG/1
100 CAPSULE ORAL 2 TIMES DAILY
Qty: 14 CAPSULE | Refills: 0 | Status: SHIPPED | OUTPATIENT
Start: 2020-05-05 | End: 2020-05-12
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
## 2020-05-14 ENCOUNTER — TELEPHONE (OUTPATIENT)
Dept: FAMILY MEDICINE CLINIC | Facility: CLINIC | Age: 75
End: 2020-05-14

## 2020-05-14 NOTE — TELEPHONE ENCOUNTER
PT CALLED STATED THAT HIS LEGS ARE SWOLLEN. PT IS CONCERNED. PT WOULD LIKE A CALL BACK.    PT CONTACT [phone]     PLEASE ADVISE

## 2020-05-15 ENCOUNTER — OFFICE VISIT (OUTPATIENT)
Dept: FAMILY MEDICINE CLINIC | Facility: CLINIC | Age: 75
End: 2020-05-15

## 2020-05-15 ENCOUNTER — TELEPHONE (OUTPATIENT)
Dept: FAMILY MEDICINE CLINIC | Facility: CLINIC | Age: 75
End: 2020-05-15

## 2020-05-15 DIAGNOSIS — R60.0 LOCALIZED EDEMA: ICD-10-CM

## 2020-05-15 DIAGNOSIS — I10 ESSENTIAL HYPERTENSION: Primary | ICD-10-CM

## 2020-05-15 PROCEDURE — 99442 PR PHYS/QHP TELEPHONE EVALUATION 11-20 MIN: CPT | Performed by: FAMILY MEDICINE

## 2020-05-15 RX ORDER — FUROSEMIDE 20 MG/1
20 TABLET ORAL 2 TIMES DAILY
Qty: 60 TABLET | Refills: 0 | Status: SHIPPED | OUTPATIENT
Start: 2020-05-15 | End: 2020-06-24 | Stop reason: SDUPTHER

## 2020-05-15 NOTE — PROGRESS NOTES
Ronald Bond is a 74 y.o. male who presents today for Leg Swelling      This is a telephone visit. Patient consented to receive care through telemedicine.  15 minutes was spent discussing symptoms, reviewing chart, and giving patient counseling on developing plan on this telephone visit.    Leg Swelling   This is a new problem. The current episode started more than 1 month ago (6-8 months on anf off. Worsening over the last several weeks. He was prescibed HCTZ to try and decrease swelling. No redness or heat to touch. No wound. No drainage. ). The problem occurs intermittently. The problem has been waxing and waning. Pertinent negatives include no abdominal pain, arthralgias, chest pain, chills, congestion, coughing, diaphoresis, fever, joint swelling, myalgias, nausea, rash or vomiting. Exacerbated by: sitting and standing. He has tried position changes (elevation. HCTZ) for the symptoms. The treatment provided moderate (Leg elevation helps significantly. HCTZ did not seem to help. ) relief.        Review of Systems   Constitutional: Negative for chills, diaphoresis and fever.   HENT: Negative for congestion.    Respiratory: Negative for cough and shortness of breath.    Cardiovascular: Positive for leg swelling. Negative for chest pain and palpitations.   Gastrointestinal: Positive for constipation (2/2 methadone). Negative for abdominal pain, nausea and vomiting.   Musculoskeletal: Negative for arthralgias, joint swelling and myalgias.   Skin: Positive for dry skin. Negative for color change, rash and skin lesions.        The following portions of the patient's history were reviewed and updated as appropriate: allergies, current medications, past family history, past medical history, past social history, past surgical history and problem list.    Current Outpatient Medications on File Prior to Visit   Medication Sig Dispense Refill   • amLODIPine (NORVASC) 5 MG tablet Take 1 tablet by mouth Daily. 90 tablet 2    • hydroCHLOROthiazide (MICROZIDE) 12.5 MG capsule Take 1 capsule by mouth Daily. 30 capsule 5   • METHADONE HCL PO Take 70 mg by mouth Daily.     • pantoprazole (PROTONIX) 20 MG EC tablet Take 1 tablet by mouth Daily. 90 tablet 2   • PARoxetine (PAXIL) 40 MG tablet Take 1 tablet by mouth Every Morning. 90 tablet 2   • polyethylene glycol (MIRALAX) packet Take 17 g by mouth Daily. 90 packet 2   • Testosterone (ANDROGEL) 20.25 MG/1.25GM (1.62%) gel Apply 2 pumps daily to arms and shoulders daily 75 g 5     No current facility-administered medications on file prior to visit.        Allergies   Allergen Reactions   • Acetaminophen Other (See Comments)     Pt has Hx hep c        There were no vitals taken for this visit.     Physical Exam   Constitutional: He is oriented to person, place, and time.   Neurological: He is alert and oriented to person, place, and time.   Psychiatric: He has a normal mood and affect. His behavior is normal.        Results for orders placed or performed in visit on 04/30/20   Comprehensive Metabolic Panel   Result Value Ref Range    Glucose 101 (H) 65 - 99 mg/dL    BUN 10 8 - 23 mg/dL    Creatinine 1.00 0.76 - 1.27 mg/dL    eGFR Non African Am 73 >60 mL/min/1.73    eGFR African Am 89 >60 mL/min/1.73    BUN/Creatinine Ratio 10.0 7.0 - 25.0    Sodium 139 136 - 145 mmol/L    Potassium 3.9 3.5 - 5.2 mmol/L    Chloride 94 (L) 98 - 107 mmol/L    Total CO2 31.1 (H) 22.0 - 29.0 mmol/L    Calcium 8.9 8.6 - 10.5 mg/dL    Total Protein 8.2 6.0 - 8.5 g/dL    Albumin 4.70 3.50 - 5.20 g/dL    Globulin 3.5 gm/dL    A/G Ratio 1.3 g/dL    Total Bilirubin 0.3 0.2 - 1.2 mg/dL    Alkaline Phosphatase 99 39 - 117 U/L    AST (SGOT) 24 1 - 40 U/L    ALT (SGPT) 14 1 - 41 U/L   TSH   Result Value Ref Range    TSH 2.020 0.270 - 4.200 uIU/mL   POC Urinalysis Dipstick, Automated   Result Value Ref Range    Color Yellow Yellow, Straw, Dark Yellow, Raquel    Clarity, UA Clear Clear    Specific Gravity  1.020 1.005 -  1.030    pH, Urine 7.0 5.0 - 8.0    Leukocytes Negative Negative    Nitrite, UA Negative Negative    Protein, POC Trace (A) Negative mg/dL    Glucose, UA Negative Negative, 1000 mg/dL (3+) mg/dL    Ketones, UA Negative Negative    Urobilinogen, UA Normal Normal    Bilirubin Negative Negative    Blood, UA Negative Negative   CBC & Differential   Result Value Ref Range    WBC 6.38 3.40 - 10.80 10*3/mm3    RBC 4.35 4.14 - 5.80 10*6/mm3    Hemoglobin 13.7 13.0 - 17.7 g/dL    Hematocrit 40.3 37.5 - 51.0 %    MCV 92.6 79.0 - 97.0 fL    MCH 31.5 26.6 - 33.0 pg    MCHC 34.0 31.5 - 35.7 g/dL    RDW 13.3 12.3 - 15.4 %    Platelets 262 140 - 450 10*3/mm3    Neutrophil Rel % 58.9 42.7 - 76.0 %    Lymphocyte Rel % 21.5 19.6 - 45.3 %    Monocyte Rel % 12.7 (H) 5.0 - 12.0 %    Eosinophil Rel % 5.6 0.3 - 6.2 %    Basophil Rel % 0.8 0.0 - 1.5 %    Neutrophils Absolute 3.76 1.70 - 7.00 10*3/mm3    Lymphocytes Absolute 1.37 0.70 - 3.10 10*3/mm3    Monocytes Absolute 0.81 0.10 - 0.90 10*3/mm3    Eosinophils Absolute 0.36 0.00 - 0.40 10*3/mm3    Basophils Absolute 0.05 0.00 - 0.20 10*3/mm3    Immature Granulocyte Rel % 0.5 0.0 - 0.5 %    Immature Grans Absolute 0.03 0.00 - 0.05 10*3/mm3    nRBC 0.0 0.0 - 0.2 /100 WBC        Problems Addressed this Visit        Cardiovascular and Mediastinum    Hypertension - Primary     Unable to assess blood pressure through telemedicine visit.  Swelling in lower extremities may partially be due to amlodipine use.  Patient will continue amlodipine at this time and was given trial of Lasix.  He will follow-up with his primary care provider to assess lower extremity edema next week.  Patient may need to discontinue amlodipine and try alternative for blood pressure control if lower extremity edema continues.         Relevant Medications    furosemide (LASIX) 20 MG tablet       Other    Localized edema     Patient has bilateral lower extremity edema that is equal in both ankles and feet per patient.  Due  to this being a telephone visit I was unable to visually assess swelling.  From patient's description it does not appear to be infectious in nature and DVT seems less likely than edema due to venous stasis or amlodipine use.  Patient will try Lasix to decrease swelling.  Patient will follow-up with his PCP next week.  Strong RTC and ED precautions were given.         Relevant Medications    furosemide (LASIX) 20 MG tablet          Return in about 1 week (around 5/22/2020).    Parts of this office note have been dictated by voice recognition software. Grammatical and/or spelling errors may be present. This was an audio enabled telemedicine encounter.      Vicente Natarajan MD   5/15/2020

## 2020-05-15 NOTE — TELEPHONE ENCOUNTER
Pharmacy called to confirm whether pt will need to be on both     furosemide (LASIX) 20 MG tablet    And       hydroCHLOROthiazide (MICROZIDE) 12.5 MG capsule    Pt has an order for   furosemide (LASIX) 20 MG tablet    Please call and advise  [facility] pharmacy  [phone]  FAX: [phone]

## 2020-05-15 NOTE — ASSESSMENT & PLAN NOTE
Patient has bilateral lower extremity edema that is equal in both ankles and feet per patient.  Due to this being a telephone visit I was unable to visually assess swelling.  From patient's description it does not appear to be infectious in nature and DVT seems less likely than edema due to venous stasis or amlodipine use.  Patient will try Lasix to decrease swelling.  Patient will follow-up with his PCP next week.  Strong RTC and ED precautions were given.

## 2020-05-15 NOTE — ASSESSMENT & PLAN NOTE
Unable to assess blood pressure through telemedicine visit.  Swelling in lower extremities may partially be due to amlodipine use.  Patient will continue amlodipine at this time and was given trial of Lasix.  He will follow-up with his primary care provider to assess lower extremity edema next week.  Patient may need to discontinue amlodipine and try alternative for blood pressure control if lower extremity edema continues.

## 2020-05-18 ENCOUNTER — OFFICE VISIT (OUTPATIENT)
Dept: FAMILY MEDICINE CLINIC | Facility: CLINIC | Age: 75
End: 2020-05-18

## 2020-05-18 VITALS
HEART RATE: 91 BPM | OXYGEN SATURATION: 96 % | DIASTOLIC BLOOD PRESSURE: 72 MMHG | BODY MASS INDEX: 29.7 KG/M2 | SYSTOLIC BLOOD PRESSURE: 138 MMHG | TEMPERATURE: 97.8 F | WEIGHT: 196 LBS | HEIGHT: 68 IN

## 2020-05-18 DIAGNOSIS — R60.0 EDEMA OF BOTH LOWER LEGS DUE TO PERIPHERAL VENOUS INSUFFICIENCY: Primary | ICD-10-CM

## 2020-05-18 DIAGNOSIS — I87.2 EDEMA OF BOTH LOWER LEGS DUE TO PERIPHERAL VENOUS INSUFFICIENCY: Primary | ICD-10-CM

## 2020-05-18 PROCEDURE — 99213 OFFICE O/P EST LOW 20 MIN: CPT | Performed by: NURSE PRACTITIONER
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
## 2020-05-18 NOTE — PATIENT INSTRUCTIONS
Edema    Edema is an abnormal buildup of fluids in the body tissues and under the skin. Swelling of the legs, feet, and ankles is a common symptom that becomes more likely as you get older. Swelling is also common in looser tissues, like around the eyes. When the affected area is squeezed, the fluid may move out of that spot and leave a dent for a few moments. This dent is called pitting edema.  There are many possible causes of edema. Eating too much salt (sodium) and being on your feet or sitting for a long time can cause edema in your legs, feet, and ankles. Hot weather may make edema worse. Common causes of edema include:  · Heart failure.  · Liver or kidney disease.  · Weak leg blood vessels.  · Cancer.  · An injury.  · Pregnancy.  · Medicines.  · Being obese.  · Low protein levels in the blood.  Edema is usually painless. Your skin may look swollen or shiny.  Follow these instructions at home:  · Keep the affected body part raised (elevated) above the level of your heart when you are sitting or lying down.  · Do not sit still or stand for long periods of time.  · Do not wear tight clothing. Do not wear garters on your upper legs.  · Exercise your legs to get your circulation going. This helps to move the fluid back into your blood vessels, and it may help the swelling go down.  · Wear elastic bandages or support stockings to reduce swelling as told by your health care provider.  · Eat a low-salt (low-sodium) diet to reduce fluid as told by your health care provider.  · Depending on the cause of your swelling, you may need to limit how much fluid you drink (fluid restriction).  · Take over-the-counter and prescription medicines only as told by your health care provider.  Contact a health care provider if:  · Your edema does not get better with treatment.  · You have heart, liver, or kidney disease and have symptoms of edema.  · You have sudden and unexplained weight gain.  Get help right away if:  · You develop  shortness of breath or chest pain.  · You cannot breathe when you lie down.  · You develop pain, redness, or warmth in the swollen areas.  · You have heart, liver, or kidney disease and suddenly get edema.  · You have a fever and your symptoms suddenly get worse.  Summary  · Edema is an abnormal buildup of fluids in the body tissues and under the skin.  · Eating too much salt (sodium) and being on your feet or sitting for a long time can cause edema in your legs, feet, and ankles.  · Keep the affected body part raised (elevated) above the level of your heart when you are sitting or lying down.  This information is not intended to replace advice given to you by your health care provider. Make sure you discuss any questions you have with your health care provider.  Document Released: 12/18/2006 Document Revised: 01/20/2018 Document Reviewed: 01/20/2018  [employer] Interactive Patient Education © 2020 [employer]

## 2020-06-01 ENCOUNTER — OFFICE VISIT (OUTPATIENT)
Dept: FAMILY MEDICINE CLINIC | Facility: CLINIC | Age: 75
End: 2020-06-01

## 2020-06-01 VITALS
DIASTOLIC BLOOD PRESSURE: 66 MMHG | HEART RATE: 94 BPM | BODY MASS INDEX: 30.31 KG/M2 | SYSTOLIC BLOOD PRESSURE: 118 MMHG | RESPIRATION RATE: 12 BRPM | WEIGHT: 200 LBS | TEMPERATURE: 98.5 F | HEIGHT: 68 IN | OXYGEN SATURATION: 94 %

## 2020-06-01 DIAGNOSIS — R60.9 EDEMA, PERIPHERAL: Primary | ICD-10-CM

## 2020-06-01 DIAGNOSIS — M25.552 PAIN OF BOTH HIP JOINTS: ICD-10-CM

## 2020-06-01 DIAGNOSIS — R29.818 SUSPECTED SLEEP APNEA: ICD-10-CM

## 2020-06-01 DIAGNOSIS — R53.83 FATIGUE, UNSPECIFIED TYPE: ICD-10-CM

## 2020-06-01 DIAGNOSIS — G89.29 OTHER CHRONIC PAIN: ICD-10-CM

## 2020-06-01 DIAGNOSIS — M25.551 PAIN OF BOTH HIP JOINTS: ICD-10-CM

## 2020-06-01 DIAGNOSIS — Z86.79 HX OF BACTERIAL ENDOCARDITIS: ICD-10-CM

## 2020-06-01 PROCEDURE — 36415 COLL VENOUS BLD VENIPUNCTURE: CPT | Performed by: NURSE PRACTITIONER

## 2020-06-01 PROCEDURE — 99214 OFFICE O/P EST MOD 30 MIN: CPT | Performed by: NURSE PRACTITIONER

## 2020-06-01 PROCEDURE — 93000 ELECTROCARDIOGRAM COMPLETE: CPT | Performed by: NURSE PRACTITIONER

## 2020-06-01 RX ORDER — MELOXICAM 7.5 MG/1
7.5 TABLET ORAL DAILY
Qty: 30 TABLET | Refills: 1 | Status: SHIPPED | OUTPATIENT
Start: 2020-06-01 | End: 2020-08-25 | Stop reason: SDUPTHER

## 2020-06-01 NOTE — PATIENT INSTRUCTIONS
Edema    Edema is an abnormal buildup of fluids in the body tissues and under the skin. Swelling of the legs, feet, and ankles is a common symptom that becomes more likely as you get older. Swelling is also common in looser tissues, like around the eyes. When the affected area is squeezed, the fluid may move out of that spot and leave a dent for a few moments. This dent is called pitting edema.  There are many possible causes of edema. Eating too much salt (sodium) and being on your feet or sitting for a long time can cause edema in your legs, feet, and ankles. Hot weather may make edema worse. Common causes of edema include:  · Heart failure.  · Liver or kidney disease.  · Weak leg blood vessels.  · Cancer.  · An injury.  · Pregnancy.  · Medicines.  · Being obese.  · Low protein levels in the blood.  Edema is usually painless. Your skin may look swollen or shiny.  Follow these instructions at home:  · Keep the affected body part raised (elevated) above the level of your heart when you are sitting or lying down.  · Do not sit still or stand for long periods of time.  · Do not wear tight clothing. Do not wear garters on your upper legs.  · Exercise your legs to get your circulation going. This helps to move the fluid back into your blood vessels, and it may help the swelling go down.  · Wear elastic bandages or support stockings to reduce swelling as told by your health care provider.  · Eat a low-salt (low-sodium) diet to reduce fluid as told by your health care provider.  · Depending on the cause of your swelling, you may need to limit how much fluid you drink (fluid restriction).  · Take over-the-counter and prescription medicines only as told by your health care provider.  Contact a health care provider if:  · Your edema does not get better with treatment.  · You have heart, liver, or kidney disease and have symptoms of edema.  · You have sudden and unexplained weight gain.  Get help right away if:  · You develop  shortness of breath or chest pain.  · You cannot breathe when you lie down.  · You develop pain, redness, or warmth in the swollen areas.  · You have heart, liver, or kidney disease and suddenly get edema.  · You have a fever and your symptoms suddenly get worse.  Summary  · Edema is an abnormal buildup of fluids in the body tissues and under the skin.  · Eating too much salt (sodium) and being on your feet or sitting for a long time can cause edema in your legs, feet, and ankles.  · Keep the affected body part raised (elevated) above the level of your heart when you are sitting or lying down.  This information is not intended to replace advice given to you by your health care provider. Make sure you discuss any questions you have with your health care provider.  Document Released: 12/18/2006 Document Revised: 12/21/2018 Document Reviewed: 01/20/2018  [employer] Patient Education © 2020 [employer]
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
## 2020-06-02 LAB
ALBUMIN SERPL-MCNC: 5 G/DL (ref 3.7–4.7)
ALBUMIN/GLOB SERPL: 1.6 {RATIO} (ref 1.2–2.2)
ALP SERPL-CCNC: 96 IU/L (ref 39–117)
ALT SERPL-CCNC: 11 IU/L (ref 0–44)
AST SERPL-CCNC: 26 IU/L (ref 0–40)
BASOPHILS # BLD AUTO: 0.1 X10E3/UL (ref 0–0.2)
BASOPHILS NFR BLD AUTO: 1 %
BILIRUB SERPL-MCNC: 0.2 MG/DL (ref 0–1.2)
BUN SERPL-MCNC: 13 MG/DL (ref 8–27)
BUN/CREAT SERPL: 11 (ref 10–24)
CALCIUM SERPL-MCNC: 9.4 MG/DL (ref 8.6–10.2)
CHLORIDE SERPL-SCNC: 93 MMOL/L (ref 96–106)
CO2 SERPL-SCNC: 27 MMOL/L (ref 20–29)
CREAT SERPL-MCNC: 1.17 MG/DL (ref 0.76–1.27)
CRP SERPL-MCNC: 5 MG/L (ref 0–10)
EOSINOPHIL # BLD AUTO: 0.3 X10E3/UL (ref 0–0.4)
EOSINOPHIL NFR BLD AUTO: 5 %
ERYTHROCYTE [DISTWIDTH] IN BLOOD BY AUTOMATED COUNT: 13.2 % (ref 11.6–15.4)
ERYTHROCYTE [SEDIMENTATION RATE] IN BLOOD BY WESTERGREN METHOD: 45 MM/HR (ref 0–30)
FOLATE SERPL-MCNC: >20 NG/ML
GLOBULIN SER CALC-MCNC: 3.1 G/DL (ref 1.5–4.5)
GLUCOSE SERPL-MCNC: 88 MG/DL (ref 65–99)
HCT VFR BLD AUTO: 40.8 % (ref 37.5–51)
HGB BLD-MCNC: 13.7 G/DL (ref 13–17.7)
IMM GRANULOCYTES # BLD AUTO: 0 X10E3/UL (ref 0–0.1)
IMM GRANULOCYTES NFR BLD AUTO: 0 %
LYMPHOCYTES # BLD AUTO: 1.6 X10E3/UL (ref 0.7–3.1)
LYMPHOCYTES NFR BLD AUTO: 26 %
MAGNESIUM SERPL-MCNC: 2.3 MG/DL (ref 1.6–2.3)
MCH RBC QN AUTO: 31.4 PG (ref 26.6–33)
MCHC RBC AUTO-ENTMCNC: 33.6 G/DL (ref 31.5–35.7)
MCV RBC AUTO: 93 FL (ref 79–97)
MONOCYTES # BLD AUTO: 0.8 X10E3/UL (ref 0.1–0.9)
MONOCYTES NFR BLD AUTO: 12 %
NEUTROPHILS # BLD AUTO: 3.4 X10E3/UL (ref 1.4–7)
NEUTROPHILS NFR BLD AUTO: 56 %
PLATELET # BLD AUTO: 261 X10E3/UL (ref 150–450)
POTASSIUM SERPL-SCNC: 4.5 MMOL/L (ref 3.5–5.2)
PROT SERPL-MCNC: 8.1 G/DL (ref 6–8.5)
RBC # BLD AUTO: 4.37 X10E6/UL (ref 4.14–5.8)
SODIUM SERPL-SCNC: 138 MMOL/L (ref 134–144)
TSH SERPL DL<=0.005 MIU/L-ACNC: 3.21 UIU/ML (ref 0.45–4.5)
VIT B12 SERPL-MCNC: 745 PG/ML (ref 232–1245)
WBC # BLD AUTO: 6.1 X10E3/UL (ref 3.4–10.8)

## 2020-06-04 DIAGNOSIS — I10 HYPERTENSION, UNSPECIFIED TYPE: ICD-10-CM

## 2020-06-08 RX ORDER — AMLODIPINE BESYLATE 5 MG/1
TABLET ORAL
Qty: 90 TABLET | Refills: 2 | Status: SHIPPED | OUTPATIENT
Start: 2020-06-08 | End: 2020-06-24 | Stop reason: SDUPTHER

## 2020-06-24 ENCOUNTER — TELEPHONE (OUTPATIENT)
Dept: FAMILY MEDICINE CLINIC | Facility: CLINIC | Age: 75
End: 2020-06-24

## 2020-06-24 ENCOUNTER — OFFICE VISIT (OUTPATIENT)
Dept: FAMILY MEDICINE CLINIC | Facility: CLINIC | Age: 75
End: 2020-06-24

## 2020-06-24 VITALS
WEIGHT: 201.6 LBS | BODY MASS INDEX: 30.55 KG/M2 | HEART RATE: 82 BPM | RESPIRATION RATE: 16 BRPM | HEIGHT: 68 IN | TEMPERATURE: 97.7 F | OXYGEN SATURATION: 97 % | SYSTOLIC BLOOD PRESSURE: 140 MMHG | DIASTOLIC BLOOD PRESSURE: 80 MMHG

## 2020-06-24 DIAGNOSIS — K44.9 HIATAL HERNIA: ICD-10-CM

## 2020-06-24 DIAGNOSIS — Z76.0 ENCOUNTER FOR MEDICATION REFILL: ICD-10-CM

## 2020-06-24 DIAGNOSIS — I10 HYPERTENSION, UNSPECIFIED TYPE: ICD-10-CM

## 2020-06-24 DIAGNOSIS — F41.8 DEPRESSION WITH ANXIETY: ICD-10-CM

## 2020-06-24 DIAGNOSIS — I10 ESSENTIAL HYPERTENSION: ICD-10-CM

## 2020-06-24 DIAGNOSIS — R60.0 LOCALIZED EDEMA: ICD-10-CM

## 2020-06-24 DIAGNOSIS — R39.9 UTI SYMPTOMS: ICD-10-CM

## 2020-06-24 DIAGNOSIS — R30.0 DYSURIA: ICD-10-CM

## 2020-06-24 DIAGNOSIS — K59.00 CONSTIPATION, UNSPECIFIED CONSTIPATION TYPE: ICD-10-CM

## 2020-06-24 DIAGNOSIS — K21.9 GASTROESOPHAGEAL REFLUX DISEASE, ESOPHAGITIS PRESENCE NOT SPECIFIED: ICD-10-CM

## 2020-06-24 LAB
BILIRUB BLD-MCNC: NEGATIVE MG/DL
CLARITY, POC: CLEAR
COLOR UR: YELLOW
GLUCOSE UR STRIP-MCNC: NEGATIVE MG/DL
KETONES UR QL: NEGATIVE
LEUKOCYTE EST, POC: NEGATIVE
NITRITE UR-MCNC: NEGATIVE MG/ML
PH UR: 7 [PH] (ref 5–8)
PROT UR STRIP-MCNC: NEGATIVE MG/DL
RBC # UR STRIP: ABNORMAL /UL
SP GR UR: 1.02 (ref 1–1.03)
UROBILINOGEN UR QL: NORMAL

## 2020-06-24 PROCEDURE — 81003 URINALYSIS AUTO W/O SCOPE: CPT | Performed by: NURSE PRACTITIONER

## 2020-06-24 PROCEDURE — 99214 OFFICE O/P EST MOD 30 MIN: CPT | Performed by: NURSE PRACTITIONER

## 2020-06-24 PROCEDURE — 80053 COMPREHEN METABOLIC PANEL: CPT | Performed by: NURSE PRACTITIONER

## 2020-06-24 PROCEDURE — 93000 ELECTROCARDIOGRAM COMPLETE: CPT | Performed by: NURSE PRACTITIONER

## 2020-06-24 PROCEDURE — 36415 COLL VENOUS BLD VENIPUNCTURE: CPT | Performed by: NURSE PRACTITIONER

## 2020-06-24 RX ORDER — PANTOPRAZOLE SODIUM 20 MG/1
20 TABLET, DELAYED RELEASE ORAL DAILY
Qty: 90 TABLET | Refills: 2 | Status: SHIPPED | OUTPATIENT
Start: 2020-06-24 | End: 2021-09-16 | Stop reason: SDUPTHER

## 2020-06-24 RX ORDER — AMLODIPINE BESYLATE 5 MG/1
5 TABLET ORAL DAILY
Qty: 90 TABLET | Refills: 1 | Status: SHIPPED | OUTPATIENT
Start: 2020-06-24 | End: 2020-07-21

## 2020-06-24 RX ORDER — FUROSEMIDE 20 MG/1
20 TABLET ORAL 2 TIMES DAILY
Qty: 60 TABLET | Refills: 1 | Status: SHIPPED | OUTPATIENT
Start: 2020-06-24 | End: 2020-07-21 | Stop reason: SDUPTHER

## 2020-06-24 RX ORDER — PAROXETINE HYDROCHLORIDE 40 MG/1
40 TABLET, FILM COATED ORAL EVERY MORNING
Qty: 90 TABLET | Refills: 2 | Status: SHIPPED | OUTPATIENT
Start: 2020-06-24 | End: 2021-04-29 | Stop reason: SDUPTHER

## 2020-06-24 RX ORDER — POLYETHYLENE GLYCOL 3350 17 G/17G
17 POWDER, FOR SOLUTION ORAL DAILY
Qty: 90 PACKET | Refills: 2 | Status: SHIPPED | OUTPATIENT
Start: 2020-06-24 | End: 2021-09-16 | Stop reason: SDUPTHER

## 2020-06-24 NOTE — TELEPHONE ENCOUNTER
PATIENT REQ CALL BACK HE STATED HE FORGOT TO DISCUSS THE EDEMA IN HIS LEGS.     PLEASE CALL PATIENT BACK AND ADVISE [phone]
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
## 2020-06-26 LAB
ALBUMIN SERPL-MCNC: 5 G/DL (ref 3.7–4.7)
ALBUMIN/GLOB SERPL: 1.7 {RATIO} (ref 1.2–2.2)
ALP SERPL-CCNC: 92 IU/L (ref 39–117)
ALT SERPL-CCNC: 15 IU/L (ref 0–44)
AST SERPL-CCNC: 26 IU/L (ref 0–40)
BILIRUB SERPL-MCNC: <0.2 MG/DL (ref 0–1.2)
BUN SERPL-MCNC: 11 MG/DL (ref 8–27)
BUN/CREAT SERPL: 10 (ref 10–24)
CALCIUM SERPL-MCNC: 9 MG/DL (ref 8.6–10.2)
CHLORIDE SERPL-SCNC: 92 MMOL/L (ref 96–106)
CO2 SERPL-SCNC: 29 MMOL/L (ref 20–29)
CREAT SERPL-MCNC: 1.05 MG/DL (ref 0.76–1.27)
GLOBULIN SER CALC-MCNC: 3 G/DL (ref 1.5–4.5)
GLUCOSE SERPL-MCNC: 92 MG/DL (ref 65–99)
POTASSIUM SERPL-SCNC: 4.3 MMOL/L (ref 3.5–5.2)
PROT SERPL-MCNC: 8 G/DL (ref 6–8.5)
SODIUM SERPL-SCNC: 140 MMOL/L (ref 134–144)

## 2020-06-30 ENCOUNTER — TELEPHONE (OUTPATIENT)
Dept: FAMILY MEDICINE CLINIC | Facility: CLINIC | Age: 75
End: 2020-06-30

## 2020-06-30 NOTE — TELEPHONE ENCOUNTER
PATIENT IS REQUESTING A CALL BACK TO DISCUSS WHETHER OR NOT HE SHOULD MAKE AN APPOINTMENT BECAUSE OF EDEMA IN BOTH LEGS. HE STATES HE HAS PINK SPLOTCHES FROM HIS SHINS DOWN AND SWELLING.  PATIENT IS ALSO WANTING TO KNOW RESULTS FROM HIS MOST RECENT LABS.    PLEASE CALL AND ADVISE  [phone]

## 2020-06-30 NOTE — TELEPHONE ENCOUNTER
Attempted to call pt back and see if pt wanted to schedule another appt. No answer and was not able to LVM

## 2020-07-08 ENCOUNTER — TELEPHONE (OUTPATIENT)
Dept: FAMILY MEDICINE CLINIC | Facility: CLINIC | Age: 75
End: 2020-07-08

## 2020-07-08 NOTE — TELEPHONE ENCOUNTER
Called and asked if pt would like to make an appt regarding the swelling in his legs and pt stated that he has an appt scheduled for Friday 07/10.

## 2020-07-10 ENCOUNTER — OFFICE VISIT (OUTPATIENT)
Dept: FAMILY MEDICINE CLINIC | Facility: CLINIC | Age: 75
End: 2020-07-10

## 2020-07-10 VITALS
HEART RATE: 78 BPM | BODY MASS INDEX: 30.43 KG/M2 | HEIGHT: 68 IN | RESPIRATION RATE: 16 BRPM | SYSTOLIC BLOOD PRESSURE: 126 MMHG | OXYGEN SATURATION: 97 % | WEIGHT: 200.8 LBS | DIASTOLIC BLOOD PRESSURE: 74 MMHG | TEMPERATURE: 97.3 F

## 2020-07-10 DIAGNOSIS — Z48.02 ENCOUNTER FOR STAPLE REMOVAL: ICD-10-CM

## 2020-07-10 DIAGNOSIS — B35.3 ATHLETE'S FOOT ON LEFT: ICD-10-CM

## 2020-07-10 DIAGNOSIS — B35.3 ATHLETE'S FOOT ON RIGHT: ICD-10-CM

## 2020-07-10 DIAGNOSIS — B35.1 ONYCHOMYCOSIS OF TOENAIL: ICD-10-CM

## 2020-07-10 DIAGNOSIS — I10 ESSENTIAL HYPERTENSION: Primary | ICD-10-CM

## 2020-07-10 PROCEDURE — 99214 OFFICE O/P EST MOD 30 MIN: CPT | Performed by: NURSE PRACTITIONER

## 2020-07-10 RX ORDER — CLOTRIMAZOLE 1 %
CREAM (GRAM) TOPICAL 2 TIMES DAILY
Qty: 60 G | Refills: 1 | Status: SHIPPED | OUTPATIENT
Start: 2020-07-10
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
## 2020-07-11 LAB
ALBUMIN SERPL-MCNC: 4.7 G/DL (ref 3.7–4.7)
ALBUMIN/GLOB SERPL: 1.3 {RATIO} (ref 1.2–2.2)
ALP SERPL-CCNC: 94 IU/L (ref 39–117)
ALT SERPL-CCNC: 11 IU/L (ref 0–44)
AST SERPL-CCNC: 22 IU/L (ref 0–40)
BASOPHILS # BLD AUTO: 0.1 X10E3/UL (ref 0–0.2)
BASOPHILS NFR BLD AUTO: 1 %
BILIRUB SERPL-MCNC: 0.2 MG/DL (ref 0–1.2)
BUN SERPL-MCNC: 11 MG/DL (ref 8–27)
BUN/CREAT SERPL: 10 (ref 10–24)
CALCIUM SERPL-MCNC: 9.4 MG/DL (ref 8.6–10.2)
CHLORIDE SERPL-SCNC: 97 MMOL/L (ref 96–106)
CO2 SERPL-SCNC: 29 MMOL/L (ref 20–29)
CREAT SERPL-MCNC: 1.09 MG/DL (ref 0.76–1.27)
EOSINOPHIL # BLD AUTO: 0.3 X10E3/UL (ref 0–0.4)
EOSINOPHIL NFR BLD AUTO: 4 %
ERYTHROCYTE [DISTWIDTH] IN BLOOD BY AUTOMATED COUNT: 13 % (ref 11.6–15.4)
GLOBULIN SER CALC-MCNC: 3.7 G/DL (ref 1.5–4.5)
GLUCOSE SERPL-MCNC: 97 MG/DL (ref 65–99)
HCT VFR BLD AUTO: 39.8 % (ref 37.5–51)
HGB BLD-MCNC: 13.9 G/DL (ref 13–17.7)
IMM GRANULOCYTES # BLD AUTO: 0 X10E3/UL (ref 0–0.1)
IMM GRANULOCYTES NFR BLD AUTO: 0 %
LYMPHOCYTES # BLD AUTO: 1.6 X10E3/UL (ref 0.7–3.1)
LYMPHOCYTES NFR BLD AUTO: 19 %
MCH RBC QN AUTO: 32.3 PG (ref 26.6–33)
MCHC RBC AUTO-ENTMCNC: 34.9 G/DL (ref 31.5–35.7)
MCV RBC AUTO: 93 FL (ref 79–97)
MONOCYTES # BLD AUTO: 1.1 X10E3/UL (ref 0.1–0.9)
MONOCYTES NFR BLD AUTO: 13 %
NEUTROPHILS # BLD AUTO: 5.3 X10E3/UL (ref 1.4–7)
NEUTROPHILS NFR BLD AUTO: 63 %
PLATELET # BLD AUTO: 309 X10E3/UL (ref 150–450)
POTASSIUM SERPL-SCNC: 5 MMOL/L (ref 3.5–5.2)
PROT SERPL-MCNC: 8.4 G/DL (ref 6–8.5)
RBC # BLD AUTO: 4.3 X10E6/UL (ref 4.14–5.8)
SODIUM SERPL-SCNC: 139 MMOL/L (ref 134–144)
WBC # BLD AUTO: 8.4 X10E3/UL (ref 3.4–10.8)

## 2020-07-17 NOTE — PATIENT INSTRUCTIONS
Wound Closure Removal, Care After  This sheet gives you information about how to care for yourself after your stitches (sutures), staples, or skin adhesives have been removed. Your health care provider may also give you more specific instructions. If you have problems or questions, contact your health care provider.  What can I expect after the procedure?  After your sutures or staples have been removed or your skin adhesives have fallen off, it is common to have:  · Some discomfort and swelling in the area.  · Slight redness in the area.  Follow these instructions at home:  If you have a bandage:  · Wash your hands with soap and water before you change your bandage (dressing). If soap and water are not available, use hand .  · Change your dressing as told by your health care provider. If your dressing becomes wet or dirty, or develops a bad smell, change it as soon as possible.  · If your dressing sticks to your skin, pour warm, clean water over it until it loosens and can be removed without pulling apart the wound edges. Pat the area dry with a soft, clean towel. Do not rub the wound because that may cause bleeding.  Wound care    · Keep the wound area dry and clean.  · Check your wound every day for signs of infection. Check for:  ? Redness, swelling, or pain.  ? Fluid or blood.  ? Warmth.  ? Pus or a bad smell.  · Wash your hands with soap and water before and after touching your wound.  · Apply cream or ointment only as told by your health care provider. If you are using cream or ointment, wash the area with soap and water 2 times a day to remove all the cream or ointment. Rinse off the soap and pat the area dry with a clean towel.  · If skin glue or adhesive strips were applied after sutures or staples were removed, leave these closures in place until they peel off on their own. If adhesive strip edges start to loosen and curl up, you may trim the loose edges. Do not remove adhesive strips completely  unless your health care provider tells you to do that.  · Continue to protect the wound from injury.  · Do not pick at your wound. Picking can cause an infection.  Bathing  · Do not take baths, swim, or use a hot tub until your health care provider approves.  · Ask your health care provider when it is okay to shower.  · Follow these steps for showering:  ? If you have a dressing, remove it before getting into the shower.  ? In the shower, allow soapy water to get on the wound. Avoid scrubbing the wound.  ? When you get out of the shower, dry the wound by patting it with a clean towel.  ? Reapply a dressing over the wound if needed.  Scar care  · When your wound has completely healed, take actions to help decrease the size of your scar:  ? Wear sunscreen over the scar or cover it with clothing when you are outside. New scars get sunburned easily, which can make scarring worse.  ? Gently massage the scarred area. This can decrease scar thickness.  General instructions  · Take over-the-counter and prescription medicines only as told by your health care provider.  · Keep all follow-up visits as told by your health care provider. This is important.  Contact a health care provider if:  · You have redness, swelling, or pain around your wound.  · You have fluid or blood coming from your wound.  · Your wound feels warm to the touch.  · You have pus or a bad smell coming from your wound.  · Your wound opens up.  · You have chills.  Get help right away if:  · You have a fever.  · You have redness that is spreading from your wound.  Summary  · Change your dressing as told by your health care provider. If your dressing becomes wet or dirty, or develops a bad smell, change it as soon as possible.  · Check your wound every day for signs of infection.  · Wash your hands with soap and water before and after touching your wound.  This information is not intended to replace advice given to you by your health care provider. Make sure  you discuss any questions you have with your health care provider.  Document Released: 11/30/2009 Document Revised: 11/30/2018 Document Reviewed: 10/08/2018  Elsevier Patient Education © 2020 [employer]  Athlete's Foot    Athlete's foot (tinea pedis) is a fungal infection of the skin on your feet. It often occurs on the skin that is between or underneath the toes. It can also occur on the soles of your feet. The infection can spread from person to person (is contagious). It can also spread when a person's bare feet come in contact with the fungus on shower floors or on items such as shoes.  What are the causes?  This condition is caused by a fungus that grows in warm, moist places. You can get athlete's foot by sharing shoes, shower stalls, towels, and wet floors with someone who is infected. Not washing your feet or changing your socks often enough can also lead to athlete's foot.  What increases the risk?  This condition is more likely to develop in:  · Men.  · People who have a weak body defense system (immune system).  · People who have diabetes.  · People who use public showers, such as at a gym.  · People who wear heavy-duty shoes, such as industrial or  shoes.  · Seasons with warm, humid weather.  What are the signs or symptoms?  Symptoms of this condition include:  · Itchy areas between your toes or on the soles of your feet.  · White, flaky, or scaly areas between your toes or on the soles of your feet.  · Very itchy small blisters between your toes or on the soles of your feet.  · Small cuts in your skin. These cuts can become infected.  · Thick or discolored toenails.  How is this diagnosed?  This condition may be diagnosed with a physical exam and a review of your medical history. Your health care provider may also take a skin or toenail sample to examine under a microscope.  How is this treated?  This condition is treated with antifungal medicines. These may be applied as powders, ointments,  or creams. In severe cases, an oral antifungal medicine may be given.  Follow these instructions at home:  Medicines  · Apply or take over-the-counter and prescription medicines only as told by your health care provider.  · Apply your antifungal medicine as told by your health care provider. Do not stop using the antifungal even if your condition improves.  Foot care  · Do not scratch your feet.  · Keep your feet dry:  ? Wear cotton or wool socks. Change your socks every day or if they become wet.  ? Wear shoes that allow air to flow, such as sandals or canvas tennis shoes.  · Wash and dry your feet, including the area between your toes. Also, wash and dry your feet:  ? Every day or as told by your health care provider.  ? After exercising.  General instructions  · Do not let others use towels, shoes, nail clippers, or other personal items that touch your feet.  · Protect your feet by wearing sandals in wet areas, such as locker rooms and shared showers.  · Keep all follow-up visits as told by your health care provider. This is important.  · If you have diabetes, keep your blood sugar under control.  Contact a health care provider if:  · You have a fever.  · You have swelling, soreness, warmth, or redness in your foot.  · Your feet are not getting better with treatment.  · Your symptoms get worse.  · You have new symptoms.  Summary  · Athlete's foot (tinea pedis) is a fungal infection of the skin on your feet. It often occurs on skin that is between or underneath the toes.  · This condition is caused by a fungus that grows in warm, moist places.  · Symptoms include white, flaky, or scaly areas between your toes or on the soles of your feet.  · This condition is treated with antifungal medicines.  · Keep your feet clean. Always dry them thoroughly.  This information is not intended to replace advice given to you by your health care provider. Make sure you discuss any questions you have with your health care  provider.  Document Released: 12/15/2001 Document Revised: 12/13/2018 Document Reviewed: 10/08/2018  [employer]ative [employer] Patient Education © 2020 [employer]  Fungal Nail Infection  A fungal nail infection is a common infection of the toenails or fingernails. This condition affects toenails more often than fingernails. It often affects the great, or big, toes. More than one nail may be infected. The condition can be passed from person to person (is contagious).  What are the causes?  This condition is caused by a fungus. Several types of fungi can cause the infection. These fungi are common in moist and warm areas. If your hands or feet come into contact with the fungus, it may get into a crack in your fingernail or toenail and cause the infection.  What increases the risk?  The following factors may make you more likely to develop this condition:  · Being male.  · Being of older age.  · Living with someone who has the fungus.  · Walking barefoot in areas where the fungus thrives, such as showers or locker rooms.  · Wearing shoes and socks that cause your feet to sweat.  · Having a nail injury or a recent nail surgery.  · Having certain medical conditions, such as:  ? Athlete's foot.  ? Diabetes.  ? Psoriasis.  ? Poor circulation.  ? A weak body defense system (immune system).  What are the signs or symptoms?  Symptoms of this condition include:  · A pale spot on the nail.  · Thickening of the nail.  · A nail that becomes yellow or brown.  · A brittle or ragged nail edge.  · A crumbling nail.  · A nail that has lifted away from the nail bed.  How is this diagnosed?  This condition is diagnosed with a physical exam. Your health care provider may take a scraping or clipping from your nail to test for the fungus.  How is this treated?  Treatment is not needed for mild infections. If you have significant nail changes, treatment may include:  · Antifungal medicines taken by mouth (orally). You may need to take the medicine for  several weeks or several months, and you may not see the results for a long time. These medicines can cause side effects. Ask your health care provider what problems to watch for.  · Antifungal nail polish or nail cream. These may be used along with oral antifungal medicines.  · Laser treatment of the nail.  · Surgery to remove the nail. This may be needed for the most severe infections.  It can take a long time, usually up to a year, for the infection to go away. The infection may also come back.  Follow these instructions at home:  Medicines  · Take or apply over-the-counter and prescription medicines only as told by your health care provider.  · Ask your health care provider about using over-the-counter mentholated ointment on your nails.  Nail care  · Trim your nails often.  · Wash and dry your hands and feet every day.  · Keep your feet dry:  ? Wear absorbent socks, and change your socks frequently.  ? Wear shoes that allow air to circulate, such as sandals or canvas tennis shoes. Throw out old shoes.  · Do not use artificial nails.  · If you go to a nail salon, make sure you choose one that uses clean instruments.  · Use antifungal foot powder on your feet and in your shoes.  General instructions  · Do not share personal items, such as towels or nail clippers.  · Do not walk barefoot in shower rooms or locker rooms.  · Wear rubber gloves if you are working with your hands in wet areas.  · Keep all follow-up visits as told by your health care provider. This is important.  Contact a health care provider if:  Your infection is not getting better or it is getting worse after several months.  Summary  · A fungal nail infection is a common infection of the toenails or fingernails.  · Treatment is not needed for mild infections. If you have significant nail changes, treatment may include taking medicine orally and applying medicine to your nails.  · It can take a long time, usually up to a year, for the infection to  go away. The infection may also come back.  · Take or apply over-the-counter and prescription medicines only as told by your health care provider.  · Follow instructions for taking care of your nails to help prevent infection from coming back or spreading.  This information is not intended to replace advice given to you by your health care provider. Make sure you discuss any questions you have with your health care provider.  Document Released: 12/15/2001 Document Revised: 04/09/2020 Document Reviewed: 05/24/2019  Elsevier Patient Education © 2020 Elsevier Inc.

## 2020-07-21 ENCOUNTER — CONSULT (OUTPATIENT)
Dept: SLEEP MEDICINE | Facility: HOSPITAL | Age: 75
End: 2020-07-21

## 2020-07-21 ENCOUNTER — OFFICE VISIT (OUTPATIENT)
Dept: CARDIOLOGY | Facility: HOSPITAL | Age: 75
End: 2020-07-21

## 2020-07-21 VITALS
OXYGEN SATURATION: 95 % | RESPIRATION RATE: 20 BRPM | HEART RATE: 78 BPM | SYSTOLIC BLOOD PRESSURE: 155 MMHG | DIASTOLIC BLOOD PRESSURE: 95 MMHG | BODY MASS INDEX: 30.67 KG/M2 | HEIGHT: 68 IN | WEIGHT: 202.38 LBS | TEMPERATURE: 96.9 F

## 2020-07-21 VITALS
HEIGHT: 68 IN | WEIGHT: 203.2 LBS | OXYGEN SATURATION: 93 % | SYSTOLIC BLOOD PRESSURE: 146 MMHG | DIASTOLIC BLOOD PRESSURE: 82 MMHG | BODY MASS INDEX: 30.8 KG/M2 | HEART RATE: 79 BPM

## 2020-07-21 DIAGNOSIS — E66.9 OBESITY (BMI 30.0-34.9): ICD-10-CM

## 2020-07-21 DIAGNOSIS — R60.9 PERIPHERAL EDEMA: ICD-10-CM

## 2020-07-21 DIAGNOSIS — G47.52 REM SLEEP BEHAVIOR DISORDER: ICD-10-CM

## 2020-07-21 DIAGNOSIS — R60.0 BILATERAL LOWER EXTREMITY EDEMA: Primary | ICD-10-CM

## 2020-07-21 DIAGNOSIS — I10 ESSENTIAL HYPERTENSION: ICD-10-CM

## 2020-07-21 DIAGNOSIS — G47.10 HYPERSOMNOLENCE: Primary | ICD-10-CM

## 2020-07-21 PROCEDURE — 99214 OFFICE O/P EST MOD 30 MIN: CPT | Performed by: INTERNAL MEDICINE

## 2020-07-21 PROCEDURE — 99214 OFFICE O/P EST MOD 30 MIN: CPT | Performed by: NURSE PRACTITIONER

## 2020-07-21 RX ORDER — FUROSEMIDE 20 MG/1
40 TABLET ORAL 2 TIMES DAILY
Qty: 60 TABLET | Refills: 1
Start: 2020-07-21 | End: 2020-08-25

## 2020-07-21 RX ORDER — CARVEDILOL 6.25 MG/1
6.25 TABLET ORAL 2 TIMES DAILY
Qty: 60 TABLET | Refills: 3 | Status: SHIPPED | OUTPATIENT
Start: 2020-07-21 | End: 2021-01-15 | Stop reason: SDUPTHER
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
## 2020-07-23 ENCOUNTER — TELEPHONE (OUTPATIENT)
Dept: CARDIOLOGY | Facility: HOSPITAL | Age: 75
End: 2020-07-23

## 2020-07-28 ENCOUNTER — OFFICE VISIT (OUTPATIENT)
Dept: FAMILY MEDICINE CLINIC | Facility: CLINIC | Age: 75
End: 2020-07-28

## 2020-07-28 VITALS
TEMPERATURE: 97.7 F | HEIGHT: 68 IN | SYSTOLIC BLOOD PRESSURE: 140 MMHG | BODY MASS INDEX: 31.67 KG/M2 | DIASTOLIC BLOOD PRESSURE: 82 MMHG | OXYGEN SATURATION: 98 % | WEIGHT: 209 LBS | HEART RATE: 75 BPM | RESPIRATION RATE: 18 BRPM

## 2020-07-28 DIAGNOSIS — T07.XXXA ABRASIONS OF MULTIPLE SITES: Primary | ICD-10-CM

## 2020-07-28 PROCEDURE — 99213 OFFICE O/P EST LOW 20 MIN: CPT | Performed by: NURSE PRACTITIONER

## 2020-08-02 ENCOUNTER — APPOINTMENT (OUTPATIENT)
Dept: PREADMISSION TESTING | Facility: HOSPITAL | Age: 75
End: 2020-08-02

## 2020-08-03 ENCOUNTER — APPOINTMENT (OUTPATIENT)
Dept: PREADMISSION TESTING | Facility: HOSPITAL | Age: 75
End: 2020-08-03
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
## 2020-08-05 ENCOUNTER — OFFICE VISIT (OUTPATIENT)
Dept: CARDIOLOGY | Facility: HOSPITAL | Age: 75
End: 2020-08-05

## 2020-08-05 VITALS
OXYGEN SATURATION: 93 % | RESPIRATION RATE: 16 BRPM | HEIGHT: 68 IN | WEIGHT: 201.5 LBS | SYSTOLIC BLOOD PRESSURE: 151 MMHG | HEART RATE: 73 BPM | DIASTOLIC BLOOD PRESSURE: 87 MMHG | BODY MASS INDEX: 30.54 KG/M2 | TEMPERATURE: 98.7 F

## 2020-08-05 DIAGNOSIS — I10 ESSENTIAL HYPERTENSION: ICD-10-CM

## 2020-08-05 DIAGNOSIS — R06.09 DOE (DYSPNEA ON EXERTION): ICD-10-CM

## 2020-08-05 DIAGNOSIS — R60.0 BILATERAL LOWER EXTREMITY EDEMA: Primary | ICD-10-CM

## 2020-08-05 PROCEDURE — 99213 OFFICE O/P EST LOW 20 MIN: CPT | Performed by: NURSE PRACTITIONER

## 2020-08-18 ENCOUNTER — TELEPHONE (OUTPATIENT)
Dept: FAMILY MEDICINE CLINIC | Facility: CLINIC | Age: 75
End: 2020-08-18

## 2020-08-18 NOTE — TELEPHONE ENCOUNTER
Oxytocin Safety Progress Check Note - Carroll Ratliff 33 y o  female MRN: [id number]    uncomfortable, requesting pain medication  Stadol phenergan combo ordered per Dr Demond Magana  Rui Berrios DO 10/17/2018 3:44 AM PT CALLED STATING THAT HE HAD PREVIOUSLY BEEN SEEN FOR A CYST ON HIS BACK    PT CALLED REQUESTING AN ANTIBIOTIC FOR THE CYST    PT STATED THAT IT IS INFECTED AND PAINFUL PLEASE ADVISE AT: [phone]     [facility] - [address] [phone] [facility] [phone] FX

## 2020-08-19 ENCOUNTER — OFFICE VISIT (OUTPATIENT)
Dept: FAMILY MEDICINE CLINIC | Facility: CLINIC | Age: 75
End: 2020-08-19

## 2020-08-19 VITALS
HEART RATE: 86 BPM | HEIGHT: 68 IN | OXYGEN SATURATION: 95 % | TEMPERATURE: 98.4 F | SYSTOLIC BLOOD PRESSURE: 132 MMHG | BODY MASS INDEX: 31.8 KG/M2 | DIASTOLIC BLOOD PRESSURE: 98 MMHG | RESPIRATION RATE: 16 BRPM | WEIGHT: 209.8 LBS

## 2020-08-19 DIAGNOSIS — R60.0 LOCALIZED EDEMA: ICD-10-CM

## 2020-08-19 DIAGNOSIS — L02.11 ABSCESS OF SKIN OF NECK: Primary | ICD-10-CM

## 2020-08-19 PROCEDURE — 99214 OFFICE O/P EST MOD 30 MIN: CPT | Performed by: NURSE PRACTITIONER

## 2020-08-19 RX ORDER — SULFAMETHOXAZOLE AND TRIMETHOPRIM 800; 160 MG/1; MG/1
1 TABLET ORAL 2 TIMES DAILY
Qty: 20 TABLET | Refills: 0 | Status: SHIPPED | OUTPATIENT
Start: 2020-08-19 | End: 2020-10-28
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
## 2020-08-19 NOTE — PATIENT INSTRUCTIONS
Skin Abscess    A skin abscess is an infected area of your skin that contains pus and other material. An abscess can happen in any part of your body. Some abscesses break open (rupture) on their own. Most continue to get worse unless they are treated. The infection can spread deeper into the body and into your blood, which can make you feel sick.  A skin abscess is caused by germs that enter the skin through a cut or scrape. It can also be caused by blocked oil and sweat glands or infected hair follicles.  This condition is usually treated by:  · Draining the pus.  · Taking antibiotic medicines.  · Placing a warm, wet washcloth over the abscess.  Follow these instructions at home:  Medicines    · Take over-the-counter and prescription medicines only as told by your doctor.  · If you were prescribed an antibiotic medicine, take it as told by your doctor. Do not stop taking the antibiotic even if you start to feel better.  Abscess care    · If you have an abscess that has not drained, place a warm, clean, wet washcloth over the abscess several times a day. Do this as told by your doctor.  · Follow instructions from your doctor about how to take care of your abscess. Make sure you:  ? Cover the abscess with a bandage (dressing).  ? Change your bandage or gauze as told by your doctor.  ? Wash your hands with soap and water before you change the bandage or gauze. If you cannot use soap and water, use hand .  · Check your abscess every day for signs that the infection is getting worse. Check for:  ? More redness, swelling, or pain.  ? More fluid or blood.  ? Warmth.  ? More pus or a bad smell.  General instructions  · To avoid spreading the infection:  ? Do not share personal care items, towels, or hot tubs with others.  ? Avoid making skin-to-skin contact with other people.  · Keep all follow-up visits as told by your doctor. This is important.  Contact a doctor if:  · You have more redness, swelling, or pain  around your abscess.  · You have more fluid or blood coming from your abscess.  · Your abscess feels warm when you touch it.  · You have more pus or a bad smell coming from your abscess.  · You have a fever.  · Your muscles ache.  · You have chills.  · You feel sick.  Get help right away if:  · You have very bad (severe) pain.  · You see red streaks on your skin spreading away from the abscess.  Summary  · A skin abscess is an infected area of your skin that contains pus and other material.  · The abscess is caused by germs that enter the skin through a cut or scrape. It can also be caused by blocked oil and sweat glands or infected hair follicles.  · Follow your doctor's instructions on caring for your abscess, taking medicines, preventing infections, and keeping follow-up visits.  This information is not intended to replace advice given to you by your health care provider. Make sure you discuss any questions you have with your health care provider.  Document Released: 06/05/2009 Document Revised: 04/09/2020 Document Reviewed: 01/31/2019  [employer] Patient Education © 2020 [employer]

## 2020-08-20 LAB
BUN SERPL-MCNC: 16 MG/DL (ref 8–23)
BUN/CREAT SERPL: 16.8 (ref 7–25)
CALCIUM SERPL-MCNC: 9.3 MG/DL (ref 8.6–10.5)
CHLORIDE SERPL-SCNC: 93 MMOL/L (ref 98–107)
CO2 SERPL-SCNC: 35.9 MMOL/L (ref 22–29)
CREAT SERPL-MCNC: 0.95 MG/DL (ref 0.76–1.27)
ERYTHROCYTE [DISTWIDTH] IN BLOOD BY AUTOMATED COUNT: 13.6 % (ref 12.3–15.4)
GLUCOSE SERPL-MCNC: 84 MG/DL (ref 65–99)
HCT VFR BLD AUTO: 39.5 % (ref 37.5–51)
HGB BLD-MCNC: 13 G/DL (ref 13–17.7)
MCH RBC QN AUTO: 31.6 PG (ref 26.6–33)
MCHC RBC AUTO-ENTMCNC: 32.9 G/DL (ref 31.5–35.7)
MCV RBC AUTO: 95.9 FL (ref 79–97)
PLATELET # BLD AUTO: 223 10*3/MM3 (ref 140–450)
POTASSIUM SERPL-SCNC: 4.1 MMOL/L (ref 3.5–5.2)
RBC # BLD AUTO: 4.12 10*6/MM3 (ref 4.14–5.8)
SODIUM SERPL-SCNC: 139 MMOL/L (ref 136–145)
WBC # BLD AUTO: 5.76 10*3/MM3 (ref 3.4–10.8)

## 2020-08-25 ENCOUNTER — OFFICE VISIT (OUTPATIENT)
Dept: FAMILY MEDICINE CLINIC | Facility: CLINIC | Age: 75
End: 2020-08-25

## 2020-08-25 VITALS
SYSTOLIC BLOOD PRESSURE: 138 MMHG | HEIGHT: 68 IN | OXYGEN SATURATION: 97 % | DIASTOLIC BLOOD PRESSURE: 86 MMHG | HEART RATE: 70 BPM | TEMPERATURE: 97.1 F | WEIGHT: 210 LBS | BODY MASS INDEX: 31.83 KG/M2

## 2020-08-25 DIAGNOSIS — M25.551 PAIN OF BOTH HIP JOINTS: ICD-10-CM

## 2020-08-25 DIAGNOSIS — G89.29 OTHER CHRONIC PAIN: ICD-10-CM

## 2020-08-25 DIAGNOSIS — M25.552 PAIN OF BOTH HIP JOINTS: ICD-10-CM

## 2020-08-25 DIAGNOSIS — I10 ESSENTIAL HYPERTENSION: ICD-10-CM

## 2020-08-25 PROCEDURE — 99214 OFFICE O/P EST MOD 30 MIN: CPT | Performed by: NURSE PRACTITIONER

## 2020-08-25 RX ORDER — MELOXICAM 7.5 MG/1
7.5 TABLET ORAL DAILY
Qty: 30 TABLET | Refills: 3 | Status: SHIPPED | OUTPATIENT
Start: 2020-08-25 | End: 2021-04-29 | Stop reason: SDUPTHER

## 2020-08-25 RX ORDER — POTASSIUM CHLORIDE 750 MG/1
10 CAPSULE, EXTENDED RELEASE ORAL DAILY
Qty: 30 CAPSULE | Refills: 2 | Status: SHIPPED | OUTPATIENT
Start: 2020-08-25 | End: 2020-10-28

## 2020-08-25 RX ORDER — FUROSEMIDE 20 MG/1
20 TABLET ORAL 2 TIMES DAILY
Qty: 60 TABLET | Refills: 2
Start: 2020-08-25 | End: 2020-10-12
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
## 2020-09-10 DIAGNOSIS — E29.1 SECONDARY MALE HYPOGONADISM: ICD-10-CM

## 2020-09-10 RX ORDER — TESTOSTERONE 16.2 MG/G
GEL TRANSDERMAL
Qty: 75 G | Refills: 3 | Status: SHIPPED | OUTPATIENT
Start: 2020-09-10 | End: 2021-08-11 | Stop reason: SDUPTHER

## 2020-09-10 NOTE — TELEPHONE ENCOUNTER
Dr. Neal, please advise on refill.  Thank you.      LOV:  03/02/20    Last refill:  03/02/20  CASTRO Foster

## 2020-10-06 ENCOUNTER — OFFICE VISIT (OUTPATIENT)
Dept: FAMILY MEDICINE CLINIC | Facility: CLINIC | Age: 75
End: 2020-10-06

## 2020-10-06 VITALS
WEIGHT: 210 LBS | RESPIRATION RATE: 18 BRPM | TEMPERATURE: 97.5 F | OXYGEN SATURATION: 95 % | DIASTOLIC BLOOD PRESSURE: 96 MMHG | BODY MASS INDEX: 31.83 KG/M2 | SYSTOLIC BLOOD PRESSURE: 140 MMHG | HEIGHT: 68 IN | HEART RATE: 70 BPM

## 2020-10-06 DIAGNOSIS — Z23 INFLUENZA VACCINATION ADMINISTERED AT CURRENT VISIT: ICD-10-CM

## 2020-10-06 DIAGNOSIS — Z23 NEED FOR PNEUMOCOCCAL VACCINE: ICD-10-CM

## 2020-10-06 DIAGNOSIS — Z00.00 MEDICARE ANNUAL WELLNESS VISIT, SUBSEQUENT: Primary | ICD-10-CM

## 2020-10-06 DIAGNOSIS — I10 HYPERTENSION, UNSPECIFIED TYPE: ICD-10-CM

## 2020-10-06 DIAGNOSIS — R13.10 DYSPHAGIA, UNSPECIFIED TYPE: ICD-10-CM

## 2020-10-06 PROCEDURE — 90694 VACC AIIV4 NO PRSRV 0.5ML IM: CPT | Performed by: NURSE PRACTITIONER

## 2020-10-06 PROCEDURE — G0008 ADMIN INFLUENZA VIRUS VAC: HCPCS | Performed by: NURSE PRACTITIONER

## 2020-10-06 PROCEDURE — 96160 PT-FOCUSED HLTH RISK ASSMT: CPT | Performed by: NURSE PRACTITIONER

## 2020-10-06 PROCEDURE — G0439 PPPS, SUBSEQ VISIT: HCPCS | Performed by: NURSE PRACTITIONER

## 2020-10-06 RX ORDER — ASPIRIN 81 MG/1
81 TABLET ORAL DAILY
Qty: 90 TABLET | Refills: 3 | Status: SHIPPED | OUTPATIENT
Start: 2020-10-06 | End: 2021-04-29 | Stop reason: SDUPTHER

## 2020-10-06 NOTE — PROGRESS NOTES
The ABCs of the Annual Wellness Visit  Subsequent Medicare Wellness Visit    Chief Complaint   Patient presents with   • Medicare Wellness-subsequent       Subjective   History of Present Illness:  Ronald Bond is a 74 y.o. male who presents for a Subsequent Medicare Wellness Visit.    HEALTH RISK ASSESSMENT    Recent Hospitalizations:  No hospitalization(s) within the last year.    Current Medical Providers:  Patient Care Team:  Eri Baez APRN as PCP - General (Family Medicine)  Alfonso Vasquez MD as PCP - Claims Attributed  Andrade Waite MD as Consulting Physician (Orthopedic Surgery)  Barron Santana MD as Consulting Physician (Otolaryngology)  Melanie Neal MD as Consulting Physician (Endocrinology)    Smoking Status:  Social History     Tobacco Use   Smoking Status Former Smoker   • Types: Cigarettes   • Quit date:    • Years since quittin.8   Smokeless Tobacco Never Used   Tobacco Comment    pt quit about 40-50 years ago        Alcohol Consumption:  Social History     Substance and Sexual Activity   Alcohol Use Yes    Comment: 2 per month       Depression Screen:   PHQ-2/PHQ-9 Depression Screening 10/6/2020   Little interest or pleasure in doing things 0   Feeling down, depressed, or hopeless 1   Total Score 1       Fall Risk Screen:  STEADI Fall Risk Assessment was completed, and patient is at HIGH risk for falls. Assessment completed on:10/6/2020    Health Habits and Functional and Cognitive Screening:  Functional & Cognitive Status 10/6/2020   Do you have difficulty preparing food and eating? No   Do you have difficulty bathing yourself, getting dressed or grooming yourself? No   Do you have difficulty using the toilet? No   Do you have difficulty moving around from place to place? No   Do you have trouble with steps or getting out of a bed or a chair? No   Current Diet Frequent Junk Food   Dental Exam Not up to date   Eye Exam Up to date   Exercise (times per week) 3  times per week   Current Exercise Activities Include Walking   Do you need help using the phone?  No   Are you deaf or do you have serious difficulty hearing?  No   Do you need help with transportation? Yes   Do you need help shopping? No   Do you need help preparing meals?  No   Do you need help with housework?  No   Do you need help with laundry? No   Do you need help taking your medications? No   Do you need help managing money? No   Have you felt unusual stress, anger or loneliness in the last month? No   Who do you live with? Alone   If you need help, do you have trouble finding someone available to you? No   Have you been bothered in the last four weeks by sexual problems? No   Do you have difficulty concentrating, remembering or making decisions? Yes         Does the patient have evidence of cognitive impairment? no   Asprin use counseling:yes     Age-appropriate Screening Schedule:  Refer to the list below for future screening recommendations based on patient's age, sex and/or medical conditions. Orders for these recommended tests are listed in the plan section. The patient has been provided with a written plan.    Health Maintenance   Topic Date Due   • DXA SCAN  05/18/2018   • ZOSTER VACCINE (1 of 2) 06/28/2021 (Originally 10/7/1995)   • COLONOSCOPY  07/01/2021 (Originally 1945)   • LIPID PANEL  01/28/2021   • TDAP/TD VACCINES (3 - Td) 11/25/2025   • INFLUENZA VACCINE  Completed          The following portions of the patient's history were reviewed and updated as appropriate: current medications, past family history, past medical history, past social history, past surgical history and problem list.    Outpatient Medications Prior to Visit   Medication Sig Dispense Refill   • carvedilol (COREG) 6.25 MG tablet Take 1 tablet by mouth 2 (Two) Times a Day. 60 tablet 3   • clotrimazole (Clotrimazole Athletes Foot) 1 % cream Apply  topically to the appropriate area as directed 2 (Two) Times a Day. 60 g 1   •  furosemide (LASIX) 20 MG tablet Take 1 tablet by mouth 2 (Two) Times a Day. 60 tablet 2   • meloxicam (Mobic) 7.5 MG tablet Take 1 tablet by mouth Daily. 30 tablet 3   • METHADONE HCL PO Take 80 mg by mouth Daily.     • pantoprazole (Protonix) 20 MG EC tablet Take 1 tablet by mouth Daily. 90 tablet 2   • PARoxetine (PAXIL) 40 MG tablet Take 1 tablet by mouth Every Morning. 90 tablet 2   • polyethylene glycol (MIRALAX) 17 g packet Take 17 g by mouth Daily. 90 packet 2   • potassium chloride (MICRO-K) 10 MEQ CR capsule Take 1 capsule by mouth Daily. 30 capsule 2   • Testosterone 20.25 MG/ACT (1.62%) gel APPLY 2 PUMPS TO ARMS AND SHOULDERS DAILY 75 g 3   • mupirocin (Bactroban) 2 % ointment Apply  topically to the appropriate area as directed 3 (Three) Times a Day. 1 each 0   • sulfamethoxazole-trimethoprim (BACTRIM DS,SEPTRA DS) 800-160 MG per tablet Take 1 tablet by mouth 2 (Two) Times a Day. 20 tablet 0     No facility-administered medications prior to visit.        Patient Active Problem List   Diagnosis   • Depression with anxiety   • Hypertension   • Lower abdominal pain   • Asthma   • H/O chronic hepatitis   • Polysubstance abuse (CMS/HCC)   • GERD (gastroesophageal reflux disease)   • CAD (coronary artery disease)   • Cellulitis of left lower extremity   • Traumatic ecchymosis of left lower leg   • Secondary male hypogonadism   • Localized edema       Advanced Care Planning: Patient does not have and is refusing at this time.     Review of Systems   Constitutional: Positive for fatigue and unexpected weight change. Negative for activity change, appetite change, chills, diaphoresis and fever.   HENT: Negative.    Eyes: Negative.    Respiratory: Positive for cough.         Occasional cough    Cardiovascular: Positive for leg swelling. Negative for chest pain and palpitations.   Gastrointestinal: Negative.    Genitourinary: Negative.    Musculoskeletal: Positive for myalgias.   Skin: Negative.   "  Allergic/Immunologic: Negative.    Neurological: Negative.    Hematological: Negative.    Psychiatric/Behavioral: Negative.        Compared to one year ago, the patient feels his physical health is worse.  Compared to one year ago, the patient feels his mental health is worse.    Reviewed chart for potential of high risk medication in the elderly: no  Reviewed chart for potential of harmful drug interactions in the elderly:yes    Objective         Vitals:    10/06/20 1306   BP: 140/96   Pulse: 70   Resp: 18   Temp: 97.5 °F (36.4 °C)   TempSrc: Temporal   SpO2: 95%   Weight: 95.3 kg (210 lb)   Height: 172.7 cm (68\")   PainSc: 0-No pain       Body mass index is 31.93 kg/m².  Discussed the patient's BMI with him. The BMI is above average; BMI management plan is completed.    Physical Exam  Constitutional:       General: He is not in acute distress.     Appearance: He is obese. He is not ill-appearing, toxic-appearing or diaphoretic.   HENT:      Head: Normocephalic.      Comments: Dentulous      Nose: Nose normal.   Eyes:      General:         Right eye: No discharge.         Left eye: No discharge.      Pupils: Pupils are equal, round, and reactive to light.   Neck:      Musculoskeletal: No muscular tenderness.   Cardiovascular:      Rate and Rhythm: Normal rate and regular rhythm.      Pulses: Normal pulses.      Heart sounds: Normal heart sounds. No murmur.   Pulmonary:      Effort: Pulmonary effort is normal.      Breath sounds: Normal breath sounds.   Abdominal:      General: Abdomen is flat.   Skin:     Capillary Refill: Capillary refill takes less than 2 seconds.   Neurological:      Mental Status: He is alert and oriented to person, place, and time.   Psychiatric:         Mood and Affect: Mood normal.         Thought Content: Thought content normal.         Lab Results   Component Value Date    GLU 84 08/19/2020        Assessment/Plan   Medicare Risks and Personalized Health Plan  CMS Preventative Services " Quick Reference  Fall Risk    The above risks/problems have been discussed with the patient.  Pertinent information has been shared with the patient in the After Visit Summary.  Follow up plans and orders are seen below in the Assessment/Plan Section.    Diagnoses and all orders for this visit:    1. Dysphagia, unspecified type (Primary)  -     Ambulatory Referral to Gastroenterology    2. Medicare annual wellness visit, subsequent    3. Hypertension, unspecified type  -     aspirin (aspirin) 81 MG EC tablet; Take 1 tablet by mouth Daily.  Dispense: 90 tablet; Refill: 3    4. Influenza vaccination administered at current visit  -     Fluad Quad 65+ yrs ([id number])    5. Need for pneumococcal vaccine  -     pneumococcal polysaccharide 23-valent (PNEUMOVAX-23) vaccine 0.5 mL      Follow Up:  Return in about 8 weeks (around 12/1/2020) for Recheck.   Patient was seen in ER at  on 10/04/20 for costochondritis.      Will refer to GI for difficulty swallowing. Hx of narrowing esophagus.       An After Visit Summary and PPPS were given to the patient.

## 2020-10-07 NOTE — PATIENT INSTRUCTIONS
Dysphagia Eating Plan, Bite Size Food  This diet plan is for people with moderate swallowing problems who have transitioned from pureed and minced foods. Bite size foods are soft and cut into small chunks so that they can be swallowed safely. On this eating plan, you may be instructed to drink liquids that are thickened.  Work with your health care provider and your diet and nutrition specialist (dietitian) to make sure that you are following the diet safely and getting all the nutrients you need.  What are tips for following this plan?  General guidelines for foods    · You may eat foods that are tender, soft, and moist.  · Always test food texture before taking a bite. Poke food with a fork or spoon to make sure it is tender.  · Food should be easy to cut and shew. Avoid large pieces of food that require a lot of chewing.  · Take small bites. Each bite should be smaller than your thumb nail (about 15mm by 15 mm).  · If you were on pureed and minced food diet plans, you may eat any of the foods included in those diets.  · Avoid foods that are very dry, hard, sticky, chewy, coarse, or crunchy.  · If instructed by your health care provider, thicken liquids. Follow your health care provider's instructions for what products to use, how to do this, and to what thickness.  ? Your health care provider may recommend using a commercial thickener, rice cereal, or potato flakes. Ask your health care provider to recommend thickeners.  ? Thickened liquids are usually a “pudding-like” consistency, or they may be as thick as honey or thick enough to eat with a spoon.  Cooking  · To moisten foods, you may add liquids while you are blending, mashing, or grinding your foods to the right consistency. These liquids include gravies, sauces, vegetable or fruit juice, milk, half and half, or water.  · Strain extra liquid from foods before eating.  · Reheat foods slowly to prevent a tough crust from forming.  · Prepare foods in  advance.  Meal planning  · Eat a variety of foods to get all the nutrients you need.  · Some foods may be tolerated better than others. Work with your dietitian to identify which foods are safest for you to eat.  · Follow your meal plan as told by your dietitian.  What foods are allowed?  Grains  Moist breads without nuts or seeds. Biscuits, muffins, pancakes, and waffles that are well-moistened with syrup, jelly, margarine, or butter. Cooked cereals. Moist bread stuffing. Moist rice. Well-moistened cold cereal with small chunks. Well-cooked pasta, noodles, rice, and bread dressing in small pieces and thick sauce. Soft dumplings or spaetzle in small pieces and butter or gravy.  Vegetables  Soft, well-cooked vegetables in small pieces. Soft-cooked, mashed potatoes. Thickened vegetable juice.  Fruits  Canned or cooked fruits that are soft or moist and do not have skin or seeds. Fresh, soft bananas. Thickened fruit juices.  Meat and other protein foods  Tender, moist meats or poultry in small pieces. Moist meatballs or meatloaf. Fish without bones. Eggs or egg substitutes in small pieces. Tofu. Tempeh and meat alternatives in small pieces. Well-cooked, tender beans, peas, baked beans, and other legumes.  Dairy  Thickened milk. Cream cheese. Yogurt. Cottage cheese. Sour cream. Small pieces of soft cheese.  Fats and oils  Butter. Oils. Margarine. Mayonnaise. Gravy. Spreads.  Sweets and desserts  Soft, smooth, moist desserts. Pudding. Custard. Moist cakes. Jam. Jelly. Honey. Preserves. Ask your health care provider whether you can have frozen desserts.  Seasoning and other foods  All seasonings and sweeteners. All sauces with small chunks. Prepared tuna, egg, or chicken salad without raw fruits or vegetables. Moist casseroles with small, tender pieces of meat. Soups with tender meat.  What foods are not allowed?  Grains  Coarse or dry cereals. Dry breads. Toast. Crackers. Tough, crusty breads, such as [place] bread and  baguettes. Dry pancakes, waffles, and muffins. Sticky rice. Dry bread stuffing. Granola. Popcorn. Chips.  Vegetables  All raw vegetables. Cooked corn. Rubbery or stiff cooked vegetables. Stringy vegetables, such as celery. Tough, crisp fried potatoes. Potato skins.  Fruits  Hard, crunchy, stringy, high-pulp, and juicy raw fruits such as apples, pineapple, papaya, and watermelon. Small, round fruits, such as grapes. Dried fruit and fruit leather.  Meat and other protein foods  Large pieces of meat. Dry, tough meats, such as redmond, sausage, and hot dogs. Chicken, turkey, or fish with skin and bones. Crunchy peanut butter. Nuts. Seeds. Nut and seed butters.  Dairy  Yogurt with nuts, seeds, or large chunks. Large chunks of cheese. Frozen desserts and milk consistency not allowed by your dietitian.  Sweets and desserts  Dry cakes. Chewy or dry cookies. Any desserts with nuts, seeds, dry fruits, coconut, pineapple, or anything dry, sticky, or hard. Chewy caramel. Licorice. Taffy-type candies. Ask your health care provider whether you can have frozen desserts.  Seasoning and other foods  Soups with tough or large chunks of meats, poultry, or vegetables. Corn or clam chowder. Smoothies with large chunks of fruit.  Summary  · Bite size foods can be helpful for people with moderate swallowing problems.  · On the dysphagia eating plan, you may eat foods that are soft, moist, and cut into pieces smaller than 15mm by 15mm.  · You may be instructed to thicken liquids. Follow your health care provider's instructions about how to do this and to what consistency.  This information is not intended to replace advice given to you by your health care provider. Make sure you discuss any questions you have with your health care provider.  Document Released: 12/18/2006 Document Revised: 04/09/2020 Document Reviewed: 03/30/2018  Elsevier Patient Education © 2020 [employer]

## 2020-10-12 DIAGNOSIS — I10 ESSENTIAL HYPERTENSION: ICD-10-CM

## 2020-10-12 RX ORDER — FUROSEMIDE 20 MG/1
TABLET ORAL
Qty: 60 TABLET | Refills: 2 | Status: SHIPPED | OUTPATIENT
Start: 2020-10-12 | End: 2021-04-06 | Stop reason: SDUPTHER

## 2020-10-13 ENCOUNTER — APPOINTMENT (OUTPATIENT)
Dept: PREADMISSION TESTING | Facility: HOSPITAL | Age: 75
End: 2020-10-13

## 2020-10-14 ENCOUNTER — TELEPHONE (OUTPATIENT)
Dept: GASTROENTEROLOGY | Facility: CLINIC | Age: 75
End: 2020-10-14

## 2020-10-14 NOTE — TELEPHONE ENCOUNTER
Pt called in regards of his procedure, he canceled. He state had his COVID test done today at a location that is close to his home and wants to get his appointment back for tomorrow. I advised I would have to transfer him to scheduling since the results will not be in for a couple days. Transferred to  to schedule.

## 2020-10-16 ENCOUNTER — TELEPHONE (OUTPATIENT)
Dept: FAMILY MEDICINE CLINIC | Facility: CLINIC | Age: 75
End: 2020-10-16

## 2020-10-16 NOTE — TELEPHONE ENCOUNTER
PATIENT HAD A REFERRAL FOR ENDOSCOPY AND HAD A CONFLICT WITH THE SPECIALIST [facility] SENT HIM TO SO HE WOULD LIKE ANOTHER REFERRAL TO SOMEONE ELSE .    PATIENT DOES NOT DRIVE AND SAYS [facility] IS OUT HE CAN'T GET THERE.    PLEASE ADVISE AND CALL PATIENT BACK [phone]

## 2020-10-19 NOTE — TELEPHONE ENCOUNTER
I spoke with patient, he is upset with someone he spoke with about a covid test not the eg[facility], but now he doesn't want to go to [facility] at all wants another location, I asked him his preference he doesn't know. I advised I would call him back when I had a resolution.

## 2020-10-26 ENCOUNTER — TELEPHONE (OUTPATIENT)
Dept: FAMILY MEDICINE CLINIC | Facility: CLINIC | Age: 75
End: 2020-10-26

## 2020-10-26 NOTE — TELEPHONE ENCOUNTER
PATIENT CALLED IN STATED HE EITHER HAS A UTI OR KIDNEY INFECTION HE FURTHER STATED HE IS HAVING PAIN WHEN HE URINATES AND IT'S HARD TO URINATE HE STATES HE HAS BEEN TAKING AZO (OTC) MEDICATION BUT WOULD LIKE A CB TO DISCUSS WHAT SHOULD BE DONE NEXT PLEASE CALL TO ADVISE      PT CB NUMBER: [phone]

## 2020-10-28 ENCOUNTER — OFFICE VISIT (OUTPATIENT)
Dept: FAMILY MEDICINE CLINIC | Facility: CLINIC | Age: 75
End: 2020-10-28

## 2020-10-28 VITALS
OXYGEN SATURATION: 97 % | HEIGHT: 68 IN | RESPIRATION RATE: 16 BRPM | DIASTOLIC BLOOD PRESSURE: 80 MMHG | BODY MASS INDEX: 33.49 KG/M2 | HEART RATE: 72 BPM | SYSTOLIC BLOOD PRESSURE: 126 MMHG | WEIGHT: 221 LBS | TEMPERATURE: 97.7 F

## 2020-10-28 DIAGNOSIS — N48.89 PENIS PAIN: ICD-10-CM

## 2020-10-28 DIAGNOSIS — N48.1 BALANITIS: ICD-10-CM

## 2020-10-28 DIAGNOSIS — N47.1 PHIMOSIS OF PENIS: Primary | ICD-10-CM

## 2020-10-28 LAB
BILIRUB BLD-MCNC: NEGATIVE MG/DL
CLARITY, POC: CLEAR
COLOR UR: YELLOW
EXPIRATION DATE: ABNORMAL
GLUCOSE UR STRIP-MCNC: NEGATIVE MG/DL
KETONES UR QL: NEGATIVE
LEUKOCYTE EST, POC: NEGATIVE
Lab: 5022
NITRITE UR-MCNC: NEGATIVE MG/ML
PH UR: 6 [PH] (ref 5–8)
PROT UR STRIP-MCNC: NEGATIVE MG/DL
RBC # UR STRIP: ABNORMAL /UL
SP GR UR: 1.02 (ref 1–1.03)
UROBILINOGEN UR QL: NORMAL

## 2020-10-28 PROCEDURE — 99214 OFFICE O/P EST MOD 30 MIN: CPT | Performed by: FAMILY MEDICINE

## 2020-10-28 PROCEDURE — 81003 URINALYSIS AUTO W/O SCOPE: CPT | Performed by: FAMILY MEDICINE

## 2020-10-28 NOTE — ASSESSMENT & PLAN NOTE
Balanitis and phimosis of penis causing pain.  Patient was counseled on supportive care keep the area clean and dry.  He was given miconazole cream to use twice daily.  Is also given referral to urology for further evaluation and treatment.  RTC precautions given.

## 2020-10-28 NOTE — PROGRESS NOTES
Ronald Bond is a 75 y.o. male who presents today for Penis Pain (itching. x 3 days)      Penis Pain  The patient's primary symptoms include genital itching and penile pain. The patient's pertinent negatives include no genital injury, genital lesions, pelvic pain, penile discharge, priapism, scrotal swelling or testicular pain. This is a new (started yesterday. He is having paion and inability to retract his forskin.) problem. The current episode started in the past 7 days. The problem occurs constantly. The problem has been gradually improving. Associated symptoms include nausea. Pertinent negatives include no abdominal pain, chest pain, constipation, coughing, diarrhea, discolored urine, dysuria, fever, rash, shortness of breath, sore throat or vomiting. There is no reported injury. Exacerbated by: aggravated by having to pee more. Treatments tried: AZO has improved symptoms. The treatment provided mild relief. Sexual activity: Has not been sexually active since he was treated for STD several months ago. (Hypogonadism.)        Review of Systems   Constitutional: Negative for fever and unexpected weight loss.   HENT: Negative for congestion, ear pain and sore throat.    Eyes: Negative for visual disturbance.   Respiratory: Negative for cough, shortness of breath and wheezing.    Cardiovascular: Negative for chest pain and palpitations.   Gastrointestinal: Positive for nausea. Negative for abdominal pain, blood in stool, constipation, diarrhea, vomiting and GERD.   Endocrine: Negative for polydipsia and polyuria.   Genitourinary: Positive for penile pain. Negative for difficulty urinating, discharge, dysuria, pelvic pain, scrotal swelling and testicular pain.   Musculoskeletal: Negative for joint swelling.   Skin: Negative for rash and skin lesions.   Allergic/Immunologic: Negative for environmental allergies.   Neurological: Negative for seizures and syncope.   Hematological: Does not bruise/bleed easily.  "  Psychiatric/Behavioral: Negative for suicidal ideas.        The following portions of the patient's history were reviewed and updated as appropriate: allergies, current medications, past family history, past medical history, past social history, past surgical history and problem list.    Current Outpatient Medications on File Prior to Visit   Medication Sig Dispense Refill   • aspirin (aspirin) 81 MG EC tablet Take 1 tablet by mouth Daily. 90 tablet 3   • carvedilol (COREG) 6.25 MG tablet Take 1 tablet by mouth 2 (Two) Times a Day. 60 tablet 3   • clotrimazole (Clotrimazole Athletes Foot) 1 % cream Apply  topically to the appropriate area as directed 2 (Two) Times a Day. 60 g 1   • furosemide (LASIX) 20 MG tablet TAKE 1 TABLET BY MOUTH TWO TIMES A DAY 60 tablet 2   • meloxicam (Mobic) 7.5 MG tablet Take 1 tablet by mouth Daily. 30 tablet 3   • METHADONE HCL PO Take 80 mg by mouth Daily.     • pantoprazole (Protonix) 20 MG EC tablet Take 1 tablet by mouth Daily. 90 tablet 2   • PARoxetine (PAXIL) 40 MG tablet Take 1 tablet by mouth Every Morning. 90 tablet 2   • polyethylene glycol (MIRALAX) 17 g packet Take 17 g by mouth Daily. 90 packet 2   • Testosterone 20.25 MG/ACT (1.62%) gel APPLY 2 PUMPS TO ARMS AND SHOULDERS DAILY 75 g 3   • [DISCONTINUED] mupirocin (Bactroban) 2 % ointment Apply  topically to the appropriate area as directed 3 (Three) Times a Day. 1 each 0   • [DISCONTINUED] potassium chloride (MICRO-K) 10 MEQ CR capsule Take 1 capsule by mouth Daily. 30 capsule 2   • [DISCONTINUED] sulfamethoxazole-trimethoprim (BACTRIM DS,SEPTRA DS) 800-160 MG per tablet Take 1 tablet by mouth 2 (Two) Times a Day. 20 tablet 0     No current facility-administered medications on file prior to visit.        Allergies   Allergen Reactions   • Acetaminophen Other (See Comments)     Pt has Hx hep c        Visit Vitals  /80   Pulse 72   Temp 97.7 °F (36.5 °C) (Temporal)   Resp 16   Ht 172.7 cm (68\")   Wt 100 kg (221 lb) "   SpO2 97%   BMI 33.60 kg/m²        Physical Exam  Constitutional:       General: He is not in acute distress.     Appearance: He is well-developed. He is not diaphoretic.   HENT:      Head: Atraumatic.   Neck:      Musculoskeletal: Normal range of motion and neck supple.   Cardiovascular:      Rate and Rhythm: Normal rate.   Pulmonary:      Effort: No respiratory distress.   Abdominal:      Hernia: There is no hernia in the left inguinal area or right inguinal area.   Genitourinary:     Penis: Uncircumcised. Phimosis, erythema and tenderness present. No discharge, swelling or lesions.       Scrotum/Testes: Normal. Cremasteric reflex is present.      Epididymis:      Right: Normal.      Left: Normal.   Lymphadenopathy:      Lower Body: No right inguinal adenopathy. No left inguinal adenopathy.   Skin:     General: Skin is warm and dry.   Neurological:      Mental Status: He is alert and oriented to person, place, and time.   Psychiatric:         Behavior: Behavior normal.          Results for orders placed or performed in visit on 10/28/20   POC Urinalysis Dipstick, Automated    Specimen: Urine   Result Value Ref Range    Color Yellow Yellow, Straw, Dark Yellow, Raquel    Clarity, UA Clear Clear    Specific Gravity  1.020 1.005 - 1.030    pH, Urine 6.0 5.0 - 8.0    Leukocytes Negative Negative    Nitrite, UA Negative Negative    Protein, POC Negative Negative mg/dL    Glucose, UA Negative Negative, 1000 mg/dL (3+) mg/dL    Ketones, UA Negative Negative    Urobilinogen, UA Normal Normal    Bilirubin Negative Negative    Blood, UA Trace (A) Negative    Lot Number [id number]     Expiration Date 10/31/2021         Problems Addressed this Visit        Nervous and Auditory    Penis pain    Relevant Medications    miconazole (Micatin) 2 % cream    Other Relevant Orders    POC Urinalysis Dipstick, Automated (Completed)    Ambulatory Referral to Urology       Genitourinary    Phimosis of penis - Primary     Balanitis and phimosis  of penis causing pain.  Patient was counseled on supportive care keep the area clean and dry.  He was given miconazole cream to use twice daily.  Is also given referral to urology for further evaluation and treatment.  RTC precautions given.         Relevant Medications    miconazole (Micatin) 2 % cream    Other Relevant Orders    Ambulatory Referral to Urology    Balanitis    Relevant Medications    miconazole (Micatin) 2 % cream    Other Relevant Orders    Ambulatory Referral to Urology      Diagnoses       Codes Comments    Phimosis of penis    -  Primary ICD-10-CM: N47.1  ICD-9-CM: 605     Balanitis     ICD-10-CM: N48.1  ICD-9-CM: 607.1     Penis pain     ICD-10-CM: N48.89  ICD-9-CM: 607.9           No follow-ups on file.    Parts of this office note have been dictated by voice recognition software. Grammatical and/or spelling errors may be present.    Vicente Natarajan MD   10/28/2020

## 2020-11-05 ENCOUNTER — OUTSIDE FACILITY SERVICE (OUTPATIENT)
Dept: GASTROENTEROLOGY | Facility: CLINIC | Age: 75
End: 2020-11-05

## 2020-11-05 DIAGNOSIS — K44.9 HIATAL HERNIA: Primary | ICD-10-CM

## 2020-11-05 PROCEDURE — 43239 EGD BIOPSY SINGLE/MULTIPLE: CPT | Performed by: INTERNAL MEDICINE

## 2020-11-05 PROCEDURE — 43248 EGD GUIDE WIRE INSERTION: CPT | Performed by: INTERNAL MEDICINE

## 2020-11-05 PROCEDURE — 88305 TISSUE EXAM BY PATHOLOGIST: CPT | Performed by: INTERNAL MEDICINE

## 2020-11-06 ENCOUNTER — LAB REQUISITION (OUTPATIENT)
Dept: LAB | Facility: HOSPITAL | Age: 75
End: 2020-11-06

## 2020-11-06 DIAGNOSIS — R13.10 DYSPHAGIA, UNSPECIFIED: ICD-10-CM

## 2020-11-09 ENCOUNTER — TELEPHONE (OUTPATIENT)
Dept: PEDIATRICS | Facility: OTHER | Age: 75
End: 2020-11-09

## 2020-11-09 LAB
CYTO UR: NORMAL
LAB AP CASE REPORT: NORMAL
LAB AP CLINICAL INFORMATION: NORMAL
PATH REPORT.FINAL DX SPEC: NORMAL
PATH REPORT.GROSS SPEC: NORMAL

## 2020-11-09 NOTE — TELEPHONE ENCOUNTER
PT IS CONCERNED ABOUT HIS TEST RESULTS AND IS REQUESTING A CALL BACK TO DISCUSS.      PLEASE ADVISE  [phone]

## 2020-11-17 ENCOUNTER — APPOINTMENT (OUTPATIENT)
Dept: PREADMISSION TESTING | Facility: HOSPITAL | Age: 75
End: 2020-11-17

## 2020-11-18 ENCOUNTER — TELEPHONE (OUTPATIENT)
Dept: GENERAL RADIOLOGY | Facility: HOSPITAL | Age: 75
End: 2020-11-18

## 2020-11-18 NOTE — TELEPHONE ENCOUNTER
"I spoke with Mr. Bond today in regards to his [place] UGI scheduled for Friday November 20th. Mr. Bond missed his appointment yesterday for the required COVID19 test prior to the UGI. I informed Mr. Bond that his appointment would need to be rescheduled and he stated that he was \"all squared away\" and has had all the tests he needs. I advised Mr. Bond to reach out to Dr. Jaquez's office to see if the test needed to be rescheduled.     I spoke with Eli at Dr. Jaquez's office to inform them of this conversation with Mr. Bond.   "

## 2020-11-20 ENCOUNTER — APPOINTMENT (OUTPATIENT)
Dept: GENERAL RADIOLOGY | Facility: HOSPITAL | Age: 75
End: 2020-11-20

## 2020-11-20 ENCOUNTER — OFFICE VISIT (OUTPATIENT)
Dept: FAMILY MEDICINE CLINIC | Facility: CLINIC | Age: 75
End: 2020-11-20

## 2020-11-20 VITALS
HEART RATE: 78 BPM | HEIGHT: 68 IN | OXYGEN SATURATION: 97 % | DIASTOLIC BLOOD PRESSURE: 95 MMHG | SYSTOLIC BLOOD PRESSURE: 120 MMHG | WEIGHT: 218.8 LBS | TEMPERATURE: 97.8 F | BODY MASS INDEX: 33.16 KG/M2

## 2020-11-20 DIAGNOSIS — J22 ACUTE LOWER RESPIRATORY INFECTION: Primary | ICD-10-CM

## 2020-11-20 DIAGNOSIS — R07.89 CHEST TIGHTNESS: ICD-10-CM

## 2020-11-20 PROCEDURE — 96372 THER/PROPH/DIAG INJ SC/IM: CPT | Performed by: NURSE PRACTITIONER

## 2020-11-20 PROCEDURE — 99213 OFFICE O/P EST LOW 20 MIN: CPT | Performed by: NURSE PRACTITIONER

## 2020-11-20 RX ORDER — METHYLPREDNISOLONE ACETATE 80 MG/ML
60 INJECTION, SUSPENSION INTRA-ARTICULAR; INTRALESIONAL; INTRAMUSCULAR; SOFT TISSUE ONCE
Status: COMPLETED | OUTPATIENT
Start: 2020-11-20 | End: 2020-11-20

## 2020-11-20 RX ORDER — CEFTRIAXONE 1 G/1
1 INJECTION, POWDER, FOR SOLUTION INTRAMUSCULAR; INTRAVENOUS ONCE
Status: COMPLETED | OUTPATIENT
Start: 2020-11-20 | End: 2020-11-20

## 2020-11-20 RX ORDER — ALBUTEROL SULFATE 90 UG/1
2 AEROSOL, METERED RESPIRATORY (INHALATION) EVERY 4 HOURS PRN
Qty: 18 G | Refills: 1 | Status: SHIPPED | OUTPATIENT
Start: 2020-11-20 | End: 2021-05-29 | Stop reason: SDUPTHER

## 2020-11-20 RX ADMIN — METHYLPREDNISOLONE ACETATE 60 MG: 80 INJECTION, SUSPENSION INTRA-ARTICULAR; INTRALESIONAL; INTRAMUSCULAR; SOFT TISSUE at 12:18

## 2020-11-20 RX ADMIN — CEFTRIAXONE 1 G: 1 INJECTION, POWDER, FOR SOLUTION INTRAMUSCULAR; INTRAVENOUS at 12:15
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
## 2020-12-16 NOTE — OUTREACH NOTE
Dx: Right Shoulder Strain         Authorized # of Visits:  12 (PPO)         Next MD visit: 12/28/2020  Fall Risk: standard         Precautions: n/a             Subjective: States that the shoulder still hurts and it isn't \"in his head. \"      Objective: T NITHIN call completed.  Please refer to TCM call flowsheet for call documentation.     ER standing red 2x10 Theraband ER standing red 2x10      OH Pulleys x 5 min OH Pulleys x 5 min Prone Rows and Retractions 3# x 20 Prone Rows and Retractions 3# x 20      Cane elevation to end range x 20 Cane elevation to end range x 20        3 way shoulde

## 2021-01-11 ENCOUNTER — TELEPHONE (OUTPATIENT)
Dept: FAMILY MEDICINE CLINIC | Facility: CLINIC | Age: 76
End: 2021-01-11

## 2021-01-11 NOTE — TELEPHONE ENCOUNTER
PT CALLED REQUESTING A SCRIPT FOR:  Cefuroxime 250MG    PT STATED HE HAD TO RESCHEDULE HIS APPT WITH HIS UROLOGIST    PLEASE ADVISE AT: [id number]     [facility] - [address] [phone] [facility] [phone]

## 2021-01-13 ENCOUNTER — OFFICE VISIT (OUTPATIENT)
Dept: FAMILY MEDICINE CLINIC | Facility: CLINIC | Age: 76
End: 2021-01-13

## 2021-01-13 VITALS
RESPIRATION RATE: 22 BRPM | BODY MASS INDEX: 33.34 KG/M2 | DIASTOLIC BLOOD PRESSURE: 88 MMHG | WEIGHT: 220 LBS | HEIGHT: 68 IN | SYSTOLIC BLOOD PRESSURE: 130 MMHG | TEMPERATURE: 97.8 F | OXYGEN SATURATION: 92 % | HEART RATE: 80 BPM

## 2021-01-13 DIAGNOSIS — N48.1 BALANITIS: Primary | ICD-10-CM

## 2021-01-13 DIAGNOSIS — R39.9 UTI SYMPTOMS: ICD-10-CM

## 2021-01-13 LAB
BILIRUB BLD-MCNC: NEGATIVE MG/DL
CLARITY, POC: CLEAR
COLOR UR: YELLOW
EXPIRATION DATE: ABNORMAL
GLUCOSE UR STRIP-MCNC: NEGATIVE MG/DL
KETONES UR QL: NEGATIVE
LEUKOCYTE EST, POC: NEGATIVE
Lab: 5042
NITRITE UR-MCNC: NEGATIVE MG/ML
PH UR: 6.5 [PH] (ref 5–8)
PROT UR STRIP-MCNC: NEGATIVE MG/DL
RBC # UR STRIP: ABNORMAL /UL
SP GR UR: 1.01 (ref 1–1.03)
UROBILINOGEN UR QL: NORMAL

## 2021-01-13 PROCEDURE — 99214 OFFICE O/P EST MOD 30 MIN: CPT | Performed by: NURSE PRACTITIONER

## 2021-01-13 PROCEDURE — 81003 URINALYSIS AUTO W/O SCOPE: CPT | Performed by: NURSE PRACTITIONER

## 2021-01-13 NOTE — PATIENT INSTRUCTIONS
Balanitis    Balanitis is swelling and irritation (inflammation) of the head of the penis (glans penis). The condition may also cause inflammation of the skin around the glans penis (foreskin) in men who have not been circumcised. It may develop because of an infection or another medical condition.  Balanitis occurs most often among men who have not had their foreskin removed (uncircumcised men). Balanitis sometimes causes scarring of the penis or foreskin, which can require surgery. Untreated balanitis can increase the risk of penile cancer.  What are the causes?  Common causes of this condition include:  · Poor personal hygiene, especially in uncircumcised men. Not cleaning the glans penis and foreskin well can result in buildup of bacteria, viruses, and yeast, which can lead to infection and inflammation.  · Irritation and lack of air flow due to fluid (smegma) that can build up on the glans penis.  Other causes include:  · Chemical irritation from products such as soaps or shower gels (especially those that have fragrance), condoms, personal lubricants, petroleum jelly, spermicides, or fabric softeners.  · Skin conditions, such as eczema, dermatitis, and psoriasis.  · Allergies to medicines, such as tetracycline and sulfa drugs.  · Certain medical conditions, including liver cirrhosis, congestive heart failure, diabetes, and kidney disease.  · Infections, such as candidiasis, HPV (human papillomavirus), herpes simplex, gonorrhea, and syphilis.  · Severe obesity.  What increases the risk?  The following factors may make you more likely to develop this condition:  · Having diabetes. This is the most common risk factor.  · Having a tight foreskin that is difficult to pull back (retract) past the glans.  · Having sexual intercourse without using a condom.  What are the signs or symptoms?  Symptoms of this condition include:  · Discharge from under the foreskin.  · A bad smell.  · Pain or difficulty retracting the  foreskin.  · Tenderness, redness, and swelling of the glans.  · A rash or sores on the glans or foreskin.  · Itchiness.  · Inability to get an erection due to pain.  · Difficulty urinating.  · Scarring of the penis or foreskin, in some cases.  How is this diagnosed?  This condition may be diagnosed based on:  · A physical exam.  · Testing a swab of discharge to check for bacterial or fungal infection.  · Blood tests:  ? To check for viruses that can cause balanitis.  ? To check your blood sugar (glucose) level. High blood glucose could be a sign of diabetes, which can cause balanitis.  How is this treated?  Treatment for balanitis depends on the cause. Treatment may include:  · Improving personal hygiene. Your health care provider may recommend sitting in a bath of warm water that is deep enough to cover your hips and buttocks (sitz bath).  · Medicines such as:  ? Creams or ointments to reduce swelling (steroids) or to treat an infection.  ? Antibiotic medicine.  ? Antifungal medicine.  · Surgery to remove or cut the foreskin (circumcision). This may be done if you have scarring on the foreskin that makes it difficult to retract.  · Controlling other medical problems that may be causing your condition or making it worse.  Follow these instructions at home:  · Do not have sex until the condition clears up, or until your health care provider approves.  · Keep your penis clean and dry. Take sitz baths as recommended by your health care provider.  · Avoid products that irritate your skin or make symptoms worse, such as soaps and shower gels that have fragrance.  · Take over-the-counter and prescription medicines only as told by your health care provider.  ? If you were prescribed an antibiotic medicine or a cream or ointment, use it as told by your health care provider. Do not stop using your medicine, cream, or ointment even if you start to feel better.  ? Do not drive or use heavy machinery while taking prescription  pain medicine.  Contact a health care provider if:  · Your symptoms get worse or do not improve with home care.  · You develop chills or a fever.  · You have trouble urinating.  · You cannot retract your foreskin.  Get help right away if:  · You develop severe pain.  · You are unable to urinate.  Summary  · Balanitis is inflammation of the head of the penis (glans penis) caused by irritation or infection.  · Balanitis causes pain, redness, and swelling of the glans penis.  · This condition is most common among uncircumcised men who do not keep their glans penis clean and in men who have diabetes.  · Treatment may include creams or ointments.  · Good hygiene is important for prevention. This includes pulling back the foreskin when washing your penis.  This information is not intended to replace advice given to you by your health care provider. Make sure you discuss any questions you have with your health care provider.  Document Revised: 11/30/2018 Document Reviewed: 11/06/2017  eWave Interactive Patient Education © 2020 Elsevier Inc.  Miconazole skin gel, lotion, ointment, or solution  What is this medicine?  MICONAZOLE (mi TONY a zolosvaldo) is an antifungal medicine. It is used to treat certain kinds of fungal or yeast infections of the skin.  This medicine may be used for other purposes; ask your health care provider or pharmacist if you have questions.  COMMON BRAND NAME(S): AZOLEN TINCTURE, Fungoid, Triple Paste AF, Zeasorb Athlete's Foot  What should I tell my health care provider before I take this medicine?  They need to know if you have any of these conditions:  · diabetes  · HIV or AIDS  · immune system problems  · other chronic health condition  · recent chemotherapy treatments  · an unusual or allergic reaction to miconazole, other medicines, foods, dyes or preservatives  · pregnant or trying to get pregnant  · breast-feeding  How should I use this medicine?  This medicine is for external use only. Do not take by mouth.  Follow the directions on the label. Wash hands before and after use. If treating hands, only wash hands before use. Cleanse and dry affected area thoroughly. Apply a thin layer of this medicine to the affected skin and surrounding area. Do not get this medicine in your eyes. If you do, rinse out with plenty of cool tap water. You can cover the area with a sterile gauze dressing (bandage). Do not use an airtight bandage (such as a plastic-covered bandage). Do not use your medicine more often than directed. Use this medicine for the full amount of time recommended on the package or by your doctor or health care professional even if you begin to feel better. Do not use for more than 4 weeks without advice.  Talk to your pediatrician regarding the use of this medicine in children. Special care may be needed.  Overdosage: If you think you have taken too much of this medicine contact a poison control center or emergency room at once.  NOTE: This medicine is only for you. Do not share this medicine with others.  What if I miss a dose?  If you miss a dose, use it as soon as you can. If it is almost time for your next dose, use only that dose. Do not use double or extra doses.  What may interact with this medicine?  Interactions are not expected. Do not use any other skin products on the affected area without asking your doctor or health care professional.  This list may not describe all possible interactions. Give your health care provider a list of all the medicines, herbs, non-prescription drugs, or dietary supplements you use. Also tell them if you smoke, drink alcohol, or use illegal drugs. Some items may interact with your medicine.  What should I watch for while using this medicine?  Tell your doctor or healthcare professional if your symptoms do not start to get better or if they get worse. You may have a skin infection that does not respond to this medicine.  If you are using this medicine for 'jock itch' be sure to  dry the groin completely after bathing. Do not wear underwear that is tight-fitting or made from synthetic fibers like kamille or nylon. Wear loose-fitting, cotton underwear.  If you are using this medicine for athlete's foot be sure to dry your feet carefully after bathing, especially between the toes. Do not wear socks made from wool or synthetic materials like kamille or nylon. Wear clean cotton socks and change them at least once a day, change them more if your feet sweat a lot. Also, try to wear sandals or shoes that are well-ventilated.  What side effects may I notice from receiving this medicine?  Side effects that you should report to your doctor or health care professional as soon as possible:  · allergic reactions like skin rash, itching or hives, swelling of the face, lips, or tongue  · increased inflammation, redness, or pain at the affected area  Side effects that usually do not require medical attention (report to your doctor or health care professional if they continue or are bothersome):  · mild skin irritation, burning, or itching at the affected area  This list may not describe all possible side effects. Call your doctor for medical advice about side effects. You may report side effects to FDA at 8-175-FDA-5432.  Where should I keep my medicine?  Keep out of the reach of children.  Store at room temperature between 15 and 30 degrees C (59 and 86 degrees F). Throw away any unused medicine after the expiration date.  NOTE: This sheet is a summary. It may not cover all possible information. If you have questions about this medicine, talk to your doctor, pharmacist, or health care provider.  © 2020 Elsevier/Gold Standard (2009-07-08 15:11:44)

## 2021-01-15 DIAGNOSIS — Z76.0 MEDICATION REFILL: Primary | ICD-10-CM

## 2021-01-15 RX ORDER — CARVEDILOL 6.25 MG/1
TABLET ORAL
Qty: 60 TABLET | Refills: 3 | OUTPATIENT
Start: 2021-01-15

## 2021-01-15 RX ORDER — CARVEDILOL 6.25 MG/1
6.25 TABLET ORAL 2 TIMES DAILY
Qty: 60 TABLET | Refills: 3 | Status: SHIPPED | OUTPATIENT
Start: 2021-01-15 | End: 2021-04-29 | Stop reason: SDUPTHER
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
## 2021-04-06 DIAGNOSIS — I10 ESSENTIAL HYPERTENSION: ICD-10-CM

## 2021-04-06 DIAGNOSIS — E29.1 SECONDARY MALE HYPOGONADISM: ICD-10-CM

## 2021-04-06 RX ORDER — TESTOSTERONE 16.2 MG/G
GEL TRANSDERMAL
Qty: 75 G | Refills: 3 | OUTPATIENT
Start: 2021-04-06

## 2021-04-07 NOTE — TELEPHONE ENCOUNTER
Caller: Ronald Bond    Relationship: Self    Best call back number: [phone]    Medication needed:   Requested Prescriptions     Pending Prescriptions Disp Refills   • furosemide (LASIX) 20 MG tablet 60 tablet 2     Sig: Take 1 tablet by mouth 2 (Two) Times a Day.       When do you need the refill by: ASAP    What additional details did the patient provide when requesting the medication: OUT OF MEDICATION; STATED HE KNEW HE HAD TO MAKE AN APPOINTMENT BUT WAS MOVING AND WOULD DO SO AS SOON AS HE COULD GET SETTLED. ALSO, WOULD LIKE 90 DAY SUPPLY IF POSSIBLE.    Does the patient have less than a 3 day supply:  [x] Yes  [] No    What is the patient's preferred pharmacy: [facility] - [address] [phone] [facility] [phone]

## 2021-04-08 RX ORDER — FUROSEMIDE 20 MG/1
20 TABLET ORAL 2 TIMES DAILY
Qty: 60 TABLET | Refills: 0 | Status: SHIPPED | OUTPATIENT
Start: 2021-04-08 | End: 2021-04-12

## 2021-04-12 DIAGNOSIS — I10 ESSENTIAL HYPERTENSION: ICD-10-CM

## 2021-04-12 RX ORDER — FUROSEMIDE 20 MG/1
TABLET ORAL
Qty: 60 TABLET | Refills: 0 | Status: SHIPPED | OUTPATIENT
Start: 2021-04-12 | End: 2021-04-29 | Stop reason: SDUPTHER

## 2021-04-29 ENCOUNTER — OFFICE VISIT (OUTPATIENT)
Dept: FAMILY MEDICINE CLINIC | Facility: CLINIC | Age: 76
End: 2021-04-29

## 2021-04-29 VITALS
TEMPERATURE: 97.7 F | BODY MASS INDEX: 32.98 KG/M2 | RESPIRATION RATE: 18 BRPM | OXYGEN SATURATION: 97 % | HEIGHT: 68 IN | DIASTOLIC BLOOD PRESSURE: 89 MMHG | WEIGHT: 217.6 LBS | SYSTOLIC BLOOD PRESSURE: 136 MMHG | HEART RATE: 64 BPM

## 2021-04-29 DIAGNOSIS — I10 HYPERTENSION, UNSPECIFIED TYPE: ICD-10-CM

## 2021-04-29 DIAGNOSIS — I10 ESSENTIAL HYPERTENSION: ICD-10-CM

## 2021-04-29 DIAGNOSIS — M25.552 PAIN OF BOTH HIP JOINTS: ICD-10-CM

## 2021-04-29 DIAGNOSIS — Z13.220 ENCOUNTER FOR SCREENING FOR LIPID DISORDER: ICD-10-CM

## 2021-04-29 DIAGNOSIS — Z12.5 SCREENING PSA (PROSTATE SPECIFIC ANTIGEN): Primary | ICD-10-CM

## 2021-04-29 DIAGNOSIS — G89.29 OTHER CHRONIC PAIN: ICD-10-CM

## 2021-04-29 DIAGNOSIS — Z76.0 MEDICATION REFILL: ICD-10-CM

## 2021-04-29 DIAGNOSIS — K59.00 CONSTIPATION, UNSPECIFIED CONSTIPATION TYPE: ICD-10-CM

## 2021-04-29 DIAGNOSIS — F41.8 DEPRESSION WITH ANXIETY: ICD-10-CM

## 2021-04-29 DIAGNOSIS — M25.551 PAIN OF BOTH HIP JOINTS: ICD-10-CM

## 2021-04-29 PROCEDURE — 99214 OFFICE O/P EST MOD 30 MIN: CPT | Performed by: NURSE PRACTITIONER

## 2021-04-29 RX ORDER — MELOXICAM 7.5 MG/1
7.5 TABLET ORAL DAILY
Qty: 90 TABLET | Refills: 1 | Status: SHIPPED | OUTPATIENT
Start: 2021-04-29 | End: 2021-09-16 | Stop reason: SDUPTHER

## 2021-04-29 RX ORDER — ASPIRIN 81 MG/1
81 TABLET ORAL DAILY
Qty: 90 TABLET | Refills: 3 | Status: SHIPPED | OUTPATIENT
Start: 2021-04-29 | End: 2021-09-16 | Stop reason: SDUPTHER

## 2021-04-29 RX ORDER — PAROXETINE HYDROCHLORIDE 40 MG/1
40 TABLET, FILM COATED ORAL EVERY MORNING
Qty: 90 TABLET | Refills: 1 | Status: SHIPPED | OUTPATIENT
Start: 2021-04-29 | End: 2021-06-09 | Stop reason: SDUPTHER

## 2021-04-29 RX ORDER — FUROSEMIDE 20 MG/1
20 TABLET ORAL 2 TIMES DAILY
Qty: 180 TABLET | Refills: 1 | Status: SHIPPED | OUTPATIENT
Start: 2021-04-29 | End: 2021-05-29 | Stop reason: SDUPTHER

## 2021-04-29 RX ORDER — CARVEDILOL 6.25 MG/1
6.25 TABLET ORAL 2 TIMES DAILY
Qty: 180 TABLET | Refills: 1 | Status: SHIPPED | OUTPATIENT
Start: 2021-04-29 | End: 2021-05-29 | Stop reason: SDUPTHER

## 2021-04-30 LAB
ALBUMIN SERPL-MCNC: 4.6 G/DL (ref 3.5–5.2)
ALBUMIN/GLOB SERPL: 1.5 G/DL
ALP SERPL-CCNC: 81 U/L (ref 39–117)
ALT SERPL-CCNC: 22 U/L (ref 1–41)
AST SERPL-CCNC: 20 U/L (ref 1–40)
BILIRUB SERPL-MCNC: 0.3 MG/DL (ref 0–1.2)
BUN SERPL-MCNC: 13 MG/DL (ref 8–23)
BUN/CREAT SERPL: 12.4 (ref 7–25)
CALCIUM SERPL-MCNC: 9.9 MG/DL (ref 8.6–10.5)
CHLORIDE SERPL-SCNC: 99 MMOL/L (ref 98–107)
CHOLEST SERPL-MCNC: 226 MG/DL (ref 0–200)
CO2 SERPL-SCNC: 37.1 MMOL/L (ref 22–29)
CREAT SERPL-MCNC: 1.05 MG/DL (ref 0.76–1.27)
GLOBULIN SER CALC-MCNC: 3 GM/DL
GLUCOSE SERPL-MCNC: 94 MG/DL (ref 65–99)
HDLC SERPL-MCNC: 43 MG/DL (ref 40–60)
LDLC SERPL CALC-MCNC: 147 MG/DL (ref 0–100)
POTASSIUM SERPL-SCNC: 4.5 MMOL/L (ref 3.5–5.2)
PROT SERPL-MCNC: 7.6 G/DL (ref 6–8.5)
PSA SERPL-MCNC: 0.83 NG/ML (ref 0–4)
SODIUM SERPL-SCNC: 144 MMOL/L (ref 136–145)
TRIGL SERPL-MCNC: 197 MG/DL (ref 0–150)
VLDLC SERPL CALC-MCNC: 36 MG/DL (ref 5–40)

## 2021-05-04 DIAGNOSIS — E78.5 HYPERLIPIDEMIA LDL GOAL <100: Primary | ICD-10-CM

## 2021-05-04 RX ORDER — SIMVASTATIN 10 MG
10 TABLET ORAL NIGHTLY
Qty: 30 TABLET | Refills: 5 | Status: SHIPPED | OUTPATIENT
Start: 2021-05-04 | End: 2021-09-02

## 2021-05-10 ENCOUNTER — TELEPHONE (OUTPATIENT)
Dept: GASTROENTEROLOGY | Facility: CLINIC | Age: 76
End: 2021-05-10

## 2021-05-27 ENCOUNTER — TELEPHONE (OUTPATIENT)
Dept: FAMILY MEDICINE CLINIC | Facility: CLINIC | Age: 76
End: 2021-05-27

## 2021-05-27 NOTE — TELEPHONE ENCOUNTER
Caller: Ronald Bond    Relationship: Self    Best call back number: [phone]    What is the best time to reach you: ANYTIME    Who are you requesting to speak with (clinical staff, provider,  specific staff member): TERESA PONCE    Do you know the name of the person who called: PATIENT    What was the call regarding: PATIENT STATES HE IS STILL HAVING A LOT OF SWELLING IN HIS LEGS AS WELL AS REDNESS.  HE STATES THE REDNESS IS WORSE AT HIS ANKLES AND WORSENS THE MORE HE WALKS AND HIS FEET HURT.    Do you require a callback: YES

## 2021-05-28 NOTE — TELEPHONE ENCOUNTER
Patient advised needs to be seen. Scheduled for tomorrow due to limited availability. He did not want [facility] or [facility] due to insurance coverage.

## 2021-05-28 NOTE — TELEPHONE ENCOUNTER
Patient needs to be seen in clinic to be evaluated or he needs to go to the urgent treatment center or ED to be seen and evaluated.

## 2021-05-29 ENCOUNTER — OFFICE VISIT (OUTPATIENT)
Dept: FAMILY MEDICINE CLINIC | Facility: CLINIC | Age: 76
End: 2021-05-29

## 2021-05-29 VITALS
OXYGEN SATURATION: 99 % | HEIGHT: 70 IN | TEMPERATURE: 98.7 F | SYSTOLIC BLOOD PRESSURE: 172 MMHG | WEIGHT: 222.8 LBS | BODY MASS INDEX: 31.9 KG/M2 | RESPIRATION RATE: 18 BRPM | DIASTOLIC BLOOD PRESSURE: 90 MMHG | HEART RATE: 80 BPM

## 2021-05-29 DIAGNOSIS — J22 ACUTE LOWER RESPIRATORY INFECTION: ICD-10-CM

## 2021-05-29 DIAGNOSIS — Z76.0 MEDICATION REFILL: ICD-10-CM

## 2021-05-29 DIAGNOSIS — I25.119 CORONARY ARTERY DISEASE INVOLVING NATIVE HEART WITH ANGINA PECTORIS, UNSPECIFIED VESSEL OR LESION TYPE (HCC): ICD-10-CM

## 2021-05-29 DIAGNOSIS — M20.62 TOE DEFORMITY, ACQUIRED, LEFT: ICD-10-CM

## 2021-05-29 DIAGNOSIS — R60.9 PERIPHERAL EDEMA: Primary | ICD-10-CM

## 2021-05-29 DIAGNOSIS — B35.1 ONYCHOMYCOSIS: ICD-10-CM

## 2021-05-29 DIAGNOSIS — R07.89 CHEST TIGHTNESS: ICD-10-CM

## 2021-05-29 DIAGNOSIS — I10 ESSENTIAL HYPERTENSION: ICD-10-CM

## 2021-05-29 DIAGNOSIS — B18.2 CHRONIC HEPATITIS C WITHOUT HEPATIC COMA (HCC): ICD-10-CM

## 2021-05-29 DIAGNOSIS — R06.02 SHORTNESS OF BREATH ON EXERTION: ICD-10-CM

## 2021-05-29 PROCEDURE — 99214 OFFICE O/P EST MOD 30 MIN: CPT | Performed by: NURSE PRACTITIONER

## 2021-05-29 NOTE — PATIENT INSTRUCTIONS
Peripheral Edema    Peripheral edema is swelling that is caused by a buildup of fluid. Peripheral edema most often affects the lower legs, ankles, and feet. It can also develop in the arms, hands, and face. The area of the body that has peripheral edema will look swollen. It may also feel heavy or warm. Your clothes may start to feel tight. Pressing on the area may make a temporary dent in your skin. You may not be able to move your swollen arm or leg as much as usual.  There are many causes of peripheral edema. It can happen because of a complication of other conditions such as congestive heart failure, kidney disease, or a problem with your blood circulation. It also can be a side effect of certain medicines or because of an infection. It often happens to women during pregnancy. Sometimes, the cause is not known.  Follow these instructions at home:  Managing pain, stiffness, and swelling    · Raise (elevate) your legs while you are sitting or lying down.  · Move around often to prevent stiffness and to lessen swelling.  · Do not sit or stand for long periods of time.  · Wear support stockings as told by your health care provider.  Medicines  · Take over-the-counter and prescription medicines only as told by your health care provider.  · Your health care provider may prescribe medicine to help your body get rid of excess water (diuretic).  General instructions  · Pay attention to any changes in your symptoms.  · Follow instructions from your health care provider about limiting salt (sodium) in your diet. Sometimes, eating less salt may reduce swelling.  · Moisturize skin daily to help prevent skin from cracking and draining.  · Keep all follow-up visits as told by your health care provider. This is important.  Contact a health care provider if you have:  · A fever.  · Edema that starts suddenly or is getting worse, especially if you are pregnant or have a medical condition.  · Swelling in only one leg.  · Increased  swelling, redness, or pain in one or both of your legs.  · Drainage or sores at the area where you have edema.  Get help right away if you:  · Develop shortness of breath, especially when you are lying down.  · Have pain in your chest or abdomen.  · Feel weak.  · Feel faint.  Summary  · Peripheral edema is swelling that is caused by a buildup of fluid. Peripheral edema most often affects the lower legs, ankles, and feet.  · Move around often to prevent stiffness and to lessen swelling. Do not sit or stand for long periods of time.  · Pay attention to any changes in your symptoms.  · Contact a health care provider if you have edema that starts suddenly or is getting worse, especially if you are pregnant or have a medical condition.  · Get help right away if you develop shortness of breath, especially when lying down.  This information is not intended to replace advice given to you by your health care provider. Make sure you discuss any questions you have with your health care provider.  Document Revised: 09/11/2019 Document Reviewed: 09/11/2019  [employer] Patient Education © 2021 [employer]    Coronary Artery Disease, Male  Coronary artery disease (CAD) is a condition in which the arteries that lead to the heart (coronary arteries) become narrow or blocked. The narrowing or blockage can lead to decreased blood flow to the heart. Prolonged reduced blood flow can cause a heart attack (myocardial infarction or MI). This condition may also be called coronary heart disease.  Because CAD is the leading cause of death in men, it is important to understand what causes this condition and how it is treated.  What are the causes?  CAD is most often caused by atherosclerosis. This is the buildup of fat and cholesterol (plaque) on the inside of the arteries. Over time, the plaque may narrow or block the artery, reducing blood flow to the heart. Plaque can also become weak and break off within a coronary artery and cause a  sudden blockage. Other less common causes of CAD include:  · A blood clot or a piece of a blood clot or other substance that blocks the flow of blood in a coronary artery (embolism).  · A tearing of the artery (spontaneous coronary artery dissection).  · An enlargement of an artery (aneurysm).  · Inflammation (vasculitis) in the artery wall.  What increases the risk?  The following factors may make you more likely to develop this condition:  · Age. Men over age 45 are at a greater risk of CAD.  · Family history of CAD.  · Gender. Men often develop CAD earlier in life than women.  · High blood pressure (hypertension).  · Diabetes.  · High cholesterol levels.  · Tobacco use.  · Excessive alcohol use.  · Lack of exercise.  · A diet high in saturated and trans fats, such as fried food and processed meat.  Other possible risk factors include:  · High stress levels.  · Depression.  · Obesity.  · Sleep apnea.  What are the signs or symptoms?  Many people do not have any symptoms during the early stages of CAD. As the condition progresses, symptoms may include:  · Chest pain (angina). The pain can:  ? Feel like crushing or squeezing, or like a tightness, pressure, fullness, or heaviness in the chest.  ? Last more than a few minutes or can stop and recur. The pain tends to get worse with exercise or stress and to fade with rest.  · Pain in the arms, neck, jaw, ear, or back.  · Unexplained heartburn or indigestion.  · Shortness of breath.  · Nausea or vomiting.  · Sudden light-headedness.  · Sudden cold sweats.  · Fluttering or fast heartbeat (palpitations).  How is this diagnosed?  This condition is diagnosed based on:  · Your family and medical history.  · A physical exam.  · Tests, including:  ? A test to check the electrical signals in your heart (electrocardiogram).  ? Exercise stress test. This looks for signs of blockage when the heart is stressed with exercise, such as running on a treadmill.  ? Pharmacologic stress  test. This test looks for signs of blockage when the heart is being stressed with a medicine.  ? Blood tests.  ? Coronary angiogram. This is a procedure to look at the coronary arteries to see if there is any blockage. During this test, a dye is injected into your arteries so they appear on an X-ray.  ? Coronary artery CT scan. This CT scan helps detect calcium deposits in your coronary arteries. Calcium deposits are an indicator of CAD.  ? A test that uses sound waves to take a picture of your heart (echocardiogram).  ? Chest X-ray.  How is this treated?  This condition may be treated by:  · Healthy lifestyle changes to reduce risk factors.  · Medicines such as:  ? Antiplatelet medicines and blood-thinning medicines, such as aspirin. These help to prevent blood clots.  ? Nitroglycerin.  ? Blood pressure medicines.  ? Cholesterol-lowering medicine.  · Coronary angioplasty and stenting. During this procedure, a thin, flexible tube is inserted through a blood vessel and into a blocked artery. A balloon or similar device on the end of the tube is inflated to open up the artery. In some cases, a small, mesh tube (stent) is inserted into the artery to keep it open.  · Coronary artery bypass surgery. During this surgery, veins or arteries from other parts of the body are used to create a bypass around the blockage and allow blood to reach your heart.  Follow these instructions at home:  Medicines  · Take over-the-counter and prescription medicines only as told by your health care provider.  · Do not take the following medicines unless your health care provider approves:  ? NSAIDs, such as ibuprofen, naproxen, or celecoxib.  ? Vitamin supplements that contain vitamin A, vitamin E, or both.  Lifestyle  · Follow an exercise program approved by your health care provider. Aim for 150 minutes of moderate exercise or 75 minutes of vigorous exercise each week.  · Maintain a healthy weight or lose weight as approved by your health  care provider.  · Learn to manage stress or try to limit your stress. Ask your health care provider for suggestions if you need help.  · Get screened for depression and seek treatment, if needed.  · Do not use any products that contain nicotine or tobacco, such as cigarettes, e-cigarettes, and chewing tobacco. If you need help quitting, ask your health care provider.  · Do not use illegal drugs.  Eating and drinking    · Follow a heart-healthy diet. A dietitian can help educate you about healthy food options and changes. In general, eat plenty of fruits and vegetables, lean meats, and whole grains.  · Avoid foods high in:  ? Sugar.  ? Salt (sodium).  ? Saturated fat, such as processed or fatty meat.  ? Trans fat, such as fried foods.  · Use healthy cooking methods such as roasting, grilling, broiling, baking, poaching, steaming, or stir-frying.  · Do not drink alcohol if your health care provider tells you not to drink.  · If you drink alcohol:  ? Limit how much you have to 0-2 drinks per day.  ? Be aware of how much alcohol is in your drink. In the [place] one drink equals one 12 oz bottle of beer (355 mL), one 5 oz glass of wine (148 mL), or one 1½ oz glass of hard liquor (44 mL).  General instructions  · Manage any other health conditions, such as hypertension and diabetes. These conditions affect your heart.  · Your health care provider may ask you to monitor your blood pressure. Ideally, your blood pressure should be below 130/80.  · Keep all follow-up visits as told by your health care provider. This is important.  Get help right away if:  · You have pain in your chest, neck, ear, arm, jaw, stomach, or back that:  ? Lasts more than a few minutes.  ? Is recurring.  ? Is not relieved by taking medicine under your tongue (sublingual nitroglycerin).  · You have profuse sweating without cause.  · You have unexplained:  ? Heartburn or indigestion.  ? Shortness of breath or difficulty breathing.  ? Fluttering or fast  heartbeat (palpitations).  ? Nausea or vomiting.  ? Fatigue.  ? Feelings of nervousness or anxiety.  ? Weakness.  ? Diarrhea.  · You have sudden light-headedness or dizziness.  · You faint.  · You feel like hurting yourself or think about taking your own life.  These symptoms may represent a serious problem that is an emergency. Do not wait to see if the symptoms will go away. Get medical help right away. Call your local emergency services (911 in the [place] Do not drive yourself to the hospital.  Summary  · Coronary artery disease (CAD) is a condition in which the arteries that lead to the heart (coronary arteries) become narrow or blocked. The narrowing or blockage can lead to a heart attack.  · Many people do not have any symptoms during the early stages of CAD.  · CAD can be treated with lifestyle changes, medicines, surgery, or a combination of these treatments.  This information is not intended to replace advice given to you by your health care provider. Make sure you discuss any questions you have with your health care provider.  Document Revised: 09/06/2019 Document Reviewed: 08/27/2019  [employer] Patient Education © 2021 [employer]    Hepatitis C  Hepatitis C is a liver infection that is caused by the hepatitis C virus (HCV). The virus infects and causes inflammation in the liver. Hepatitis C can lead to:  · A condition where the liver cannot work anymore (liver failure).  · Scarring of the liver (cirrhosis).  · Liver cancer.  People with hepatitis C often do not know for months or years that they have it. This is because they often do not have symptoms or may have only mild symptoms.  What are the causes?  This condition is caused by HCV. The virus can spread from person to person (is contagious) through:  · Contact with an infected person's blood, semen, or vaginal fluids.  · Childbirth. A woman who has hepatitis C can pass it to her baby during birth.  · Donated blood (blood transfusion) or a donated  body organ (organ transplantation) if received in the [place] before 1992.  What increases the risk?  The following factors may make you more likely to develop this condition:  · Having contact with needles or syringes that have HCV on them (are contaminated). Contact may happen while:  ? Receiving acupuncture.  ? Getting a tattoo.  ? Getting a body piercing.  ? Injecting drugs.  · Having sex with someone who is infected. The virus can spread through vaginal, oral, or anal sex.  · Receiving treatment to filter your blood (kidney dialysis).  · Having HIV (human immunodeficiency virus) or AIDS (acquired immunodeficiency syndrome).  · Having a job that involves contact with blood or certain other body fluids, such as in health care.  What are the signs or symptoms?  Symptoms of this condition include:  · Tiredness (fatigue).  · Loss of appetite.  · Nausea or vomiting.  · Pain in your abdomen.  · Dark yellow urine.  · Your skin or the white parts of your eyes turning yellow (jaundice).  · Itchy skin.  · Light-colored or tan stool.  · Joint pain.  · Bleeding and bruising that happen often.  · Fluid building up in your stomach (ascites).  Often, hepatitis C causes no symptoms.  How is this diagnosed?  This condition is diagnosed with:  · Blood tests.  · Other tests of how well your liver is working. These may include:  ? Magnetic resonance elastography (MRE). This imaging test uses MRI and sound waves to measure liver stiffness.  ? Transient elastography. This imaging test uses ultrasound to measure liver stiffness.  ? Liver biopsy. This test involves taking a tissue sample from your liver to look at under a microscope.  How is this treated?  Treatment may depend on how severe your condition is, how long it has lasted, and whether you have liver damage. More testing may be done to figure out the best treatment. Treatment may include:  · Taking antiviral medicines and other medicines.  · Having follow-up treatments  every 6-12 months for infections or other liver problems.  · Having liver transplantation.  Follow these instructions at home:  Medicines  · Take over-the-counter and prescription medicines only as told by your health care provider.  · If you were prescribed an antiviral medicine, take it as told by your health care provider. Do not stop using the antiviral even if you start to feel better.  · Do not take any new medicines, including over-the-counter medicines or supplements, unless your health care provider approves.  Activity  · Rest as needed.  · Do not have sex unless approved by your health care provider.  · Return to your normal activities as told by your health care provider. Ask your health care provider what activities are safe for you.  · Ask your health care provider when you may return to school or work.  Eating and drinking    · Eat a balanced diet with plenty of fruits and vegetables, whole grains, and lean meats or non-meat proteins (such as beans or tofu).  · Drink enough fluids to keep your urine pale yellow.  · Do not drink alcohol.  General instructions  · Do not share toothbrushes, nail clippers, or razors.  · Wash your hands often with soap and water for at least 20 seconds. If soap and water are not available, use hand .  · Cover any cuts or open sores on your skin to prevent spreading HCV.  · Avoid swimming or using hot tubs if you have open sores or wounds.  · Keep all follow-up visits as told by your health care provider. This is important. You may need follow-up visits every 6-12 months.  How is this prevented?  There is no vaccine for hepatitis C. The only way to prevent this infection is to lessen your risk of coming into contact with HCV. Make sure you:  · Wash your hands often with soap and water for at least 20 seconds.  · Do not share needles or syringes.  · Use a condom every time you have vaginal, oral, or anal sex. Be sure to use it correctly each time.  ? Both females and  males should wear condoms.  ? Condoms should be kept in place from the beginning to the end of sexual activity.  ? Latex condoms should be used, if possible. These offer the best protection.  · Avoid handling blood or other body fluids without gloves or other protection.  · Avoid getting tattoos or body piercings in shops or other places that are not clean.  Where to find more information  · Centers for Disease Control and Prevention: www.cdc.gov/hepatitis  Contact a health care provider if you:  · Have a fever.  · Have pain in your abdomen.  · Pass dark urine.  · Pass light-colored or tan stool.  · Have joint pain.  Get help right away if you:  · Have an increase in fatigue or weakness.  · Lose your appetite.  · Cannot eat or drink without vomiting.  · Develop jaundice or your jaundice gets worse.  · Bruise or bleed easily.  Summary  · Hepatitis C is a liver infection that is caused by the hepatitis C virus (HCV). This infection can lead to a condition where the liver cannot work anymore (liver failure), scarring of the liver (cirrhosis), or liver cancer.  · HCV causes this condition and can spread from person to person (is contagious).  · Do not take any medicines, including over-the-counter medicines or supplements, unless your health care provider approves.  This information is not intended to replace advice given to you by your health care provider. Make sure you discuss any questions you have with your health care provider.  Document Revised: 09/09/2020 Document Reviewed: 08/17/2020  [employer] Patient Education © 2021 [employer]    Shortness of Breath, Adult  Shortness of breath is when a person has trouble breathing enough air or when a person feels like she or he is having trouble breathing in enough air. Shortness of breath could be a sign of a medical problem.  Follow these instructions at home:    · Pay attention to any changes in your symptoms.  · Do not use any products that contain nicotine or  tobacco, such as cigarettes, e-cigarettes, and chewing tobacco.  · Do not smoke. Smoking is a common cause of shortness of breath. If you need help quitting, ask your health care provider.  · Avoid things that can irritate your airways, such as:  ? Mold.  ? Dust.  ? Air pollution.  ? Chemical fumes.  ? Things that can cause allergy symptoms (allergens), if you have allergies.  · Keep your living space clean and free of mold and dust.  · Rest as needed. Slowly return to your usual activities.  · Take over-the-counter and prescription medicines only as told by your health care provider. This includes oxygen therapy and inhaled medicines.  · Keep all follow-up visits as told by your health care provider. This is important.  Contact a health care provider if:  · Your condition does not improve as soon as expected.  · You have a hard time doing your normal activities, even after you rest.  · You have new symptoms.  Get help right away if:  · Your shortness of breath gets worse.  · You have shortness of breath when you are resting.  · You feel light-headed or you faint.  · You have a cough that is not controlled with medicines.  · You cough up blood.  · You have pain with breathing.  · You have pain in your chest, arms, shoulders, or abdomen.  · You have a fever.  · You cannot walk up stairs or exercise the way that you normally do.  These symptoms may represent a serious problem that is an emergency. Do not wait to see if the symptoms will go away. Get medical help right away. Call your local emergency services (911 in the [place] Do not drive yourself to the hospital.  Summary  · Shortness of breath is when a person has trouble breathing enough air. It can be a sign of a medical problem.  · Avoid things that irritate your lungs, such as smoking, pollution, mold, and dust.  · Pay attention to changes in your symptoms and contact your health care provider if you have a hard time completing daily activities because of  shortness of breath.  This information is not intended to replace advice given to you by your health care provider. Make sure you discuss any questions you have with your health care provider.  Document Revised: 05/20/2019 Document Reviewed: 05/20/2019  [employer] Patient Education © 2021 [employer]    Managing Your Hypertension  Hypertension, also called high blood pressure, is when the force of the blood pressing against the walls of the arteries is too strong. Arteries are blood vessels that carry blood from your heart throughout your body. Hypertension forces the heart to work harder to pump blood and may cause the arteries to become narrow or stiff.  Understanding blood pressure readings  Your personal target blood pressure may vary depending on your medical conditions, your age, and other factors. A blood pressure reading includes a higher number over a lower number. Ideally, your blood pressure should be below 120/80. You should know that:  · The first, or top, number is called the systolic pressure. It is a measure of the pressure in your arteries as your heart beats.  · The second, or bottom number, is called the diastolic pressure. It is a measure of the pressure in your arteries as the heart relaxes.  Blood pressure is classified into four stages. Based on your blood pressure reading, your health care provider may use the following stages to determine what type of treatment you need, if any. Systolic pressure and diastolic pressure are measured in a unit called mmHg.  Normal  · Systolic pressure: below 120.  · Diastolic pressure: below 80.  Elevated  · Systolic pressure: 120-129.  · Diastolic pressure: below 80.  Hypertension stage 1  · Systolic pressure: 130-139.  · Diastolic pressure: 80-89.  Hypertension stage 2  · Systolic pressure: 140 or above.  · Diastolic pressure: 90 or above.  How can this condition affect me?  Managing your hypertension is an important responsibility. Over time, hypertension  can damage the arteries and decrease blood flow to important parts of the body, including the brain, heart, and kidneys. Having untreated or uncontrolled hypertension can lead to:  · A heart attack.  · A stroke.  · A weakened blood vessel (aneurysm).  · Heart failure.  · Kidney damage.  · Eye damage.  · Metabolic syndrome.  · Memory and concentration problems.  · Vascular dementia.  What actions can I take to manage this condition?  Hypertension can be managed by making lifestyle changes and possibly by taking medicines. Your health care provider will help you make a plan to bring your blood pressure within a normal range.  Nutrition    · Eat a diet that is high in fiber and potassium, and low in salt (sodium), added sugar, and fat. An example eating plan is called the Dietary Approaches to Stop Hypertension (DASH) diet. To eat this way:  ? Eat plenty of fresh fruits and vegetables. Try to fill one-half of your plate at each meal with fruits and vegetables.  ? Eat whole grains, such as whole-wheat pasta, brown rice, or whole-grain bread. Fill about one-fourth of your plate with whole grains.  ? Eat low-fat dairy products.  ? Avoid fatty cuts of meat, processed or cured meats, and poultry with skin. Fill about one-fourth of your plate with lean proteins such as fish, chicken without skin, beans, eggs, and tofu.  ? Avoid pre-made and processed foods. These tend to be higher in sodium, added sugar, and fat.  · Reduce your daily sodium intake. Most people with hypertension should eat less than 1,500 mg of sodium a day.  Lifestyle    · Work with your health care provider to maintain a healthy body weight or to lose weight. Ask what an ideal weight is for you.  · Get at least 30 minutes of exercise that causes your heart to beat faster (aerobic exercise) most days of the week. Activities may include walking, swimming, or biking.  · Include exercise to strengthen your muscles (resistance exercise), such as weight lifting,  as part of your weekly exercise routine. Try to do these types of exercises for 30 minutes at least 3 days a week.  · Do not use any products that contain nicotine or tobacco, such as cigarettes, e-cigarettes, and chewing tobacco. If you need help quitting, ask your health care provider.  · Control any long-term (chronic) conditions you have, such as high cholesterol or diabetes.  · Identify your sources of stress and find ways to manage stress. This may include meditation, deep breathing, or making time for fun activities.  Alcohol use  · Do not drink alcohol if:  ? Your health care provider tells you not to drink.  ? You are pregnant, may be pregnant, or are planning to become pregnant.  · If you drink alcohol:  ? Limit how much you use to:  § 0-1 drink a day for women.  § 0-2 drinks a day for men.  ? Be aware of how much alcohol is in your drink. In the [place] one drink equals one 12 oz bottle of beer (355 mL), one 5 oz glass of wine (148 mL), or one 1½ oz glass of hard liquor (44 mL).  Medicines  Your health care provider may prescribe medicine if lifestyle changes are not enough to get your blood pressure under control and if:  · Your systolic blood pressure is 130 or higher.  · Your diastolic blood pressure is 80 or higher.  Take medicines only as told by your health care provider. Follow the directions carefully. Blood pressure medicines must be taken as told by your health care provider. The medicine does not work as well when you skip doses. Skipping doses also puts you at risk for problems.  Monitoring  Before you monitor your blood pressure:  · Do not smoke, drink caffeinated beverages, or exercise within 30 minutes before taking a measurement.  · Use the bathroom and empty your bladder (urinate).  · Sit quietly for at least 5 minutes before taking measurements.  Monitor your blood pressure at home as told by your health care provider. To do this:  · Sit with your back straight and supported.  · Place your  feet flat on the floor. Do not cross your legs.  · Support your arm on a flat surface, such as a table. Make sure your upper arm is at heart level.  · Each time you measure, take two or three readings one minute apart and record the results.  You may also need to have your blood pressure checked regularly by your health care provider.  General information  · Talk with your health care provider about your diet, exercise habits, and other lifestyle factors that may be contributing to hypertension.  · Review all the medicines you take with your health care provider because there may be side effects or interactions.  · Keep all visits as told by your health care provider. Your health care provider can help you create and adjust your plan for managing your high blood pressure.  Where to find more information  · National Heart, Lung, and Blood [place]: www.nhlbi.nih.gov  · American Heart Association: www.heart.org  Contact a health care provider if:  · You think you are having a reaction to medicines you have taken.  · You have repeated (recurrent) headaches.  · You feel dizzy.  · You have swelling in your ankles.  · You have trouble with your vision.  Get help right away if:  · You develop a severe headache or confusion.  · You have unusual weakness or numbness, or you feel faint.  · You have severe pain in your chest or abdomen.  · You vomit repeatedly.  · You have trouble breathing.  These symptoms may represent a serious problem that is an emergency. Do not wait to see if the symptoms will go away. Get medical help right away. Call your local emergency services (911 in the [place] Do not drive yourself to the hospital.  Summary  · Hypertension is when the force of blood pumping through your arteries is too strong. If this condition is not controlled, it may put you at risk for serious complications.  · Your personal target blood pressure may vary depending on your medical conditions, your age, and other factors. For  most people, a normal blood pressure is less than 120/80.  · Hypertension is managed by lifestyle changes, medicines, or both.  · Lifestyle changes to help manage hypertension include losing weight, eating a healthy, low-sodium diet, exercising more, stopping smoking, and limiting alcohol.  This information is not intended to replace advice given to you by your health care provider. Make sure you discuss any questions you have with your health care provider.  Document Revised: 01/22/2021 Document Reviewed: 11/17/2020  Elsevier Patient Education © 2021 [employer]    Hammer Toe    Hammer toe is a change in the shape, or a deformity, of the toe. The deformity causes the middle joint of the toe to stay bent. Hammer toe starts gradually. At first, the toe can be straightened. Gradually over time, the deformity becomes stiff and permanent.  Hammer toe causes pain, especially when you are wearing shoes. Early treatments to keep the toe straight may relieve pain. As the deformity becomes stiff and permanent, surgery may be needed to straighten the toe.  What are the causes?  This condition is caused by abnormal bending of the toe joint that is closest to your foot. It happens gradually over time. This pulls on the muscles and connections (tendons) of the toe joint, making them weak and stiff. It is often related to wearing shoes that are too short or narrow and do not let your toes straighten. This condition can affect any toe.  What increases the risk?  You are more likely to develop this condition if you:  · Are female, or are older.  · Wear shoes that are too small, or wear high-heeled shoes that pinch your toes.  · Are a ballet dancer.  · Have a second toe that is longer than your big toe (first toe).  · Injure your foot or toe.  · Have arthritis, or a nerve or muscle disorder.  · Have a family history of hammer toe.  What are the signs or symptoms?  The main symptoms of this condition are pain and deformity of the  toe. The pain is worse when wearing shoes, walking, or running. Other symptoms may include:  · A thickened patch of skin, called a corn or callus, that forms over the top of the bent part of the toe or between the toes.  · Redness and a burning feeling on the toe.  · An open sore that forms on the top of the toe.  · Not being able to straighten the toe.  How is this diagnosed?  This condition is diagnosed based on your symptoms and a physical exam. During the exam, your health care provider will try to straighten your toe to see how stiff the deformity is. You may also have tests, such as:  · A blood test to check for rheumatoid arthritis.  · An X-ray to show how severe the deformity is.  How is this treated?  Treatment for this condition depends on how stiff the deformity is. Surgery is often needed. However, sometimes a hammer toe can be straightened without surgery. Treatments that do not involve surgery include:  · Taping the toe into a straightened position.  · Using pads and cushions to protect the toe.  · Wearing shoes that provide enough room for the toes.  · Doing toe-stretching exercises at home.  · Taking an NSAID, such as ibuprofen, to reduce pain and swelling.  If these treatments do not help or the toe cannot be straightened, surgery is the next option. The most common surgeries used to straighten a hammer toe include:  · Arthroplasty. In this procedure, part of the joint is removed, and that allows the toe to straighten.  · Fusion. In this procedure, cartilage between the two bones of the joint is taken out, and the bones are fused together into one longer bone.  · Implantation. In this procedure, part of the bone is removed and replaced with an implant to let the toe move again.  · Flexor tendon transfer. In this procedure, the tendons that curl the toes down (flexor tendons) are repositioned.  Follow these instructions at home:  · Take over-the-counter and prescription medicines only as told by your  health care provider.  · Do toe-straightening and stretching exercises as told by your health care provider.  · Keep all follow-up visits as told by your health care provider. This is important.  How is this prevented?  · Wear shoes that give your toes enough room and do not cause pain.  · Wear shoes that fit properly. Buy shoes at the end of the day to make sure they fit well, since your foot may swell during the day.  · Do not wear high-heeled shoes.  Contact a health care provider if:  · Your pain gets worse.  · Your toe becomes red or swollen.  · You develop an open sore on your toe.  Get help right away if:  Summary  · Hammer toe is a gradually progressing condition that causes your toe to become bent and stiff.  · At first, hammer toe can be treated by taping the toe into a straightened position and doing toe-stretching exercises. If these treatments do not help, surgery may be needed.  · To prevent this condition, wear shoes that fit properly and give your toes enough room. Do not wear high-heeled shoes.  This information is not intended to replace advice given to you by your health care provider. Make sure you discuss any questions you have with your health care provider.  Document Revised: 11/05/2020 Document Reviewed: 11/05/2020  "[employer] Patient Education © 2021 "[employer] Inc.    Fungal Nail Infection  A fungal nail infection is a common infection of the toenails or fingernails. This condition affects toenails more often than fingernails. It often affects the great, or big, toes. More than one nail may be infected. The condition can be passed from person to person (is contagious).  What are the causes?  This condition is caused by a fungus. Several types of fungi can cause the infection. These fungi are common in moist and warm areas. If your hands or feet come into contact with the fungus, it may get into a crack in your fingernail or toenail and cause the infection.  What increases the risk?  The  following factors may make you more likely to develop this condition:  · Being male.  · Being of older age.  · Living with someone who has the fungus.  · Walking barefoot in areas where the fungus thrives, such as showers or locker rooms.  · Wearing shoes and socks that cause your feet to sweat.  · Having a nail injury or a recent nail surgery.  · Having certain medical conditions, such as:  ? Athlete's foot.  ? Diabetes.  ? Psoriasis.  ? Poor circulation.  ? A weak body defense system (immune system).  What are the signs or symptoms?  Symptoms of this condition include:  · A pale spot on the nail.  · Thickening of the nail.  · A nail that becomes yellow or brown.  · A brittle or ragged nail edge.  · A crumbling nail.  · A nail that has lifted away from the nail bed.  How is this diagnosed?  This condition is diagnosed with a physical exam. Your health care provider may take a scraping or clipping from your nail to test for the fungus.  How is this treated?  Treatment is not needed for mild infections. If you have significant nail changes, treatment may include:  · Antifungal medicines taken by mouth (orally). You may need to take the medicine for several weeks or several months, and you may not see the results for a long time. These medicines can cause side effects. Ask your health care provider what problems to watch for.  · Antifungal nail polish or nail cream. These may be used along with oral antifungal medicines.  · Laser treatment of the nail.  · Surgery to remove the nail. This may be needed for the most severe infections.  It can take a long time, usually up to a year, for the infection to go away. The infection may also come back.  Follow these instructions at home:  Medicines  · Take or apply over-the-counter and prescription medicines only as told by your health care provider.  · Ask your health care provider about using over-the-counter mentholated ointment on your nails.  Nail care  · Trim your nails  often.  · Wash and dry your hands and feet every day.  · Keep your feet dry:  ? Wear absorbent socks, and change your socks frequently.  ? Wear shoes that allow air to circulate, such as sandals or canvas tennis shoes. Throw out old shoes.  · Do not use artificial nails.  · If you go to a nail salon, make sure you choose one that uses clean instruments.  · Use antifungal foot powder on your feet and in your shoes.  General instructions  · Do not share personal items, such as towels or nail clippers.  · Do not walk barefoot in shower rooms or locker rooms.  · Wear rubber gloves if you are working with your hands in wet areas.  · Keep all follow-up visits as told by your health care provider. This is important.  Contact a health care provider if:  Your infection is not getting better or it is getting worse after several months.  Summary  · A fungal nail infection is a common infection of the toenails or fingernails.  · Treatment is not needed for mild infections. If you have significant nail changes, treatment may include taking medicine orally and applying medicine to your nails.  · It can take a long time, usually up to a year, for the infection to go away. The infection may also come back.  · Take or apply over-the-counter and prescription medicines only as told by your health care provider.  · Follow instructions for taking care of your nails to help prevent infection from coming back or spreading.  This information is not intended to replace advice given to you by your health care provider. Make sure you discuss any questions you have with your health care provider.  Document Revised: 04/09/2020 Document Reviewed: 05/24/2019  [employer] Patient Education © 2021 [employer] Inc.
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
## 2021-05-30 PROBLEM — S80.12XA TRAUMATIC ECCHYMOSIS OF LEFT LOWER LEG: Status: RESOLVED | Noted: 2019-07-01 | Resolved: 2021-05-30

## 2021-05-30 PROBLEM — J45.909 ASTHMA: Chronic | Status: ACTIVE | Noted: 2018-04-23

## 2021-05-30 PROBLEM — I25.10 CAD (CORONARY ARTERY DISEASE): Chronic | Status: ACTIVE | Noted: 2018-04-23

## 2021-05-30 PROBLEM — K21.9 GERD (GASTROESOPHAGEAL REFLUX DISEASE): Chronic | Status: ACTIVE | Noted: 2018-04-23

## 2021-05-30 PROBLEM — L03.116 CELLULITIS OF LEFT LOWER EXTREMITY: Status: RESOLVED | Noted: 2019-07-01 | Resolved: 2021-05-30

## 2021-05-30 PROBLEM — N48.1 BALANITIS: Status: RESOLVED | Noted: 2020-10-28 | Resolved: 2021-05-30

## 2021-05-30 PROBLEM — E78.5 HYPERLIPIDEMIA LDL GOAL <100: Chronic | Status: ACTIVE | Noted: 2021-05-04

## 2021-05-30 PROBLEM — R10.30 LOWER ABDOMINAL PAIN: Status: RESOLVED | Noted: 2018-04-23 | Resolved: 2021-05-30

## 2021-05-30 PROBLEM — N48.89 PENIS PAIN: Status: RESOLVED | Noted: 2020-10-28 | Resolved: 2021-05-30

## 2021-05-30 RX ORDER — CARVEDILOL 6.25 MG/1
6.25 TABLET ORAL 2 TIMES DAILY
Qty: 180 TABLET | Refills: 1 | Status: SHIPPED | OUTPATIENT
Start: 2021-05-30 | End: 2021-09-16 | Stop reason: SDUPTHER

## 2021-05-30 RX ORDER — FUROSEMIDE 20 MG/1
20 TABLET ORAL 2 TIMES DAILY
Qty: 180 TABLET | Refills: 0 | Status: SHIPPED | OUTPATIENT
Start: 2021-05-30 | End: 2021-09-16 | Stop reason: SDUPTHER

## 2021-05-30 RX ORDER — ALBUTEROL SULFATE 90 UG/1
2 AEROSOL, METERED RESPIRATORY (INHALATION) EVERY 4 HOURS PRN
Qty: 18 G | Refills: 1 | Status: SHIPPED | OUTPATIENT
Start: 2021-05-30 | End: 2021-09-16 | Stop reason: SDUPTHER

## 2021-06-01 ENCOUNTER — TELEPHONE (OUTPATIENT)
Dept: FAMILY MEDICINE CLINIC | Facility: CLINIC | Age: 76
End: 2021-06-01

## 2021-06-01 DIAGNOSIS — I10 ESSENTIAL HYPERTENSION: ICD-10-CM

## 2021-06-01 DIAGNOSIS — R60.9 PERIPHERAL EDEMA: Primary | ICD-10-CM

## 2021-06-01 DIAGNOSIS — B18.2 CHRONIC HEPATITIS C WITHOUT HEPATIC COMA (HCC): ICD-10-CM

## 2021-06-01 LAB
ALBUMIN SERPL-MCNC: 4.3 G/DL (ref 3.7–4.7)
ALBUMIN/GLOB SERPL: 1.4 {RATIO} (ref 1.2–2.2)
ALP SERPL-CCNC: 81 IU/L (ref 48–121)
ALT SERPL-CCNC: 14 IU/L (ref 0–44)
AST SERPL-CCNC: 24 IU/L (ref 0–40)
BILIRUB SERPL-MCNC: <0.2 MG/DL (ref 0–1.2)
BUN SERPL-MCNC: 15 MG/DL (ref 8–27)
BUN/CREAT SERPL: 14 (ref 10–24)
CALCIUM SERPL-MCNC: 8.8 MG/DL (ref 8.6–10.2)
CHLORIDE SERPL-SCNC: 95 MMOL/L (ref 96–106)
CO2 SERPL-SCNC: 31 MMOL/L (ref 20–29)
CREAT SERPL-MCNC: 1.1 MG/DL (ref 0.76–1.27)
GLOBULIN SER CALC-MCNC: 3.1 G/DL (ref 1.5–4.5)
GLUCOSE SERPL-MCNC: ABNORMAL MG/DL
NT-PROBNP SERPL-MCNC: 246 PG/ML (ref 0–376)
POTASSIUM SERPL-SCNC: ABNORMAL MMOL/L
PROT SERPL-MCNC: 7.4 G/DL (ref 6–8.5)
SODIUM SERPL-SCNC: 139 MMOL/L (ref 134–144)

## 2021-06-01 NOTE — TELEPHONE ENCOUNTER
Caller: Ronald Bond    Relationship: Self    Best call back number: [phone]    What is the best time to reach you: ANY    Who are you requesting to speak with (clinical staff, provider,  specific staff member): CLINICAL    What was the call regarding: PATIENT WOULD LIKE TO KNOW IF HE STILL NEEDS TO KEEP A REFERRAL TO BE CHECKED FOR HEP C     Do you require a callback: YES

## 2021-06-04 NOTE — PROGRESS NOTES
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 Patient was last seen in our office on 8/5.  He was supposed to have an echo done but did not show up for this.  His amlodipine was changed to carvedilol due to lower extremity edema.  He reports that his lower extremity edema has continued to persist.  He reports frequent urination with Lasix, however, edema still persist.  Edema resolves in the morning and improves if he removes his socks.   He notes a history of stage III liver disease and wonders if this has contributed.  He has no history of known CAD.  He denies any chest pain, shortness of breath, palpitations.  He tells me his main physical limitation is his bursitis and leg weakness.  He reports she has chronic pain and admits that he has taken some extra methadone recently. He says this was his friends and it was in liquid form, not sure how much he took. He says he will occasionally double up on his tablets also    Objective     Vital Signs:   Vitals:    06/07/21 0901 06/07/21 0905 06/07/21 0906 06/07/21 1117   BP: (!) 203/98 (!) 186/102 (!) 202/90 160/100   BP Location: Right arm Left arm Left arm Left arm   Patient Position: Sitting Standing Sitting Sitting   Cuff Size: Adult Adult Adult Adult   Pulse: 78 84 76    Resp:   20    Temp:   97.8 °F (36.6 °C)    TempSrc:   Temporal    SpO2: 97% 97% 95%    Weight:   100 kg (221 lb 4 oz)    Height:   176.5 cm (69.5\")      Body mass " index is 32.2 kg/m².  Physical Exam  Vitals reviewed.   Constitutional:       Appearance: Normal appearance.   HENT:      Head: Normocephalic.   Eyes:      Extraocular Movements: Extraocular movements intact.      Pupils: Pupils are equal, round, and reactive to light.   Neck:      Vascular: No carotid bruit.   Cardiovascular:      Rate and Rhythm: Normal rate and regular rhythm.      Pulses: Normal pulses.      Heart sounds: Normal heart sounds, S1 normal and S2 normal. No murmur heard.     Pulmonary:      Effort: Pulmonary effort is normal. No respiratory distress.      Breath sounds: Normal breath sounds.   Chest:      Chest wall: No tenderness.   Abdominal:      General: Abdomen is flat. Bowel sounds are normal.      Palpations: Abdomen is soft.   Musculoskeletal:      Cervical back: Neck supple.      Right lower le+ Edema present.      Left lower le+ Edema present.   Skin:     General: Skin is warm and dry.   Neurological:      General: No focal deficit present.      Mental Status: He is alert and oriented to person, place, and time. Mental status is at baseline.   Psychiatric:         Mood and Affect: Mood normal.         Behavior: Behavior normal.         Thought Content: Thought content normal.              Result Review  Data Reviewed:{ Labs  Result Review  Imaging  Med Tab  Media :23}   SCANNED EKG (2020)  proBNP (2021 10:03)  Comprehensive Metabolic Panel (2021 09:59)  PSA Screen (2021 14:27)  Lipid Panel (2021 14:27)  Comprehensive Metabolic Panel (2021 14:27)  EKG today shows normal sinus rhythm, prolonged QT, QT interval 509  Consultant notes Concetta FOREMAN            Assessment and Plan {CC Problem List  Visit Diagnosis  ROS  Review (Popup)  Health Maintenance  Quality  BestPractice  Medications  SmartSets  SnapShot Encounters  Media :23}   1. Bilateral lower extremity edema  Suspect that BLE is not cardiac related and probably  secondary to chronic venous insufficiency and possibly due to underlying liver disease.  His proBNPs have been normal.  Will reorder echo  Recommend compression stockings, 20 to 30 mmHg daily and elevation of legs when at rest  - Adult Transthoracic Echo Complete W/ Cont if Necessary Per Protocol; Future    2. Accelerated hypertension  Clonidine 0.1 mg given in clinic today with appropriate reduction in systolic blood pressure.  His previous blood pressures have been controlled.  He has a blood pressure cuff at home but says he does not monitor.  He says he is very nervous today.  I have advised him to monitor his blood pressure regularly at home and call stop blood pressures are consistently greater than 140    3. Prolonged Q-T interval on ECG  Likely secondary to methadone and Paxil use.  Would highly recommend that he consider reducing his methadone dose.  He says he cannot do this due to his chronic pain.  I have strongly advised him to not take any additional methadone and discussed the risks of deadly arrhythmias associated with this.  He says he is willing to stop his Paxil, I have advised him not to do this abruptly and will discuss other options with his PCP, such as Cymbalta.  If he continues on the methadone would recommend regular EKGs about every 3 to 6 months    4. ORTEGA (dyspnea on exertion)  Very mild, he says that his physical limitation is more related to his pain  - ECG 12 Lead; Future  - Adult Transthoracic Echo Complete W/ Cont if Necessary Per Protocol; Future        Follow Up {Instructions Charge Capture  Follow-up Communications :23}   Return if symptoms worsen or fail to improve.    Patient was given instructions and counseling regarding his condition or for health maintenance advice. Please see specific information pulled into the AVS if appropriate.  Patient was instructed to call the [facility] with any questions, concerns, or worsening symptoms.    *Please note that portions  of this note were completed with a voice recognition program. Efforts were made to edit the dictations, but occasionally words are mistranscribed.

## 2021-06-07 ENCOUNTER — HOSPITAL ENCOUNTER (OUTPATIENT)
Dept: CARDIOLOGY | Facility: HOSPITAL | Age: 76
Discharge: HOME | End: 2021-06-07

## 2021-06-07 ENCOUNTER — OFFICE VISIT (OUTPATIENT)
Dept: CARDIOLOGY | Facility: HOSPITAL | Age: 76
End: 2021-06-07

## 2021-06-07 VITALS
SYSTOLIC BLOOD PRESSURE: 160 MMHG | HEART RATE: 76 BPM | RESPIRATION RATE: 20 BRPM | DIASTOLIC BLOOD PRESSURE: 100 MMHG | BODY MASS INDEX: 31.68 KG/M2 | WEIGHT: 221.25 LBS | TEMPERATURE: 97.8 F | OXYGEN SATURATION: 95 % | HEIGHT: 70 IN

## 2021-06-07 DIAGNOSIS — R94.31 PROLONGED Q-T INTERVAL ON ECG: ICD-10-CM

## 2021-06-07 DIAGNOSIS — R06.09 DOE (DYSPNEA ON EXERTION): ICD-10-CM

## 2021-06-07 DIAGNOSIS — I10 ACCELERATED HYPERTENSION: ICD-10-CM

## 2021-06-07 DIAGNOSIS — R60.0 BILATERAL LOWER EXTREMITY EDEMA: Primary | ICD-10-CM

## 2021-06-07 LAB
QT INTERVAL: 450 MS
QTC INTERVAL: 509 MS

## 2021-06-07 PROCEDURE — 93010 ELECTROCARDIOGRAM REPORT: CPT | Performed by: INTERNAL MEDICINE

## 2021-06-07 PROCEDURE — 99214 OFFICE O/P EST MOD 30 MIN: CPT | Performed by: NURSE PRACTITIONER

## 2021-06-07 PROCEDURE — 93005 ELECTROCARDIOGRAM TRACING: CPT | Performed by: NURSE PRACTITIONER

## 2021-06-09 ENCOUNTER — TELEPHONE (OUTPATIENT)
Dept: FAMILY MEDICINE CLINIC | Facility: CLINIC | Age: 76
End: 2021-06-09

## 2021-06-09 ENCOUNTER — TELEPHONE (OUTPATIENT)
Dept: ENDOCRINOLOGY | Facility: CLINIC | Age: 76
End: 2021-06-09

## 2021-06-09 DIAGNOSIS — F41.8 DEPRESSION WITH ANXIETY: Primary | ICD-10-CM

## 2021-06-09 RX ORDER — DULOXETIN HYDROCHLORIDE 30 MG/1
30 CAPSULE, DELAYED RELEASE ORAL DAILY
Qty: 30 CAPSULE | Refills: 3 | Status: SHIPPED | OUTPATIENT
Start: 2021-06-23 | End: 2021-09-16 | Stop reason: SDUPTHER

## 2021-06-09 RX ORDER — PAROXETINE HYDROCHLORIDE 20 MG/1
20 TABLET, FILM COATED ORAL EVERY MORNING
Qty: 14 TABLET | Refills: 0
Start: 2021-06-09 | End: 2021-07-02

## 2021-06-09 NOTE — TELEPHONE ENCOUNTER
06/09/2021  Called patient regarding his message.  He states his legs are swollen and that they are somewhat discolored more redness  at the ankles.  He denies any heat, or pain.  He denies chest pain or severe shortness of breath.  He was seen 06/07/2021 he does have pending test for echo.  And has a follow-up with . Dallas Jaquez has a history of hep C.   I did receive a message from Selena Vences.  His QT on his EKG has longer.  Recommending to decrease and stop Paxil and start Cymbalta.  Discussed with patient he is agreeable to do so.  Discussed cutting his pill in half taking one half a tablet for 2 weeks then stopping.  Will order Cymbalta start in 2 weeks.  He is to go to the emergency room for any severe changes in his legs for chest pain or shortness of air.  Otherwise he is to continue his current medications get his testing done and follow-up with GI as planned.  He is to follow-up with PCP in 2 weeks.            PATIENT SAID THAT HIS LEGS HAVE BEEN SWELLING AND RETAINING FLUIDS. HE IS CONCERNED AND WOULD LIKE A CALLBACK TO DISCUSS THIS.    CONTACT: [phone]

## 2021-06-09 NOTE — TELEPHONE ENCOUNTER
PT CALLED STATING THAT HIS LEGS ARE SWOLLEN AND HE DOES NOT FEEL WELL. HE CXLD HIS APPT TODAY BUT WOULD LIEK FOR SOMEONE TO REACH OUT TO HIM FOR SOME CLINICAL ADVICE. PLEASE REACH OUT TO PT AT EARLIEST CONVENIENCE. THANK YOU

## 2021-06-09 NOTE — TELEPHONE ENCOUNTER
Pharmacy Name:  [facility]    Pharmacy representative name: PHARMACIST     Pharmacy representative phone number: [phone]    What medication are you calling in regards to: PAXIL    What question does the pharmacy have: IS THE PRESCRIPTION  THAT WAS SENT TO THE PHARMACY SUPPOSE TO BE PRESCRIBED ON TOP OF THE PAXIL    Who is the provider that prescribed the medication: KELY PONCE

## 2021-06-15 DIAGNOSIS — R07.89 CHEST TIGHTNESS: ICD-10-CM

## 2021-06-15 DIAGNOSIS — Z76.0 MEDICATION REFILL: ICD-10-CM

## 2021-06-15 DIAGNOSIS — J22 ACUTE LOWER RESPIRATORY INFECTION: ICD-10-CM

## 2021-06-15 DIAGNOSIS — I10 ESSENTIAL HYPERTENSION: ICD-10-CM

## 2021-06-15 RX ORDER — ALBUTEROL SULFATE 90 UG/1
2 AEROSOL, METERED RESPIRATORY (INHALATION) EVERY 4 HOURS PRN
Qty: 18 G | Refills: 1 | Status: CANCELLED | OUTPATIENT
Start: 2021-06-15

## 2021-06-15 RX ORDER — CARVEDILOL 6.25 MG/1
6.25 TABLET ORAL 2 TIMES DAILY
Qty: 180 TABLET | Refills: 1 | Status: CANCELLED | OUTPATIENT
Start: 2021-06-15

## 2021-06-15 RX ORDER — FUROSEMIDE 20 MG/1
20 TABLET ORAL 2 TIMES DAILY
Qty: 180 TABLET | Refills: 0 | Status: CANCELLED | OUTPATIENT
Start: 2021-06-15 | End: 2021-09-13

## 2021-06-23 ENCOUNTER — APPOINTMENT (OUTPATIENT)
Dept: CARDIOLOGY | Facility: HOSPITAL | Age: 76
End: 2021-06-23

## 2021-06-28 ENCOUNTER — HOSPITAL ENCOUNTER (OUTPATIENT)
Dept: CARDIOLOGY | Facility: HOSPITAL | Age: 76
Discharge: HOME | End: 2021-06-28
Admitting: NURSE PRACTITIONER

## 2021-06-28 VITALS — HEIGHT: 69 IN | BODY MASS INDEX: 32.73 KG/M2 | WEIGHT: 221 LBS

## 2021-06-28 DIAGNOSIS — R60.0 BILATERAL LOWER EXTREMITY EDEMA: ICD-10-CM

## 2021-06-28 DIAGNOSIS — R06.09 DOE (DYSPNEA ON EXERTION): ICD-10-CM

## 2021-06-28 PROCEDURE — 93306 TTE W/DOPPLER COMPLETE: CPT

## 2021-06-29 ENCOUNTER — TELEPHONE (OUTPATIENT)
Dept: CARDIOLOGY | Facility: HOSPITAL | Age: 76
End: 2021-06-29

## 2021-06-29 DIAGNOSIS — G47.9 SLEEP DISTURBANCE: Primary | ICD-10-CM

## 2021-06-29 DIAGNOSIS — I51.7 RIGHT VENTRICULAR DILATION: ICD-10-CM

## 2021-06-29 LAB
ASCENDING AORTA: 3.9 CM
BH CV ECHO MEAS - AO MAX PG (FULL): 10.8 MMHG
BH CV ECHO MEAS - AO MAX PG: 13.7 MMHG
BH CV ECHO MEAS - AO MEAN PG (FULL): 5.7 MMHG
BH CV ECHO MEAS - AO MEAN PG: 7.1 MMHG
BH CV ECHO MEAS - AO ROOT AREA (BSA CORRECTED): 1.7
BH CV ECHO MEAS - AO ROOT AREA: 10.9 CM^2
BH CV ECHO MEAS - AO ROOT DIAM: 3.7 CM
BH CV ECHO MEAS - AO V2 MAX: 185.2 CM/SEC
BH CV ECHO MEAS - AO V2 MEAN: 128.5 CM/SEC
BH CV ECHO MEAS - AO V2 VTI: 39.8 CM
BH CV ECHO MEAS - ASC AORTA: 3.8 CM
BH CV ECHO MEAS - AVA(I,A): 1.6 CM^2
BH CV ECHO MEAS - AVA(I,D): 1.6 CM^2
BH CV ECHO MEAS - AVA(V,A): 1.4 CM^2
BH CV ECHO MEAS - AVA(V,D): 1.4 CM^2
BH CV ECHO MEAS - BSA(HAYCOCK): 2.2 M^2
BH CV ECHO MEAS - BSA: 2.2 M^2
BH CV ECHO MEAS - BZI_BMI: 32.7 KILOGRAMS/M^2
BH CV ECHO MEAS - BZI_METRIC_HEIGHT: 175.3 CM
BH CV ECHO MEAS - BZI_METRIC_WEIGHT: 100.4 KG
BH CV ECHO MEAS - EDV(CUBED): 82.2 ML
BH CV ECHO MEAS - EDV(MOD-SP2): 96 ML
BH CV ECHO MEAS - EDV(MOD-SP4): 109 ML
BH CV ECHO MEAS - EDV(TEICH): 85.3 ML
BH CV ECHO MEAS - EF(CUBED): 49.9 %
BH CV ECHO MEAS - EF(MOD-BP): 54 %
BH CV ECHO MEAS - EF(MOD-SP2): 60.4 %
BH CV ECHO MEAS - EF(MOD-SP4): 46.8 %
BH CV ECHO MEAS - EF(TEICH): 42.2 %
BH CV ECHO MEAS - ESV(CUBED): 41.2 ML
BH CV ECHO MEAS - ESV(MOD-SP2): 38 ML
BH CV ECHO MEAS - ESV(MOD-SP4): 58 ML
BH CV ECHO MEAS - ESV(TEICH): 49.3 ML
BH CV ECHO MEAS - FS: 20.6 %
BH CV ECHO MEAS - IVS/LVPW: 1.4
BH CV ECHO MEAS - IVSD: 1.7 CM
BH CV ECHO MEAS - LA DIMENSION: 3.8 CM
BH CV ECHO MEAS - LA/AO: 1
BH CV ECHO MEAS - LAD MAJOR: 6.5 CM
BH CV ECHO MEAS - LAT PEAK E' VEL: 6.6 CM/SEC
BH CV ECHO MEAS - LATERAL E/E' RATIO: 11.5
BH CV ECHO MEAS - LV DIASTOLIC VOL/BSA (35-75): 50.6 ML/M^2
BH CV ECHO MEAS - LV IVRT: 0.1 SEC
BH CV ECHO MEAS - LV MASS(C)D: 259.4 GRAMS
BH CV ECHO MEAS - LV MASS(C)DI: 120.3 GRAMS/M^2
BH CV ECHO MEAS - LV MAX PG: 3 MMHG
BH CV ECHO MEAS - LV MEAN PG: 1.4 MMHG
BH CV ECHO MEAS - LV SYSTOLIC VOL/BSA (12-30): 26.9 ML/M^2
BH CV ECHO MEAS - LV V1 MAX: 85.9 CM/SEC
BH CV ECHO MEAS - LV V1 MEAN: 53.3 CM/SEC
BH CV ECHO MEAS - LV V1 VTI: 21 CM
BH CV ECHO MEAS - LVIDD: 4.3 CM
BH CV ECHO MEAS - LVIDS: 3.5 CM
BH CV ECHO MEAS - LVLD AP2: 7.3 CM
BH CV ECHO MEAS - LVLD AP4: 7.3 CM
BH CV ECHO MEAS - LVLS AP2: 6.1 CM
BH CV ECHO MEAS - LVLS AP4: 6.1 CM
BH CV ECHO MEAS - LVOT AREA (M): 3.1 CM^2
BH CV ECHO MEAS - LVOT AREA: 3.1 CM^2
BH CV ECHO MEAS - LVOT DIAM: 2 CM
BH CV ECHO MEAS - LVPWD: 1.3 CM
BH CV ECHO MEAS - MED PEAK E' VEL: 6.1 CM/SEC
BH CV ECHO MEAS - MEDIAL E/E' RATIO: 12.3
BH CV ECHO MEAS - MV A MAX VEL: 84.4 CM/SEC
BH CV ECHO MEAS - MV DEC SLOPE: 193.1 CM/SEC^2
BH CV ECHO MEAS - MV DEC TIME: 0.3 SEC
BH CV ECHO MEAS - MV E MAX VEL: 77.5 CM/SEC
BH CV ECHO MEAS - MV E/A: 0.92
BH CV ECHO MEAS - MV MAX PG: 4.2 MMHG
BH CV ECHO MEAS - MV MEAN PG: 2 MMHG
BH CV ECHO MEAS - MV P1/2T MAX VEL: 89.3 CM/SEC
BH CV ECHO MEAS - MV P1/2T: 135.4 MSEC
BH CV ECHO MEAS - MV V2 MAX: 102.6 CM/SEC
BH CV ECHO MEAS - MV V2 MEAN: 67 CM/SEC
BH CV ECHO MEAS - MV V2 VTI: 38.2 CM
BH CV ECHO MEAS - MVA P1/2T LCG: 2.5 CM^2
BH CV ECHO MEAS - MVA(P1/2T): 1.6 CM^2
BH CV ECHO MEAS - MVA(VTI): 1.7 CM^2
BH CV ECHO MEAS - PA ACC SLOPE: 368 CM/SEC^2
BH CV ECHO MEAS - PA ACC TIME: 0.14 SEC
BH CV ECHO MEAS - PA MAX PG: 3.1 MMHG
BH CV ECHO MEAS - PA PR(ACCEL): 15.6 MMHG
BH CV ECHO MEAS - PA V2 MAX: 88.6 CM/SEC
BH CV ECHO MEAS - PI END-D VEL: 124.9 CM/SEC
BH CV ECHO MEAS - RAP SYSTOLE: 8 MMHG
BH CV ECHO MEAS - RVSP: 27 MMHG
BH CV ECHO MEAS - SI(AO): 200.6 ML/M^2
BH CV ECHO MEAS - SI(CUBED): 19 ML/M^2
BH CV ECHO MEAS - SI(LVOT): 29.8 ML/M^2
BH CV ECHO MEAS - SI(MOD-SP2): 26.9 ML/M^2
BH CV ECHO MEAS - SI(MOD-SP4): 23.7 ML/M^2
BH CV ECHO MEAS - SI(TEICH): 16.7 ML/M^2
BH CV ECHO MEAS - SV(AO): 432.6 ML
BH CV ECHO MEAS - SV(CUBED): 41 ML
BH CV ECHO MEAS - SV(LVOT): 64.2 ML
BH CV ECHO MEAS - SV(MOD-SP2): 58 ML
BH CV ECHO MEAS - SV(MOD-SP4): 51 ML
BH CV ECHO MEAS - SV(TEICH): 36 ML
BH CV ECHO MEAS - TAPSE (>1.6): 2.5 CM
BH CV ECHO MEAS - TR MAX PG: 19 MMHG
BH CV ECHO MEAS - TR MAX VEL: 216.2 CM/SEC
BH CV ECHO MEASUREMENTS AVERAGE E/E' RATIO: 12.2
BH CV VAS BP LEFT ARM: NORMAL MMHG
BH CV XLRA - RV BASE: 4.6 CM
BH CV XLRA - RV LENGTH: 6.5 CM
BH CV XLRA - RV MID: 3.8 CM
IVRT: 95 MSEC
LEFT ATRIUM VOLUME INDEX: 43.1 ML/M^2
LEFT ATRIUM VOLUME: 93 ML
LV EF 2D ECHO EST: 50 %
MAXIMAL PREDICTED HEART RATE: 146 BPM
STRESS TARGET HR: 124 BPM

## 2021-06-29 NOTE — TELEPHONE ENCOUNTER
Called patient with echo results. LVEF normal, no significant valve disorders. RV is moderately dilated but normal RV systolic function and normal RVSP. Moderate LAE. He has a history of hepatitis and is seeing GI in the near future and wonders if his swelling is related to his liver, I explained to him that this certainly could be probable. At this time, I have recommended that he have routine EKGs with his PCP or pain medicine to monitor QT interval on methadone. He does note that he stopped his paxil. I have sent message to Eri FOREMAN.  Advised patient to call with any new or worsening symptoms.  Encouraged to monitor for symptoms such as palpitations, worsening shortness of breath or chest pain.  He currently denies any of this. Will refer for sleep study due to RV enlargement, BLE edema, labile HTN and reported low oxygen sats when he sleeps.  Patient agrees to proceed and has no further questions or concerns

## 2021-07-02 ENCOUNTER — OFFICE VISIT (OUTPATIENT)
Dept: FAMILY MEDICINE CLINIC | Facility: CLINIC | Age: 76
End: 2021-07-02

## 2021-07-02 VITALS
BODY MASS INDEX: 32.85 KG/M2 | HEIGHT: 69 IN | DIASTOLIC BLOOD PRESSURE: 80 MMHG | WEIGHT: 221.8 LBS | SYSTOLIC BLOOD PRESSURE: 160 MMHG | TEMPERATURE: 97.8 F | OXYGEN SATURATION: 97 % | HEART RATE: 68 BPM | RESPIRATION RATE: 16 BRPM

## 2021-07-02 DIAGNOSIS — J45.31 MILD PERSISTENT ASTHMATIC BRONCHITIS WITH ACUTE EXACERBATION: Primary | ICD-10-CM

## 2021-07-02 DIAGNOSIS — R53.83 FATIGUE, UNSPECIFIED TYPE: ICD-10-CM

## 2021-07-02 LAB
ALBUMIN SERPL-MCNC: 4.4 G/DL (ref 3.5–5.2)
ALBUMIN/GLOB SERPL: 1.4 G/DL
ALP SERPL-CCNC: 78 U/L (ref 39–117)
ALT SERPL-CCNC: 18 U/L (ref 1–41)
AST SERPL-CCNC: 22 U/L (ref 1–40)
BASOPHILS # BLD AUTO: 0.05 10*3/MM3 (ref 0–0.2)
BASOPHILS NFR BLD AUTO: 0.7 % (ref 0–1.5)
BILIRUB BLD-MCNC: NEGATIVE MG/DL
BILIRUB SERPL-MCNC: 0.4 MG/DL (ref 0–1.2)
BUN SERPL-MCNC: 16 MG/DL (ref 8–23)
BUN/CREAT SERPL: 14.5 (ref 7–25)
CALCIUM SERPL-MCNC: 9.8 MG/DL (ref 8.6–10.5)
CHLORIDE SERPL-SCNC: 96 MMOL/L (ref 98–107)
CLARITY, POC: CLEAR
CO2 SERPL-SCNC: 32.9 MMOL/L (ref 22–29)
COLOR UR: YELLOW
CREAT SERPL-MCNC: 1.1 MG/DL (ref 0.76–1.27)
EOSINOPHIL # BLD AUTO: 0.52 10*3/MM3 (ref 0–0.4)
EOSINOPHIL NFR BLD AUTO: 7.2 % (ref 0.3–6.2)
ERYTHROCYTE [DISTWIDTH] IN BLOOD BY AUTOMATED COUNT: 13.2 % (ref 12.3–15.4)
EXPIRATION DATE: ABNORMAL
GLOBULIN SER CALC-MCNC: 3.1 GM/DL
GLUCOSE SERPL-MCNC: 105 MG/DL (ref 65–99)
GLUCOSE UR STRIP-MCNC: NEGATIVE MG/DL
HCT VFR BLD AUTO: 36.9 % (ref 37.5–51)
HGB BLD-MCNC: 12.5 G/DL (ref 13–17.7)
IMM GRANULOCYTES # BLD AUTO: 0.02 10*3/MM3 (ref 0–0.05)
IMM GRANULOCYTES NFR BLD AUTO: 0.3 % (ref 0–0.5)
KETONES UR QL: NEGATIVE
LEUKOCYTE EST, POC: NEGATIVE
LYMPHOCYTES # BLD AUTO: 1.25 10*3/MM3 (ref 0.7–3.1)
LYMPHOCYTES NFR BLD AUTO: 17.4 % (ref 19.6–45.3)
Lab: ABNORMAL
MCH RBC QN AUTO: 31.5 PG (ref 26.6–33)
MCHC RBC AUTO-ENTMCNC: 33.9 G/DL (ref 31.5–35.7)
MCV RBC AUTO: 92.9 FL (ref 79–97)
MONOCYTES # BLD AUTO: 0.84 10*3/MM3 (ref 0.1–0.9)
MONOCYTES NFR BLD AUTO: 11.7 % (ref 5–12)
NEUTROPHILS # BLD AUTO: 4.52 10*3/MM3 (ref 1.7–7)
NEUTROPHILS NFR BLD AUTO: 62.7 % (ref 42.7–76)
NITRITE UR-MCNC: NEGATIVE MG/ML
NRBC BLD AUTO-RTO: 0 /100 WBC (ref 0–0.2)
PH UR: 6.5 [PH] (ref 5–8)
PLATELET # BLD AUTO: 212 10*3/MM3 (ref 140–450)
POTASSIUM SERPL-SCNC: 5 MMOL/L (ref 3.5–5.2)
PROT SERPL-MCNC: 7.5 G/DL (ref 6–8.5)
PROT UR STRIP-MCNC: NEGATIVE MG/DL
RBC # BLD AUTO: 3.97 10*6/MM3 (ref 4.14–5.8)
RBC # UR STRIP: ABNORMAL /UL
SODIUM SERPL-SCNC: 137 MMOL/L (ref 136–145)
SP GR UR: 1.01 (ref 1–1.03)
UROBILINOGEN UR QL: NORMAL
VIT B12 SERPL-MCNC: 865 PG/ML (ref 211–946)
WBC # BLD AUTO: 7.2 10*3/MM3 (ref 3.4–10.8)

## 2021-07-02 PROCEDURE — 96372 THER/PROPH/DIAG INJ SC/IM: CPT | Performed by: NURSE PRACTITIONER

## 2021-07-02 PROCEDURE — 81003 URINALYSIS AUTO W/O SCOPE: CPT | Performed by: NURSE PRACTITIONER

## 2021-07-02 PROCEDURE — 99213 OFFICE O/P EST LOW 20 MIN: CPT | Performed by: NURSE PRACTITIONER

## 2021-07-02 RX ORDER — AMOXICILLIN AND CLAVULANATE POTASSIUM 875; 125 MG/1; MG/1
1 TABLET, FILM COATED ORAL 2 TIMES DAILY
Qty: 20 TABLET | Refills: 0 | Status: SHIPPED | OUTPATIENT
Start: 2021-07-02 | End: 2021-07-12

## 2021-07-02 RX ORDER — CEFTRIAXONE 1 G/1
1 INJECTION, POWDER, FOR SOLUTION INTRAMUSCULAR; INTRAVENOUS ONCE
Status: COMPLETED | OUTPATIENT
Start: 2021-07-02 | End: 2021-07-02

## 2021-07-02 RX ORDER — METHYLPREDNISOLONE ACETATE 80 MG/ML
60 INJECTION, SUSPENSION INTRA-ARTICULAR; INTRALESIONAL; INTRAMUSCULAR; SOFT TISSUE ONCE
Status: COMPLETED | OUTPATIENT
Start: 2021-07-02 | End: 2021-07-02

## 2021-07-02 RX ADMIN — METHYLPREDNISOLONE ACETATE 60 MG: 80 INJECTION, SUSPENSION INTRA-ARTICULAR; INTRALESIONAL; INTRAMUSCULAR; SOFT TISSUE at 10:46

## 2021-07-02 RX ADMIN — CEFTRIAXONE 1 G: 1 INJECTION, POWDER, FOR SOLUTION INTRAMUSCULAR; INTRAVENOUS at 10:40

## 2021-07-02 NOTE — PROGRESS NOTES
"Chief Complaint  Anxiety, Depression, Hypertension (med reill), and Cough    Subjective          Ronald Bond presents to [facility]  Patient is a 75-year-old male.  He is here for complaint of cough, productive green sputum.  Some shortness of breath with wheezing.    He has stopped the paxil - he has not started the cymbalta yet. Recommendation made by his cardiologist. He states he has been on lyrica in the past but has to stop due to the methadone - he goes to a methadone clinic.           Cough  This is a new problem. The current episode started in the past 7 days. The problem has been gradually worsening. The problem occurs every few minutes. The cough is productive of purulent sputum. Associated symptoms include nasal congestion, a sore throat and shortness of breath. Nothing aggravates the symptoms. He has tried nothing for the symptoms. The treatment provided mild relief. His past medical history is significant for asthma, bronchitis, environmental allergies and pneumonia.       Objective   Vital Signs:   /80   Pulse 68   Temp 97.8 °F (36.6 °C)   Resp 16   Ht 176.5 cm (69.49\")   Wt 101 kg (221 lb 12.8 oz)   SpO2 97%   BMI 32.29 kg/m²     Physical Exam  Vitals (he has not taken his blood pressure medication today ) reviewed.   HENT:      Head: Normocephalic.      Mouth/Throat:      Mouth: Mucous membranes are moist.      Dentition: Abnormal dentition.      Comments: He is edentulous and is difficult to understand.     Eyes:      Pupils: Pupils are equal, round, and reactive to light.   Cardiovascular:      Rate and Rhythm: Normal rate and regular rhythm.      Heart sounds: Normal heart sounds.   Pulmonary:      Breath sounds: Wheezing present.   Abdominal:      General: Bowel sounds are normal.      Palpations: Abdomen is soft.   Skin:     General: Skin is warm and dry.      Capillary Refill: Capillary refill takes less than 2 seconds.   Neurological:      " Mental Status: He is alert. Mental status is at baseline.      Gait: Gait abnormal.   Psychiatric:         Mood and Affect: Mood is anxious.         Behavior: Behavior is cooperative.         Thought Content: Thought content does not include suicidal ideation. Thought content does not include suicidal plan.        Result Review :                 Assessment and Plan    Diagnoses and all orders for this visit:    1. Mild persistent asthmatic bronchitis with acute exacerbation (Primary)  -     cefTRIAXone (ROCEPHIN) injection 1 g  -     methylPREDNISolone acetate (DEPO-medrol) injection 60 mg  -     CBC & Differential  -     Comprehensive Metabolic Panel  -     amoxicillin-clavulanate (Augmentin) 875-125 MG per tablet; Take 1 tablet by mouth 2 (Two) Times a Day for 10 days.  Dispense: 20 tablet; Refill: 0    2. Fatigue, unspecified type  -     POC Urinalysis Dipstick, Automated  -     Vitamin B12    use albuterol inhaler as ordered prn.   Follow up in 4 weeks for anxiety   Start the cymbalta -   Take the your carvedilol for twice a day as ordered. It will help your blood pressure.       Follow Up   Return in about 3 months (around 10/2/2021) for Recheck.  Patient was given instructions and counseling regarding his condition or for health maintenance advice. Please see specific information pulled into the AVS if appropriate.

## 2021-07-16 NOTE — PATIENT INSTRUCTIONS
Fatigue  If you have fatigue, you feel tired all the time and have a lack of energy or a lack of motivation. Fatigue may make it difficult to start or complete tasks because of exhaustion. In general, occasional or mild fatigue is often a normal response to activity or life. However, long-lasting (chronic) or extreme fatigue may be a symptom of a medical condition.  Follow these instructions at home:  General instructions  · Watch your fatigue for any changes.  · Go to bed and get up at the same time every day.  · Avoid fatigue by pacing yourself during the day and getting enough sleep at night.  · Maintain a healthy weight.  Medicines  · Take over-the-counter and prescription medicines only as told by your health care provider.  · Take a multivitamin, if told by your health care provider.   · Do not use herbal or dietary supplements unless they are approved by your health care provider.  Activity    · Exercise regularly, as told by your health care provider.  · Use or practice techniques to help you relax, such as yoga, chun chi, meditation, or massage therapy.  Eating and drinking    · Avoid heavy meals in the evening.  · Eat a well-balanced diet, which includes lean proteins, whole grains, plenty of fruits and vegetables, and low-fat dairy products.  · Avoid consuming too much caffeine.  · Avoid the use of alcohol.  · Drink enough fluid to keep your urine pale yellow.  Lifestyle  · Change situations that cause you stress. Try to keep your work and personal schedule in balance.  · Do not use any products that contain nicotine or tobacco, such as cigarettes and e-cigarettes. If you need help quitting, ask your health care provider.  · Do not use drugs.  Contact a health care provider if:  · Your fatigue does not get better.  · You have a fever.  · You suddenly lose or gain weight.  · You have headaches.  · You have trouble falling asleep or sleeping through the night.  · You feel angry, guilty, anxious, or  sad.  · You are unable to have a bowel movement (constipation).  · Your skin is dry.  · You have swelling in your legs or another part of your body.  Get help right away if:  · You feel confused.  · Your vision is blurry.  · You feel faint or you pass out.  · You have a severe headache.  · You have severe pain in your abdomen, your back, or the area between your waist and hips (pelvis).  · You have chest pain, shortness of breath, or an irregular or fast heartbeat.  · You are unable to urinate, or you urinate less than normal.  · You have abnormal bleeding, such as bleeding from the rectum, vagina, nose, lungs, or nipples.  · You vomit blood.  · You have thoughts about hurting yourself or others.  If you ever feel like you may hurt yourself or others, or have thoughts about taking your own life, get help right away. You can go to your nearest emergency department or call:  · Your local emergency services (911 in the [place]  · A suicide crisis helpline, such as the National Suicide Prevention Lifeline at 1-977.834.1695. This is open 24 hours a day.  Summary  · If you have fatigue, you feel tired all the time and have a lack of energy or a lack of motivation.  · Fatigue may make it difficult to start or complete tasks because of exhaustion.  · Long-lasting (chronic) or extreme fatigue may be a symptom of a medical condition.  · Exercise regularly, as told by your health care provider.  · Change situations that cause you stress. Try to keep your work and personal schedule in balance.  This information is not intended to replace advice given to you by your health care provider. Make sure you discuss any questions you have with your health care provider.  Document Revised: 07/08/2020 Document Reviewed: 09/12/2018  [employer] Patient Education © 2021 [employer]    Acute Bronchitis, Adult    Acute bronchitis is sudden or acute swelling of the air tubes (bronchi) in the lungs. Acute bronchitis causes these tubes to fill  with mucus, which can make it hard to breathe. It can also cause coughing or wheezing.  In adults, acute bronchitis usually goes away within 2 weeks. A cough caused by bronchitis may last up to 3 weeks. Smoking, allergies, and asthma can make the condition worse.  What are the causes?  This condition can be caused by germs and by substances that irritate the lungs, including:  · Cold and flu viruses. The most common cause of this condition is the virus that causes the common cold.  · Bacteria.  · Substances that irritate the lungs, including:  ? Smoke from cigarettes and other forms of tobacco.  ? Dust and pollen.  ? Fumes from chemical products, gases, or burned fuel.  ? Other materials that pollute indoor or outdoor air.  · Close contact with someone who has acute bronchitis.  What increases the risk?  The following factors may make you more likely to develop this condition:  · A weak body's defense system, also called the immune system.  · A condition that affects your lungs and breathing, such as asthma.  What are the signs or symptoms?  Common symptoms of this condition include:  · Lung and breathing problems, such as:  ? Coughing. This may bring up clear, yellow, or green mucus from your lungs (sputum).  ? Wheezing.  ? Having too much mucus in your lungs (chest congestion).  ? Having shortness of breath.  · A fever.  · Chills.  · Aches and pains, including:  ? Tightness in your chest and other body aches.  ? A sore throat.  How is this diagnosed?  This condition is usually diagnosed based on:  · Your symptoms and medical history.  · A physical exam.  You may also have other tests, including tests to rule out other conditions, such pneumonia. These tests include:  · A test of lung function.  · Test of a mucus sample to look for the presence of bacteria.  · Tests to check the oxygen level in your blood.  · Blood tests.  · Chest X-ray.  How is this treated?  Most cases of acute bronchitis clear up over time  without treatment. Your health care provider may recommend:  · Drinking more fluids. This can thin your mucus, which may improve your breathing.  · Taking a medicine for a fever or cough.  · Using a device that gets medicine into your lungs (inhaler) to help improve breathing and control coughing.  · Using a vaporizer or a humidifier. These are machines that add water to the air to help you breathe better.  Follow these instructions at home:  Activity  · Get plenty of rest.  · Return to your normal activities as told by your health care provider. Ask your health care provider what activities are safe for you.  Lifestyle  · Drink enough fluid to keep your urine pale yellow.  · Do not drink alcohol.  · Do not use any products that contain nicotine or tobacco, such as cigarettes, e-cigarettes, and chewing tobacco. If you need help quitting, ask your health care provider. Be aware that:  ? Your bronchitis will get worse if you smoke or breathe in other people's smoke (secondhand smoke).  ? Your lungs will heal faster if you quit smoking.  General instructions    · Take over-the-counter and prescription medicines only as told by your health care provider.  · Use an inhaler, vaporizer, or humidifier as told by your health care provider.  · If you have a sore throat, gargle with a salt-water mixture 3-4 times a day or as needed. To make a salt-water mixture, completely dissolve ½-1 tsp (3-6 g) of salt in 1 cup (237 mL) of warm water.  · Keep all follow-up visits as told by your health care provider. This is important.  How is this prevented?  To lower your risk of getting this condition again:  · Wash your hands often with soap and water. If soap and water are not available, use hand .  · Avoid contact with people who have cold symptoms.  · Try not to touch your mouth, nose, or eyes with your hands.  · Avoid places where there are fumes from chemicals. Breathing these fumes will make your condition worse.  · Get  the flu shot every year.  Contact a health care provider if:  · Your symptoms do not improve after 2 weeks of treatment.  · You vomit more than once or twice.  · You have symptoms of dehydration such as:  ? Dark urine.  ? Dry skin or eyes.  ? Increased thirst.  ? Headaches.  ? Confusion.  ? Muscle cramps.  Get help right away if you:  · Cough up blood.  · Feel pain in your chest.  · Have severe shortness of breath.  · Faint or keep feeling like you are going to faint.  · Have a severe headache.  · Have fever or chills that get worse.  These symptoms may represent a serious problem that is an emergency. Do not wait to see if the symptoms will go away. Get medical help right away. Call your local emergency services (911 in the [place] Do not drive yourself to the hospital.  Summary  · Acute bronchitis is sudden (acute) inflammation of the air tubes (bronchi) between the windpipe and the lungs. In adults, acute bronchitis usually goes away within 2 weeks, although coughing may last 3 weeks or longer  · Take over-the-counter and prescription medicines only as told by your health care provider.  · Drink enough fluid to keep your urine pale yellow.  · Contact a health care provider if your symptoms do not improve after 2 weeks of treatment.  · Get help right away if you cough up blood, faint, or have chest pain or shortness of breath.  This information is not intended to replace advice given to you by your health care provider. Make sure you discuss any questions you have with your health care provider.  Document Revised: 08/31/2020 Document Reviewed: 07/10/2020  Elsevier Patient Education © 2021 [employer]

## 2021-08-06 ENCOUNTER — TELEPHONE (OUTPATIENT)
Dept: FAMILY MEDICINE CLINIC | Facility: CLINIC | Age: 76
End: 2021-08-06

## 2021-08-06 DIAGNOSIS — F19.10 POLYSUBSTANCE ABUSE (HCC): ICD-10-CM

## 2021-08-06 NOTE — TELEPHONE ENCOUNTER
08/06/2021  19:20  Called patient; we discussed his request to have information faxed to Pavel Kaur.  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  Patient is requesting the information be faxed due to him having to wear more frequent for drug screens and having to go onsite every time to get his methadone.  He has difficulty with transportation.  Discussed with patient and will take this as a verbal request to release information.  Will send letter as requested.        08/06/2021 1:58 pm   Called patient regarding his message. No answer.  Left message to call office.  Into his request patient has been on Paxil 20 mg since 2018 it was changed to 40mg daily  Daily. 11/06/2018.  He is also on methadone daily.  He had abnormal EKG.  Cardiology is requesting him to be changed from Paxil to Cymbalta due to a prolonged QT interval on his EKG.  This was in June 2021.  Discussed with patient his Paxil was weaned.  He was started on Cymbalta.  And he is currently off of Paxil.  Totally discontinued on 07/02/2021.     KELLEN Kennedy           Provider: TERESA PONCE  Caller: BERTIN RÍOS  Relationship to Patient: SELF  Phone Number: [phone]  Reason for Call: PATIENT NEEDS A STATEMENT STATING WHY HE  TAKES PAXIL AND HOW LONG HE'S BEEN TAKING IT AND HOW OFTEN HE TAKES IT. HE WOULD LIKE IT FAXED TO THE BELOW NUMBER.     ATTN PAVEL KAUR  FAX NUMBER [phone]  PHONE NUMBER [phone] EXT [phone]

## 2021-08-11 ENCOUNTER — LAB (OUTPATIENT)
Dept: LAB | Facility: HOSPITAL | Age: 76
End: 2021-08-11

## 2021-08-11 ENCOUNTER — OFFICE VISIT (OUTPATIENT)
Dept: ENDOCRINOLOGY | Facility: CLINIC | Age: 76
End: 2021-08-11

## 2021-08-11 VITALS
SYSTOLIC BLOOD PRESSURE: 146 MMHG | HEART RATE: 81 BPM | BODY MASS INDEX: 31.82 KG/M2 | DIASTOLIC BLOOD PRESSURE: 98 MMHG | OXYGEN SATURATION: 94 % | HEIGHT: 70 IN

## 2021-08-11 DIAGNOSIS — B18.2 CHRONIC HEPATITIS C WITHOUT HEPATIC COMA (HCC): ICD-10-CM

## 2021-08-11 DIAGNOSIS — I10 ESSENTIAL HYPERTENSION: ICD-10-CM

## 2021-08-11 DIAGNOSIS — E29.1 SECONDARY MALE HYPOGONADISM: Primary | ICD-10-CM

## 2021-08-11 DIAGNOSIS — R60.9 PERIPHERAL EDEMA: ICD-10-CM

## 2021-08-11 PROCEDURE — 99213 OFFICE O/P EST LOW 20 MIN: CPT | Performed by: INTERNAL MEDICINE

## 2021-08-11 RX ORDER — TESTOSTERONE 16.2 MG/G
GEL TRANSDERMAL
Qty: 75 G | Refills: 5 | Status: SHIPPED | OUTPATIENT
Start: 2021-08-11 | End: 2022-02-18 | Stop reason: SDUPTHER
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
## 2021-08-18 LAB
ESTRADIOL SERPL-MCNC: 14.7 PG/ML (ref 7.6–42.6)
FSH SERPL-ACNC: 1.4 MIU/ML (ref 1.5–12.4)
LH SERPL-ACNC: <0.3 MIU/ML (ref 1.7–8.6)
PROLACTIN SERPL-MCNC: 12.8 NG/ML (ref 4–15.2)
TESTOST FREE MFR SERPL: 2.55 % (ref 1.5–4.2)
TESTOST FREE SERPL-MCNC: 0.33 NG/DL (ref 5–21)
TESTOST SERPL-MCNC: 12.9 NG/DL (ref 264–916)

## 2021-08-19 ENCOUNTER — TELEPHONE (OUTPATIENT)
Dept: ENDOCRINOLOGY | Facility: CLINIC | Age: 76
End: 2021-08-19

## 2021-08-19 NOTE — TELEPHONE ENCOUNTER
Never received a PA from Pharmacy. Pharmacy states it is for his testosterone. PA completed. Waiting for response.

## 2021-09-01 ENCOUNTER — OFFICE VISIT (OUTPATIENT)
Dept: GASTROENTEROLOGY | Facility: CLINIC | Age: 76
End: 2021-09-01

## 2021-09-01 ENCOUNTER — LAB (OUTPATIENT)
Dept: LAB | Facility: HOSPITAL | Age: 76
End: 2021-09-01

## 2021-09-01 VITALS
TEMPERATURE: 97.1 F | SYSTOLIC BLOOD PRESSURE: 162 MMHG | HEIGHT: 70 IN | HEART RATE: 81 BPM | BODY MASS INDEX: 31.21 KG/M2 | DIASTOLIC BLOOD PRESSURE: 104 MMHG | OXYGEN SATURATION: 94 % | WEIGHT: 218 LBS

## 2021-09-01 DIAGNOSIS — R19.00 SWELLING ABDOMEN: ICD-10-CM

## 2021-09-01 DIAGNOSIS — K74.00 LIVER FIBROSIS: ICD-10-CM

## 2021-09-01 DIAGNOSIS — R19.00 SWELLING ABDOMEN: Primary | ICD-10-CM

## 2021-09-01 DIAGNOSIS — Z11.59 ENCOUNTER FOR SCREENING FOR OTHER VIRAL DISEASES: ICD-10-CM

## 2021-09-01 PROCEDURE — 99214 OFFICE O/P EST MOD 30 MIN: CPT | Performed by: INTERNAL MEDICINE

## 2021-09-01 RX ORDER — DULOXETIN HYDROCHLORIDE 30 MG/1
CAPSULE, DELAYED RELEASE ORAL
COMMUNITY
Start: 2021-06-10 | End: 2021-09-16 | Stop reason: SDUPTHER
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
## 2021-09-02 DIAGNOSIS — E78.5 HYPERLIPIDEMIA LDL GOAL <100: ICD-10-CM

## 2021-09-02 RX ORDER — SIMVASTATIN 10 MG
TABLET ORAL
Qty: 90 TABLET | Refills: 0 | Status: SHIPPED | OUTPATIENT
Start: 2021-09-02 | End: 2021-09-16 | Stop reason: SDUPTHER

## 2021-09-07 LAB
A1AT PHENOTYP SERPL IFE: NORMAL
A1AT SERPL-MCNC: 155 MG/DL (ref 101–187)
ACTIN IGG SERPL-ACNC: 7 UNITS (ref 0–19)
ALBUMIN SERPL-MCNC: 4.4 G/DL (ref 3.5–5.2)
ALBUMIN/GLOB SERPL: 1.5 G/DL
ALP SERPL-CCNC: 78 U/L (ref 39–117)
ALT SERPL-CCNC: 13 U/L (ref 1–41)
ANA SER QL: NEGATIVE
AST SERPL-CCNC: 21 U/L (ref 1–40)
BILIRUB SERPL-MCNC: 0.2 MG/DL (ref 0–1.2)
BUN SERPL-MCNC: 9 MG/DL (ref 8–23)
BUN/CREAT SERPL: 9.6 (ref 7–25)
CALCIUM SERPL-MCNC: 9.5 MG/DL (ref 8.6–10.5)
CHLORIDE SERPL-SCNC: 97 MMOL/L (ref 98–107)
CO2 SERPL-SCNC: 34.3 MMOL/L (ref 22–29)
CREAT SERPL-MCNC: 0.94 MG/DL (ref 0.76–1.27)
ERYTHROCYTE [DISTWIDTH] IN BLOOD BY AUTOMATED COUNT: 13.1 % (ref 12.3–15.4)
GLOBULIN SER CALC-MCNC: 2.9 GM/DL
GLUCOSE SERPL-MCNC: 97 MG/DL (ref 65–99)
HAV AB SER QL: POSITIVE
HAV IGM SERPL QL IA: NEGATIVE
HBV CORE AB SERPL QL IA: POSITIVE
HBV CORE IGM SERPL QL IA: NEGATIVE
HBV SURFACE AB SER QL: NON REACTIVE
HBV SURFACE AG SERPL QL IA: NEGATIVE
HCT VFR BLD AUTO: 39.6 % (ref 37.5–51)
HCV AB S/CO SERPL IA: 4.3 S/CO RATIO (ref 0–0.9)
HCV RNA SERPL NAA+PROBE-ACNC: NORMAL IU/ML
HFE GENE MUT ANL BLD/T: NORMAL
HGB BLD-MCNC: 13.6 G/DL (ref 13–17.7)
IGA SERPL-MCNC: 364 MG/DL (ref 61–437)
IGG SERPL-MCNC: 1439 MG/DL (ref 603–1613)
IGM SERPL-MCNC: 78 MG/DL (ref 15–143)
MCH RBC QN AUTO: 32.2 PG (ref 26.6–33)
MCHC RBC AUTO-ENTMCNC: 34.3 G/DL (ref 31.5–35.7)
MCV RBC AUTO: 93.8 FL (ref 79–97)
MITOCHONDRIA M2 IGG SER-ACNC: <20 UNITS (ref 0–20)
PLATELET # BLD AUTO: 222 10*3/MM3 (ref 140–450)
POTASSIUM SERPL-SCNC: 3.9 MMOL/L (ref 3.5–5.2)
PROT SERPL-MCNC: 7.3 G/DL (ref 6–8.5)
RBC # BLD AUTO: 4.22 10*6/MM3 (ref 4.14–5.8)
SODIUM SERPL-SCNC: 142 MMOL/L (ref 136–145)
TEST INFORMATION: NORMAL
WBC # BLD AUTO: 7.9 10*3/MM3 (ref 3.4–10.8)

## 2021-09-15 DIAGNOSIS — G89.29 OTHER CHRONIC PAIN: ICD-10-CM

## 2021-09-15 DIAGNOSIS — M25.552 PAIN OF BOTH HIP JOINTS: ICD-10-CM

## 2021-09-15 DIAGNOSIS — M25.551 PAIN OF BOTH HIP JOINTS: ICD-10-CM

## 2021-09-15 DIAGNOSIS — I10 ESSENTIAL HYPERTENSION: ICD-10-CM

## 2021-09-16 ENCOUNTER — OFFICE VISIT (OUTPATIENT)
Dept: FAMILY MEDICINE CLINIC | Facility: CLINIC | Age: 76
End: 2021-09-16

## 2021-09-16 VITALS
OXYGEN SATURATION: 95 % | DIASTOLIC BLOOD PRESSURE: 90 MMHG | TEMPERATURE: 98.7 F | BODY MASS INDEX: 33.62 KG/M2 | HEIGHT: 69 IN | SYSTOLIC BLOOD PRESSURE: 152 MMHG | WEIGHT: 227 LBS | RESPIRATION RATE: 18 BRPM | HEART RATE: 75 BPM

## 2021-09-16 DIAGNOSIS — Z91.89 COMPLIANCE WITH MEDICATION REGIMEN: ICD-10-CM

## 2021-09-16 DIAGNOSIS — G89.29 CHRONIC PAIN OF BOTH FEET: Primary | ICD-10-CM

## 2021-09-16 DIAGNOSIS — K44.9 HIATAL HERNIA: ICD-10-CM

## 2021-09-16 DIAGNOSIS — R60.9 PERIPHERAL EDEMA: ICD-10-CM

## 2021-09-16 DIAGNOSIS — G89.29 OTHER CHRONIC PAIN: ICD-10-CM

## 2021-09-16 DIAGNOSIS — Z76.0 MEDICATION REFILL: ICD-10-CM

## 2021-09-16 DIAGNOSIS — K59.00 CONSTIPATION, UNSPECIFIED CONSTIPATION TYPE: ICD-10-CM

## 2021-09-16 DIAGNOSIS — I10 HYPERTENSION, UNSPECIFIED TYPE: ICD-10-CM

## 2021-09-16 DIAGNOSIS — E78.5 HYPERLIPIDEMIA LDL GOAL <100: ICD-10-CM

## 2021-09-16 DIAGNOSIS — F19.10 POLYSUBSTANCE ABUSE (HCC): ICD-10-CM

## 2021-09-16 DIAGNOSIS — R07.89 CHEST TIGHTNESS: ICD-10-CM

## 2021-09-16 DIAGNOSIS — F41.8 DEPRESSION WITH ANXIETY: ICD-10-CM

## 2021-09-16 DIAGNOSIS — M25.551 PAIN OF BOTH HIP JOINTS: ICD-10-CM

## 2021-09-16 DIAGNOSIS — M79.672 CHRONIC PAIN OF BOTH FEET: Primary | ICD-10-CM

## 2021-09-16 DIAGNOSIS — M25.552 PAIN OF BOTH HIP JOINTS: ICD-10-CM

## 2021-09-16 DIAGNOSIS — K21.9 GASTROESOPHAGEAL REFLUX DISEASE WITHOUT ESOPHAGITIS: ICD-10-CM

## 2021-09-16 DIAGNOSIS — M79.671 CHRONIC PAIN OF BOTH FEET: Primary | ICD-10-CM

## 2021-09-16 DIAGNOSIS — I10 ESSENTIAL HYPERTENSION: ICD-10-CM

## 2021-09-16 DIAGNOSIS — J44.9 CHRONIC OBSTRUCTIVE PULMONARY DISEASE, UNSPECIFIED COPD TYPE (HCC): ICD-10-CM

## 2021-09-16 DIAGNOSIS — Z76.0 ENCOUNTER FOR MEDICATION REFILL: ICD-10-CM

## 2021-09-16 PROCEDURE — 99214 OFFICE O/P EST MOD 30 MIN: CPT | Performed by: NURSE PRACTITIONER

## 2021-09-16 RX ORDER — PANTOPRAZOLE SODIUM 20 MG/1
20 TABLET, DELAYED RELEASE ORAL DAILY PRN
Qty: 90 TABLET | Refills: 1 | Status: SHIPPED | OUTPATIENT
Start: 2021-09-16 | End: 2022-01-04 | Stop reason: SDUPTHER

## 2021-09-16 RX ORDER — FUROSEMIDE 20 MG/1
20 TABLET ORAL 2 TIMES DAILY
Qty: 180 TABLET | Refills: 1 | Status: SHIPPED | OUTPATIENT
Start: 2021-09-16 | End: 2022-01-04 | Stop reason: SDUPTHER

## 2021-09-16 RX ORDER — MELOXICAM 7.5 MG/1
7.5 TABLET ORAL DAILY
Qty: 90 TABLET | Refills: 1 | Status: SHIPPED | OUTPATIENT
Start: 2021-09-16 | End: 2022-02-17 | Stop reason: SDUPTHER

## 2021-09-16 RX ORDER — PREGABALIN 50 MG/1
50 CAPSULE ORAL 3 TIMES DAILY
Qty: 90 CAPSULE | Refills: 1 | Status: SHIPPED | OUTPATIENT
Start: 2021-09-16 | End: 2021-12-02

## 2021-09-16 RX ORDER — ALBUTEROL SULFATE 90 UG/1
2 AEROSOL, METERED RESPIRATORY (INHALATION) EVERY 4 HOURS PRN
Qty: 18 G | Refills: 1 | Status: SHIPPED | OUTPATIENT
Start: 2021-09-16 | End: 2022-01-04 | Stop reason: SDUPTHER

## 2021-09-16 RX ORDER — MELOXICAM 7.5 MG/1
TABLET ORAL
Qty: 90 TABLET | Refills: 1 | OUTPATIENT
Start: 2021-09-16

## 2021-09-16 RX ORDER — POLYETHYLENE GLYCOL 3350 17 G/17G
17 POWDER, FOR SOLUTION ORAL DAILY
Qty: 90 PACKET | Refills: 2 | Status: SHIPPED | OUTPATIENT
Start: 2021-09-16 | End: 2022-01-04 | Stop reason: SDUPTHER

## 2021-09-16 RX ORDER — ASPIRIN 81 MG/1
81 TABLET ORAL DAILY
Qty: 90 TABLET | Refills: 3 | Status: SHIPPED | OUTPATIENT
Start: 2021-09-16

## 2021-09-16 RX ORDER — CARVEDILOL 6.25 MG/1
6.25 TABLET ORAL 2 TIMES DAILY
Qty: 180 TABLET | Refills: 1 | Status: SHIPPED | OUTPATIENT
Start: 2021-09-16 | End: 2021-11-27

## 2021-09-16 RX ORDER — FUROSEMIDE 20 MG/1
TABLET ORAL
Qty: 180 TABLET | Refills: 0 | OUTPATIENT
Start: 2021-09-16

## 2021-09-16 RX ORDER — DULOXETIN HYDROCHLORIDE 30 MG/1
30 CAPSULE, DELAYED RELEASE ORAL DAILY
Qty: 90 CAPSULE | Refills: 1 | Status: SHIPPED | OUTPATIENT
Start: 2021-09-16 | End: 2021-09-16

## 2021-09-16 RX ORDER — SIMVASTATIN 10 MG
10 TABLET ORAL
Qty: 90 TABLET | Refills: 1 | Status: SHIPPED | OUTPATIENT
Start: 2021-09-16 | End: 2021-10-29

## 2021-09-16 NOTE — PROGRESS NOTES
"Chief Complaint  Follow-up (GI) and Pain (legs, feet )    Subjective          Ronald Bond presents to [facility]  Patient is a 75-year-old male.  He is here for follow-up concerning his last visit with GI specialist.  He is complaining of pain to his legs and arms.  He does currently attend the methadone clinic and he takes testosterone.  Patient is currently on Cymbalta which he reports does not help at all.  Has been on Lyrica in the past and would like to start back on Lyrica.   Leg Pain   The incident occurred more than 1 week ago. There was no injury mechanism. The pain is present in the right leg, left leg, right foot and left foot. The pain is at a severity of 4/10. The pain is moderate. He reports no foreign bodies present. The symptoms are aggravated by movement. Treatments tried: cymbalta. The treatment provided no relief.       The following portions of the patient's history were reviewed and updated as appropriate: allergies, current medications, past family history, past medical history, past social history, past surgical history and problem list.    Review of Systems   Constitutional: Positive for activity change.   HENT: Negative.    Respiratory: Negative.    Cardiovascular: Positive for leg swelling.   Gastrointestinal: Negative.  Negative for diarrhea, nausea and vomiting.   Musculoskeletal: Positive for arthralgias and myalgias.   Skin: Negative.    Neurological: Negative.    Psychiatric/Behavioral: Negative.          Objective   Vital Signs:   /90   Pulse 75   Temp 98.7 °F (37.1 °C)   Resp 18   Ht 175.3 cm (69\")   Wt 103 kg (227 lb)   SpO2 95%   BMI 33.52 kg/m²     Physical Exam  Vitals reviewed.   Constitutional:       Appearance: He is well-developed. He is not ill-appearing.   HENT:      Head: Normocephalic.   Eyes:      Conjunctiva/sclera: Conjunctivae normal.      Pupils: Pupils are equal, round, and reactive to light.   Cardiovascular:      " Rate and Rhythm: Normal rate and regular rhythm.      Heart sounds: Normal heart sounds.   Pulmonary:      Effort: Pulmonary effort is normal.      Breath sounds: Wheezing present.   Abdominal:      Palpations: Abdomen is soft.   Musculoskeletal:         General: Normal range of motion.      Cervical back: Normal range of motion.      Right lower leg: Edema present.      Left lower leg: Edema present.      Comments: Pedal edema has improved.    Lymphadenopathy:      Cervical: No cervical adenopathy.   Skin:     General: Skin is warm and dry.      Capillary Refill: Capillary refill takes less than 2 seconds.   Neurological:      Mental Status: He is alert and oriented to person, place, and time. Mental status is at baseline.   Psychiatric:         Mood and Affect: Mood normal.         Speech: Speech normal.         Behavior: Behavior normal. Behavior is cooperative.        Result Review :                 Assessment and Plan    Diagnoses and all orders for this visit:    1. Compliance with medication regimen (Primary)  -     Compliance Drug Analysis, Ur - Urine, Clean Catch    2. Hyperlipidemia LDL goal <100  -     simvastatin (ZOCOR) 10 MG tablet; Take 1 tablet by mouth every night at bedtime.  Dispense: 90 tablet; Refill: 1  -     CBC & Differential  -     Lipid Panel    3. Constipation, unspecified constipation type  -     polyethylene glycol (MIRALAX) 17 g packet; Take 17 g by mouth Daily.  Dispense: 90 packet; Refill: 2    4. Essential hypertension  -     aspirin (aspirin) 81 MG EC tablet; Take 1 tablet by mouth Daily.  Dispense: 90 tablet; Refill: 3  -     carvedilol (COREG) 6.25 MG tablet; Take 1 tablet by mouth 2 (Two) Times a Day.  Dispense: 180 tablet; Refill: 1  -     CBC & Differential  -     Comprehensive Metabolic Panel  -     Urinalysis With Culture If Indicated - Urine, Clean Catch  -     Urinalysis With Culture If Indicated - Urine, Clean Catch    5. Pain of both hip joints  -     meloxicam (Mobic)  7.5 MG tablet; Take 1 tablet by mouth Daily.  Dispense: 90 tablet; Refill: 1    6. Other chronic pain  -     meloxicam (Mobic) 7.5 MG tablet; Take 1 tablet by mouth Daily.  Dispense: 90 tablet; Refill: 1  -     pregabalin (LYRICA) 50 MG capsule; Take 1 capsule by mouth 3 (Three) Times a Day.  Dispense: 90 capsule; Refill: 1  -     Compliance Drug Analysis, Ur - Urine, Clean Catch    7. Chronic obstructive pulmonary disease, unspecified COPD type (CMS/[facility])  -     albuterol sulfate  (90 Base) MCG/ACT inhaler; Inhale 2 puffs Every 4 (Four) Hours As Needed for Wheezing or Shortness of Air.  Dispense: 18 g; Refill: 1    8. Hiatal hernia    9. Encounter for medication refill  -     pantoprazole (Protonix) 20 MG EC tablet; Take 1 tablet by mouth Daily As Needed for Heartburn or Indigestion.  Dispense: 90 tablet; Refill: 1    10. Hypertension, unspecified type  -     aspirin (aspirin) 81 MG EC tablet; Take 1 tablet by mouth Daily.  Dispense: 90 tablet; Refill: 3    11. Medication refill  -     carvedilol (COREG) 6.25 MG tablet; Take 1 tablet by mouth 2 (Two) Times a Day.  Dispense: 180 tablet; Refill: 1    12. Depression with anxiety  -     Discontinue: DULoxetine (CYMBALTA) 30 MG capsule; Take 1 capsule by mouth Daily.  Dispense: 90 capsule; Refill: 1    13. Chest tightness  -     albuterol sulfate  (90 Base) MCG/ACT inhaler; Inhale 2 puffs Every 4 (Four) Hours As Needed for Wheezing or Shortness of Air.  Dispense: 18 g; Refill: 1    14. Polysubstance abuse (CMS/[facility])    15. Peripheral edema  -     furosemide (LASIX) 20 MG tablet; Take 1 tablet by mouth 2 (Two) Times a Day.  Dispense: 180 tablet; Refill: 1    16. Gastroesophageal reflux disease without esophagitis  -     pantoprazole (Protonix) 20 MG EC tablet; Take 1 tablet by mouth Daily As Needed for Heartburn or Indigestion.  Dispense: 90 tablet; Refill: 1    17. Chronic pain of both feet    Other orders  -     Microscopic Examination -    Bunny beck  reviewed.   He signed the consent for controlled substances.   Urine for compliance.   Will start on  lyrica - 50 mg tid.   Discussed weaning of the cymbalta.   He is to wean off the cymbalta take very other day for 8 days then every 2 days for 6 days. Then stop.   He is on methadone -   He is seeing  - testosterone   Follow up in 6-8 weeks for recheck.     I spent 35 minutes caring for Ronald on this date of service. This time includes time spent by me in the following activities:preparing for the visit, reviewing tests, obtaining and/or reviewing a separately obtained history, performing a medically appropriate examination and/or evaluation , counseling and educating the patient/family/caregiver and ordering medications, tests, or procedures  Follow Up   Return in about 8 weeks (around 11/11/2021) for Recheck.  Patient was given instructions and counseling regarding his condition or for health maintenance advice. Please see specific information pulled into the AVS if appropriate.

## 2021-09-17 LAB
ALBUMIN SERPL-MCNC: 4.4 G/DL (ref 3.5–5.2)
ALBUMIN/GLOB SERPL: 1.5 G/DL
ALP SERPL-CCNC: 77 U/L (ref 39–117)
ALT SERPL-CCNC: 15 U/L (ref 1–41)
AST SERPL-CCNC: 20 U/L (ref 1–40)
BASOPHILS # BLD AUTO: 0.06 10*3/MM3 (ref 0–0.2)
BASOPHILS NFR BLD AUTO: 0.8 % (ref 0–1.5)
BILIRUB SERPL-MCNC: 0.2 MG/DL (ref 0–1.2)
BUN SERPL-MCNC: 12 MG/DL (ref 8–23)
BUN/CREAT SERPL: 10.7 (ref 7–25)
CALCIUM SERPL-MCNC: 9.2 MG/DL (ref 8.6–10.5)
CHLORIDE SERPL-SCNC: 96 MMOL/L (ref 98–107)
CHOLEST SERPL-MCNC: 162 MG/DL (ref 0–200)
CO2 SERPL-SCNC: 34.9 MMOL/L (ref 22–29)
CREAT SERPL-MCNC: 1.12 MG/DL (ref 0.76–1.27)
EOSINOPHIL # BLD AUTO: 0.53 10*3/MM3 (ref 0–0.4)
EOSINOPHIL NFR BLD AUTO: 7.2 % (ref 0.3–6.2)
ERYTHROCYTE [DISTWIDTH] IN BLOOD BY AUTOMATED COUNT: 13.4 % (ref 12.3–15.4)
GLOBULIN SER CALC-MCNC: 3 GM/DL
GLUCOSE SERPL-MCNC: 112 MG/DL (ref 65–99)
HCT VFR BLD AUTO: 40.6 % (ref 37.5–51)
HDLC SERPL-MCNC: 37 MG/DL (ref 40–60)
HGB BLD-MCNC: 13.5 G/DL (ref 13–17.7)
IMM GRANULOCYTES # BLD AUTO: 0.02 10*3/MM3 (ref 0–0.05)
IMM GRANULOCYTES NFR BLD AUTO: 0.3 % (ref 0–0.5)
LDLC SERPL CALC-MCNC: 97 MG/DL (ref 0–100)
LYMPHOCYTES # BLD AUTO: 1.36 10*3/MM3 (ref 0.7–3.1)
LYMPHOCYTES NFR BLD AUTO: 18.4 % (ref 19.6–45.3)
MCH RBC QN AUTO: 32.5 PG (ref 26.6–33)
MCHC RBC AUTO-ENTMCNC: 33.3 G/DL (ref 31.5–35.7)
MCV RBC AUTO: 97.6 FL (ref 79–97)
MONOCYTES # BLD AUTO: 1.1 10*3/MM3 (ref 0.1–0.9)
MONOCYTES NFR BLD AUTO: 14.9 % (ref 5–12)
NEUTROPHILS # BLD AUTO: 4.33 10*3/MM3 (ref 1.7–7)
NEUTROPHILS NFR BLD AUTO: 58.4 % (ref 42.7–76)
NRBC BLD AUTO-RTO: 0 /100 WBC (ref 0–0.2)
PLATELET # BLD AUTO: 257 10*3/MM3 (ref 140–450)
POTASSIUM SERPL-SCNC: 4.7 MMOL/L (ref 3.5–5.2)
PROT SERPL-MCNC: 7.4 G/DL (ref 6–8.5)
RBC # BLD AUTO: 4.16 10*6/MM3 (ref 4.14–5.8)
SODIUM SERPL-SCNC: 142 MMOL/L (ref 136–145)
TRIGL SERPL-MCNC: 157 MG/DL (ref 0–150)
VLDLC SERPL CALC-MCNC: 28 MG/DL (ref 5–40)
WBC # BLD AUTO: 7.4 10*3/MM3 (ref 3.4–10.8)

## 2021-09-18 LAB
APPEARANCE UR: CLEAR
BACTERIA #/AREA URNS HPF: NORMAL /HPF
BILIRUB UR QL STRIP: NEGATIVE
CASTS URNS QL MICRO: NORMAL /LPF
COLOR UR: YELLOW
EPI CELLS #/AREA URNS HPF: NORMAL /HPF (ref 0–10)
GLUCOSE UR QL: NEGATIVE
HGB UR QL STRIP: NEGATIVE
KETONES UR QL STRIP: NEGATIVE
LEUKOCYTE ESTERASE UR QL STRIP: NEGATIVE
MICRO URNS: NORMAL
MICRO URNS: NORMAL
NITRITE UR QL STRIP: NEGATIVE
PH UR STRIP: 7 [PH] (ref 5–7.5)
PROT UR QL STRIP: NEGATIVE
RBC #/AREA URNS HPF: NORMAL /HPF (ref 0–2)
SP GR UR: 1 (ref 1–1.03)
URINALYSIS REFLEX: NORMAL
UROBILINOGEN UR STRIP-MCNC: 0.2 MG/DL (ref 0.2–1)
WBC #/AREA URNS HPF: NORMAL /HPF (ref 0–5)

## 2021-09-26 LAB — DRUGS UR: NORMAL

## 2021-10-06 ENCOUNTER — TELEPHONE (OUTPATIENT)
Dept: FAMILY MEDICINE CLINIC | Facility: CLINIC | Age: 76
End: 2021-10-06

## 2021-10-06 NOTE — TELEPHONE ENCOUNTER
PHONE CALL FROM PATIENT.  HAVING TROUBLE WITH MEMORY, FEELS FOGGY.  FEELS SLEEPY ALL THE TIME.  DOESN'T KNOW WHAT TO DO.      PLEASE CALL [phone]
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
## 2021-10-18 ENCOUNTER — OFFICE VISIT (OUTPATIENT)
Dept: FAMILY MEDICINE CLINIC | Facility: CLINIC | Age: 76
End: 2021-10-18

## 2021-10-18 VITALS
SYSTOLIC BLOOD PRESSURE: 148 MMHG | TEMPERATURE: 98.4 F | HEART RATE: 83 BPM | HEIGHT: 69 IN | RESPIRATION RATE: 18 BRPM | BODY MASS INDEX: 33.21 KG/M2 | OXYGEN SATURATION: 94 % | DIASTOLIC BLOOD PRESSURE: 92 MMHG | WEIGHT: 224.2 LBS

## 2021-10-18 DIAGNOSIS — I10 HYPERTENSION, UNSPECIFIED TYPE: ICD-10-CM

## 2021-10-18 DIAGNOSIS — Z13.220 SCREENING, LIPID: ICD-10-CM

## 2021-10-18 DIAGNOSIS — R41.3 POOR SHORT-TERM MEMORY: ICD-10-CM

## 2021-10-18 DIAGNOSIS — Z23 NEED FOR INFLUENZA VACCINATION: Primary | ICD-10-CM

## 2021-10-18 DIAGNOSIS — Z00.00 MEDICARE ANNUAL WELLNESS VISIT, SUBSEQUENT: ICD-10-CM

## 2021-10-18 PROCEDURE — 90662 IIV NO PRSV INCREASED AG IM: CPT | Performed by: NURSE PRACTITIONER

## 2021-10-18 PROCEDURE — 1125F AMNT PAIN NOTED PAIN PRSNT: CPT | Performed by: NURSE PRACTITIONER

## 2021-10-18 PROCEDURE — G0008 ADMIN INFLUENZA VIRUS VAC: HCPCS | Performed by: NURSE PRACTITIONER

## 2021-10-18 PROCEDURE — G0439 PPPS, SUBSEQ VISIT: HCPCS | Performed by: NURSE PRACTITIONER

## 2021-10-18 PROCEDURE — 1159F MED LIST DOCD IN RCRD: CPT | Performed by: NURSE PRACTITIONER

## 2021-10-18 PROCEDURE — 96160 PT-FOCUSED HLTH RISK ASSMT: CPT | Performed by: NURSE PRACTITIONER

## 2021-10-19 LAB — TSH SERPL DL<=0.005 MIU/L-ACNC: 2.02 UIU/ML (ref 0.27–4.2)

## 2021-10-25 ENCOUNTER — TELEPHONE (OUTPATIENT)
Dept: FAMILY MEDICINE CLINIC | Facility: CLINIC | Age: 76
End: 2021-10-25

## 2021-10-29 DIAGNOSIS — E78.5 HYPERLIPIDEMIA LDL GOAL <100: ICD-10-CM

## 2021-10-29 RX ORDER — SIMVASTATIN 10 MG
TABLET ORAL
Qty: 90 TABLET | Refills: 1 | Status: SHIPPED | OUTPATIENT
Start: 2021-10-29 | End: 2022-01-04 | Stop reason: SDUPTHER

## 2021-11-01 ENCOUNTER — TELEPHONE (OUTPATIENT)
Dept: FAMILY MEDICINE CLINIC | Facility: CLINIC | Age: 76
End: 2021-11-01

## 2021-11-26 DIAGNOSIS — Z76.0 MEDICATION REFILL: ICD-10-CM

## 2021-11-26 DIAGNOSIS — I10 ESSENTIAL HYPERTENSION: ICD-10-CM

## 2021-11-27 RX ORDER — CARVEDILOL 6.25 MG/1
TABLET ORAL
Qty: 180 TABLET | Refills: 1 | Status: SHIPPED | OUTPATIENT
Start: 2021-11-27 | End: 2022-01-04 | Stop reason: SDUPTHER

## 2021-12-02 DIAGNOSIS — G89.29 OTHER CHRONIC PAIN: ICD-10-CM

## 2021-12-03 RX ORDER — PREGABALIN 50 MG/1
CAPSULE ORAL
Qty: 90 CAPSULE | Refills: 0 | Status: SHIPPED | OUTPATIENT
Start: 2021-12-03 | End: 2022-01-05 | Stop reason: SDUPTHER

## 2021-12-03 NOTE — TELEPHONE ENCOUNTER
Caller: Ronald Bond    Relationship: Self    Best call back number: [phone]    What medication are you requesting: AN INCREASE IN DOSAGE AND A 90 DAY SUPPLY    If a prescription is needed, what is your preferred pharmacy and phone number: [facility] - [address] [phone] [facility] [phone]      Additional notes:

## 2021-12-28 ENCOUNTER — TELEPHONE (OUTPATIENT)
Dept: FAMILY MEDICINE CLINIC | Facility: CLINIC | Age: 76
End: 2021-12-28

## 2021-12-28 NOTE — TELEPHONE ENCOUNTER
PATIENT HAS CALLED AND STATED HE TAKES CYMBALTA. PATIENT IS REQUESTING A NEW 90 DAY PRESCRIPTION ON CYMBALTA.    PATIENT USES [facility]    PATIENT IS REQUESTING A CALL BACK ONCE PRESCRIPTION HAS BEEN FILLED.    CALL BACK NUMBER IS   [phone]

## 2022-01-03 ENCOUNTER — TELEPHONE (OUTPATIENT)
Dept: FAMILY MEDICINE CLINIC | Facility: CLINIC | Age: 77
End: 2022-01-03

## 2022-01-03 NOTE — TELEPHONE ENCOUNTER
Caller: Ronald Bond    Relationship: Self    Best call back number: [phone] (H)    What is the best time to reach you: ANYTIME    Who are you requesting to speak with (clinical staff, provider,  specific staff member): KELLEN LUTZ    Do you know the name of the person who called: PATIENT    What was the call regarding:   PATIENT STATES THAT HE WILL BE CHANGING INSURANCE [facility]. PATIENT WILL LIKE TO SPEAK WITH KELLEN HEARD BEFORE SWITCHING INSURANCE. PATIENT ALSO INCLUDED THAT HE HAS A COUGH AND HAS BEEN VOMITING, DECLINED AN APPOINTMENT AND REQUESTED TO SPEAK WITH KELLEN HEARD TO SEE WHAT SHE RECOMMENDS.     Do you require a callback: YES. PLEASE    PATIENT HAS BEEN ADVISED TO PLEASE ALLOW 48 HOURS FOR OUR CLINICAL TEAM WILL FOLLOW UP ON THIS REQUEST.

## 2022-01-04 ENCOUNTER — OFFICE VISIT (OUTPATIENT)
Dept: FAMILY MEDICINE CLINIC | Facility: CLINIC | Age: 77
End: 2022-01-04

## 2022-01-04 VITALS
WEIGHT: 224 LBS | HEIGHT: 69 IN | OXYGEN SATURATION: 94 % | DIASTOLIC BLOOD PRESSURE: 90 MMHG | RESPIRATION RATE: 18 BRPM | BODY MASS INDEX: 33.18 KG/M2 | TEMPERATURE: 97.4 F | SYSTOLIC BLOOD PRESSURE: 144 MMHG | HEART RATE: 64 BPM

## 2022-01-04 DIAGNOSIS — R13.10 DYSPHAGIA, UNSPECIFIED TYPE: ICD-10-CM

## 2022-01-04 DIAGNOSIS — J45.20 MILD INTERMITTENT ASTHMA WITHOUT COMPLICATION: ICD-10-CM

## 2022-01-04 DIAGNOSIS — Z76.0 MEDICATION REFILL: ICD-10-CM

## 2022-01-04 DIAGNOSIS — K59.00 CONSTIPATION, UNSPECIFIED CONSTIPATION TYPE: ICD-10-CM

## 2022-01-04 DIAGNOSIS — K21.9 GASTROESOPHAGEAL REFLUX DISEASE WITHOUT ESOPHAGITIS: ICD-10-CM

## 2022-01-04 DIAGNOSIS — M25.552 PAIN OF BOTH HIP JOINTS: ICD-10-CM

## 2022-01-04 DIAGNOSIS — E78.5 HYPERLIPIDEMIA LDL GOAL <100: ICD-10-CM

## 2022-01-04 DIAGNOSIS — R07.89 CHEST TIGHTNESS: ICD-10-CM

## 2022-01-04 DIAGNOSIS — M25.551 PAIN OF BOTH HIP JOINTS: ICD-10-CM

## 2022-01-04 DIAGNOSIS — G89.29 OTHER CHRONIC PAIN: ICD-10-CM

## 2022-01-04 DIAGNOSIS — Z76.0 ENCOUNTER FOR MEDICATION REFILL: ICD-10-CM

## 2022-01-04 DIAGNOSIS — I10 ESSENTIAL HYPERTENSION: ICD-10-CM

## 2022-01-04 DIAGNOSIS — R60.9 PERIPHERAL EDEMA: ICD-10-CM

## 2022-01-04 DIAGNOSIS — K44.9 HIATAL HERNIA: Primary | ICD-10-CM

## 2022-01-04 PROCEDURE — 99214 OFFICE O/P EST MOD 30 MIN: CPT | Performed by: NURSE PRACTITIONER

## 2022-01-04 RX ORDER — PANTOPRAZOLE SODIUM 20 MG/1
20 TABLET, DELAYED RELEASE ORAL DAILY PRN
Qty: 90 TABLET | Refills: 1 | Status: SHIPPED | OUTPATIENT
Start: 2022-01-04 | End: 2022-10-24 | Stop reason: SDUPTHER

## 2022-01-04 RX ORDER — SIMVASTATIN 10 MG
10 TABLET ORAL
Qty: 90 TABLET | Refills: 1 | Status: SHIPPED | OUTPATIENT
Start: 2022-01-04 | End: 2023-02-14 | Stop reason: SDUPTHER

## 2022-01-04 RX ORDER — POLYETHYLENE GLYCOL 3350 17 G/17G
17 POWDER, FOR SOLUTION ORAL DAILY
Qty: 90 PACKET | Refills: 2 | Status: SHIPPED | OUTPATIENT
Start: 2022-01-04 | End: 2022-08-18 | Stop reason: SDUPTHER

## 2022-01-04 RX ORDER — ALBUTEROL SULFATE 90 UG/1
2 AEROSOL, METERED RESPIRATORY (INHALATION) EVERY 4 HOURS PRN
Qty: 18 G | Refills: 1 | Status: SHIPPED | OUTPATIENT
Start: 2022-01-04 | End: 2022-06-03 | Stop reason: SDUPTHER

## 2022-01-04 RX ORDER — FUROSEMIDE 20 MG/1
20 TABLET ORAL 2 TIMES DAILY
Qty: 180 TABLET | Refills: 1 | Status: SHIPPED | OUTPATIENT
Start: 2022-01-04 | End: 2022-06-17

## 2022-01-04 RX ORDER — CARVEDILOL 6.25 MG/1
6.25 TABLET ORAL 2 TIMES DAILY
Qty: 180 TABLET | Refills: 1 | Status: SHIPPED | OUTPATIENT
Start: 2022-01-04 | End: 2022-06-09 | Stop reason: SDUPTHER

## 2022-01-04 NOTE — PROGRESS NOTES
"Chief Complaint  Vomiting and Trouble Swallowing    Subjective          Ronald Bond presents to [facility]  Patient is a 75 yo male. He is here for complaint of acid reflux, coughing, vomiting if coughing too much. Hx of gastropathy. Has not been taking his pantoprazole. \" I am drinking about 7 sodas a day.\" Discussed stopping any drinks with sodium bicarb.\"  He is concerned with his weight gain. He had decreased has activity.   He is requesting medication refills.   He needing his lyrica, mobic, zocor, pantoprazole, carvedilol.         The following portions of the patient's history were reviewed and updated as appropriate: allergies, current medications, past family history, past medical history, past social history, past surgical history and problem list.    Review of Systems   Constitutional: Positive for activity change and fatigue.   HENT: Positive for trouble swallowing.         \"feels like things get stuck sometimes\"    Respiratory: Positive for cough, chest tightness and wheezing.    Cardiovascular: Negative.    Gastrointestinal: Positive for constipation.        Acid reflux is worse   Constipation - improving with miralax    Musculoskeletal: Positive for arthralgias and myalgias.        Heartburn    Skin: Negative.    Psychiatric/Behavioral: Positive for sleep disturbance. Negative for decreased concentration, self-injury and suicidal ideas. The patient is nervous/anxious. The patient is not hyperactive.          Objective   Vital Signs:   /90   Pulse 64   Temp 97.4 °F (36.3 °C) (Infrared)   Resp 18   Ht 175.3 cm (69\")   Wt 102 kg (224 lb)   SpO2 94%   BMI 33.08 kg/m²     Physical Exam  Vitals reviewed.   Constitutional:       Appearance: He is obese.   HENT:      Head: Normocephalic.      Nose: Nose normal.      Mouth/Throat:      Mouth: Mucous membranes are moist.      Dentition: Abnormal dentition.      Comments: Edentulous - difficult to understand "   Eyes:      Pupils: Pupils are equal, round, and reactive to light.   Cardiovascular:      Rate and Rhythm: Normal rate and regular rhythm.      Pulses: Normal pulses.   Pulmonary:      Effort: Pulmonary effort is normal.      Breath sounds: Wheezing present.   Abdominal:      General: Bowel sounds are normal. There is no distension.      Palpations: Abdomen is soft. There is no mass.      Tenderness: There is no abdominal tenderness. There is no right CVA tenderness, left CVA tenderness, guarding or rebound.      Hernia: No hernia is present.   Musculoskeletal:         General: Normal range of motion.   Skin:     General: Skin is warm and dry.      Capillary Refill: Capillary refill takes less than 2 seconds.   Neurological:      Mental Status: He is alert and oriented to person, place, and time.   Psychiatric:         Mood and Affect: Mood normal.         Behavior: Behavior is cooperative.       Result Review :                 Assessment and Plan    Diagnoses and all orders for this visit:    1. Hiatal hernia (Primary)  -     Ambulatory Referral to Gastroenterology    2. Encounter for medication refill  -     pantoprazole (Protonix) 20 MG EC tablet; Take 1 tablet by mouth Daily As Needed for Heartburn or Indigestion.  Dispense: 90 tablet; Refill: 1    3. Gastroesophageal reflux disease without esophagitis  -     pantoprazole (Protonix) 20 MG EC tablet; Take 1 tablet by mouth Daily As Needed for Heartburn or Indigestion.  Dispense: 90 tablet; Refill: 1  -     Ambulatory Referral to Gastroenterology    4. Medication refill  -     carvedilol (COREG) 6.25 MG tablet; Take 1 tablet by mouth 2 (Two) Times a Day.  Dispense: 180 tablet; Refill: 1    5. Essential hypertension  -     carvedilol (COREG) 6.25 MG tablet; Take 1 tablet by mouth 2 (Two) Times a Day.  Dispense: 180 tablet; Refill: 1    6. Other chronic pain  -     pregabalin (LYRICA) 50 MG capsule; Take 1 capsule by mouth 3 (Three) Times a Day.  Dispense: 90  capsule; Refill: 1    7. Hyperlipidemia LDL goal <100  -     simvastatin (ZOCOR) 10 MG tablet; Take 1 tablet by mouth every night at bedtime.  Dispense: 90 tablet; Refill: 1    8. Constipation, unspecified constipation type  -     polyethylene glycol (MIRALAX) 17 g packet; Take 17 g by mouth Daily.  Dispense: 90 packet; Refill: 2    9. Peripheral edema  -     furosemide (LASIX) 20 MG tablet; Take 1 tablet by mouth 2 (Two) Times a Day.  Dispense: 180 tablet; Refill: 1    10. Pain of both hip joints    11. Chest tightness  -     albuterol sulfate  (90 Base) MCG/ACT inhaler; Inhale 2 puffs Every 4 (Four) Hours As Needed for Wheezing or Shortness of Air.  Dispense: 18 g; Refill: 1    12. Mild intermittent asthma without complication  -     albuterol sulfate  (90 Base) MCG/ACT inhaler; Inhale 2 puffs Every 4 (Four) Hours As Needed for Wheezing or Shortness of Air.  Dispense: 18 g; Refill: 1    13. Dysphagia, unspecified type  -     Ambulatory Referral to Gastroenterology         Stop drinking soda, increase water intake.   Increase walking.   Albuterol prn for occasional wheezing   Take the pantoprazole daily.     Follow up in 4 weeks   Will refer to GI - he has seen Dr. Jaquez in the past.  Will refer due to hiatal hernia and increase in reflux.   Refilling medications at current doses.       Follow Up   Return in about 4 weeks (around 2/1/2022), or asthma, reflux, for Recheck.  Patient was given instructions and counseling regarding his condition or for health maintenance advice. Please see specific information pulled into the AVS if appropriate.

## 2022-01-05 RX ORDER — PREGABALIN 50 MG/1
50 CAPSULE ORAL 3 TIMES DAILY
Qty: 90 CAPSULE | Refills: 1 | Status: SHIPPED | OUTPATIENT
Start: 2022-01-05 | End: 2022-05-17

## 2022-01-06 ENCOUNTER — TELEPHONE (OUTPATIENT)
Dept: GASTROENTEROLOGY | Facility: CLINIC | Age: 77
End: 2022-01-06

## 2022-01-06 NOTE — TELEPHONE ENCOUNTER
PT CALLED SAYING THAT HE HAS A HARD TIME SWALLOWING SOMETIMES AND SOME COUGHING, BUT NO OTHER PROBLEMS. HE WOULD LIKE TO KNOW IF HE CAN GET AN EGD DONE, HIS LAST ONE WAS ON 11/5/2020. PLEASE ADVISE.

## 2022-01-10 NOTE — PATIENT INSTRUCTIONS
Asthma, Adult    Asthma is a long-term (chronic) condition in which the airways get tight and narrow. The airways are the breathing passages that lead from the nose and mouth down into the lungs. A person with asthma will have times when symptoms get worse. These are called asthma attacks. They can cause coughing, whistling sounds when you breathe (wheezing), shortness of breath, and chest pain. They can make it hard to breathe. There is no cure for asthma, but medicines and lifestyle changes can help control it.  There are many things that can bring on an asthma attack or make asthma symptoms worse (triggers). Common triggers include:  · Mold.  · Dust.  · Cigarette smoke.  · Cockroaches.  · Things that can cause allergy symptoms (allergens). These include animal skin flakes (dander) and pollen from trees or grass.  · Things that pollute the air. These may include household , wood smoke, smog, or chemical odors.  · Cold air, weather changes, and wind.  · Crying or laughing hard.  · Stress.  · Certain medicines or drugs.  · Certain foods such as dried fruit, potato chips, and grape juice.  · Infections, such as a cold or the flu.  · Certain medical conditions or diseases.  · Exercise or tiring activities.  Asthma may be treated with medicines and by staying away from the things that cause asthma attacks. Types of medicines may include:  · Controller medicines. These help prevent asthma symptoms. They are usually taken every day.  · Fast-acting reliever or rescue medicines. These quickly relieve asthma symptoms. They are used as needed and provide short-term relief.  · Allergy medicines if your attacks are brought on by allergens.  · Medicines to help control the body's defense (immune) system.  Follow these instructions at home:  Avoiding triggers in your home  · Change your heating and air conditioning filter often.  · Limit your use of fireplaces and wood stoves.  · Get rid of pests (such as roaches and  mice) and their droppings.  · Throw away plants if you see mold on them.  · Clean your floors. Dust regularly. Use cleaning products that do not smell.  · Have someone vacuum when you are not home. Use a vacuum  with a HEPA filter if possible.  · Replace carpet with wood, tile, or vinyl ondina. Carpet can trap animal skin flakes and dust.  · Use allergy-proof pillows, mattress covers, and box spring covers.  · Wash bed sheets and blankets every week in hot water. Dry them in a dryer.  · Keep your bedroom free of any triggers.  · Avoid pets and keep windows closed when things that cause allergy symptoms are in the air.  · Use blankets that are made of polyester or cotton.  · Clean bathrooms and bronson with bleach. If possible, have someone repaint the walls in these rooms with mold-resistant paint. Keep out of the rooms that are being cleaned and painted.  · Wash your hands often with soap and water. If soap and water are not available, use hand .  · Do not allow anyone to smoke in your home.  General instructions  · Take over-the-counter and prescription medicines only as told by your doctor.  ? Talk with your doctor if you have questions about how or when to take your medicines.  ? Make note if you need to use your medicines more often than usual.  · Do not use any products that contain nicotine or tobacco, such as cigarettes and e-cigarettes. If you need help quitting, ask your doctor.  · Stay away from secondhand smoke.  · Avoid doing things outdoors when allergen counts are high and when air quality is low.  · Wear a ski mask when doing outdoor activities in the winter. The mask should cover your nose and mouth. Exercise indoors on cold days if you can.  · Warm up before you exercise. Take time to cool down after exercise.  · Use a peak flow meter as told by your doctor. A peak flow meter is a tool that measures how well the lungs are working.  · Keep track of the peak flow meter's readings.  Write them down.  · Follow your asthma action plan. This is a written plan for taking care of your asthma and treating your attacks.  · Make sure you get all the shots (vaccines) that your doctor recommends. Ask your doctor about a flu shot and a pneumonia shot.  · Keep all follow-up visits as told by your doctor. This is important.  Contact a doctor if:  · You have wheezing, shortness of breath, or a cough even while taking medicine to prevent attacks.  · The mucus you cough up (sputum) is thicker than usual.  · The mucus you cough up changes from clear or white to yellow, green, gray, or bloody.  · You have problems from the medicine you are taking, such as:  ? A rash.  ? Itching.  ? Swelling.  ? Trouble breathing.  · You need reliever medicines more than 2-3 times a week.  · Your peak flow reading is still at 50-79% of your personal best after following the action plan for 1 hour.  · You have a fever.  Get help right away if:  · You seem to be worse and are not responding to medicine during an asthma attack.  · You are short of breath even at rest.  · You get short of breath when doing very little activity.  · You have trouble eating, drinking, or talking.  · You have chest pain or tightness.  · You have a fast heartbeat.  · Your lips or fingernails start to turn blue.  · You are light-headed or dizzy, or you faint.  · Your peak flow is less than 50% of your personal best.  · You feel too tired to breathe normally.  Summary  · Asthma is a long-term (chronic) condition in which the airways get tight and narrow. An asthma attack can make it hard to breathe.  · Asthma cannot be cured, but medicines and lifestyle changes can help control it.  · Make sure you understand how to avoid triggers and how and when to use your medicines.  This information is not intended to replace advice given to you by your health care provider. Make sure you discuss any questions you have with your health care provider.  Document Revised:  04/21/2021 Document Reviewed: 04/21/2021  [employer] Patient Education © 2021 [employer]    Food Choices for Gastroesophageal Reflux Disease, Adult  When you have gastroesophageal reflux disease (GERD), the foods you eat and your eating habits are very important. Choosing the right foods can help ease your discomfort. Think about working with a food expert (dietitian) to help you make good choices.  What are tips for following this plan?  Reading food labels  · Look for foods that are low in saturated fat. Foods that may help with your symptoms include:  ? Foods that have less than 5% of daily value (DV) of fat.  ? Foods that have 0 grams of trans fat.  Cooking  · Do not dudley your food.  · Cook your food by baking, steaming, grilling, or broiling. These are all methods that do not need a lot of fat for cooking.  · To add flavor, try to use herbs that are low in spice and acidity.  Meal planning    · Choose healthy foods that are low in fat, such as:  ? Fruits and vegetables.  ? Whole grains.  ? Low-fat dairy products.  ? Lean meats, fish, and poultry.  · Eat small meals often instead of eating 3 large meals each day. Eat your meals slowly in a place where you are relaxed. Avoid bending over or lying down until 2-3 hours after eating.  · Limit high-fat foods such as fatty meats or fried foods.  · Limit your intake of fatty foods, such as oils, butter, and shortening.  · Avoid the following as told by your doctor:  ? Foods that cause symptoms. These may be different for different people. Keep a food diary to keep track of foods that cause symptoms.  ? Alcohol.  ? Drinking a lot of liquid with meals.  ? Eating meals during the 2-3 hours before bed.    Lifestyle  · Stay at a healthy weight. Ask your doctor what weight is healthy for you. If you need to lose weight, work with your doctor to do so safely.  · Exercise for at least 30 minutes on 5 or more days each week, or as told by your doctor.  · Wear loose-fitting  clothes.  · Do not smoke or use any products that contain nicotine or tobacco. If you need help quitting, ask your doctor.  · Sleep with the head of your bed higher than your feet. Use a wedge under the mattress or blocks under the bed frame to raise the head of the bed.  · Chew sugar-free gum after meals.  What foods should eat?    Eat a healthy, well-balanced diet of fruits, vegetables, whole grains, low-fat dairy products, lean meats, fish, and poultry. Each person is different.  Foods that may cause symptoms in one person may not cause any symptoms in another person. Work with your doctor to find foods that are safe for you.  The items listed above may not be a complete list of what you can eat and drink. Contact a food expert for more options.  What foods should I avoid?  Limiting some of these foods may help in managing the symptoms of GERD. Everyone is different. Talk with a food expert or your doctor to help you find the exact foods to avoid, if any.  Fruits  Any fruits prepared with added fat. Any fruits that cause symptoms. For some people, this may include citrus fruits, such as oranges, grapefruit, pineapple, and taylor.  Vegetables  Deep-fried vegetables. French fries. Any vegetables prepared with added fat. Any vegetables that cause symptoms. For some people, this may include tomatoes and tomato products, chili peppers, onions and garlic, and horseradish.  Grains  Pastries or quick breads with added fat.  Meats and other proteins  High-fat meats, such as fatty beef or pork, hot dogs, ribs, ham, sausage, salami, and redmond. Fried meat or protein, including fried fish and fried chicken. Nuts and nut butters, in large amounts.  Dairy  Whole milk and chocolate milk. Sour cream. Cream. Ice cream. Cream cheese. Milkshakes.  Fats and oils  Butter. Margarine. Shortening. Ghee.  Beverages  Coffee and tea, with or without caffeine. Carbonated beverages. Sodas. Energy drinks. Fruit juice made with acidic fruits,  such as orange or grapefruit. Tomato juice. Alcoholic drinks.  Sweets and desserts  Chocolate and cocoa. Donuts.  Seasonings and condiments  Pepper. Peppermint and spearmint. Added salt. Any condiments, herbs, or seasonings that cause symptoms. For some people, this may include chan, hot sauce, or vinegar-based salad dressings.  The items listed above may not be a complete list of what you should not eat and drink. Contact a food expert for more options.  Questions to ask your doctor  Diet and lifestyle changes are often the first steps that are taken to manage symptoms of GERD. If diet and lifestyle changes do not help, talk with your doctor about taking medicines.  Where to find more information  · [employer] Gastrointestinal [employer]: aboutgerd.org  Summary  · When you have GERD, food and lifestyle choices are very important in easing your symptoms.  · Eat small meals often instead of 3 large meals a day. Eat your meals slowly and in a place where you are relaxed.  · Avoid bending over or lying down until 2-3 hours after eating.  · Limit high-fat foods such as fatty meats or fried foods.  This information is not intended to replace advice given to you by your health care provider. Make sure you discuss any questions you have with your health care provider.  Document Revised: 06/28/2021 Document Reviewed: 06/28/2021  [employer] Patient Education © 2021 [employer]    Chronic Back Pain  When back pain lasts longer than 3 months, it is called chronic back pain. Pain may get worse at certain times (flare-ups). There are things you can do at home to manage your pain.  Follow these instructions at home:  Pay attention to any changes in your symptoms. Take these actions to help with your pain:  Managing pain and stiffness         · If told, put ice on the painful area. Your doctor may tell you to use ice for 24-48 hours after the flare-up starts. To do this:  ? Put ice in a plastic bag.  ? Place a  towel between your skin and the bag.  ? Leave the ice on for 20 minutes, 2-3 times a day.  · If told, put heat on the painful area. Do this as often as told by your doctor. Use the heat source that your doctor recommends, such as a moist heat pack or a heating pad.  ? Place a towel between your skin and the heat source.  ? Leave the heat on for 20-30 minutes.  ? Take off the heat if your skin turns bright red. This is especially important if you are unable to feel pain, heat, or cold. You may have a greater risk of getting burned.  · Soak in a warm bath. This can help relieve pain.  Activity    · Avoid bending and other activities that make pain worse.  · When standing:  ? Keep your upper back and neck straight.  ? Keep your shoulders pulled back.  ? Avoid slouching.  · When sitting:  ? Keep your back straight.  ? Relax your shoulders. Do not round your shoulders or pull them backward.  · Do not sit or  one place for long periods of time.  · Take short rest breaks during the day. Lying down or standing is usually better than sitting. Resting can help relieve pain.  · When sitting or lying down for a long time, do some mild activity or stretching. This will help to prevent stiffness and pain.  · Get regular exercise. Ask your doctor what activities are safe for you.  · Do not lift anything that is heavier than 10 lb (4.5 kg) or the limit that you are told, until your doctor says that it is safe.  · To prevent injury when you lift things:  ? Bend your knees.  ? Keep the weight close to your body.  ? Avoid twisting.  · Sleep on a firm mattress. Try lying on your side with your knees slightly bent. If you lie on your back, put a pillow under your knees.    Medicines  · Treatment may include medicines for pain and swelling taken by mouth or put on the skin, prescription pain medicine, or muscle relaxants.  · Take over-the-counter and prescription medicines only as told by your doctor.  · Ask your doctor if the  medicine prescribed to you:  ? Requires you to avoid driving or using machinery.  ? Can cause trouble pooping (constipation). You may need to take these actions to prevent or treat trouble pooping:  § Drink enough fluid to keep your pee (urine) pale yellow.  § Take over-the-counter or prescription medicines.  § Eat foods that are high in fiber. These include beans, whole grains, and fresh fruits and vegetables.  § Limit foods that are high in fat and sugars. These include fried or sweet foods.  General instructions  · Do not use any products that contain nicotine or tobacco, such as cigarettes, e-cigarettes, and chewing tobacco. If you need help quitting, ask your doctor.  · Keep all follow-up visits as told by your doctor. This is important.  Contact a doctor if:  · Your pain does not get better with rest or medicine.  · Your pain gets worse, or you have new pain.  · You have a high fever.  · You lose weight very quickly.  · You have trouble doing your normal activities.  Get help right away if:  · One or both of your legs or feet feel weak.  · One or both of your legs or feet lose feeling (have numbness).  · You have trouble controlling when you poop (have a bowel movement) or pee (urinate).  · You have bad back pain and:  ? You feel like you may vomit (nauseous), or you vomit.  ? You have pain in your belly (abdomen).  ? You have shortness of breath.  ? You faint.  Summary  · When back pain lasts longer than 3 months, it is called chronic back pain.  · Pain may get worse at certain times (flare-ups).  · Use ice and heat as told by your doctor. Your doctor may tell you to use ice after flare-ups.  This information is not intended to replace advice given to you by your health care provider. Make sure you discuss any questions you have with your health care provider.  Document Revised: 01/27/2021 Document Reviewed: 01/27/2021  [employer] Patient Education © 2021 [employer]

## 2022-01-25 ENCOUNTER — PREP FOR SURGERY (OUTPATIENT)
Dept: OTHER | Facility: HOSPITAL | Age: 77
End: 2022-01-25

## 2022-01-25 ENCOUNTER — HOSPITAL ENCOUNTER (OUTPATIENT)
Facility: HOSPITAL | Age: 77
Setting detail: HOSPITAL OUTPATIENT SURGERY
End: 2022-01-25
Attending: INTERNAL MEDICINE | Admitting: INTERNAL MEDICINE

## 2022-01-25 DIAGNOSIS — R13.10 DYSPHAGIA, UNSPECIFIED TYPE: Primary | ICD-10-CM

## 2022-01-25 DIAGNOSIS — Z01.818 PREOPERATIVE TESTING: ICD-10-CM

## 2022-02-02 ENCOUNTER — APPOINTMENT (OUTPATIENT)
Dept: PREADMISSION TESTING | Facility: HOSPITAL | Age: 77
End: 2022-02-02

## 2022-02-02 NOTE — TELEPHONE ENCOUNTER
PATIENT CALLED AND CANCELLED HIS EGD APPT ON FEB 4 DUE TO NO RIDE. HE DID NOT WANT TO FILEMON AT THIS TIME. JUST WANTED TO LET YOU KNOW.

## 2022-02-17 ENCOUNTER — OFFICE VISIT (OUTPATIENT)
Dept: FAMILY MEDICINE CLINIC | Facility: CLINIC | Age: 77
End: 2022-02-17

## 2022-02-17 ENCOUNTER — LAB (OUTPATIENT)
Dept: LAB | Facility: HOSPITAL | Age: 77
End: 2022-02-17

## 2022-02-17 VITALS
RESPIRATION RATE: 18 BRPM | OXYGEN SATURATION: 95 % | HEIGHT: 66 IN | BODY MASS INDEX: 36.16 KG/M2 | DIASTOLIC BLOOD PRESSURE: 82 MMHG | WEIGHT: 225 LBS | SYSTOLIC BLOOD PRESSURE: 152 MMHG | TEMPERATURE: 98.5 F | HEART RATE: 88 BPM

## 2022-02-17 DIAGNOSIS — M25.552 PAIN OF BOTH HIP JOINTS: ICD-10-CM

## 2022-02-17 DIAGNOSIS — J30.89 NON-SEASONAL ALLERGIC RHINITIS, UNSPECIFIED TRIGGER: Primary | ICD-10-CM

## 2022-02-17 DIAGNOSIS — G89.29 OTHER CHRONIC PAIN: ICD-10-CM

## 2022-02-17 DIAGNOSIS — E78.5 HYPERLIPIDEMIA LDL GOAL <100: ICD-10-CM

## 2022-02-17 DIAGNOSIS — I10 ESSENTIAL HYPERTENSION: ICD-10-CM

## 2022-02-17 DIAGNOSIS — E29.1 SECONDARY MALE HYPOGONADISM: ICD-10-CM

## 2022-02-17 DIAGNOSIS — M25.551 PAIN OF BOTH HIP JOINTS: ICD-10-CM

## 2022-02-17 DIAGNOSIS — K21.9 GASTROESOPHAGEAL REFLUX DISEASE WITHOUT ESOPHAGITIS: ICD-10-CM

## 2022-02-17 DIAGNOSIS — R60.9 PERIPHERAL EDEMA: ICD-10-CM

## 2022-02-17 PROCEDURE — 99214 OFFICE O/P EST MOD 30 MIN: CPT | Performed by: NURSE PRACTITIONER

## 2022-02-17 PROCEDURE — 80053 COMPREHEN METABOLIC PANEL: CPT | Performed by: NURSE PRACTITIONER

## 2022-02-17 RX ORDER — LORATADINE 10 MG/1
10 TABLET ORAL DAILY
Qty: 30 TABLET | Refills: 11 | Status: SHIPPED | OUTPATIENT
Start: 2022-02-17 | End: 2022-06-03 | Stop reason: SDUPTHER

## 2022-02-17 RX ORDER — MELOXICAM 7.5 MG/1
7.5 TABLET ORAL DAILY
Qty: 90 TABLET | Refills: 1 | Status: SHIPPED | OUTPATIENT
Start: 2022-02-17 | End: 2022-06-03

## 2022-02-18 LAB
ALBUMIN SERPL-MCNC: 4.6 G/DL (ref 3.5–5.2)
ALBUMIN/GLOB SERPL: 1.5 G/DL
ALP SERPL-CCNC: 67 U/L (ref 39–117)
ALT SERPL W P-5'-P-CCNC: 14 U/L (ref 1–41)
ANION GAP SERPL CALCULATED.3IONS-SCNC: 11.4 MMOL/L (ref 5–15)
AST SERPL-CCNC: 32 U/L (ref 1–40)
BILIRUB SERPL-MCNC: 0.3 MG/DL (ref 0–1.2)
BUN SERPL-MCNC: 12 MG/DL (ref 8–23)
BUN/CREAT SERPL: 13.6 (ref 7–25)
CALCIUM SPEC-SCNC: 9.3 MG/DL (ref 8.6–10.5)
CHLORIDE SERPL-SCNC: 100 MMOL/L (ref 98–107)
CO2 SERPL-SCNC: 25.6 MMOL/L (ref 22–29)
CREAT SERPL-MCNC: 0.88 MG/DL (ref 0.76–1.27)
GFR SERPL CREATININE-BSD FRML MDRD: 84 ML/MIN/1.73
GLOBULIN UR ELPH-MCNC: 3 GM/DL
GLUCOSE SERPL-MCNC: 90 MG/DL (ref 65–99)
POTASSIUM SERPL-SCNC: 4.4 MMOL/L (ref 3.5–5.2)
PROT SERPL-MCNC: 7.6 G/DL (ref 6–8.5)
SODIUM SERPL-SCNC: 137 MMOL/L (ref 136–145)

## 2022-02-18 RX ORDER — TESTOSTERONE 16.2 MG/G
GEL TRANSDERMAL
Qty: 75 G | Refills: 3 | OUTPATIENT
Start: 2022-02-18

## 2022-02-18 RX ORDER — TESTOSTERONE 16.2 MG/G
GEL TRANSDERMAL
Qty: 75 G | Refills: 5 | Status: SHIPPED | OUTPATIENT
Start: 2022-02-18

## 2022-02-28 ENCOUNTER — NURSE TRIAGE (OUTPATIENT)
Dept: CALL CENTER | Facility: HOSPITAL | Age: 77
End: 2022-02-28

## 2022-02-28 ENCOUNTER — TELEPHONE (OUTPATIENT)
Dept: GASTROENTEROLOGY | Facility: CLINIC | Age: 77
End: 2022-02-28

## 2022-02-28 NOTE — TELEPHONE ENCOUNTER
Patient is having slurring of speak, unable  To walk, hands shaking , unable to   Barley dial a the phone. Was in the ED 02/25, signed out AMA. Doctor wanted to admit him,  Would have to wait for a bed. Refuses to return to the ED. Wants an office appointment    Reason for Disposition  • Patient sounds very sick or weak to the triager    Additional Information  • Negative: [1] SEVERE weakness (i.e., unable to walk or barely able to walk, requires support) AND [2] new-onset or worsening  • Negative: [1] Weakness (i.e., paralysis, loss of muscle strength) of the face, arm / hand, or leg / foot on one side of the body AND [2] sudden onset AND [3] present now (Exception: Bell's palsy suspected [i.e., weakness only one side of the face, developing over hours to days, no other symptoms])  • Negative: [1] Numbness (i.e., loss of sensation) of the face, arm / hand, or leg / foot on one side of the body AND [2] sudden onset AND [3] present now  • Negative: [1] Loss of speech or garbled speech AND [2] sudden onset AND [3] present now  • Negative: Difficult to awaken or acting confused (e.g., disoriented, slurred speech)  • Negative: Sounds like a life-threatening emergency to the triager  • Negative: Confusion, disorientation, or hallucinations is main symptom  • Negative: Neck pain is main symptom (and having weakness, numbness, or tingling in arm / hand because of neck pain)  • Negative: Back pain is main symptom (and having weakness, numbness, or tingling in leg because of back pain)  • Negative: Hand pain is main symptom (and having mild weakness, numbness, or tingling in hand related to hand pain)  • Negative: Dizziness is main symptom  • Negative: Vision loss or change is main symptom  • Negative: Followed a head injury within last 3 days  • Negative: Followed a neck injury within last 3 days  • Negative: [1] Tingling in both hands and/or feet AND [2] breathing faster than normal AND [3] feels similar to prior panic  "attack or hyperventilation episode  • Negative: Weakness in both sides of the body or weakness all over  • Negative: Headache  (and neurologic deficit)  • Negative: [1] Back pain AND [2] numbness (loss of sensation) in groin or rectal area  • Negative: [1] Unable to urinate (or only a few drops) > 4 hours AND [2] bladder feels very full (e.g., palpable bladder or strong urge to urinate)  • Negative: [1] Loss of bladder or bowel control (urine or bowel incontinence; wetting self, leaking stool) AND [2] new-onset  • Negative: [1] Weakness (i.e., paralysis, loss of muscle strength) of the face, arm / hand, or leg / foot on one side of the body AND [2] sudden onset AND [3] brief (now gone)  • Negative: [1] Numbness (i.e., loss of sensation) of the face, arm / hand, or leg / foot on one side of the body AND [2] sudden onset AND [3] brief (now gone)  • Negative: [1] Loss of speech or garbled speech AND [2] sudden onset AND [3] brief (now gone)  • Negative: Bell's palsy suspected (i.e., weakness on only one side of the face, developing over hours to days, no other symptoms)    Answer Assessment - Initial Assessment Questions  1. SYMPTOM: \"What is the main symptom you are concerned about?\" (e.g., weakness, numbness)     Shaking hands,  Speech slurring,  Weakness  Unable to  Walk or dial a phone  2. ONSET: \"When did this start?\" (minutes, hours, days; while sleeping)      02/25  3. LAST NORMAL: \"When was the last time you were normal (no symptoms)?\"    Friday 02/25  4. PATTERN \"Does this come and go, or has it been constant since it started?\"  \"Is it present now?\"     present  5. CARDIAC SYMPTOMS: \"Have you had any of the following symptoms: chest pain, difficulty breathing, palpitations?\"     no  6. NEUROLOGIC SYMPTOMS: \"Have you had any of the following symptoms: headache, dizziness, vision loss, double vision, changes in speech, unsteady on your feet?\"      Vision,  Weakness of legs, unable to wak  7. OTHER SYMPTOMS: \"Do " "you have any other symptoms?\"      yes  8. PREGNANCY: \"Is there any chance you are pregnant?\" \"When was your last menstrual period?\"      na    Protocols used: NEUROLOGIC DEFICIT-ADULT-AH      "

## 2022-03-01 ENCOUNTER — OFFICE VISIT (OUTPATIENT)
Dept: FAMILY MEDICINE CLINIC | Facility: CLINIC | Age: 77
End: 2022-03-01

## 2022-03-01 ENCOUNTER — TELEPHONE (OUTPATIENT)
Dept: FAMILY MEDICINE CLINIC | Facility: CLINIC | Age: 77
End: 2022-03-01

## 2022-03-01 DIAGNOSIS — Z91.199 NO-SHOW FOR APPOINTMENT: Primary | ICD-10-CM

## 2022-03-01 NOTE — TELEPHONE ENCOUNTER
Spoke with patient. He had gone to the ER today at [place]. States he is feeling better than he was yesterday and so-on. Stated he will see Eri tomorrow.

## 2022-03-01 NOTE — TELEPHONE ENCOUNTER
CALLED PT ABOUT MISSED APPT. HE WAS AT THE [facility] ER. RESCHEDULED HIS APPT FRO Thursday MORNING

## 2022-03-01 NOTE — PROGRESS NOTES
Chief Complaint  No chief complaint on file.    Subjective     {Problem List  Visit Diagnosis   Encounters  Notes  Medications  Labs  Result Review Imaging  Media :23}     Ronald Bond presents to [facility]  Called patient for telephone visit. He did not answer. Called twice.         The following portions of the patient's history were reviewed and updated as appropriate: allergies, current medications, past family history, past medical history, past social history, past surgical history and problem list.    Review of Systems      Objective   Vital Signs:   There were no vitals taken for this visit.    Physical Exam   Result Review :{Labs  Result Review  Imaging  Med Tab  Media :23}   {The following data was reviewed by (Optional):79584}  {Ambulatory Labs (Optional):27477}  {Data reviewed (Optional):37803:::1}          Assessment and Plan {CC Problem List  Visit Diagnosis  ROS  Review (Popup)  Health Maintenance  Quality  BestPractice  Medications  SmartSets  SnapShot Encounters  Media :23}   There are no diagnoses linked to this encounter.  {Time Spent (Optional):67303}  Follow Up {Instructions Charge Capture  Follow-up Communications :23}  No follow-ups on file.  Patient was given instructions and counseling regarding his condition or for health maintenance advice. Please see specific information pulled into the AVS if appropriate.

## 2022-03-03 ENCOUNTER — OFFICE VISIT (OUTPATIENT)
Dept: FAMILY MEDICINE CLINIC | Facility: CLINIC | Age: 77
End: 2022-03-03

## 2022-03-03 ENCOUNTER — TELEPHONE (OUTPATIENT)
Dept: GASTROENTEROLOGY | Facility: CLINIC | Age: 77
End: 2022-03-03

## 2022-03-03 ENCOUNTER — APPOINTMENT (OUTPATIENT)
Dept: PREADMISSION TESTING | Facility: HOSPITAL | Age: 77
End: 2022-03-03

## 2022-03-03 VITALS
HEIGHT: 66 IN | BODY MASS INDEX: 34.87 KG/M2 | DIASTOLIC BLOOD PRESSURE: 90 MMHG | OXYGEN SATURATION: 96 % | TEMPERATURE: 98.6 F | WEIGHT: 217 LBS | SYSTOLIC BLOOD PRESSURE: 138 MMHG | HEART RATE: 72 BPM | RESPIRATION RATE: 18 BRPM

## 2022-03-03 DIAGNOSIS — Z09 HOSPITAL DISCHARGE FOLLOW-UP: ICD-10-CM

## 2022-03-03 DIAGNOSIS — Z60.2 LIVES ALONE WITHOUT HELP AVAILABLE: Primary | ICD-10-CM

## 2022-03-03 DIAGNOSIS — I10 HYPERTENSION, UNSPECIFIED TYPE: ICD-10-CM

## 2022-03-03 DIAGNOSIS — S69.92XS LEFT WRIST INJURY, SEQUELA: ICD-10-CM

## 2022-03-03 DIAGNOSIS — Z76.0 MEDICATION REFILL: ICD-10-CM

## 2022-03-03 PROCEDURE — 99213 OFFICE O/P EST LOW 20 MIN: CPT | Performed by: NURSE PRACTITIONER

## 2022-03-03 RX ORDER — POTASSIUM CHLORIDE 750 MG/1
10 TABLET, FILM COATED, EXTENDED RELEASE ORAL DAILY
Qty: 30 TABLET | Refills: 0 | Status: SHIPPED | OUTPATIENT
Start: 2022-03-03 | End: 2022-10-24 | Stop reason: SDUPTHER

## 2022-03-03 RX ORDER — METHADONE HYDROCHLORIDE 10 MG/1
80 TABLET ORAL DAILY
COMMUNITY
End: 2022-05-17

## 2022-03-03 RX ORDER — DULOXETIN HYDROCHLORIDE 30 MG/1
CAPSULE, DELAYED RELEASE ORAL
COMMUNITY
Start: 2022-01-14 | End: 2022-05-23

## 2022-03-03 SDOH — SOCIAL STABILITY - SOCIAL INSECURITY: PROBLEMS RELATED TO LIVING ALONE: Z60.2

## 2022-03-03 NOTE — TELEPHONE ENCOUNTER
Patient is scheduled for egd on March 8th. Patient missed his preadmission testing appt.    Tried calling him but no answer and unable to lvm

## 2022-03-03 NOTE — PROGRESS NOTES
Patient was a no show.   Called his cell phone - no answer.   Left message to call office to reschedule.     Eri Baez, APRN

## 2022-03-03 NOTE — PROGRESS NOTES
"Chief Complaint  Hospital Follow Up Visit    Subjective          Ronald Bond presents to [facility]  Patient is a 75 yo male. He is here for follow up from hospitalization from [facility] ER visit for chest pain and left wrist pain. He states he had a CT scan and xr on his wrist. No fracture.  Hypertension, hypokalemia, renal insufficiency, tremor  He was referred to see: Nephrologist Dr.James Hill  He has seen and has appointment with Dr. Prem Mansfield  Orthopedic for wrist injury. Carolyn Bunch :   Neurologist for further evaluation of tremors and possible parkinsonism.   He states he is feeling better. He is needing some assistance with transportation.   \"it cost me 40.00 to come here today with uber\".He lives alone. Will refer to  to assist with transportation. Discussed all the referral with referral coordinator at assist with making appointments.         The following portions of the patient's history were reviewed and updated as appropriate: allergies, current medications, past family history, past medical history, past social history, past surgical history and problem list.    Review of Systems   Constitutional: Positive for fatigue.   HENT: Negative.    Eyes: Negative.    Respiratory: Negative.    Cardiovascular: Negative.    Gastrointestinal: Negative.    Genitourinary: Negative.    Musculoskeletal: Positive for arthralgias.        Left wrist pain    Skin: Negative.    Allergic/Immunologic: Positive for environmental allergies.   Neurological: Positive for tremors.   Hematological: Negative.    Psychiatric/Behavioral: Negative.          Objective   Vital Signs:   /90   Pulse 72   Temp 98.6 °F (37 °C)   Resp 18   Ht 167.6 cm (66\")   Wt 98.4 kg (217 lb)   SpO2 96%   BMI 35.02 kg/m²     Physical Exam  Vitals reviewed.   HENT:      Mouth/Throat:      Mouth: Mucous membranes are moist.      Comments: Edentulous   Cardiovascular:      Rate " and Rhythm: Normal rate and regular rhythm.      Pulses: Normal pulses.   Abdominal:      General: Bowel sounds are normal.      Palpations: Abdomen is soft.   Skin:     General: Skin is warm and dry.      Capillary Refill: Capillary refill takes less than 2 seconds.   Neurological:      Mental Status: He is alert and oriented to person, place, and time.      Gait: Gait abnormal.      Comments: Has a fine tremors   Psychiatric:         Mood and Affect: Mood normal.         Behavior: Behavior is cooperative.        Result Review :                 Assessment and Plan    Diagnoses and all orders for this visit:    1. Lives alone without help available (Primary)  -     Ambulatory Referral to Social Work    2. Hospital discharge follow-up         3. Medication refill  -     potassium chloride 10 MEQ CR tablet; Take 1 tablet by mouth Daily.  Dispense: 30 tablet; Refill: 0    4. Left wrist injury, sequela       Continue to take tylenol as needed for pain.   Patient is on methadone.     5. Hypertension, unspecified type    he states he will take his medication upon arrival home.   Keep appointments with orthopedics, neurology, and nephrology.     Follow Up   Return in about 3 months (around 6/3/2022) for Recheck.  Patient was given instructions and counseling regarding his condition or for health maintenance advice. Please see specific information pulled into the AVS if appropriate.

## 2022-03-04 NOTE — TELEPHONE ENCOUNTER
Called patient again so we could him reschedule for preadmission testing but vm is not set up.     Looks like pre admission testing called to confirm his covid appt for this Sunday. He advised them he had to cancel his pre admission testing appt due to no transportation and has no transportation for covid test on Sunday. He informed them he does not want to cancel his procedure. They advised him to call Dr. Jaquez office.     I have not received a call from patient and I have tried calling him but no answer.     Patient will not be able to proceed with egd until patient has pre admission testing and a covid test prior to egd.

## 2022-03-04 NOTE — PATIENT INSTRUCTIONS
Chronic Pain, Adult  Chronic pain is a type of pain that lasts or keeps coming back for at least 3-6 months. You may have headaches, pain in the abdomen, or pain in other areas of the body. Chronic pain may be related to an illness, such as fibromyalgia or complex regional pain syndrome. Chronic pain may also be related to an injury or a health condition. Sometimes, the cause of chronic pain is not known.  Chronic pain can make it hard for you to do daily activities. If not treated, chronic pain can lead to anxiety and depression. Treatment depends on the cause and severity of your pain. You may need to work with a pain specialist to come up with a treatment plan. The plan may include medicine, counseling, and physical therapy. Many people benefit from a combination of two or more types of treatment to control their pain.  Follow these instructions at home:  Medicines  · Take over-the-counter and prescription medicines only as told by your health care provider.  · Ask your health care provider if the medicine prescribed to you:  ? Requires you to avoid driving or using machinery.  ? Can cause constipation. You may need to take these actions to prevent or treat constipation:  § Drink enough fluid to keep your urine pale yellow.  § Take over-the-counter or prescription medicines.  § Eat foods that are high in fiber, such as beans, whole grains, and fresh fruits and vegetables.  § Limit foods that are high in fat and processed sugars, such as fried or sweet foods.  Treatment plan  Follow your treatment plan as told by your health care provider. This may include:  · Gentle, regular exercise.  · Eating a healthy diet that includes foods such as vegetables, fruits, fish, and lean meats.  · Cognitive or behavioral therapy that changes the way you think or act in response to the pain. This may help improve how you feel.  · Working with a physical therapist.  · Meditation, yoga, acupuncture, or massage therapy.  · Aroma,  color, light, or sound therapy.  · Local electrical stimulation. The electrical pulses help to relieve pain by temporarily stopping the nerve impulses that cause you to feel pain.  · Injections. These deliver numbing or pain-relieving medicines into the spine or the area of pain.    Lifestyle    · Ask your health care provider whether you should keep a pain diary. Your health care provider will tell you what information to write in the diary. This may include when you have pain, what the pain feels like, and how medicines and other behaviors or treatments help to reduce the pain.  · Consider talking with a mental health care provider about how to manage chronic pain.  · Consider joining a chronic pain support group.  · Try to control or lower your stress levels. Talk with your health care provider about ways to do this.    General instructions  · Learn as much as you can about how to manage your chronic pain. Ask your health care provider if an intensive pain rehabilitation program or a chronic pain specialist would be helpful.  · Check your pain level as told by your health care provider. Ask your health care provider if you should use a pain scale.  · It is up to you to get the results of any tests that were done. Ask your health care provider, or the department that is doing the tests, when your results will be ready.  · Keep all follow-up visits as told by your health care provider. This is important.  Contact a health care provider if:  · Your pain gets worse, or you have new pain.  · You have trouble sleeping.  · You have trouble doing your normal activities.  · Your pain is not controlled with treatment.  · You have side effects from pain medicine.  · You feel weak.  · You notice any other changes that show that your condition is getting worse.  Get help right away if:  · You lose feeling or have numbness in your body.  · You lose control of bowel or bladder function.  · Your pain suddenly gets much  worse.  · You develop shaking or chills.  · You develop confusion.  · You develop chest pain.  · You have trouble breathing or shortness of breath.  · You pass out.  · You have thoughts about hurting yourself or others.  If you ever feel like you may hurt yourself or others, or have thoughts about taking your own life, get help right away. Go to your nearest emergency department or:  · Call your local emergency services (911 in the [place]  · Call a suicide crisis helpline, such as the National Suicide Prevention Lifeline at 1-604.202.2524. This is open 24 hours a day in the [place]  · Text the Crisis Text Line at 221324 (in the [place]  Summary  · Chronic pain is a type of pain that lasts or keeps coming back for at least 3-6 months.  · Chronic pain may be related to an illness, injury, or other health condition. Sometimes, the cause of chronic pain is not known.  · Treatment depends on the cause and severity of your pain.  · Many people benefit from a combination of two or more types of treatment to control their pain.  · Follow your treatment plan as told by your health care provider.  This information is not intended to replace advice given to you by your health care provider. Make sure you discuss any questions you have with your health care provider.  Document Revised: 09/03/2020 Document Reviewed: 09/03/2020  [employer] Patient Education © 2021 [employer]    Edema    Edema is when you have too much fluid in your body or under your skin. Edema may make your legs, feet, and ankles swell up. Swelling is also common in looser tissues, like around your eyes. This is a common condition. It gets more common as you get older. There are many possible causes of edema. Eating too much salt (sodium) and being on your feet or sitting for a long time can cause edema in your legs, feet, and ankles. Hot weather may make edema worse.  Edema is usually painless. Your skin may look swollen or shiny.  Follow these instructions at  "home:  · Keep the swollen body part raised (elevated) above the level of your heart when you are sitting or lying down.  · Do not sit still or stand for a long time.  · Do not wear tight clothes. Do not wear garters on your upper legs.  · Exercise your legs. This can help the swelling go down.  · Wear elastic bandages or support stockings as told by your doctor.  · Eat a low-salt (low-sodium) diet to reduce fluid as told by your doctor.  · Depending on the cause of your swelling, you may need to limit how much fluid you drink (fluid restriction).  · Take over-the-counter and prescription medicines only as told by your doctor.  Contact a doctor if:  · Treatment is not working.  · You have heart, liver, or kidney disease and have symptoms of edema.  · You have sudden and unexplained weight gain.  Get help right away if:  · You have shortness of breath or chest pain.  · You cannot breathe when you lie down.  · You have pain, redness, or warmth in the swollen areas.  · You have heart, liver, or kidney disease and get edema all of a sudden.  · You have a fever and your symptoms get worse all of a sudden.  Summary  · Edema is when you have too much fluid in your body or under your skin.  · Edema may make your legs, feet, and ankles swell up. Swelling is also common in looser tissues, like around your eyes.  · Raise (elevate) the swollen body part above the level of your heart when you are sitting or lying down.  · Follow your doctor's instructions about diet and how much fluid you can drink (fluid restriction).  This information is not intended to replace advice given to you by your health care provider. Make sure you discuss any questions you have with your health care provider.  Document Revised: 12/21/2018 Document Reviewed: 01/05/2018  [employer] Patient Education © 2021 [employer].    [employer] Current Therapy 2021 (pp. 213-216). [place]: [employer].\">   Gastroesophageal Reflux Disease, Adult  Gastroesophageal " reflux (ALYSSA) happens when acid from the stomach flows up into the tube that connects the mouth and the stomach (esophagus). Normally, food travels down the esophagus and stays in the stomach to be digested. However, when a person has ALYSSA, food and stomach acid sometimes move back up into the esophagus. If this becomes a more serious problem, the person may be diagnosed with a disease called gastroesophageal reflux disease (GERD). GERD occurs when the reflux:  · Happens often.  · Causes frequent or severe symptoms.  · Causes problems such as damage to the esophagus.  When stomach acid comes in contact with the esophagus, the acid may cause soreness (inflammation) in the esophagus. Over time, GERD may create small holes (ulcers) in the lining of the esophagus.  What are the causes?  This condition is caused by a problem with the muscle between the esophagus and the stomach (lower esophageal sphincter, or LES). Normally, the LES muscle closes after food passes through the esophagus to the stomach. When the LES is weakened or abnormal, it does not close properly, and that allows food and stomach acid to go back up into the esophagus.  The LES can be weakened by certain dietary substances, medicines, and medical conditions, including:  · Tobacco use.  · Pregnancy.  · Having a hiatal hernia.  · Alcohol use.  · Certain foods and beverages, such as coffee, chocolate, onions, and peppermint.  What increases the risk?  You are more likely to develop this condition if you:  · Have an increased body weight.  · Have a connective tissue disorder.  · Use NSAID medicines.  What are the signs or symptoms?  Symptoms of this condition include:  · Heartburn.  · Difficult or painful swallowing.  · The feeling of having a lump in the throat.  · A bitter taste in the mouth.  · Bad breath.  · Having a large amount of saliva.  · Having an upset or bloated stomach.  · Belching.  · Chest pain. Different conditions can cause chest pain. Make  sure you see your health care provider if you experience chest pain.  · Shortness of breath or wheezing.  · Ongoing (chronic) cough or a night-time cough.  · Wearing away of tooth enamel.  · Weight loss.  How is this diagnosed?  Your health care provider will take a medical history and perform a physical exam. To determine if you have mild or severe GERD, your health care provider may also monitor how you respond to treatment. You may also have tests, including:  · A test to examine your stomach and esophagus with a small camera (endoscopy).  · A test that measures the acidity level in your esophagus.  · A test that measures how much pressure is on your esophagus.  · A barium swallow or modified barium swallow test to show the shape, size, and functioning of your esophagus.  How is this treated?  The goal of treatment is to help relieve your symptoms and to prevent complications. Treatment for this condition may vary depending on how severe your symptoms are. Your health care provider may recommend:  · Changes to your diet.  · Medicine.  · Surgery.  Follow these instructions at home:  Eating and drinking    · Follow a diet as recommended by your health care provider. This may involve avoiding foods and drinks such as:  ? Coffee and tea (with or without caffeine).  ? Drinks that contain alcohol.  ? Energy drinks and sports drinks.  ? Carbonated drinks or sodas.  ? Chocolate and cocoa.  ? Peppermint and mint flavorings.  ? Garlic and onions.  ? Horseradish.  ? Spicy and acidic foods, including peppers, chili powder, chan powder, vinegar, hot sauces, and barbecue sauce.  ? Citrus fruit juices and citrus fruits, such as oranges, taylor, and limes.  ? Tomato-based foods, such as red sauce, chili, salsa, and pizza with red sauce.  ? Fried and fatty foods, such as donuts, french fries, potato chips, and high-fat dressings.  ? High-fat meats, such as hot dogs and fatty cuts of red and white meats, such as rib eye steak,  sausage, ham, and redmond.  ? High-fat dairy items, such as whole milk, butter, and cream cheese.  · Eat small, frequent meals instead of large meals.  · Avoid drinking large amounts of liquid with your meals.  · Avoid eating meals during the 2-3 hours before bedtime.  · Avoid lying down right after you eat.  · Do not exercise right after you eat.    Lifestyle    · Do not use any products that contain nicotine or tobacco, such as cigarettes, e-cigarettes, and chewing tobacco. If you need help quitting, ask your health care provider.  · Try to reduce your stress by using methods such as yoga or meditation. If you need help reducing stress, ask your health care provider.  · If you are overweight, reduce your weight to an amount that is healthy for you. Ask your health care provider for guidance about a safe weight loss goal.    General instructions  · Pay attention to any changes in your symptoms.  · Take over-the-counter and prescription medicines only as told by your health care provider. Do not take aspirin, ibuprofen, or other NSAIDs unless your health care provider told you to do so.  · Wear loose-fitting clothing. Do not wear anything tight around your waist that causes pressure on your abdomen.  · Raise (elevate) the head of your bed about 6 inches (15 cm).  · Avoid bending over if this makes your symptoms worse.  · Keep all follow-up visits as told by your health care provider. This is important.  Contact a health care provider if:  · You have:  ? New symptoms.  ? Unexplained weight loss.  ? Difficulty swallowing or it hurts to swallow.  ? Wheezing or a persistent cough.  ? A hoarse voice.  · Your symptoms do not improve with treatment.  Get help right away if you:  · Have pain in your arms, neck, jaw, teeth, or back.  · Feel sweaty, dizzy, or light-headed.  · Have chest pain or shortness of breath.  · Vomit and your vomit looks like blood or coffee grounds.  · Faint.  · Have stool that is bloody or  black.  · Cannot swallow, drink, or eat.  Summary  · Gastroesophageal reflux happens when acid from the stomach flows up into the esophagus. GERD is a disease in which the reflux happens often, causes frequent or severe symptoms, or causes problems such as damage to the esophagus.  · Treatment for this condition may vary depending on how severe your symptoms are. Your health care provider may recommend diet and lifestyle changes, medicine, or surgery.  · Contact a health care provider if you have new or worsening symptoms.  · Take over-the-counter and prescription medicines only as told by your health care provider. Do not take aspirin, ibuprofen, or other NSAIDs unless your health care provider told you to do so.  · Keep all follow-up visits as told by your health care provider. This is important.  This information is not intended to replace advice given to you by your health care provider. Make sure you discuss any questions you have with your health care provider.  Document Revised: 06/26/2019 Document Reviewed: 06/26/2019  [employer] Patient Education © 2021 [employer]    Hip Pain  The hip is the joint between the upper legs and the lower pelvis. The bones, cartilage, tendons, and muscles of your hip joint support your body and allow you to move around.  Hip pain can range from a minor ache to severe pain in one or both of your hips. The pain may be felt on the inside of the hip joint near the groin, or on the outside near the buttocks and upper thigh. You may also have swelling or stiffness in your hip area.  Follow these instructions at home:  Managing pain, stiffness, and swelling         · If directed, put ice on the painful area. To do this:  ? Put ice in a plastic bag.  ? Place a towel between your skin and the bag.  ? Leave the ice on for 20 minutes, 2-3 times a day.  · If directed, apply heat to the affected area as often as told by your health care provider. Use the heat source that your health care  provider recommends, such as a moist heat pack or a heating pad.  ? Place a towel between your skin and the heat source.  ? Leave the heat on for 20-30 minutes.  ? Remove the heat if your skin turns bright red. This is especially important if you are unable to feel pain, heat, or cold. You may have a greater risk of getting burned.  Activity  · Do exercises as told by your health care provider.  · Avoid activities that cause pain.  General instructions    · Take over-the-counter and prescription medicines only as told by your health care provider.  · Keep a journal of your symptoms. Write down:  ? How often you have hip pain.  ? The location of your pain.  ? What the pain feels like.  ? What makes the pain worse.  · Sleep with a pillow between your legs on your most comfortable side.  · Keep all follow-up visits as told by your health care provider. This is important.    Contact a health care provider if:  · You cannot put weight on your leg.  · Your pain or swelling continues or gets worse after one week.  · It gets harder to walk.  · You have a fever.  Get help right away if:  · You fall.  · You have a sudden increase in pain and swelling in your hip.  · Your hip is red or swollen or very tender to touch.  Summary  · Hip pain can range from a minor ache to severe pain in one or both of your hips.  · The pain may be felt on the inside of the hip joint near the groin, or on the outside near the buttocks and upper thigh.  · Avoid activities that cause pain.  · Write down how often you have hip pain, the location of the pain, what makes it worse, and what it feels like.  This information is not intended to replace advice given to you by your health care provider. Make sure you discuss any questions you have with your health care provider.  Document Revised: 05/04/2020 Document Reviewed: 05/04/2020  [employer] Patient Education © 2021 [employer].

## 2022-03-04 NOTE — NURSING NOTE
Called pt for pre-call to inform him that he has a COVID test scheduled at the [facility] on Sunday, 3/6/22 at 11:45 am.  Pt. Stated that he had to cancel his PAT appointment yesterday as he did not have transportation to the appt.  Pt. Stated that he does not have transportation for the appt. On Sunday either.  Pt. Does not wish to cancel his procedure but is just not able to get to all the appts that are normally necessary for this procedure.  Encouraged pt. To call Dr. Jaquez's office, number provided, to clarify and seek MD recommendations regarding PAT and COVID appts.  Pt. Verbalized an understanding of instructions.

## 2022-03-06 ENCOUNTER — CLINICAL SUPPORT NO REQUIREMENTS (OUTPATIENT)
Dept: PREADMISSION TESTING | Facility: HOSPITAL | Age: 77
End: 2022-03-06

## 2022-03-06 DIAGNOSIS — Z60.2 LIVES ALONE WITHOUT HELP AVAILABLE: Primary | ICD-10-CM

## 2022-03-06 SDOH — SOCIAL STABILITY - SOCIAL INSECURITY: PROBLEMS RELATED TO LIVING ALONE: Z60.2

## 2022-03-08 ENCOUNTER — REFERRAL TRIAGE (OUTPATIENT)
Dept: CASE MANAGEMENT | Facility: OTHER | Age: 77
End: 2022-03-08

## 2022-03-18 PROBLEM — R25.1 TREMORS OF NERVOUS SYSTEM: Status: ACTIVE | Noted: 2022-03-18

## 2022-03-21 ENCOUNTER — APPOINTMENT (OUTPATIENT)
Dept: PREADMISSION TESTING | Facility: HOSPITAL | Age: 77
End: 2022-03-21

## 2022-03-21 NOTE — TELEPHONE ENCOUNTER
Patient was rescheduled for egd on 03.22.22 and pre admission testing/covid on 03.21.22.     Called him today to confirm his appt with pre admission testing and for procedure tomorrow.     States he is unable to make his appt today for pre admission testing/covid testing. Says he has a prior commitment. Explained that we would have to reschedule his egd for tomorrow. He asked me to call him back at the end of the week to reschedule egd.     Took him off the schedule for tomorrow. Will call him back at the end of the week.

## 2022-03-22 ENCOUNTER — TELEPHONE (OUTPATIENT)
Dept: FAMILY MEDICINE CLINIC | Facility: CLINIC | Age: 77
End: 2022-03-22

## 2022-03-22 NOTE — TELEPHONE ENCOUNTER
Caller: Varun Ronald L    Relationship: Self    Best call back number: [phone]    Caller requesting test results: SELF    When was the test performed: OVER A WEEK AGO WHILE IN THE HOSPITAL ER    Where was the test performed: [facility] EMERGENCY ROOM    Additional notes: STILL WAITING FOR TESTING AND REFERRAL FOLLOWING EMERGENCY ROOM TESTS.    PLEASE CALL. REFUSED APPOINTMENT- STATES SOMEONE WAS SUPPOSED TO CALL HIM.

## 2022-03-25 ENCOUNTER — OFFICE VISIT (OUTPATIENT)
Dept: FAMILY MEDICINE CLINIC | Facility: CLINIC | Age: 77
End: 2022-03-25

## 2022-03-25 VITALS
DIASTOLIC BLOOD PRESSURE: 88 MMHG | OXYGEN SATURATION: 97 % | HEIGHT: 66 IN | TEMPERATURE: 98.6 F | BODY MASS INDEX: 35.68 KG/M2 | RESPIRATION RATE: 18 BRPM | HEART RATE: 88 BPM | WEIGHT: 222 LBS | SYSTOLIC BLOOD PRESSURE: 136 MMHG

## 2022-03-25 DIAGNOSIS — S69.92XS LEFT WRIST INJURY, SEQUELA: Primary | ICD-10-CM

## 2022-03-25 PROCEDURE — 99213 OFFICE O/P EST LOW 20 MIN: CPT | Performed by: NURSE PRACTITIONER

## 2022-04-04 ENCOUNTER — TELEPHONE (OUTPATIENT)
Dept: FAMILY MEDICINE CLINIC | Facility: CLINIC | Age: 77
End: 2022-04-04

## 2022-04-04 NOTE — TELEPHONE ENCOUNTER
Caller: Ronald Bond    Relationship to patient: Self    Best call back number: [phone]    Patient is needing:PATIENT STATED THAT HE HAS ALREADY MADE APPOINTMENT FOR HIS REFERRALS AND NO NEED FOR PROVIDER TO DO ANYTHING FURTHER     PATIENT STATED THAT HE HAS BEEN WAITING ON CALL FOR THESE APPOINTMENTS FOR MONTHS NOW AND NO ONE HAS CALLED AND LET HIM KNOW IF THEY HAVE BEEN SCHEDULED OR NOT AND WITH HIS MEDICAL ISSUES PATIENT FELT IT WAS BETTER HE CALLED AND SCHEDULED THEM    PATIENT WANTED TO LET OFFICE KNOW

## 2022-04-12 ENCOUNTER — APPOINTMENT (OUTPATIENT)
Dept: PREADMISSION TESTING | Facility: HOSPITAL | Age: 77
End: 2022-04-12

## 2022-04-22 ENCOUNTER — OFFICE VISIT (OUTPATIENT)
Dept: FAMILY MEDICINE CLINIC | Facility: CLINIC | Age: 77
End: 2022-04-22

## 2022-04-22 VITALS
BODY MASS INDEX: 35.36 KG/M2 | TEMPERATURE: 98 F | HEIGHT: 66 IN | RESPIRATION RATE: 18 BRPM | SYSTOLIC BLOOD PRESSURE: 136 MMHG | WEIGHT: 220 LBS | HEART RATE: 84 BPM | OXYGEN SATURATION: 94 % | DIASTOLIC BLOOD PRESSURE: 88 MMHG

## 2022-04-22 DIAGNOSIS — L02.11 CELLULITIS AND ABSCESS OF NECK: Primary | ICD-10-CM

## 2022-04-22 DIAGNOSIS — L03.221 CELLULITIS AND ABSCESS OF NECK: Primary | ICD-10-CM

## 2022-04-22 PROCEDURE — 99213 OFFICE O/P EST LOW 20 MIN: CPT | Performed by: NURSE PRACTITIONER

## 2022-04-22 RX ORDER — AMOXICILLIN AND CLAVULANATE POTASSIUM 875; 125 MG/1; MG/1
1 TABLET, FILM COATED ORAL 2 TIMES DAILY
Qty: 14 TABLET | Refills: 0 | Status: SHIPPED | OUTPATIENT
Start: 2022-04-22 | End: 2022-04-29

## 2022-04-22 NOTE — PROGRESS NOTES
"Chief Complaint  Cyst (Cyst on the back of the neck)    Subjective          Ronald Bond presents to [facility]  Patient is a 76-year-old male.  He is here for complaint of a lesion to the back of his neck.  States it started about little over a week ago, painful, fluid-filled, he popped it and nasty milky colored fluid came out yesterday.  He denies any fever or chills.  States the pain has resolved.        The following portions of the patient's history were reviewed and updated as appropriate: allergies, current medications, past family history, past medical history, past social history, past surgical history and problem list.    Review of Systems   Constitutional: Negative.    HENT: Negative.    Gastrointestinal: Negative.    Musculoskeletal: Positive for arthralgias and myalgias.   Skin: Positive for color change and wound.        Back of neck has a wound on it.      Allergic/Immunologic: Positive for environmental allergies.   Neurological: Negative.    Hematological: Negative.    Psychiatric/Behavioral: Negative.          Objective   Vital Signs:   /88 (BP Location: Right arm, Patient Position: Sitting, Cuff Size: Adult)   Pulse 84   Temp 98 °F (36.7 °C) (Temporal)   Resp 18   Ht 167.6 cm (65.98\")   Wt 99.8 kg (220 lb)   SpO2 94%   BMI 35.53 kg/m²    Patient's Body mass index is 35.53 kg/m². indicating that he is obese (BMI >30). Obesity-related health conditions include the following: hypertension. Obesity is improving with treatment. BMI is is above average; BMI management plan is completed. We discussed portion control and increasing exercise..      PHQ-2/9 Depression Screening  PHQ-9 Total Score:      JEFFERSON-7 Anxiety Screening  JEFFERSON-7             Physical Exam  Vitals reviewed.   Constitutional:       Appearance: He is well-developed. He is not ill-appearing.   HENT:      Head: Normocephalic.   Eyes:      Conjunctiva/sclera: Conjunctivae normal.      Pupils: " Pupils are equal, round, and reactive to light.   Cardiovascular:      Rate and Rhythm: Normal rate and regular rhythm.      Heart sounds: Normal heart sounds.   Pulmonary:      Effort: Pulmonary effort is normal.      Breath sounds: Normal breath sounds.   Musculoskeletal:      Cervical back: Normal range of motion.   Lymphadenopathy:      Cervical: No cervical adenopathy.   Skin:     General: Skin is warm and dry.      Capillary Refill: Capillary refill takes less than 2 seconds.      Findings: Erythema and lesion present.             Comments: Patient has remaining cellulitis erythematous base with some scabbing seen. No current drainage      Neurological:      Mental Status: He is alert and oriented to person, place, and time.   Psychiatric:         Mood and Affect: Mood normal.         Speech: Speech normal.         Behavior: Behavior normal. Behavior is cooperative.        Result Review :                 Assessment and Plan    Diagnoses and all orders for this visit:    1. Cellulitis and abscess of neck (Primary)  -     amoxicillin-clavulanate (Augmentin) 875-125 MG per tablet; Take 1 tablet by mouth 2 (Two) Times a Day for 7 days.  Dispense: 14 tablet; Refill: 0  -     mupirocin (BACTROBAN) 2 % ointment; Apply 1 application topically to the appropriate area as directed 3 (Three) Times a Day.  Dispense: 15 g; Refill: 0    will treat for cellulitis - abscess improving. Will start on augmentin and topical mupirocin .   Follow up if new drainage develops, or fever, or worsening condition.     Follow up as needed and in 3 months.         Follow Up   Return in about 3 months (around 7/22/2022), or if symptoms worsen or fail to improve.  Patient was given instructions and counseling regarding his condition or for health maintenance advice. Please see specific information pulled into the AVS if appropriate.

## 2022-04-27 ENCOUNTER — TELEPHONE (OUTPATIENT)
Dept: FAMILY MEDICINE CLINIC | Facility: CLINIC | Age: 77
End: 2022-04-27

## 2022-04-27 NOTE — TELEPHONE ENCOUNTER
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    PLEASE ADVISE.  CALL BACK:[phone]

## 2022-05-16 ENCOUNTER — APPOINTMENT (OUTPATIENT)
Dept: PREADMISSION TESTING | Facility: HOSPITAL | Age: 77
End: 2022-05-16

## 2022-05-17 ENCOUNTER — OFFICE VISIT (OUTPATIENT)
Dept: FAMILY MEDICINE CLINIC | Facility: CLINIC | Age: 77
End: 2022-05-17

## 2022-05-17 ENCOUNTER — ANESTHESIA EVENT (OUTPATIENT)
Dept: GASTROENTEROLOGY | Facility: HOSPITAL | Age: 77
End: 2022-05-17

## 2022-05-17 ENCOUNTER — ANESTHESIA (OUTPATIENT)
Dept: GASTROENTEROLOGY | Facility: HOSPITAL | Age: 77
End: 2022-05-17

## 2022-05-17 VITALS
RESPIRATION RATE: 18 BRPM | BODY MASS INDEX: 37.01 KG/M2 | SYSTOLIC BLOOD PRESSURE: 126 MMHG | OXYGEN SATURATION: 93 % | WEIGHT: 229.2 LBS | TEMPERATURE: 97.8 F | DIASTOLIC BLOOD PRESSURE: 78 MMHG | HEART RATE: 74 BPM

## 2022-05-17 DIAGNOSIS — M79.602 LEFT ARM PAIN: ICD-10-CM

## 2022-05-17 DIAGNOSIS — I10 HYPERTENSION, UNSPECIFIED TYPE: ICD-10-CM

## 2022-05-17 DIAGNOSIS — G89.29 OTHER CHRONIC PAIN: Primary | ICD-10-CM

## 2022-05-17 PROCEDURE — 99214 OFFICE O/P EST MOD 30 MIN: CPT | Performed by: NURSE PRACTITIONER

## 2022-05-17 RX ORDER — PREGABALIN 100 MG/1
100 CAPSULE ORAL 3 TIMES DAILY
Qty: 90 CAPSULE | Refills: 2 | Status: SHIPPED | OUTPATIENT
Start: 2022-05-17 | End: 2022-10-24 | Stop reason: SDUPTHER

## 2022-05-17 NOTE — PROGRESS NOTES
Chief Complaint  Fall (Fell yesterday, trying to tie his shoe) and Follow-up    Subjective          Ronald Bond presents to [facility]  Patient is a 76-year-old male.  He is here for follow-up on his hypertension.  He denies any chest pain, or shortness of breath, pedal edema slight improvement.   And he had an incident where he fell yesterday injuring his left arm.  He reports he went to [facility].  He states he had an x-ray and it was negative for fracture.  He is complaining of pain from the elbow to his wrist.  He has not in a sling.  Fingers are warm pink movable.  States he is using ice on it with some relief.         The following portions of the patient's history were reviewed and updated as appropriate: allergies, current medications, past family history, past medical history, past social history, past surgical history and problem list.    Review of Systems   Constitutional: Positive for fatigue. Negative for activity change.   Respiratory: Negative.    Cardiovascular: Negative.    Gastrointestinal: Negative.    Musculoskeletal: Positive for arthralgias and myalgias.        Left lower arm pain    Skin:        Abrasion on left knee   Psychiatric/Behavioral: Negative for sleep disturbance. The patient is nervous/anxious.          Objective   Vital Signs:   /78 (BP Location: Right arm, Patient Position: Sitting, Cuff Size: Adult)   Pulse 74   Temp 97.8 °F (36.6 °C) (Infrared)   Resp 18   Wt 104 kg (229 lb 3.2 oz)   SpO2 93%   BMI 37.01 kg/m²                   Physical Exam  Vitals reviewed.   Constitutional:       Appearance: He is well-developed and well-groomed. He is obese.   Eyes:      Pupils: Pupils are equal, round, and reactive to light.   Cardiovascular:      Rate and Rhythm: Normal rate and regular rhythm.      Pulses: Normal pulses.   Pulmonary:      Effort: Pulmonary effort is normal.      Breath sounds: Normal  breath sounds.   Musculoskeletal:         General: Tenderness present.      Left upper arm: Swelling and tenderness present. No edema, deformity, lacerations or bony tenderness.        Legs:       Comments: Left knee - abrasion - open to air.   No drainage or erythema   Skin:     General: Skin is warm and dry.      Capillary Refill: Capillary refill takes less than 2 seconds.   Neurological:      Mental Status: He is alert and oriented to person, place, and time.      Comments:   Abnormal gait - no apparent change    Psychiatric:         Mood and Affect: Mood normal.         Behavior: Behavior is cooperative.        Result Review :                 Assessment and Plan    Diagnoses and all orders for this visit:    1. Other chronic pain (Primary)  -     pregabalin (LYRICA) 100 MG capsule; Take 1 capsule by mouth 3 (Three) Times a Day.  Dispense: 90 capsule; Refill: 2    2. Left arm pain  -     Diclofenac Sodium (VOLTAREN) 1 % gel gel; Apply 4 g topically to the appropriate area as directed 4 (Four) Times a Day As Needed (arm pain).  Dispense: 100 g; Refill: 1  -     pregabalin (LYRICA) 100 MG capsule; Take 1 capsule by mouth 3 (Three) Times a Day.  Dispense: 90 capsule; Refill: 2    3. Hypertension, unspecified type  Chronic - stable continue medication at current doses.       increasing lyrica due to increase in chronic pain.   He is aware of the controlled substance policy.   Same pharmacy. Dicussed side effects.   Bunny reviewed.   Tylenol prn  Ice and elevate left arm.   Seen at [facility] er no reports no fracture.   Warm pink moveable.     I spent 31 minutes caring for Ronald on this date of service. This time includes time spent by me in the following activities:preparing for the visit, reviewing tests, obtaining and/or reviewing a separately obtained history, performing a medically appropriate examination and/or evaluation , ordering medications, tests, or procedures and documenting information in the  medical record  Follow Up   Return in about 3 months (around 8/17/2022) for Recheck.  Patient was given instructions and counseling regarding his condition or for health maintenance advice. Please see specific information pulled into the AVS if appropriate.

## 2022-05-17 NOTE — ANESTHESIA PREPROCEDURE EVALUATION
Anesthesia Evaluation     Patient summary reviewed and Nursing notes reviewed   NPO Solid Status: > 8 hours  NPO Liquid Status: > 8 hours           Airway   Dental      Pulmonary    (+) asthma,  Cardiovascular     ECG reviewed    (+) hypertension, CAD, hyperlipidemia,     ROS comment: Normal sinus rhythm  Prolonged QT    ECHO EF 50% LV moderately dilated.  LA moderately increased.RVSP (TR) normal (<35 mmHg).27 mmHg.        Neuro/Psych  GI/Hepatic/Renal/Endo    (+)  hiatal hernia, GERD,  hepatitis, liver disease,     Musculoskeletal     Abdominal    Substance History      OB/GYN          Other   arthritis,                      Anesthesia Plan    CODE STATUS:

## 2022-05-23 ENCOUNTER — TELEPHONE (OUTPATIENT)
Dept: FAMILY MEDICINE CLINIC | Facility: CLINIC | Age: 77
End: 2022-05-23

## 2022-05-23 DIAGNOSIS — G47.9 SLEEP DISORDER: ICD-10-CM

## 2022-05-23 DIAGNOSIS — R60.9 PERIPHERAL EDEMA: Primary | ICD-10-CM

## 2022-05-23 DIAGNOSIS — R41.3 POOR SHORT-TERM MEMORY: ICD-10-CM

## 2022-05-23 DIAGNOSIS — Z91.199 NONCOMPLIANCE WITH TREATMENT PLAN: ICD-10-CM

## 2022-05-23 NOTE — TELEPHONE ENCOUNTER
Caller: Ronald Bond    Relationship: Self    Best call back number: [phone]    What is the best time to reach you: ANYTIME    Who are you requesting to speak with (clinical staff, provider,  specific staff member): NURSE         What was the call regarding: THE PATIENT STATES THAT THIS WEEK HE HAS HAD SWELLING IN HIS FEET AND ANKLES THE PATIENT WOULD LIKE TO KNOW WHAT HE SHOULD DO FOR THAT  THE PATIENT STATES THAT HE IS FALLING ASLEEP WHILE STANDING UP AT INAPPROPRIATE TIME THE PATIENT STATES THAT HE IS HAVING THOSE EPISODES EVERY DAY HE WOULD LIKE TO KNOW WHAT HE SHOULD DO FOR THAT AS WELL     Do you require a callback: YES

## 2022-05-23 NOTE — TELEPHONE ENCOUNTER
Patient has been non compliant with his referral to see sleep medicine. It was originally ordered on 06/2021.   Will reorder the referral.   He is currently on 20 mg of lasix bid.   Will have staff check to see if he is actually taking the medication twice a day.     He will need to follow up with an office visit.   He has been seen by cardiology. May need another follow - up visit.     Eri Baez APRN

## 2022-05-29 ENCOUNTER — TELEPHONE (OUTPATIENT)
Dept: FAMILY MEDICINE CLINIC | Facility: CLINIC | Age: 77
End: 2022-05-29

## 2022-05-29 NOTE — TELEPHONE ENCOUNTER
Call from nurse line. Returned call to patient.,     Patient with some worsening swelling to one leg more than other leg. Some associated redness. He mentioned possible wound/possible athletes foot? He is poor historian and somewhat difficult to determine all his specific symptoms.     I recommend he to urgent care today for further eval. He states understanding.     Aubrey Caruso MD  Family Medicine - [facility]

## 2022-06-03 ENCOUNTER — OFFICE VISIT (OUTPATIENT)
Dept: FAMILY MEDICINE CLINIC | Facility: CLINIC | Age: 77
End: 2022-06-03

## 2022-06-03 ENCOUNTER — PREP FOR SURGERY (OUTPATIENT)
Dept: OTHER | Facility: HOSPITAL | Age: 77
End: 2022-06-03

## 2022-06-03 VITALS
OXYGEN SATURATION: 98 % | RESPIRATION RATE: 14 BRPM | WEIGHT: 233 LBS | TEMPERATURE: 97.8 F | DIASTOLIC BLOOD PRESSURE: 90 MMHG | BODY MASS INDEX: 37.45 KG/M2 | SYSTOLIC BLOOD PRESSURE: 165 MMHG | HEART RATE: 88 BPM | HEIGHT: 66 IN

## 2022-06-03 DIAGNOSIS — J30.89 NON-SEASONAL ALLERGIC RHINITIS, UNSPECIFIED TRIGGER: ICD-10-CM

## 2022-06-03 DIAGNOSIS — Z01.818 PRE-OPERATIVE CLEARANCE: ICD-10-CM

## 2022-06-03 DIAGNOSIS — R60.9 PERIPHERAL EDEMA: ICD-10-CM

## 2022-06-03 DIAGNOSIS — L03.116 BILATERAL LOWER LEG CELLULITIS: Primary | ICD-10-CM

## 2022-06-03 DIAGNOSIS — L03.115 BILATERAL LOWER LEG CELLULITIS: Primary | ICD-10-CM

## 2022-06-03 DIAGNOSIS — R13.10 DYSPHAGIA, UNSPECIFIED TYPE: Primary | ICD-10-CM

## 2022-06-03 DIAGNOSIS — J45.20 MILD INTERMITTENT ASTHMA WITHOUT COMPLICATION: ICD-10-CM

## 2022-06-03 PROCEDURE — 99214 OFFICE O/P EST MOD 30 MIN: CPT | Performed by: NURSE PRACTITIONER

## 2022-06-03 RX ORDER — LORATADINE 10 MG/1
10 TABLET ORAL DAILY
Qty: 30 TABLET | Refills: 11 | Status: SHIPPED | OUTPATIENT
Start: 2022-06-03 | End: 2022-10-24 | Stop reason: SDUPTHER

## 2022-06-03 RX ORDER — ALBUTEROL SULFATE 90 UG/1
2 AEROSOL, METERED RESPIRATORY (INHALATION) EVERY 4 HOURS PRN
Qty: 18 G | Refills: 1 | Status: SHIPPED | OUTPATIENT
Start: 2022-06-03 | End: 2022-10-24 | Stop reason: SDUPTHER

## 2022-06-03 RX ORDER — CEPHALEXIN 500 MG/1
500 CAPSULE ORAL 2 TIMES DAILY
Qty: 20 CAPSULE | Refills: 0 | Status: SHIPPED | OUTPATIENT
Start: 2022-06-03 | End: 2022-06-13

## 2022-06-03 NOTE — PROGRESS NOTES
"Chief Complaint   Cellulitis (Legs both are swollen and in pain. ), Med Refill, and Asthma    Subjective          Ronald Bond presents to [facility]  Patient is a 77 yo male. He is here for complaint of bilateral lower leg edema with redness, few sores where he has been scratching. They have been swollen for months, the redness, itching has worsened over the past couple of weeks.   He denies fever.       Asthma  He complains of chest tightness, cough and wheezing. This is a chronic problem. The current episode started more than 1 year ago. The problem occurs intermittently. The problem has been waxing and waning. The cough is non-productive. Associated symptoms include dyspnea on exertion. Pertinent negatives include no chest pain. His symptoms are aggravated by exercise, strenuous activity and climbing stairs. He reports moderate improvement on treatment. His past medical history is significant for asthma.       The following portions of the patient's history were reviewed and updated as appropriate: allergies, current medications, past family history, past medical history, past social history, past surgical history and problem list.    Review of Systems   HENT: Negative.    Respiratory: Positive for cough and wheezing.    Cardiovascular: Positive for dyspnea on exertion and leg swelling. Negative for chest pain and palpitations.   Gastrointestinal: Negative.    Genitourinary: Negative.    Musculoskeletal: Positive for gait problem.   Skin: Positive for color change.   Hematological: Negative.    Psychiatric/Behavioral: The patient is nervous/anxious.          Objective   Vital Signs:   /90 (BP Location: Right arm, Patient Position: Sitting, Cuff Size: Adult)   Pulse 88   Temp 97.8 °F (36.6 °C) (Infrared)   Resp 14   Ht 167.6 cm (65.98\")   Wt 106 kg (233 lb)   SpO2 98%   BMI 37.63 kg/m²                  Physical Exam  Vitals reviewed.   Constitutional:       " Appearance: He is well-developed. He is not ill-appearing.   HENT:      Head: Normocephalic.      Nose: Nose normal.      Mouth/Throat:      Dentition: Abnormal dentition.      Comments: Edentulous   Difficult to understand  Eyes:      Conjunctiva/sclera: Conjunctivae normal.      Pupils: Pupils are equal, round, and reactive to light.   Cardiovascular:      Rate and Rhythm: Normal rate and regular rhythm.      Heart sounds: Normal heart sounds.   Pulmonary:      Effort: Pulmonary effort is normal.      Breath sounds: Normal breath sounds.   Musculoskeletal:         General: Swelling and tenderness present.      Cervical back: Normal range of motion.      Right lower leg: Edema present.      Left lower leg: Edema present.   Lymphadenopathy:      Cervical: No cervical adenopathy.   Skin:     General: Skin is warm and dry.      Capillary Refill: Capillary refill takes less than 2 seconds.      Findings: Erythema present.      Comments: Bilateral lower extremities are erythematous, numerous scattered sores, appears patient has been scratching. No drainage.   Warm.   Normal cap refill.      Neurological:      Mental Status: He is alert and oriented to person, place, and time.   Psychiatric:         Mood and Affect: Mood normal.         Speech: Speech normal.         Behavior: Behavior normal. Behavior is cooperative.        Result Review :                 Assessment and Plan    Diagnoses and all orders for this visit:    1. Bilateral lower leg cellulitis (Primary)  -     cephalexin (KEFLEX) 500 MG capsule; Take 1 capsule by mouth 2 (Two) Times a Day for 10 days.  Dispense: 20 capsule; Refill: 0    2. Mild intermittent asthma without complication  -     albuterol sulfate  (90 Base) MCG/ACT inhaler; Inhale 2 puffs Every 4 (Four) Hours As Needed for Wheezing or Shortness of Air.  Dispense: 18 g; Refill: 1    3. Non-seasonal allergic rhinitis, unspecified trigger  -     loratadine (CLARITIN) 10 MG tablet; Take 1  tablet by mouth Daily.  Dispense: 30 tablet; Refill: 11    4. Peripheral edema  -     Comprehensive Metabolic Panel; Future    bilateral lower leg edema   Will treat for cellulitis bruno lower legs.   Missed appointment with Dr. Dallas Jaquez - needs to reset up . He is to follow with cardiology. Discussed assisting patient with making his appointments.   Keep legs elevated while sitting around.   OSMAN hose -put on in the morning and take off at night.   Follow up if you worsen or fail to improve.        His blood pressure is current elevated. He did not take his morning medication.         Follow Up   Return in about 2 weeks (around 6/17/2022), or if symptoms worsen or fail to improve, for Recheck.  Patient was given instructions and counseling regarding his condition or for health maintenance advice. Please see specific information pulled into the AVS if appropriate.

## 2022-06-08 NOTE — PROGRESS NOTES
"[facility]    Chief Complaint  Edema and Shortness of Breath    Subjective    History of Present Illness {CC  Problem List  Visit  Diagnosis   Encounters  Notes  Medications  Labs  Result Review Imaging  Media :23}     Ronald Bond is a 76 y.o. 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  He was seen by Eri Baez on 6/3 with worsening lower extremity and pain and was treated for cellulitis.  He had an echo last year which showed moderate RV enlargement, normal RV function, normal RVSP, LVEF 50%.  He was referred to sleep medicine, but he cancelled.  He says he does not believe in sleep apnea    He reports that recently he has had some worsening dyspnea and edema with blistering. Symptoms have improved on keflex. He notes ongoing wheezing, he notes wheezing when he reclines back.  He reports exertional dyspnea just walking across the room. He denies orthopnea/PND.  He denies chest pain.  He tells me that I am supposed to prescribe him something to replace his Paxil        Objective     Vital Signs:   Vitals:    06/09/22 1026   BP: 153/95   BP Location: Left arm   Patient Position: Sitting   Cuff Size: Adult   Pulse: 80   Resp: 17   Temp: 97.1 °F (36.2 °C)   TempSrc: Temporal   SpO2: 90%   Weight: 108 kg (237 lb)   Height: 167.6 cm (65.98\")     Body mass index is 38.28 kg/m².  Physical Exam  Vitals reviewed.   Constitutional:       Appearance: Normal appearance.   HENT:      Head: Normocephalic.   Neck:      Vascular: No carotid bruit.   Cardiovascular:      Rate and Rhythm: Normal rate and regular rhythm.      Pulses: Normal pulses.      Heart sounds: Normal heart sounds, S1 normal and S2 normal. No murmur heard.  Pulmonary:      Effort: Pulmonary " effort is normal. No respiratory distress.      Breath sounds: Normal breath sounds.   Chest:      Chest wall: No tenderness.   Abdominal:      General: Abdomen is flat.      Palpations: Abdomen is soft.   Musculoskeletal:      Cervical back: Neck supple.      Right lower le+ Edema present.      Left lower le+ Edema present.   Skin:     General: Skin is warm and dry.   Neurological:      General: No focal deficit present.      Mental Status: He is alert and oriented to person, place, and time. Mental status is at baseline.   Psychiatric:         Mood and Affect: Mood normal.         Behavior: Behavior normal.         Thought Content: Thought content normal.              Result Review  Data Reviewed:{ Labs  Result Review  Imaging  Med Tab  Media :23}   XR Chest 1 View (2022 02:30)  CMP (2022 02:18)  CBC w/diff (2022 02:18)  Adult Transthoracic Echo Complete W/ Cont if Necessary Per Protocol (2021 16:11)    EKG today shows normal sinus rhythm,            Assessment and Plan {CC Problem List  Visit Diagnosis  ROS  Review (Popup)  Health Maintenance  Quality  BestPractice  Medications  SmartSets  SnapShot Encounters  Media :23}   1. ORTEGA (dyspnea on exertion)  Will do stress test due to possible anginal equivalent.  Patient unable to walk on treadmill due to exertional dyspnea.  Needs pet imaging due to body habitus.  - ECG 12 Lead; Future  - XR Chest PA & Lateral; Future  - Basic Metabolic Panel; Future  - Magnesium; Future  - proBNP; Future  - Stress Test With Pet Myocardial Perfusion (MULTI STUDY, REST AND STRESS); Future  - Ambulatory Referral to Cardiology    2. 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  He has stopped the Paxil and appears to be improved.  We discussed using alternative such as Cymbalta.  Will discuss with PCP  - ECG 12 Lead; Future  - Magnesium; Future  - Stress Test With Pet Myocardial Perfusion  (MULTI STUDY, REST AND STRESS); Future  - Ambulatory Referral to Cardiology    3. Bilateral lower extremity edema  Likely multifactorial.  He has known history of chronic hepatitis.  Possibly secondary to some right heart failure/HFpEF  He reports Lasix does not seem to be working as well for him.  If labs are stable will consider changing Lasix to Bumex or torsemide  - Basic Metabolic Panel; Future  - proBNP; Future  - Stress Test With Pet Myocardial Perfusion (MULTI STUDY, REST AND STRESS); Future  - Ambulatory Referral to Cardiology    4. Right ventricular dilation  Again discussed risks of untreated sleep apnea.  He is hesitant but finally agrees to proceed with consult with sleep medicine  - Ambulatory Referral to Cardiology  - Ambulatory Referral to Sleep Medicine    5. Essential hypertension  Appears to be uncontrolled.  We will increase his carvedilol to 12.5 mg twice daily  He has no interest in monitoring his blood pressure at home  - carvedilol (COREG) 12.5 MG tablet; Take 1 tablet by mouth 2 (Two) Times a Day.  Dispense: 60 tablet; Refill: 3  - Stress Test With Pet Myocardial Perfusion (MULTI STUDY, REST AND STRESS); Future    6. Sleep disturbance    - Ambulatory Referral to Sleep Medicine          Follow Up {Instructions Charge Capture  Follow-up Communications :23}   Return in about 2 weeks (around 6/23/2022) for swelling HTN, Office follow up.    Patient was given instructions and counseling regarding his condition or for health maintenance advice. Please see specific information pulled into the AVS if appropriate.  Advised to call the [facility] with any questions, concerns, or worsening symptoms.

## 2022-06-09 ENCOUNTER — HOSPITAL ENCOUNTER (OUTPATIENT)
Dept: CARDIOLOGY | Facility: HOSPITAL | Age: 77
Discharge: HOME | End: 2022-06-09

## 2022-06-09 ENCOUNTER — HOSPITAL ENCOUNTER (OUTPATIENT)
Facility: HOSPITAL | Age: 77
Setting detail: HOSPITAL OUTPATIENT SURGERY
End: 2022-06-09
Attending: INTERNAL MEDICINE | Admitting: INTERNAL MEDICINE

## 2022-06-09 ENCOUNTER — LAB (OUTPATIENT)
Dept: LAB | Facility: HOSPITAL | Age: 77
End: 2022-06-09

## 2022-06-09 ENCOUNTER — HOSPITAL ENCOUNTER (OUTPATIENT)
Dept: GENERAL RADIOLOGY | Facility: HOSPITAL | Age: 77
Discharge: HOME | End: 2022-06-09

## 2022-06-09 ENCOUNTER — OFFICE VISIT (OUTPATIENT)
Dept: CARDIOLOGY | Facility: HOSPITAL | Age: 77
End: 2022-06-09

## 2022-06-09 VITALS
HEIGHT: 66 IN | SYSTOLIC BLOOD PRESSURE: 153 MMHG | RESPIRATION RATE: 17 BRPM | BODY MASS INDEX: 38.09 KG/M2 | DIASTOLIC BLOOD PRESSURE: 95 MMHG | HEART RATE: 80 BPM | TEMPERATURE: 97.1 F | WEIGHT: 237 LBS | OXYGEN SATURATION: 90 %

## 2022-06-09 DIAGNOSIS — I10 ESSENTIAL HYPERTENSION: ICD-10-CM

## 2022-06-09 DIAGNOSIS — R94.31 PROLONGED Q-T INTERVAL ON ECG: ICD-10-CM

## 2022-06-09 DIAGNOSIS — R06.09 DOE (DYSPNEA ON EXERTION): ICD-10-CM

## 2022-06-09 DIAGNOSIS — I51.7 RIGHT VENTRICULAR DILATION: ICD-10-CM

## 2022-06-09 DIAGNOSIS — R60.0 BILATERAL LOWER EXTREMITY EDEMA: ICD-10-CM

## 2022-06-09 DIAGNOSIS — R06.09 DOE (DYSPNEA ON EXERTION): Primary | ICD-10-CM

## 2022-06-09 DIAGNOSIS — G47.9 SLEEP DISTURBANCE: ICD-10-CM

## 2022-06-09 LAB
ANION GAP SERPL CALCULATED.3IONS-SCNC: 10.6 MMOL/L (ref 5–15)
BUN SERPL-MCNC: 18 MG/DL (ref 8–23)
BUN/CREAT SERPL: 15.8 (ref 7–25)
CALCIUM SPEC-SCNC: 9.5 MG/DL (ref 8.6–10.5)
CHLORIDE SERPL-SCNC: 96 MMOL/L (ref 98–107)
CO2 SERPL-SCNC: 31.4 MMOL/L (ref 22–29)
CREAT SERPL-MCNC: 1.14 MG/DL (ref 0.76–1.27)
EGFRCR SERPLBLD CKD-EPI 2021: 67.1 ML/MIN/1.73
GLUCOSE SERPL-MCNC: 82 MG/DL (ref 65–99)
MAGNESIUM SERPL-MCNC: 2.2 MG/DL (ref 1.6–2.4)
NT-PROBNP SERPL-MCNC: 123 PG/ML (ref 0–1800)
POTASSIUM SERPL-SCNC: 4.5 MMOL/L (ref 3.5–5.2)
QT INTERVAL: 428 MS
QTC INTERVAL: 468 MS
SODIUM SERPL-SCNC: 138 MMOL/L (ref 136–145)

## 2022-06-09 PROCEDURE — 99214 OFFICE O/P EST MOD 30 MIN: CPT | Performed by: NURSE PRACTITIONER

## 2022-06-09 PROCEDURE — 83735 ASSAY OF MAGNESIUM: CPT

## 2022-06-09 PROCEDURE — 93010 ELECTROCARDIOGRAM REPORT: CPT | Performed by: INTERNAL MEDICINE

## 2022-06-09 PROCEDURE — 36415 COLL VENOUS BLD VENIPUNCTURE: CPT

## 2022-06-09 PROCEDURE — 83880 ASSAY OF NATRIURETIC PEPTIDE: CPT

## 2022-06-09 PROCEDURE — 71046 X-RAY EXAM CHEST 2 VIEWS: CPT

## 2022-06-09 PROCEDURE — 80048 BASIC METABOLIC PNL TOTAL CA: CPT

## 2022-06-09 PROCEDURE — 93005 ELECTROCARDIOGRAM TRACING: CPT | Performed by: NURSE PRACTITIONER

## 2022-06-09 RX ORDER — CARVEDILOL 12.5 MG/1
12.5 TABLET ORAL 2 TIMES DAILY
Qty: 60 TABLET | Refills: 3 | Status: SHIPPED | OUTPATIENT
Start: 2022-06-09 | End: 2022-10-24 | Stop reason: SDUPTHER

## 2022-06-10 ENCOUNTER — TELEPHONE (OUTPATIENT)
Dept: CARDIOLOGY | Facility: HOSPITAL | Age: 77
End: 2022-06-10

## 2022-06-10 NOTE — TELEPHONE ENCOUNTER
Attempted to contact patient again with lab and chest x-ray results.  Unable to reach and voicemail was full.  We will send message via [employer]

## 2022-06-10 NOTE — TELEPHONE ENCOUNTER
Attempted to contact patient with lab and CXR results. Unable to reach and unable to leave message. Will try again later

## 2022-06-10 NOTE — PROGRESS NOTES
Results released to [facility]  Your chest x-ray shows no evidence of fluid.  Please call me if you have any further questions or concerns

## 2022-06-15 ENCOUNTER — TELEPHONE (OUTPATIENT)
Dept: FAMILY MEDICINE CLINIC | Facility: CLINIC | Age: 77
End: 2022-06-15

## 2022-06-15 NOTE — TELEPHONE ENCOUNTER
SWELLING IN LEGS AND BLISTERS ON LEGS THAT ARE OOZING. DOES NOT WANT TO COME IN IF POSSIBLE. PLEASE ADIVSE.

## 2022-06-15 NOTE — TELEPHONE ENCOUNTER
After review of his chart, it looks like he saw his cardiologist last week, and discussed the swelling with her. She did labs and told him if they were stable she would change his fluid pills. So, number one he needs to call his cardiology office, they have been trying to reach him, and with the blisters, this could be from the swelling itself, or possibly infection, which he would need to be seen by someone to assess.

## 2022-06-15 NOTE — TELEPHONE ENCOUNTER
Called pt, could not lvm due to mailbox being full.    OKAY FOR HUB TO READ  After review of his chart, it looks like he saw his cardiologist last week, and discussed the swelling with her. She did labs and told him if they were stable she would change his fluid pills. So, number one he needs to call his cardiology office, they have been trying to reach him, and with the blisters, this could be from the swelling itself, or possibly infection, which he would need to be seen by someone to assess.

## 2022-06-16 ENCOUNTER — TELEPHONE (OUTPATIENT)
Dept: CARDIOLOGY | Facility: HOSPITAL | Age: 77
End: 2022-06-16

## 2022-06-16 NOTE — TELEPHONE ENCOUNTER
Called and Spoke to patient whom C/O LE Edema. Denies Substantial weight gain or dyspnea. Patient Worried there may be an infection. Offered Next available to see provider.

## 2022-06-17 ENCOUNTER — DOCUMENTATION (OUTPATIENT)
Dept: CARDIOLOGY | Facility: HOSPITAL | Age: 77
End: 2022-06-17

## 2022-06-17 ENCOUNTER — OFFICE VISIT (OUTPATIENT)
Dept: CARDIOLOGY | Facility: HOSPITAL | Age: 77
End: 2022-06-17

## 2022-06-17 VITALS
RESPIRATION RATE: 17 BRPM | OXYGEN SATURATION: 95 % | BODY MASS INDEX: 38.57 KG/M2 | WEIGHT: 240 LBS | HEIGHT: 66 IN | SYSTOLIC BLOOD PRESSURE: 167 MMHG | DIASTOLIC BLOOD PRESSURE: 79 MMHG | HEART RATE: 78 BPM | TEMPERATURE: 98.1 F

## 2022-06-17 DIAGNOSIS — R94.31 PROLONGED Q-T INTERVAL ON ECG: ICD-10-CM

## 2022-06-17 DIAGNOSIS — I10 ESSENTIAL HYPERTENSION: ICD-10-CM

## 2022-06-17 DIAGNOSIS — R60.0 BILATERAL LOWER EXTREMITY EDEMA: Primary | ICD-10-CM

## 2022-06-17 DIAGNOSIS — R06.00 PND (PAROXYSMAL NOCTURNAL DYSPNEA): ICD-10-CM

## 2022-06-17 PROCEDURE — 99214 OFFICE O/P EST MOD 30 MIN: CPT | Performed by: NURSE PRACTITIONER

## 2022-06-17 RX ORDER — TORSEMIDE 20 MG/1
TABLET ORAL
Qty: 60 TABLET | Refills: 0 | Status: SHIPPED | OUTPATIENT
Start: 2022-06-17

## 2022-06-17 NOTE — PROGRESS NOTES
Maksim Waite RegSched Rep  Morena Vences, APRN  RE: SLEEP STUDY    Patient has been called 3x unable to contact will mail pt letter

## 2022-06-17 NOTE — PATIENT INSTRUCTIONS
Stop furosemide (lasix) and start torsemide 40 mg (2 tablets) for one week and then decrease to one tablet a day

## 2022-06-20 ENCOUNTER — DOCUMENTATION (OUTPATIENT)
Dept: CARDIOLOGY | Facility: HOSPITAL | Age: 77
End: 2022-06-20

## 2022-06-20 NOTE — PROGRESS NOTES
Maksim Waite RegSched Rep  Morena Vences, APRN  Re: Stress test  Patient has been called 5x including patient contact sister unable to contact will mail pt letter

## 2022-06-24 ENCOUNTER — PATIENT OUTREACH (OUTPATIENT)
Dept: CASE MANAGEMENT | Facility: OTHER | Age: 77
End: 2022-06-24

## 2022-06-24 NOTE — OUTREACH NOTE
AMBULATORY CASE MANAGEMENT NOTE    Name and Relationship of Patient/Support Person: Varun Ronald L - Self    Patient Outreach    Contacted patient in follow up to  referral.  Explained role of Ambulatory  and contact information.  Inquired how patient gets to his medical appts and other outings.  He said he uses the public bus. Discussed upcoming endoscopy in July, and need for his  to stay with him at the facility and drive him home.  He said he has a friend, like family, that he has asked to help him that day.  Discussed transportation options in the community, including [employer], [employer], and [employer] transportation.  Patient voiced willingness to try these options.  He said he was not at a place he could write down anything.  Told him that RN-ACM would send him contact information with each of these, on [facility].  Patient said he uses [employer], and would get the information from there.  He voiced appreciation for the call and information. No other questions or concerns voiced at this time.    Adult Patient Profile  Questions/Answers    Flowsheet Row Most Recent Value   Symptoms/Conditions Managed at Home cardiovascular   Barriers to Managing Health lack of transportation   Cardiovascular Symptoms/Conditions coronary artery disease, hypertension   Cardiovascular Management Strategies medication therapy   Primary Source of Support/Comfort friend   People in Home alone   Current Living Arrangements apartment   Resource/Environmental Concerns reliable transportation   Transportation Concerns no car, other (see comments)  [Takes the public [employer] bus where he needs to go.]        Social Work Assessment  Questions/Answers    Flowsheet Row Most Recent Value   People in Home alone   Current Living Arrangements apartment   Transportation Concerns no car, other (see comments)  [Takes the public [employer] bus where he needs to go.]          MONI ROSADO  Ambulatory Case  Management    6/24/2022, 10:51 EDT

## 2022-06-29 ENCOUNTER — OFFICE VISIT (OUTPATIENT)
Dept: SLEEP MEDICINE | Facility: HOSPITAL | Age: 77
End: 2022-06-29

## 2022-06-29 VITALS — BODY MASS INDEX: 36.48 KG/M2 | WEIGHT: 227 LBS | HEIGHT: 66 IN

## 2022-06-29 DIAGNOSIS — I10 ESSENTIAL HYPERTENSION: ICD-10-CM

## 2022-06-29 DIAGNOSIS — R29.818 SUSPECTED SLEEP APNEA: Primary | ICD-10-CM

## 2022-06-29 DIAGNOSIS — G47.19 EXCESSIVE DAYTIME SLEEPINESS: ICD-10-CM

## 2022-06-29 PROCEDURE — 99213 OFFICE O/P EST LOW 20 MIN: CPT | Performed by: NURSE PRACTITIONER
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
## 2022-07-01 ENCOUNTER — TELEPHONE (OUTPATIENT)
Dept: FAMILY MEDICINE CLINIC | Facility: CLINIC | Age: 77
End: 2022-07-01

## 2022-07-01 NOTE — TELEPHONE ENCOUNTER
Caller: Varun Ronald HILARY    Relationship: Self    Best call back number: [phone]    What is the best time to reach you: ANYTIME     Who are you requesting to speak with (clinical staff, provider,  specific staff member): TERESA PONCE    What was the call regarding: PATIENT STATES HE IS TAKING MEDICATION AT NIGHT TO SLEEP HOWEVER HE IS STRUGGLING WITH FEAR AT NIGHT AND IS UNABLE TO FALL ASLEEP. PATIENT STATES HE IS EXHAUSTED AND FEELS THE NEED TO SLEEP FROM THE MEDICATION BUT CANNOT DO SO BECAUSE OF THE FEAR. PATIENT STATES THIS HAS BEEN GOING ON FOR ABOUT TWO WEEKS.  PATIENT STATES FOR THE PAST TWO DAYS HE HAS BEEN EXPERIENCING CONFUSION AND HAS MESSED UP HIS MEDICATION FOR HIS BLOOD PRESSURE. PATIENT STATES HE IS ALSO NOT WALKING STRAIGHT. PATIENT WAS ADVISED TO SEEK MEDICAL ATTENTION AT THE NEAREST ED OR TO CALL 911 BUT DECLINED AND WAS TRANSFERRED TO THE PRACTICE FOR CLINICAL ASSISTANCE HOWEVER PCP WAS NOT AVAILABLE TODAY AND PATIENT WOULD LIKE TO SPEAK TO PCP ONLY.     Do you require a callback: YES

## 2022-07-05 NOTE — PROGRESS NOTES
"[facility]  Heart and Valve Center    Chief Complaint  Edema and Follow-up    Subjective    History of Present Illness {CC  Problem List  Visit  Diagnosis   Encounters  Notes  Medications  Labs  Result Review Imaging  Media :23}     Ronald Bond is a 76 y.o. male with hypertension, hepatitis C, history of polysubstance abuse (on chronic methadone) and bacterial endocarditis (over 20 years ago) and chronic lower extremity edema who presents today for ongoing evaluation of bilateral lower extremity edema. At his last visit his lasix was changed to torsemide. He reports that he forgot about this and only started taking it 3 days ago.  He does note some increased urination.  Swelling has improved some.  He reports the other night he had some orthopnea and associated anxiety.  He recently had a evaluation with the sleep center.  He reports that dyspnea does not bother him so much when he is walking.  However, he does note some upper chest tightness if he walks long distances.  He had a stress test ordered last month but scheduling was unable to reach him    Objective     Vital Signs:   Vitals:    07/06/22 1220   BP: 139/69   BP Location: Left arm   Patient Position: Sitting   Cuff Size: Adult   Pulse: 59   Resp: 20   Temp: 96.7 °F (35.9 °C)   TempSrc: Temporal   SpO2: 93%   Weight: 104 kg (230 lb)   Height: 167.6 cm (66\")     Body mass index is 37.12 kg/m².  Physical Exam  Vitals reviewed.   Constitutional:       Appearance: Normal appearance.   HENT:      Head: Normocephalic.   Neck:      Vascular: No carotid bruit.   Cardiovascular:      Rate and Rhythm: Normal rate and regular rhythm.      Pulses: Normal pulses.      Heart sounds: Normal heart sounds, S1 normal and S2 normal. No murmur heard.  Pulmonary:      Effort: Pulmonary effort is normal. No respiratory distress.      Breath sounds: Normal breath sounds.   Chest:      Chest wall: No tenderness.   Abdominal:      General: Abdomen is " flat.      Palpations: Abdomen is soft.   Musculoskeletal:      Cervical back: Neck supple.      Right lower le+ Edema present.      Left lower le+ Edema present.   Skin:     General: Skin is warm and dry.   Neurological:      General: No focal deficit present.      Mental Status: He is alert and oriented to person, place, and time. Mental status is at baseline.   Psychiatric:         Mood and Affect: Mood normal.         Behavior: Behavior normal.         Thought Content: Thought content normal.              Result Review  Data Reviewed:{ Labs  Result Review  Imaging  Med Tab  Media :23}   XR Chest 1 View (2022 02:30)  CMP (2022 02:18)  CBC w/diff (2022 02:18)  Adult Transthoracic Echo Complete W/ Cont if Necessary Per Protocol (2021 16:11)  XR Chest PA & Lateral (2022 12:21)  proBNP (2022 11:57)  Magnesium (2022 11:57)  Basic Metabolic Panel (2022 11:57)           Assessment and Plan {CC Problem List  Visit Diagnosis  ROS  Review (Popup)  Health Maintenance  Quality  BestPractice  Medications  SmartSets  SnapShot Encounters  Media :23}   1. Bilateral lower extremity edema  Continue torsemide 40 mg for the next week and then decrease to 20 mg daily  Advised him to have labs rechecked next week    2.  Chest pain  Stress PET ordered last month, but scheduled unable to reach him.  We will get this arranged today     3. Essential hypertension  Appears better controlled  Continue to monitor    4. PND  High suspicion for sleep apnea. Sleep study pending  Continue torsemide    Patient to keep upcoming appt with Dr. Saavedra      Follow Up {Instructions Charge Capture  Follow-up Communications :23}   Return if symptoms worsen or fail to improve.    Patient was given instructions and counseling regarding his condition or for health maintenance advice. Please see specific information pulled into the AVS if appropriate.  Advised to call the Heart and  Valve Center with any questions, concerns, or worsening symptoms.

## 2022-07-05 NOTE — TELEPHONE ENCOUNTER
Advised pt of on call message, pt wants to talk to Eri, advised she is out of the office and he can make appt or go to ER/UTC

## 2022-07-06 ENCOUNTER — OFFICE VISIT (OUTPATIENT)
Dept: CARDIOLOGY | Facility: HOSPITAL | Age: 77
End: 2022-07-06

## 2022-07-06 VITALS
RESPIRATION RATE: 20 BRPM | OXYGEN SATURATION: 93 % | WEIGHT: 230 LBS | SYSTOLIC BLOOD PRESSURE: 139 MMHG | HEART RATE: 59 BPM | BODY MASS INDEX: 36.96 KG/M2 | DIASTOLIC BLOOD PRESSURE: 69 MMHG | HEIGHT: 66 IN | TEMPERATURE: 96.7 F

## 2022-07-06 DIAGNOSIS — R06.00 PND (PAROXYSMAL NOCTURNAL DYSPNEA): ICD-10-CM

## 2022-07-06 DIAGNOSIS — I51.7 RIGHT VENTRICULAR DILATION: ICD-10-CM

## 2022-07-06 DIAGNOSIS — R60.0 BILATERAL LOWER EXTREMITY EDEMA: Primary | ICD-10-CM

## 2022-07-06 DIAGNOSIS — I10 ESSENTIAL HYPERTENSION: ICD-10-CM

## 2022-07-06 DIAGNOSIS — R07.9 CHEST PAIN, UNSPECIFIED TYPE: ICD-10-CM

## 2022-07-06 PROCEDURE — 99214 OFFICE O/P EST MOD 30 MIN: CPT | Performed by: NURSE PRACTITIONER

## 2022-07-11 ENCOUNTER — TELEPHONE (OUTPATIENT)
Dept: FAMILY MEDICINE CLINIC | Facility: CLINIC | Age: 77
End: 2022-07-11

## 2022-07-11 NOTE — TELEPHONE ENCOUNTER
Caller: Ronald Bond    Relationship: Self    Best call back number: [phone]    What is the best time to reach you: ANYTIME    Who are you requesting to speak with (clinical staff, provider,  specific staff member): CLINICAL STAFF    Do you know the name of the person who called: SELF    What was the call regarding: PATIENT STATES THAT HE MISSED A CALL FROM THE OFFICE.    Do you require a callback: YES

## 2022-07-15 ENCOUNTER — HOSPITAL ENCOUNTER (OUTPATIENT)
Dept: CARDIOLOGY | Facility: HOSPITAL | Age: 77
Discharge: HOME | End: 2022-07-15
Admitting: NURSE PRACTITIONER

## 2022-07-15 VITALS
HEART RATE: 59 BPM | OXYGEN SATURATION: 93 % | SYSTOLIC BLOOD PRESSURE: 125 MMHG | DIASTOLIC BLOOD PRESSURE: 66 MMHG | BODY MASS INDEX: 36.96 KG/M2 | WEIGHT: 230 LBS | HEIGHT: 66 IN

## 2022-07-15 DIAGNOSIS — R60.0 BILATERAL LOWER EXTREMITY EDEMA: ICD-10-CM

## 2022-07-15 DIAGNOSIS — I10 ESSENTIAL HYPERTENSION: ICD-10-CM

## 2022-07-15 DIAGNOSIS — R06.09 DOE (DYSPNEA ON EXERTION): ICD-10-CM

## 2022-07-15 DIAGNOSIS — R94.31 PROLONGED Q-T INTERVAL ON ECG: ICD-10-CM

## 2022-07-15 LAB
BH CV REST NUCLEAR ISOTOPE DOSE: 30 MCI
BH CV STRESS BP STAGE 1: NORMAL
BH CV STRESS BP STAGE 3: NORMAL
BH CV STRESS COMMENTS STAGE 1: NORMAL
BH CV STRESS DOSE REGADENOSON STAGE 1: 0.4
BH CV STRESS DURATION MIN STAGE 1: 1
BH CV STRESS DURATION MIN STAGE 2: 1
BH CV STRESS DURATION MIN STAGE 3: 1
BH CV STRESS DURATION MIN STAGE 4: 1
BH CV STRESS DURATION SEC STAGE 2: 0
BH CV STRESS HR STAGE 1: 63
BH CV STRESS HR STAGE 2: 68
BH CV STRESS HR STAGE 3: 64
BH CV STRESS HR STAGE 4: 65
BH CV STRESS NUCLEAR ISOTOPE DOSE: 30 MCI
BH CV STRESS O2 STAGE 1: 95
BH CV STRESS O2 STAGE 2: 96
BH CV STRESS O2 STAGE 3: 95
BH CV STRESS O2 STAGE 4: 94
BH CV STRESS PROTOCOL 1: NORMAL
BH CV STRESS RECOVERY BP: NORMAL MMHG
BH CV STRESS RECOVERY HR: 59 BPM
BH CV STRESS RECOVERY O2: 95 %
BH CV STRESS STAGE 1: 1
BH CV STRESS STAGE 2: 2
BH CV STRESS STAGE 3: 3
BH CV STRESS STAGE 4: 4
LV EF NUC BP: 64 %
MAXIMAL PREDICTED HEART RATE: 145 BPM
PERCENT MAX PREDICTED HR: 48.28 %
STRESS BASELINE BP: NORMAL MMHG
STRESS BASELINE HR: 59 BPM
STRESS O2 SAT REST: 93 %
STRESS PERCENT HR: 57 %
STRESS POST ESTIMATED WORKLOAD: 1 METS
STRESS POST EXERCISE DUR MIN: 4 MIN
STRESS POST EXERCISE DUR SEC: 0 SEC
STRESS POST O2 SAT PEAK: 94 %
STRESS POST PEAK BP: NORMAL MMHG
STRESS POST PEAK HR: 70 BPM
STRESS TARGET HR: 123 BPM

## 2022-07-15 PROCEDURE — 78431 MYOCRD IMG PET RST&STRS CT: CPT | Performed by: INTERNAL MEDICINE

## 2022-07-15 PROCEDURE — 0 RUBIDIUM CHLORIDE: Performed by: NURSE PRACTITIONER

## 2022-07-15 PROCEDURE — A9555 RB82 RUBIDIUM: HCPCS | Performed by: NURSE PRACTITIONER

## 2022-07-15 PROCEDURE — 78431 MYOCRD IMG PET RST&STRS CT: CPT

## 2022-07-15 PROCEDURE — 93017 CV STRESS TEST TRACING ONLY: CPT

## 2022-07-15 PROCEDURE — 25010000002 REGADENOSON 0.4 MG/5ML SOLUTION: Performed by: NURSE PRACTITIONER

## 2022-07-15 PROCEDURE — 93018 CV STRESS TEST I&R ONLY: CPT | Performed by: INTERNAL MEDICINE

## 2022-07-15 RX ADMIN — RUBIDIUM CHLORIDE RB-82 1 DOSE: 150 INJECTION, SOLUTION INTRAVENOUS at 14:35

## 2022-07-15 RX ADMIN — REGADENOSON 0.4 MG: 0.08 INJECTION, SOLUTION INTRAVENOUS at 14:44

## 2022-07-15 RX ADMIN — RUBIDIUM CHLORIDE RB-82 1 DOSE: 150 INJECTION, SOLUTION INTRAVENOUS at 14:47

## 2022-07-15 NOTE — PROGRESS NOTES
Your stress test showed no evidence of significant heart blockage.  Please let me know if you have any further questions or concerns.

## 2022-07-19 ENCOUNTER — TELEPHONE (OUTPATIENT)
Dept: CARDIOLOGY | Facility: HOSPITAL | Age: 77
End: 2022-07-19

## 2022-07-19 NOTE — TELEPHONE ENCOUNTER
----- Message from Jennifer Petersen CMA sent at 7/19/2022  1:03 PM EDT -----  Spoke to patient and advised him to have labs drawn this week. Patient states that he will try to go tomorrow.  ----- Message -----  From: Morena Vences APRN  Sent: 7/19/2022  10:54 AM EDT  To: Jennifer Petersen CMA    Can you please call him and remind him to come in and have his labs rechecked this week since being on the torsemide? Let him know that he can come to any [facility].    Thanks!

## 2022-07-20 ENCOUNTER — TELEPHONE (OUTPATIENT)
Dept: CARDIOLOGY | Facility: HOSPITAL | Age: 77
End: 2022-07-20

## 2022-07-20 ENCOUNTER — LAB (OUTPATIENT)
Dept: LAB | Facility: HOSPITAL | Age: 77
End: 2022-07-20

## 2022-07-20 DIAGNOSIS — R60.0 BILATERAL LOWER EXTREMITY EDEMA: ICD-10-CM

## 2022-07-20 LAB
ANION GAP SERPL CALCULATED.3IONS-SCNC: 11.7 MMOL/L (ref 5–15)
BUN SERPL-MCNC: 15 MG/DL (ref 8–23)
BUN/CREAT SERPL: 13.2 (ref 7–25)
CALCIUM SPEC-SCNC: 8.9 MG/DL (ref 8.6–10.5)
CHLORIDE SERPL-SCNC: 96 MMOL/L (ref 98–107)
CO2 SERPL-SCNC: 31.3 MMOL/L (ref 22–29)
CREAT SERPL-MCNC: 1.14 MG/DL (ref 0.76–1.27)
EGFRCR SERPLBLD CKD-EPI 2021: 67.1 ML/MIN/1.73
GLUCOSE SERPL-MCNC: 95 MG/DL (ref 65–99)
POTASSIUM SERPL-SCNC: 4.1 MMOL/L (ref 3.5–5.2)
SODIUM SERPL-SCNC: 139 MMOL/L (ref 136–145)

## 2022-07-20 PROCEDURE — 36415 COLL VENOUS BLD VENIPUNCTURE: CPT

## 2022-07-20 PROCEDURE — 80048 BASIC METABOLIC PNL TOTAL CA: CPT

## 2022-07-20 NOTE — TELEPHONE ENCOUNTER
Attempted to contact patient to let him know results. Explained to patient that Morena sent a message to his Mychart and that his stress test has no significant heart blockages. Patient also confirmed that he was on his way to get labs drawn on the 7th floor today.

## 2022-07-20 NOTE — TELEPHONE ENCOUNTER
"----- Message from KELLEN Maza sent at 7/20/2022  7:28 AM EDT -----  Diogo Rodriguez    I sent a message to this patient that his stress test showed \"no evidence of significant heart blockage\" but got a message back that he still hasn't read it. Can you please call him and let him know that?    Thank you!    "

## 2022-07-21 ENCOUNTER — PATIENT OUTREACH (OUTPATIENT)
Dept: CASE MANAGEMENT | Facility: OTHER | Age: 77
End: 2022-07-21

## 2022-07-21 NOTE — OUTREACH NOTE
AMBULATORY CASE MANAGEMENT NOTE    Name and Relationship of Patient/Support Person: Ronald Bond L - Self    Patient Outreach    Called patient in follow up, regarding help with Transportation to medical appointments.  Asked patient if he received the information regarding options of assistance with Transportation that RN-ACM sent on his [employer].  Patient said no, he never looks at [employer].  Provided patient the information/ phone numbers for transportation options to medical appointments: 1) [employer] [place] insurance; 2) [employer], under Medicaid coverage; 3) [employer] (patient declined this one).  He said he wrote the information down, and voiced appreciation .  Reviewed upcoming appointments, 7/25 & 7/26 Endoscopy.  Patient was aware, and said he has already arranged for a friend to go with him/ take him to this appointment.  No other questions or concerns voiced at this time.    This note will be routed to the PCP.       MONI ROSADO  Ambulatory Case Management    7/21/2022, 15:01 EDT

## 2022-07-25 ENCOUNTER — APPOINTMENT (OUTPATIENT)
Dept: PREADMISSION TESTING | Facility: HOSPITAL | Age: 77
End: 2022-07-25

## 2022-07-28 ENCOUNTER — TELEPHONE (OUTPATIENT)
Dept: FAMILY MEDICINE CLINIC | Facility: CLINIC | Age: 77
End: 2022-07-28

## 2022-07-28 NOTE — TELEPHONE ENCOUNTER
Caller: Ronald Bond    Relationship to patient: Self    Best call back number: [phone]    Patient is needing: PATIENT WAS RELEASED FROM HOSPITAL AND IS HAVING PANIC ATTACKS DUE TO SHORTNESS OF BREATH. PATIENT DOES NOT HAVE OXYGEN AT HOME AND WAS NEEDING OXYGEN. HOSPITAL ASKED IF HE HAD OXYGEN AT HOME PATIENT REPLIED NO AND THAT WAS ALL THAT WAS SAID. PATIENT IS SCARED.  I ADVISED PATIENT IF IT GETS TOO BAD FOR HIM TO GO BACK TO EMERGENCY ROOM AND PATIENT AGREED. PLEASE ADVISE AND CALL PATIENT BACK

## 2022-08-02 DIAGNOSIS — Z01.812 BLOOD TESTS PRIOR TO TREATMENT OR PROCEDURE: Primary | ICD-10-CM

## 2022-08-04 NOTE — TELEPHONE ENCOUNTER
Centralized scheduling requested that I call patient because he was confused about why they were calling to schedule his echo.  I called patient was unable to reach and left message that the echocardiogram was to evaluate if the heart was causing his swelling.  I have asked him to call centralized scheduling to make this appointment or to call me back with any further questions or concerns   Patient became argumentative about wait time and when I asked if I could recheck her refraction in OD.   Recommend patient see [facility] due to my wait time.

## 2022-08-15 ENCOUNTER — OFFICE VISIT (OUTPATIENT)
Dept: FAMILY MEDICINE CLINIC | Facility: CLINIC | Age: 77
End: 2022-08-15

## 2022-08-15 VITALS
BODY MASS INDEX: 36.13 KG/M2 | HEIGHT: 66 IN | WEIGHT: 224.8 LBS | TEMPERATURE: 98.2 F | DIASTOLIC BLOOD PRESSURE: 88 MMHG | SYSTOLIC BLOOD PRESSURE: 140 MMHG | RESPIRATION RATE: 21 BRPM | OXYGEN SATURATION: 96 % | HEART RATE: 70 BPM

## 2022-08-15 DIAGNOSIS — Z09 HOSPITAL DISCHARGE FOLLOW-UP: ICD-10-CM

## 2022-08-15 DIAGNOSIS — Z91.199 NONCOMPLIANCE WITH TREATMENT PLAN: ICD-10-CM

## 2022-08-15 DIAGNOSIS — G47.9 SLEEP DISORDER: ICD-10-CM

## 2022-08-15 DIAGNOSIS — R09.3 THICK SPUTUM: ICD-10-CM

## 2022-08-15 DIAGNOSIS — F41.8 DEPRESSION WITH ANXIETY: ICD-10-CM

## 2022-08-15 PROCEDURE — 99214 OFFICE O/P EST MOD 30 MIN: CPT | Performed by: NURSE PRACTITIONER

## 2022-08-15 RX ORDER — GUAIFENESIN AND DEXTROMETHORPHAN HYDROBROMIDE 600; 30 MG/1; MG/1
1 TABLET, EXTENDED RELEASE ORAL 2 TIMES DAILY PRN
Qty: 60 TABLET | Refills: 0 | Status: SHIPPED | OUTPATIENT
Start: 2022-08-15 | End: 2022-10-24

## 2022-08-15 RX ORDER — HYDROXYZINE PAMOATE 25 MG/1
CAPSULE ORAL
COMMUNITY
Start: 2022-07-30 | End: 2022-08-28

## 2022-08-15 RX ORDER — FLUTICASONE PROPIONATE 44 UG/1
1 AEROSOL, METERED RESPIRATORY (INHALATION)
COMMUNITY
Start: 2022-07-29 | End: 2022-10-24 | Stop reason: SDUPTHER

## 2022-08-15 RX ORDER — FLUTICASONE PROPIONATE 44 MCG
AEROSOL WITH ADAPTER (GRAM) INHALATION
COMMUNITY
Start: 2022-07-29 | End: 2022-10-24 | Stop reason: SDUPTHER

## 2022-08-15 NOTE — PROGRESS NOTES
"Chief Complaint  Hospital Follow Up Visit (Pt was seen in the hospital for SOB, panic/anxiety attack and bronchitis. Pt was discharged on 7/26/22.)    Subjective          Ronald Bond presents to [facility]  Patient is a 75 yo male. He is here for hospital follow up.   He was seen and treated at [facility]. He did leave AMA. He was seen for bronchitis, SOB, panic attack. He left on 07/26/2022.          The following portions of the patient's history were reviewed and updated as appropriate: allergies, current medications, past family history, past medical history, past social history, past surgical history and problem list.    Review of Systems   Constitutional: Positive for activity change and fatigue.   HENT: Negative.    Eyes: Negative.    Respiratory: Negative.    Cardiovascular: Positive for leg swelling. Negative for chest pain and palpitations.   Gastrointestinal: Negative.    Genitourinary: Negative.    Musculoskeletal: Positive for arthralgias and myalgias.   Skin: Negative.    Allergic/Immunologic: Negative.    Neurological: Negative.    Hematological: Negative.    Psychiatric/Behavioral: Positive for sleep disturbance. Negative for suicidal ideas. The patient is nervous/anxious.          Objective   Vital Signs:   /88   Pulse 70   Temp 98.2 °F (36.8 °C) (Temporal)   Resp 21   Ht 167.6 cm (65.98\")   Wt 102 kg (224 lb 12.8 oz)   SpO2 96%   BMI 36.30 kg/m²    Class 2 Severe Obesity (BMI >=35 and <=39.9). Obesity-related health conditions include the following: obstructive sleep apnea, hypertension, GERD and osteoarthritis. Obesity is unchanged. BMI is is above average; BMI management plan is completed. We discussed portion control and increasing exercise.      PHQ-2/9 Depression Screening  PHQ-9 Total Score:      JEFFERSON-7 Anxiety Screening  JEFFERSON-7  Feeling nervous, anxious or on edge: More than half the days  Not being able to stop or control worrying: " More than half the days  Worrying too much about different things: More than half the days  Trouble Relaxing: More than half the days  Being so restless that it is hard to sit still: Not at all  Feeling afraid as if something awful might happen: Not at all  Becoming easily annoyed or irritable: Several days  JEFFERSON 7 Total Score: 9  If you checked any problems, how difficult have these problems made it for you to do your work, take care of things at home, or get along with other people: Somewhat difficult          Physical Exam  Vitals (Blood pressure is elevated.  He states he is not taking any daily medication.) reviewed.   HENT:      Mouth/Throat:      Mouth: Mucous membranes are moist.      Dentition: Abnormal dentition.      Comments: He is edentulous.  Difficulty to understand.  Cardiovascular:      Rate and Rhythm: Normal rate and regular rhythm.   Pulmonary:      Effort: Pulmonary effort is normal.      Breath sounds: No wheezing.   Abdominal:      Palpations: Abdomen is soft.   Musculoskeletal:         General: Normal range of motion.   Skin:     General: Skin is warm and dry.      Capillary Refill: Capillary refill takes less than 2 seconds.   Neurological:      Mental Status: He is alert and oriented to person, place, and time.   Psychiatric:         Mood and Affect: Mood normal.         Behavior: Behavior normal.         Thought Content: Thought content normal.         Judgment: Judgment normal.        Result Review :                 Assessment and Plan    Diagnoses and all orders for this visit:    1. Hospital discharge follow-up    2. Thick sputum  -     guaifenesin-dextromethorphan (MUCINEX DM)  MG tablet sustained-release 12 hour tablet; Take 1 tablet by mouth 2 (Two) Times a Day As Needed (cough and congestion).  Dispense: 60 tablet; Refill: 0    3. Sleep disorder  -     Ambulatory Referral to Psychiatry    4. Depression with anxiety  -     Ambulatory Referral to Psychiatry    5. Noncompliance  with treatment plan    he states he has improved since his hospitalization in July 26, 2022.   He declined walker, PT and Oxygen at the hospital.   He is here walking without any assistive devices. He is unsteady.         Follow Up   Return in about 1 week (around 8/22/2022), or respiratory, for Recheck.  Patient was given instructions and counseling regarding his condition or for health maintenance advice. Please see specific information pulled into the AVS if appropriate.

## 2022-08-18 ENCOUNTER — TELEPHONE (OUTPATIENT)
Dept: FAMILY MEDICINE CLINIC | Facility: CLINIC | Age: 77
End: 2022-08-18

## 2022-08-18 DIAGNOSIS — K59.00 CONSTIPATION, UNSPECIFIED CONSTIPATION TYPE: ICD-10-CM

## 2022-08-18 RX ORDER — POLYETHYLENE GLYCOL 3350 17 G/17G
17 POWDER, FOR SOLUTION ORAL DAILY
Qty: 90 PACKET | Refills: 3 | Status: SHIPPED | OUTPATIENT
Start: 2022-08-18 | End: 2022-10-24 | Stop reason: SDUPTHER

## 2022-08-18 NOTE — TELEPHONE ENCOUNTER
Caller: Ronald Bond    Relationship: Self    Best call back number: [phone]     What was the call regarding: PATIENT CALLED STATING THAT HE IS CONSTIPATED AND NEEDS HELP.  PATIENT ASKED FOR A CALL BACK RIGHT AWAY.    Do you require a callback: YES

## 2022-08-19 NOTE — TELEPHONE ENCOUNTER
PATIENT MADE CONTACT TO RETURN MISSED CALL    HUB RELAYED MESSAGE AS GIVEN:    Aminah Lyons     KK    9:01 AM  Note    LVM for patient to call back.  HUB OKAY TO READ!     Let him know the miralax has been reordered. He can take it daily. Increase fiber intake in his diet, increase water intake. Will order laxative suppository for as needed use.   Follow up if no improvement. Go to ER for severe abdominal pain or high fever.     Keep follow up appointment for next week.     Thanks   Teresa          PATIENT STATED HE UNDERSTOOD THE MESSAGE AND HAD NO QUESTIONS    TERESA PONCE

## 2022-08-19 NOTE — PROGRESS NOTES
"Received message patient called complaining of constipation and \"needing help\".   Will reorder his miralax.   Increase fiber, increase water intake.     Will ask staff to call patient.     He is to go to ER for severe abdominal pain or high fever.     Eri Baez, APRN    "

## 2022-08-19 NOTE — TELEPHONE ENCOUNTER
M for patient to call back.  HUB OKAY TO READ!    Let him know the miralax has been reordered. He can take it daily. Increase fiber intake in his diet, increase water intake. Will order laxative suppository for as needed use.   Follow up if no improvement. Go to ER for severe abdominal pain or high fever.     Keep follow up appointment for next week.     Thanks   Eri

## 2022-10-05 ENCOUNTER — TELEPHONE (OUTPATIENT)
Dept: FAMILY MEDICINE CLINIC | Facility: CLINIC | Age: 77
End: 2022-10-05

## 2022-10-05 NOTE — TELEPHONE ENCOUNTER
PT STATED THAT HE MISSED A CALL FROM OUR OFFICE, I DON'T SEE AN ENCOUNTER OR ANYTHING UNDER LABS OR OR IMAGING.

## 2022-10-05 NOTE — TELEPHONE ENCOUNTER
CALLED PT BACK TO MAKE SURE HE WAS AWARE OF HIS APPT ON 10/7 AT 11:15, WE BELIEVE THE CALL HE MISSED WAS THE AUTO REMINDER CALL. HIS VM IS NOT SET UP

## 2022-10-07 ENCOUNTER — TELEPHONE (OUTPATIENT)
Dept: FAMILY MEDICINE CLINIC | Facility: CLINIC | Age: 77
End: 2022-10-07

## 2022-10-07 NOTE — TELEPHONE ENCOUNTER
HUB TO READ    ATTEMPTED TO CALL PT AT NUMBER LISTED, NO ANSWER AND NO VM SET UP. UNABLE TO GO OVER NO SHOW. LETTER SENT OUT

## 2022-10-11 ENCOUNTER — OFFICE VISIT (OUTPATIENT)
Dept: FAMILY MEDICINE CLINIC | Facility: CLINIC | Age: 77
End: 2022-10-11

## 2022-10-11 ENCOUNTER — LAB (OUTPATIENT)
Dept: LAB | Facility: HOSPITAL | Age: 77
End: 2022-10-11

## 2022-10-11 VITALS
BODY MASS INDEX: 36.48 KG/M2 | SYSTOLIC BLOOD PRESSURE: 138 MMHG | WEIGHT: 227 LBS | HEART RATE: 75 BPM | OXYGEN SATURATION: 93 % | RESPIRATION RATE: 19 BRPM | HEIGHT: 66 IN | TEMPERATURE: 98.7 F | DIASTOLIC BLOOD PRESSURE: 70 MMHG

## 2022-10-11 DIAGNOSIS — R53.1 WEAKNESS GENERALIZED: ICD-10-CM

## 2022-10-11 DIAGNOSIS — Z09 HOSPITAL DISCHARGE FOLLOW-UP: ICD-10-CM

## 2022-10-11 DIAGNOSIS — R05.9 COUGH, UNSPECIFIED TYPE: ICD-10-CM

## 2022-10-11 DIAGNOSIS — R82.998 URINE LEUKOCYTES: ICD-10-CM

## 2022-10-11 DIAGNOSIS — R60.0 LOWER LEG EDEMA: ICD-10-CM

## 2022-10-11 DIAGNOSIS — R53.1 WEAKNESS GENERALIZED: Primary | ICD-10-CM

## 2022-10-11 DIAGNOSIS — M54.50 BILATERAL LOW BACK PAIN WITHOUT SCIATICA, UNSPECIFIED CHRONICITY: ICD-10-CM

## 2022-10-11 LAB
ALBUMIN SERPL-MCNC: 3.5 G/DL (ref 3.5–5.2)
ALBUMIN/GLOB SERPL: 1.2 G/DL
ALP SERPL-CCNC: 95 U/L (ref 39–117)
ALT SERPL W P-5'-P-CCNC: 14 U/L (ref 1–41)
ANION GAP SERPL CALCULATED.3IONS-SCNC: 8.9 MMOL/L (ref 5–15)
AST SERPL-CCNC: 19 U/L (ref 1–40)
BASOPHILS # BLD AUTO: 0.03 10*3/MM3 (ref 0–0.2)
BASOPHILS NFR BLD AUTO: 0.5 % (ref 0–1.5)
BILIRUB BLD-MCNC: NEGATIVE MG/DL
BILIRUB SERPL-MCNC: 0.4 MG/DL (ref 0–1.2)
BUN SERPL-MCNC: 13 MG/DL (ref 8–23)
BUN/CREAT SERPL: 12 (ref 7–25)
CALCIUM SPEC-SCNC: 8.9 MG/DL (ref 8.6–10.5)
CHLORIDE SERPL-SCNC: 99 MMOL/L (ref 98–107)
CLARITY, POC: CLEAR
CO2 SERPL-SCNC: 30.1 MMOL/L (ref 22–29)
COLOR UR: YELLOW
CREAT SERPL-MCNC: 1.08 MG/DL (ref 0.76–1.27)
DEPRECATED RDW RBC AUTO: 44.7 FL (ref 37–54)
EGFRCR SERPLBLD CKD-EPI 2021: 71.1 ML/MIN/1.73
EOSINOPHIL # BLD AUTO: 0.3 10*3/MM3 (ref 0–0.4)
EOSINOPHIL NFR BLD AUTO: 5.2 % (ref 0.3–6.2)
ERYTHROCYTE [DISTWIDTH] IN BLOOD BY AUTOMATED COUNT: 14.1 % (ref 12.3–15.4)
EXPIRATION DATE: ABNORMAL
GLOBULIN UR ELPH-MCNC: 3 GM/DL
GLUCOSE SERPL-MCNC: 96 MG/DL (ref 65–99)
GLUCOSE UR STRIP-MCNC: NEGATIVE MG/DL
HCT VFR BLD AUTO: 29.6 % (ref 37.5–51)
HGB BLD-MCNC: 10.2 G/DL (ref 13–17.7)
IMM GRANULOCYTES # BLD AUTO: 0.01 10*3/MM3 (ref 0–0.05)
IMM GRANULOCYTES NFR BLD AUTO: 0.2 % (ref 0–0.5)
KETONES UR QL: NEGATIVE
LEUKOCYTE EST, POC: ABNORMAL
LYMPHOCYTES # BLD AUTO: 1.1 10*3/MM3 (ref 0.7–3.1)
LYMPHOCYTES NFR BLD AUTO: 18.9 % (ref 19.6–45.3)
Lab: ABNORMAL
MCH RBC QN AUTO: 30.7 PG (ref 26.6–33)
MCHC RBC AUTO-ENTMCNC: 34.5 G/DL (ref 31.5–35.7)
MCV RBC AUTO: 89.2 FL (ref 79–97)
MONOCYTES # BLD AUTO: 0.98 10*3/MM3 (ref 0.1–0.9)
MONOCYTES NFR BLD AUTO: 16.8 % (ref 5–12)
NEUTROPHILS NFR BLD AUTO: 3.4 10*3/MM3 (ref 1.7–7)
NEUTROPHILS NFR BLD AUTO: 58.4 % (ref 42.7–76)
NITRITE UR-MCNC: NEGATIVE MG/ML
NRBC BLD AUTO-RTO: 0.2 /100 WBC (ref 0–0.2)
PH UR: 6 [PH] (ref 5–8)
PLATELET # BLD AUTO: 161 10*3/MM3 (ref 140–450)
PMV BLD AUTO: 10.7 FL (ref 6–12)
POTASSIUM SERPL-SCNC: 4.7 MMOL/L (ref 3.5–5.2)
PROT SERPL-MCNC: 6.5 G/DL (ref 6–8.5)
PROT UR STRIP-MCNC: NEGATIVE MG/DL
RBC # BLD AUTO: 3.32 10*6/MM3 (ref 4.14–5.8)
RBC # UR STRIP: NEGATIVE /UL
SODIUM SERPL-SCNC: 138 MMOL/L (ref 136–145)
SP GR UR: 1.02 (ref 1–1.03)
UROBILINOGEN UR QL: NORMAL
WBC NRBC COR # BLD: 5.82 10*3/MM3 (ref 3.4–10.8)

## 2022-10-11 PROCEDURE — 81003 URINALYSIS AUTO W/O SCOPE: CPT | Performed by: NURSE PRACTITIONER

## 2022-10-11 PROCEDURE — 85025 COMPLETE CBC W/AUTO DIFF WBC: CPT

## 2022-10-11 PROCEDURE — 87086 URINE CULTURE/COLONY COUNT: CPT

## 2022-10-11 PROCEDURE — 80053 COMPREHEN METABOLIC PANEL: CPT

## 2022-10-11 PROCEDURE — 99214 OFFICE O/P EST MOD 30 MIN: CPT | Performed by: NURSE PRACTITIONER

## 2022-10-11 RX ORDER — ATORVASTATIN CALCIUM 40 MG/1
TABLET, FILM COATED ORAL
COMMUNITY
Start: 2022-09-29 | End: 2022-10-24

## 2022-10-11 RX ORDER — CARVEDILOL 25 MG/1
TABLET ORAL
COMMUNITY
Start: 2022-09-29 | End: 2022-10-24 | Stop reason: SDUPTHER

## 2022-10-11 RX ORDER — HYDRALAZINE HYDROCHLORIDE 50 MG/1
TABLET, FILM COATED ORAL
COMMUNITY
Start: 2022-09-29

## 2022-10-11 RX ORDER — BENZONATATE 100 MG/1
100 CAPSULE ORAL 3 TIMES DAILY PRN
Qty: 30 CAPSULE | Refills: 0 | Status: SHIPPED | OUTPATIENT
Start: 2022-10-11 | End: 2022-10-21
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
## 2022-10-12 ENCOUNTER — REFERRAL TRIAGE (OUTPATIENT)
Dept: CASE MANAGEMENT | Facility: OTHER | Age: 77
End: 2022-10-12

## 2022-10-13 ENCOUNTER — PATIENT OUTREACH (OUTPATIENT)
Dept: CASE MANAGEMENT | Facility: OTHER | Age: 77
End: 2022-10-13

## 2022-10-13 LAB — BACTERIA SPEC AEROBE CULT: NORMAL

## 2022-10-13 NOTE — PROGRESS NOTES
Please call patient regarding his abnormal labs. Let him know how much blood he has is lower than it has been. He will need to repeat testing in 2 weeks.   Go to ER for any visible bleeding.   Otherwise satisfactory results. - liver and kidneys..

## 2022-10-13 NOTE — OUTREACH NOTE
Social Work Assessment  Questions/Answers    Flowsheet Row Most Recent Value   Referral Source physician   Reason for Consult community resources   Preferred Language English   Advance Care Planning Reviewed no concerns identified   People in Home alone   Current Living Arrangements home   Primary Care Provided by self   Employment Status retired   Medications independent   Meal Preparation independent   Housekeeping independent   Laundry independent   Shopping independent      SDOH updated and reviewed with the patient during this program:  Financial Resource Strain: Low Risk    • Difficulty of Paying Living Expenses: Not very hard      Food Insecurity: No Food Insecurity   • Worried About Running Out of Food in the Last Year: Never true   • Ran Out of Food in the Last Year: Never true      Transportation Needs: No Transportation Needs   • Lack of Transportation (Medical): No   • Lack of Transportation (Non-Medical): No      Housing Stability: Low Risk    • Unable to Pay for Housing in the Last Year: No   • Number of Places Lived in the Last Year: 1   • Unstable Housing in the Last Year: No      Patient Outreach    SW contacted pt to discuss community resources. Pt asked about meal delivery program. SW provided information about meal delivery through the [facility] but cautioned that there would be a waitlist. Pt states he is feeling stronger every day and does not need it at this time. SW also provided information about the paid meal delivery service Meals on Wheels and pt is not interested. He says he is able to ride the bus and go get his groceries from [employer]. He is not interested in Wheels at this time. No other needs were expressed.    PALLAVI DIAL -   Ambulatory Case Management    10/13/2022, 11:06 EDT

## 2022-10-24 ENCOUNTER — OFFICE VISIT (OUTPATIENT)
Dept: FAMILY MEDICINE CLINIC | Facility: CLINIC | Age: 77
End: 2022-10-24

## 2022-10-24 ENCOUNTER — LAB (OUTPATIENT)
Dept: LAB | Facility: HOSPITAL | Age: 77
End: 2022-10-24

## 2022-10-24 VITALS
OXYGEN SATURATION: 95 % | BODY MASS INDEX: 36.42 KG/M2 | WEIGHT: 226.6 LBS | SYSTOLIC BLOOD PRESSURE: 126 MMHG | TEMPERATURE: 98.2 F | DIASTOLIC BLOOD PRESSURE: 80 MMHG | HEART RATE: 68 BPM | HEIGHT: 66 IN | RESPIRATION RATE: 16 BRPM

## 2022-10-24 DIAGNOSIS — K59.00 CONSTIPATION, UNSPECIFIED CONSTIPATION TYPE: ICD-10-CM

## 2022-10-24 DIAGNOSIS — Z76.0 ENCOUNTER FOR MEDICATION REFILL: ICD-10-CM

## 2022-10-24 DIAGNOSIS — G47.9 SLEEP DISORDER: ICD-10-CM

## 2022-10-24 DIAGNOSIS — F19.10 POLYSUBSTANCE ABUSE: ICD-10-CM

## 2022-10-24 DIAGNOSIS — J30.89 NON-SEASONAL ALLERGIC RHINITIS, UNSPECIFIED TRIGGER: ICD-10-CM

## 2022-10-24 DIAGNOSIS — Z76.0 MEDICATION REFILL: ICD-10-CM

## 2022-10-24 DIAGNOSIS — K21.9 GASTROESOPHAGEAL REFLUX DISEASE WITHOUT ESOPHAGITIS: Chronic | ICD-10-CM

## 2022-10-24 DIAGNOSIS — M79.602 LEFT ARM PAIN: ICD-10-CM

## 2022-10-24 DIAGNOSIS — J45.20 MILD INTERMITTENT ASTHMA WITHOUT COMPLICATION: ICD-10-CM

## 2022-10-24 DIAGNOSIS — Z23 IMMUNIZATION DUE: ICD-10-CM

## 2022-10-24 DIAGNOSIS — Z12.11 ENCOUNTER FOR SCREENING COLONOSCOPY: ICD-10-CM

## 2022-10-24 DIAGNOSIS — Z00.00 ENCOUNTER FOR SUBSEQUENT ANNUAL WELLNESS VISIT IN MEDICARE PATIENT: Primary | ICD-10-CM

## 2022-10-24 DIAGNOSIS — F41.8 DEPRESSION WITH ANXIETY: ICD-10-CM

## 2022-10-24 DIAGNOSIS — D64.9 ANEMIA, UNSPECIFIED TYPE: ICD-10-CM

## 2022-10-24 DIAGNOSIS — I10 ESSENTIAL HYPERTENSION: ICD-10-CM

## 2022-10-24 DIAGNOSIS — G89.29 OTHER CHRONIC PAIN: ICD-10-CM

## 2022-10-24 DIAGNOSIS — R60.0 LOWER LEG EDEMA: ICD-10-CM

## 2022-10-24 DIAGNOSIS — E78.5 HYPERLIPIDEMIA LDL GOAL <100: ICD-10-CM

## 2022-10-24 LAB
DEPRECATED RDW RBC AUTO: 50.4 FL (ref 37–54)
ERYTHROCYTE [DISTWIDTH] IN BLOOD BY AUTOMATED COUNT: 14.5 % (ref 12.3–15.4)
FOLATE SERPL-MCNC: >20 NG/ML (ref 4.78–24.2)
HCT VFR BLD AUTO: 31.7 % (ref 37.5–51)
HGB BLD-MCNC: 10.5 G/DL (ref 13–17.7)
IRON 24H UR-MRATE: 50 MCG/DL (ref 59–158)
IRON SATN MFR SERPL: 16 % (ref 20–50)
MCH RBC QN AUTO: 31.3 PG (ref 26.6–33)
MCHC RBC AUTO-ENTMCNC: 33.1 G/DL (ref 31.5–35.7)
MCV RBC AUTO: 94.6 FL (ref 79–97)
PLATELET # BLD AUTO: 287 10*3/MM3 (ref 140–450)
PMV BLD AUTO: 10.6 FL (ref 6–12)
RBC # BLD AUTO: 3.35 10*6/MM3 (ref 4.14–5.8)
TIBC SERPL-MCNC: 319 MCG/DL (ref 298–536)
TRANSFERRIN SERPL-MCNC: 214 MG/DL (ref 200–360)
VIT B12 BLD-MCNC: 1373 PG/ML (ref 211–946)
WBC NRBC COR # BLD: 4.75 10*3/MM3 (ref 3.4–10.8)

## 2022-10-24 PROCEDURE — G0444 DEPRESSION SCREEN ANNUAL: HCPCS | Performed by: NURSE PRACTITIONER

## 2022-10-24 PROCEDURE — 36415 COLL VENOUS BLD VENIPUNCTURE: CPT | Performed by: NURSE PRACTITIONER

## 2022-10-24 PROCEDURE — G0439 PPPS, SUBSEQ VISIT: HCPCS | Performed by: NURSE PRACTITIONER

## 2022-10-24 PROCEDURE — 82746 ASSAY OF FOLIC ACID SERUM: CPT | Performed by: NURSE PRACTITIONER

## 2022-10-24 PROCEDURE — 99397 PER PM REEVAL EST PAT 65+ YR: CPT | Performed by: NURSE PRACTITIONER

## 2022-10-24 PROCEDURE — G0008 ADMIN INFLUENZA VIRUS VAC: HCPCS | Performed by: NURSE PRACTITIONER

## 2022-10-24 PROCEDURE — 90662 IIV NO PRSV INCREASED AG IM: CPT | Performed by: NURSE PRACTITIONER

## 2022-10-24 PROCEDURE — 85027 COMPLETE CBC AUTOMATED: CPT | Performed by: NURSE PRACTITIONER

## 2022-10-24 PROCEDURE — 1160F RVW MEDS BY RX/DR IN RCRD: CPT | Performed by: NURSE PRACTITIONER

## 2022-10-24 PROCEDURE — 82607 VITAMIN B-12: CPT | Performed by: NURSE PRACTITIONER

## 2022-10-24 PROCEDURE — 1170F FXNL STATUS ASSESSED: CPT | Performed by: NURSE PRACTITIONER

## 2022-10-24 PROCEDURE — 83540 ASSAY OF IRON: CPT | Performed by: NURSE PRACTITIONER

## 2022-10-24 PROCEDURE — 84466 ASSAY OF TRANSFERRIN: CPT | Performed by: NURSE PRACTITIONER

## 2022-10-24 RX ORDER — ALBUTEROL SULFATE 90 UG/1
2 AEROSOL, METERED RESPIRATORY (INHALATION) EVERY 4 HOURS PRN
Qty: 18 G | Refills: 1 | Status: SHIPPED | OUTPATIENT
Start: 2022-10-24 | End: 2023-02-14 | Stop reason: SDUPTHER

## 2022-10-24 RX ORDER — POLYETHYLENE GLYCOL 3350 17 G/17G
17 POWDER, FOR SOLUTION ORAL DAILY
Qty: 90 PACKET | Refills: 3 | Status: SHIPPED | OUTPATIENT
Start: 2022-10-24

## 2022-10-24 RX ORDER — POTASSIUM CHLORIDE 750 MG/1
10 TABLET, FILM COATED, EXTENDED RELEASE ORAL DAILY
Qty: 30 TABLET | Refills: 0 | Status: SHIPPED | OUTPATIENT
Start: 2022-10-24 | End: 2023-03-14

## 2022-10-24 RX ORDER — FLUTICASONE PROPIONATE 44 UG/1
1 AEROSOL, METERED RESPIRATORY (INHALATION) 2 TIMES DAILY PRN
Qty: 1 EACH | Refills: 0 | Status: SHIPPED | OUTPATIENT
Start: 2022-10-24 | End: 2023-02-14 | Stop reason: SDUPTHER

## 2022-10-24 RX ORDER — PANTOPRAZOLE SODIUM 20 MG/1
20 TABLET, DELAYED RELEASE ORAL DAILY PRN
Qty: 90 TABLET | Refills: 1 | Status: SHIPPED | OUTPATIENT
Start: 2022-10-24 | End: 2023-02-14 | Stop reason: SDUPTHER

## 2022-10-24 RX ORDER — LORATADINE 10 MG/1
10 TABLET ORAL DAILY
Qty: 30 TABLET | Refills: 11 | Status: SHIPPED | OUTPATIENT
Start: 2022-10-24

## 2022-10-24 RX ORDER — PREGABALIN 100 MG/1
100 CAPSULE ORAL 3 TIMES DAILY
Qty: 90 CAPSULE | Refills: 2 | Status: SHIPPED | OUTPATIENT
Start: 2022-10-24 | End: 2023-02-14

## 2022-10-24 RX ORDER — CARVEDILOL 25 MG/1
25 TABLET ORAL 2 TIMES DAILY WITH MEALS
Qty: 60 TABLET | Refills: 1 | Status: SHIPPED | OUTPATIENT
Start: 2022-10-24 | End: 2023-02-14 | Stop reason: SDUPTHER

## 2022-11-13 DIAGNOSIS — D64.9 ANEMIA, UNSPECIFIED TYPE: Primary | ICD-10-CM

## 2022-11-13 DIAGNOSIS — R79.0 LOW IRON STORES: ICD-10-CM

## 2022-11-13 PROBLEM — D50.9 IRON DEFICIENCY ANEMIA: Status: ACTIVE | Noted: 2022-11-13

## 2022-11-13 PROBLEM — E61.1 LOW IRON: Status: ACTIVE | Noted: 2022-11-13

## 2022-11-13 RX ORDER — DOXYCYCLINE HYCLATE 50 MG/1
324 CAPSULE, GELATIN COATED ORAL
Qty: 90 TABLET | Refills: 3 | Status: SHIPPED | OUTPATIENT
Start: 2022-11-13

## 2022-12-02 ENCOUNTER — READMISSION MANAGEMENT (OUTPATIENT)
Dept: CALL CENTER | Facility: HOSPITAL | Age: 77
End: 2022-12-02

## 2022-12-02 ENCOUNTER — TELEPHONE (OUTPATIENT)
Dept: FAMILY MEDICINE CLINIC | Facility: CLINIC | Age: 77
End: 2022-12-02

## 2022-12-02 NOTE — TELEPHONE ENCOUNTER
Caller: [facility]    Relationship to patient:     Best call back number:[phone]    New or established patient?  [] New  [x] Established    Date of discharge: 12/4/2022    Facility discharged from: [facility]     Diagnosis/Symptoms: FRACTURE OF THE FIBULA     Length of stay (If applicable): 23 DAYS     Specialty Only: Did you see a Yazidism health provider?    [] Yes  [] No  If so, who?

## 2022-12-02 NOTE — OUTREACH NOTE
Prep Survey    Flowsheet Row Responses   [facility] facility patient discharged from? Non-BH   Is LACE score < 7 ? Non-BH Discharge   Emergency Room discharge w/ pulse ox? No   Eligibility [facility]   Date of Discharge 12/04/22   Discharge Disposition Home or Self Care   Discharge diagnosis Fracture   Does the patient have one of the following disease processes/diagnoses(primary or secondary)? Other   Prep survey completed? Yes          ZOIE WALSH - Registered Nurse

## 2022-12-05 ENCOUNTER — TRANSITIONAL CARE MANAGEMENT TELEPHONE ENCOUNTER (OUTPATIENT)
Dept: CALL CENTER | Facility: HOSPITAL | Age: 77
End: 2022-12-05

## 2022-12-05 NOTE — OUTREACH NOTE
Call Center TCM Note    Flowsheet Row Responses   [facility] patient discharged from? Non-   Does the patient have one of the following disease processes/diagnoses(primary or secondary)? Other   TCM attempt successful? No   Unsuccessful attempts Attempt 1   Call Status Left message  [no release]          Mack Frazier RN    12/5/2022, 08:55 EST

## 2022-12-05 NOTE — OUTREACH NOTE
Call Center TCM Note    Flowsheet Row Responses   [facility] patient discharged from? Non-   Does the patient have one of the following disease processes/diagnoses(primary or secondary)? Other   TCM attempt successful? No   Unsuccessful attempts Attempt 2   Call Status Left message          Mack Frazier RN    12/5/2022, 09:13 EST

## 2022-12-06 ENCOUNTER — TRANSITIONAL CARE MANAGEMENT TELEPHONE ENCOUNTER (OUTPATIENT)
Dept: CALL CENTER | Facility: HOSPITAL | Age: 77
End: 2022-12-06

## 2022-12-06 NOTE — OUTREACH NOTE
Call Center TCM Note    Flowsheet Row Responses   [facility] patient discharged from? Non-   Does the patient have one of the following disease processes/diagnoses(primary or secondary)? Other   TCM attempt successful? Yes   Call start time 1515   Call end time 1516   Discharge diagnosis Fracture   Meds reviewed with patient/caregiver? Yes   Is the patient having any side effects they believe may be caused by any medication additions or changes? No   Does the patient have all medications ordered at discharge? Yes   Is the patient taking all medications as directed (includes completed medication regime)? Yes   Comments 12/13/22 at 11:15am.   Does the patient have an appointment with their PCP within 7 days of discharge? Yes   Home health comments Pt in rehab facility   Psychosocial issues? No   What is the patient's perception of their health status since discharge? Improving   TCM call completed? Yes   Call end time 1516          Brina Devries RN    12/6/2022, 15:17 EST

## 2022-12-07 ENCOUNTER — TELEPHONE (OUTPATIENT)
Dept: FAMILY MEDICINE CLINIC | Facility: CLINIC | Age: 77
End: 2022-12-07

## 2022-12-07 NOTE — TELEPHONE ENCOUNTER
Caller: MAEVE    Relationship:     Best call back number: [phone]    What was the call regarding: PATIENT IS BEING DISCHARGED TODAY, HOME HEALTH CARE IS TRYING TO MAKE SURE MRS PONCE WILL BE THE PCP FOLLOWING HIS CARE.     Do you require a callback: YES

## 2022-12-09 NOTE — TELEPHONE ENCOUNTER
Called and spoke with Magdalena at [facility].   Discussed this provider with sign his home health orders.   She is to send over what paperwork needs addressed.     KELLEN Kennedy  He is being discharged from [facility].

## 2022-12-13 ENCOUNTER — TELEPHONE (OUTPATIENT)
Dept: FAMILY MEDICINE CLINIC | Facility: CLINIC | Age: 77
End: 2022-12-13

## 2022-12-21 ENCOUNTER — TELEPHONE (OUTPATIENT)
Dept: FAMILY MEDICINE CLINIC | Facility: CLINIC | Age: 77
End: 2022-12-21

## 2022-12-21 NOTE — TELEPHONE ENCOUNTER
Pt broke his leg and is in a wheelchair. He is following up with , but not scheduled to see them until mid January. He does not want to be in a wheelchair anymore and is not in any pain. He is on a waiting list to see them sooner, but he is wanting to know if anyone here can give him some advise. He says he has left 10+ messages at  and no one has called him back. He does not have transportation and has to pay for someone to take him back and forth to appointments. He doesn't want an unnecessary appointment if he doesn't need it.

## 2022-12-22 ENCOUNTER — TELEPHONE (OUTPATIENT)
Dept: FAMILY MEDICINE CLINIC | Facility: CLINIC | Age: 77
End: 2022-12-22

## 2022-12-22 NOTE — TELEPHONE ENCOUNTER
HUB TO READ    Lm for pt to call the office    Let him know he will need to follow up the orthopedist.   It takes at least 6 weeks for a bone to heal. He will need to be released by them.  He needs to specifically follow their discharge instructions or he can prolong the healing process.     Thanks   Eri

## 2022-12-22 NOTE — TELEPHONE ENCOUNTER
Provider: KELLEN HEARD    Caller: LENORA     Relationship to Patient: [employer] [facility]     Phone Number: [phone]    Reason for Call: [facility] CALLED TO INFORM TERESA PONCE THAT THE PATIENT WAS A START OF CARE ONLY AND HAS REFUSED ANY FURTHER VISIT.

## 2023-01-03 DIAGNOSIS — E29.1 SECONDARY MALE HYPOGONADISM: ICD-10-CM

## 2023-01-03 RX ORDER — TESTOSTERONE 16.2 MG/G
GEL TRANSDERMAL
Qty: 75 G | OUTPATIENT
Start: 2023-01-03

## 2023-01-16 ENCOUNTER — READMISSION MANAGEMENT (OUTPATIENT)
Dept: CALL CENTER | Facility: HOSPITAL | Age: 78
End: 2023-01-16
Payer: MEDICARE

## 2023-01-16 ENCOUNTER — TELEPHONE (OUTPATIENT)
Dept: FAMILY MEDICINE CLINIC | Facility: CLINIC | Age: 78
End: 2023-01-16

## 2023-01-16 NOTE — OUTREACH NOTE
Prep Survey    Flowsheet Row Responses   [facility] facility patient discharged from? Non-BH   Is LACE score < 7 ? Non-BH Discharge   Eligibility [facility]   Date of Discharge 01/15/23   Discharge Disposition Home or Self Care   Discharge diagnosis Pneumonia, HTN   Does the patient have one of the following disease processes/diagnoses(primary or secondary)? Pneumonia   Prep survey completed? Yes          ZOIE WALSH - Registered Nurse

## 2023-01-16 NOTE — TELEPHONE ENCOUNTER
Caller: Ronald Bond    Relationship to patient: Self    Best call back number: [phone]    New or established patient?  [] New  [x] Established    Date of discharge: 01/15/2023    Facility discharged from: [facility]     Diagnosis/Symptoms: PNEUMONIA, HIGH BLOOD PRESSURE, TROUBLE BREATHING    Length of stay (If applicable): 4 DAYS     Specialty Only: Did you see a Confucianism health provider?    [] Yes  [] No  If so, who?

## 2023-01-17 ENCOUNTER — TRANSITIONAL CARE MANAGEMENT TELEPHONE ENCOUNTER (OUTPATIENT)
Dept: CALL CENTER | Facility: HOSPITAL | Age: 78
End: 2023-01-17
Payer: MEDICARE

## 2023-01-17 NOTE — OUTREACH NOTE
Call Center TCM Note    Flowsheet Row Responses   [facility] patient discharged from? Non-   Does the patient have one of the following disease processes/diagnoses(primary or secondary)? Other   TCM attempt successful? Yes  [No verbal release from PCP on file]   Call start time 1304   Call end time 1308   Discharge diagnosis Pneumonia, HTN   Meds reviewed with patient/caregiver? Yes   Is the patient having any side effects they believe may be caused by any medication additions or changes? No   Does the patient have all medications ordered at discharge? Yes   Is the patient taking all medications as directed (includes completed medication regime)? Yes   Comments Hospital d/c follow up 1/20/23@1115   Does the patient have an appointment with their PCP within 7 days of discharge? Yes   Has home health visited the patient within 72 hours of discharge? N/A   Psychosocial issues? No   What is the patient's perception of their health status since discharge? Improving   Is the patient/caregiver able to teach back signs and symptoms related to disease process for when to call PCP? Yes   Is the patient/caregiver able to teach back signs and symptoms related to disease process for when to call 911? Yes   Is the patient/caregiver able to teach back the hierarchy of who to call/visit for symptoms/problems? PCP, Specialist, Home health nurse, Urgent Care, ED, 911 Yes   If the patient is a current smoker, are they able to teach back resources for cessation? Not a smoker   TCM call completed? Yes   Call end time 1308   Would this patient benefit from a Referral to Amb Social Work? No   Is the patient interested in additional calls from an ambulatory ?  NOTE:  applies to high risk patients requiring additional follow-up. No          Sasha Bland RN    1/17/2023, 13:10 EST

## 2023-01-24 ENCOUNTER — TELEPHONE (OUTPATIENT)
Dept: FAMILY MEDICINE CLINIC | Facility: CLINIC | Age: 78
End: 2023-01-24

## 2023-02-10 DIAGNOSIS — E29.1 SECONDARY MALE HYPOGONADISM: ICD-10-CM

## 2023-02-10 RX ORDER — TESTOSTERONE 16.2 MG/G
GEL TRANSDERMAL
Qty: 75 G | OUTPATIENT
Start: 2023-02-10

## 2023-02-14 ENCOUNTER — OFFICE VISIT (OUTPATIENT)
Dept: FAMILY MEDICINE CLINIC | Facility: CLINIC | Age: 78
End: 2023-02-14
Payer: MEDICARE

## 2023-02-14 ENCOUNTER — LAB (OUTPATIENT)
Dept: LAB | Facility: HOSPITAL | Age: 78
End: 2023-02-14
Payer: MEDICARE

## 2023-02-14 VITALS
SYSTOLIC BLOOD PRESSURE: 128 MMHG | RESPIRATION RATE: 19 BRPM | BODY MASS INDEX: 32.56 KG/M2 | HEIGHT: 66 IN | OXYGEN SATURATION: 94 % | WEIGHT: 202.6 LBS | DIASTOLIC BLOOD PRESSURE: 80 MMHG | TEMPERATURE: 98 F | HEART RATE: 61 BPM

## 2023-02-14 DIAGNOSIS — I10 ESSENTIAL HYPERTENSION: ICD-10-CM

## 2023-02-14 DIAGNOSIS — F41.9 ANXIETY: Primary | ICD-10-CM

## 2023-02-14 DIAGNOSIS — J45.20 MILD INTERMITTENT ASTHMA WITHOUT COMPLICATION: ICD-10-CM

## 2023-02-14 DIAGNOSIS — K59.00 CONSTIPATION, UNSPECIFIED CONSTIPATION TYPE: ICD-10-CM

## 2023-02-14 DIAGNOSIS — Z76.0 ENCOUNTER FOR MEDICATION REFILL: ICD-10-CM

## 2023-02-14 DIAGNOSIS — E78.5 HYPERLIPIDEMIA LDL GOAL <100: ICD-10-CM

## 2023-02-14 DIAGNOSIS — K21.9 GASTROESOPHAGEAL REFLUX DISEASE WITHOUT ESOPHAGITIS: Chronic | ICD-10-CM

## 2023-02-14 DIAGNOSIS — F41.9 ANXIETY: ICD-10-CM

## 2023-02-14 PROCEDURE — 80053 COMPREHEN METABOLIC PANEL: CPT

## 2023-02-14 PROCEDURE — 99214 OFFICE O/P EST MOD 30 MIN: CPT | Performed by: NURSE PRACTITIONER

## 2023-02-14 RX ORDER — SIMVASTATIN 10 MG
10 TABLET ORAL
Qty: 90 TABLET | Refills: 1 | Status: SHIPPED | OUTPATIENT
Start: 2023-02-14

## 2023-02-14 RX ORDER — ALBUTEROL SULFATE 90 UG/1
2 AEROSOL, METERED RESPIRATORY (INHALATION) EVERY 4 HOURS PRN
Qty: 18 G | Refills: 1 | Status: SHIPPED | OUTPATIENT
Start: 2023-02-14

## 2023-02-14 RX ORDER — LISINOPRIL 5 MG/1
TABLET ORAL
COMMUNITY
Start: 2023-01-15 | End: 2023-02-14 | Stop reason: SDUPTHER

## 2023-02-14 RX ORDER — HYDROXYZINE HYDROCHLORIDE 25 MG/1
25 TABLET, FILM COATED ORAL 3 TIMES DAILY PRN
Qty: 90 TABLET | Refills: 1 | Status: SHIPPED | OUTPATIENT
Start: 2023-02-14 | End: 2023-02-14 | Stop reason: SDUPTHER

## 2023-02-14 RX ORDER — NALOXONE HYDROCHLORIDE 4 MG/.1ML
4 SPRAY NASAL
COMMUNITY
Start: 2022-11-11 | End: 2023-11-11

## 2023-02-14 RX ORDER — SENNA PLUS 8.6 MG/1
17.2 TABLET ORAL
COMMUNITY
Start: 2022-11-11 | End: 2023-02-14 | Stop reason: SDUPTHER

## 2023-02-14 RX ORDER — FLUTICASONE PROPIONATE 44 UG/1
1 AEROSOL, METERED RESPIRATORY (INHALATION) 2 TIMES DAILY PRN
Qty: 1 EACH | Refills: 0 | Status: SHIPPED | OUTPATIENT
Start: 2023-02-14 | End: 2024-02-14

## 2023-02-14 RX ORDER — HYDROXYZINE HYDROCHLORIDE 25 MG/1
25 TABLET, FILM COATED ORAL 3 TIMES DAILY PRN
Qty: 90 TABLET | Refills: 1 | Status: SHIPPED | OUTPATIENT
Start: 2023-02-14 | End: 2023-03-14

## 2023-02-14 RX ORDER — LISINOPRIL 5 MG/1
5 TABLET ORAL DAILY
Qty: 90 TABLET | Refills: 1 | Status: SHIPPED | OUTPATIENT
Start: 2023-02-14

## 2023-02-14 RX ORDER — PANTOPRAZOLE SODIUM 20 MG/1
20 TABLET, DELAYED RELEASE ORAL DAILY PRN
Qty: 90 TABLET | Refills: 1 | Status: SHIPPED | OUTPATIENT
Start: 2023-02-14

## 2023-02-14 RX ORDER — SENNA PLUS 8.6 MG/1
1 TABLET ORAL DAILY PRN
Qty: 30 TABLET | Refills: 11 | Status: SHIPPED | OUTPATIENT
Start: 2023-02-14 | End: 2024-02-14

## 2023-02-14 RX ORDER — CARVEDILOL 25 MG/1
25 TABLET ORAL 2 TIMES DAILY WITH MEALS
Qty: 60 TABLET | Refills: 1 | Status: SHIPPED | OUTPATIENT
Start: 2023-02-14

## 2023-02-14 NOTE — PROGRESS NOTES
"Chief Complaint  Foot Pain (Pt was seen at  1/4/23 for right foot and ankle pain. Pt states he was also recovering from pneumonia.)    Subjective          Ronald Bond presents to [facility]  History of Present Illness  Patient is a 77-year-old male.  He is here for a follow-up visit from recent hospitalization which was more than a month ago.  He was admitted for a pneumonia which he has recovered.  He had a hospitalization in October for a right foot tibia and metatarsal fracture he is walking with a cane otherwise he states his foot and leg have healed.  He states he is breathing better.  The pneumonia has resolved.      States he has been noncompliant in wearing his right foot walking boot in the past month.  He was discharged from [facility] on 1/15/2023.  \"My foot is feeling so much better\".    He does have new issues with complaint of anxiety.  States he feels like he has \"panic attacks\".  He is no longer taking lyrica due to confusion and tremors.   Will list as an allergy- sensitivity    Foot Pain  Associated symptoms include arthralgias and coughing. Pertinent negatives include no myalgias.       The following portions of the patient's history were reviewed and updated as appropriate: allergies, current medications, past family history, past medical history, past social history, past surgical history and problem list.    Review of Systems   Constitutional: Positive for appetite change and unexpected weight change.   HENT: Negative.    Respiratory: Positive for cough and chest tightness.    Cardiovascular: Negative.    Gastrointestinal: Positive for constipation.   Musculoskeletal: Positive for arthralgias, back pain and gait problem. Negative for myalgias.        Walking with a cane   Skin: Negative.    Hematological: Negative.    Psychiatric/Behavioral: The patient is nervous/anxious.          Objective   Vital Signs:   /80   Pulse 61   Temp 98 °F " "(36.7 °C) (Temporal)   Resp 19   Ht 167.6 cm (65.98\")   Wt 91.9 kg (202 lb 9.6 oz)   SpO2 94%   BMI 32.72 kg/m²    BMI is >= 30 and <35. (Class 1 Obesity). The following options were offered after discussion;: nutrition counseling/recommendations                 Physical Exam  Vitals reviewed.   HENT:      Mouth/Throat:      Mouth: Mucous membranes are moist.   Eyes:      Pupils: Pupils are equal, round, and reactive to light.   Cardiovascular:      Rate and Rhythm: Normal rate and regular rhythm.   Pulmonary:      Effort: Pulmonary effort is normal.   Abdominal:      Palpations: Abdomen is soft.   Musculoskeletal:         General: Normal range of motion.   Skin:     General: Skin is warm and dry.      Capillary Refill: Capillary refill takes less than 2 seconds.   Neurological:      Mental Status: He is alert. Mental status is at baseline.      Comments: Patient is alert and oriented today.  He does appear to be somewhat anxious.     Psychiatric:         Mood and Affect: Mood normal.         Behavior: Behavior normal.        Result Review :                 Assessment and Plan    Diagnoses and all orders for this visit:    1. Anxiety (Primary)  -     Comprehensive Metabolic Panel; Future  -     Discontinue: hydrOXYzine (ATARAX) 25 MG tablet; Take 1 tablet by mouth 3 (Three) Times a Day As Needed for Anxiety.  Dispense: 90 tablet; Refill: 1  -     hydrOXYzine (ATARAX) 25 MG tablet; Take 1 tablet by mouth 3 (Three) Times a Day As Needed for Anxiety.  Dispense: 90 tablet; Refill: 1  -     Ambulatory Referral to Psychiatry    2. Hyperlipidemia LDL goal <100  -     simvastatin (ZOCOR) 10 MG tablet; Take 1 tablet by mouth every night at bedtime.  Dispense: 90 tablet; Refill: 1    3. Gastroesophageal reflux disease without esophagitis  -     pantoprazole (Protonix) 20 MG EC tablet; Take 1 tablet by mouth Daily As Needed for Heartburn or Indigestion.  Dispense: 90 tablet; Refill: 1    4. Encounter for medication " refill  -     pantoprazole (Protonix) 20 MG EC tablet; Take 1 tablet by mouth Daily As Needed for Heartburn or Indigestion.  Dispense: 90 tablet; Refill: 1    5. Mild intermittent asthma without complication  -     fluticasone (FLOVENT HFA) 44 MCG/ACT inhaler; Inhale 1 puff 2 (Two) Times a Day As Needed (shortness of air).  Dispense: 1 each; Refill: 0  -     albuterol sulfate  (90 Base) MCG/ACT inhaler; Inhale 2 puffs Every 4 (Four) Hours As Needed for Wheezing or Shortness of Air.  Dispense: 18 g; Refill: 1    6. Essential hypertension  -     lisinopril (PRINIVIL,ZESTRIL) 5 MG tablet; Take 1 tablet by mouth Daily.  Dispense: 90 tablet; Refill: 1  -     carvedilol (COREG) 25 MG tablet; Take 1 tablet by mouth 2 (Two) Times a Day With Meals.  Dispense: 60 tablet; Refill: 1    7. Constipation, unspecified constipation type  -     senna (SENOKOT) 8.6 MG tablet; Take 1 tablet by mouth Daily As Needed for Constipation.  Dispense: 30 tablet; Refill: 11    Refilling current medications.  Patient is to follow-up in 4 weeks for anxiety and as needed  Reordering Vistaril as needed he was on it during his last hospitalization at [facility].      Follow Up   Return in about 4 weeks (around 3/14/2023), or anxiety.  Patient was given instructions and counseling regarding his condition or for health maintenance advice. Please see specific information pulled into the AVS if appropriate.

## 2023-02-15 LAB
ALBUMIN SERPL-MCNC: 4.5 G/DL (ref 3.5–5.2)
ALBUMIN/GLOB SERPL: 1.5 G/DL
ALP SERPL-CCNC: 77 U/L (ref 39–117)
ALT SERPL W P-5'-P-CCNC: 10 U/L (ref 1–41)
ANION GAP SERPL CALCULATED.3IONS-SCNC: 10.7 MMOL/L (ref 5–15)
AST SERPL-CCNC: 16 U/L (ref 1–40)
BILIRUB SERPL-MCNC: 0.4 MG/DL (ref 0–1.2)
BUN SERPL-MCNC: 20 MG/DL (ref 8–23)
BUN/CREAT SERPL: 18.5 (ref 7–25)
CALCIUM SPEC-SCNC: 9.5 MG/DL (ref 8.6–10.5)
CHLORIDE SERPL-SCNC: 96 MMOL/L (ref 98–107)
CO2 SERPL-SCNC: 31.3 MMOL/L (ref 22–29)
CREAT SERPL-MCNC: 1.08 MG/DL (ref 0.76–1.27)
EGFRCR SERPLBLD CKD-EPI 2021: 71.1 ML/MIN/1.73
GLOBULIN UR ELPH-MCNC: 3 GM/DL
GLUCOSE SERPL-MCNC: 90 MG/DL (ref 65–99)
POTASSIUM SERPL-SCNC: 4.3 MMOL/L (ref 3.5–5.2)
PROT SERPL-MCNC: 7.5 G/DL (ref 6–8.5)
SODIUM SERPL-SCNC: 138 MMOL/L (ref 136–145)

## 2023-03-14 ENCOUNTER — OFFICE VISIT (OUTPATIENT)
Dept: FAMILY MEDICINE CLINIC | Facility: CLINIC | Age: 78
End: 2023-03-14
Payer: MEDICARE

## 2023-03-14 VITALS
WEIGHT: 212.6 LBS | TEMPERATURE: 97.3 F | OXYGEN SATURATION: 95 % | HEART RATE: 75 BPM | BODY MASS INDEX: 34.17 KG/M2 | SYSTOLIC BLOOD PRESSURE: 144 MMHG | HEIGHT: 66 IN | RESPIRATION RATE: 19 BRPM | DIASTOLIC BLOOD PRESSURE: 82 MMHG

## 2023-03-14 DIAGNOSIS — F41.9 ANXIETY: ICD-10-CM

## 2023-03-14 DIAGNOSIS — Z23 IMMUNIZATION DUE: Primary | ICD-10-CM

## 2023-03-14 RX ORDER — HYDROXYZINE 50 MG/1
50 TABLET, FILM COATED ORAL EVERY 8 HOURS PRN
Qty: 90 TABLET | Refills: 3 | Status: SHIPPED | OUTPATIENT
Start: 2023-03-14

## 2023-03-14 RX ORDER — HYDROXYZINE 50 MG/1
50 TABLET, FILM COATED ORAL EVERY 8 HOURS PRN
Qty: 90 TABLET | Refills: 3 | Status: SHIPPED | OUTPATIENT
Start: 2023-03-14 | End: 2023-03-14

## 2023-03-14 NOTE — PROGRESS NOTES
"Chief Complaint  Anxiety (Follow up)    Subjective          Ronald Bond presents to [facility]  History of Present Illness  Patient is a 77 male.  He is here for follow-up on anxiety.  He did not any thoughts of self-harm or suicide.  He does state he is feeling much better mentally.  Has more energy.  \"the tremors are gone and I am feeling a lot better\". HIs mobility appears improved.   He is no longer taking lyrica due to confusion and tremors.         The following portions of the patient's history were reviewed and updated as appropriate: allergies, current medications, past family history, past medical history, past social history, past surgical history and problem list.    Review of Systems   Constitutional: Negative.    HENT: Negative.    Respiratory: Positive for cough.    Cardiovascular: Negative.    Gastrointestinal: Negative.    Musculoskeletal: Positive for myalgias.   Skin: Negative.    Hematological: Negative.    Psychiatric/Behavioral: Negative for sleep disturbance. The patient is nervous/anxious.          Objective   Vital Signs:   /82   Pulse 75   Temp 97.3 °F (36.3 °C) (Temporal)   Resp 19   Ht 167.6 cm (65.98\")   Wt 96.4 kg (212 lb 9.6 oz)   SpO2 95%   BMI 34.33 kg/m²    BMI is >= 30 and <35. (Class 1 Obesity). The following options were offered after discussion;: exercise counseling/recommendations and nutrition counseling/recommendations      PHQ-2/9 Depression Screening  PHQ-9 Total Score:      JEFFERSON-7 Anxiety Screening  JEFFERSON-7  Feeling nervous, anxious or on edge: Several days  Not being able to stop or control worrying: Several days  Worrying too much about different things: Several days  Trouble Relaxing: Not at all  Being so restless that it is hard to sit still: Not at all  Feeling afraid as if something awful might happen: Not at all  Becoming easily annoyed or irritable: Not at all  JEFFERSON 7 Total Score: 3  If you checked any problems, how " difficult have these problems made it for you to do your work, take care of things at home, or get along with other people: Somewhat difficult          Physical Exam  Vitals reviewed.   Constitutional:       Appearance: He is well-developed. He is not ill-appearing.   HENT:      Head: Normocephalic.   Eyes:      Conjunctiva/sclera: Conjunctivae normal.      Pupils: Pupils are equal, round, and reactive to light.   Cardiovascular:      Rate and Rhythm: Normal rate and regular rhythm.      Heart sounds: Normal heart sounds.   Pulmonary:      Effort: Pulmonary effort is normal.      Breath sounds: Normal breath sounds.   Abdominal:      General: Bowel sounds are normal.      Palpations: Abdomen is soft.   Musculoskeletal:      Cervical back: Normal range of motion.   Lymphadenopathy:      Cervical: No cervical adenopathy.   Skin:     General: Skin is warm and dry.      Capillary Refill: Capillary refill takes less than 2 seconds.   Neurological:      Mental Status: He is alert and oriented to person, place, and time.      Motor: No weakness.      Gait: Gait normal.   Psychiatric:         Mood and Affect: Mood normal.         Speech: Speech normal.         Behavior: Behavior normal. Behavior is cooperative.         Thought Content: Thought content normal.        Result Review :                 Assessment and Plan    Diagnoses and all orders for this visit:    1. Immunization due (Primary)  -     Pneumococcal Conjugate Vaccine 20-Valent (PCV20)    2. Anxiety  -     Discontinue: hydrOXYzine (ATARAX) 50 MG tablet; Take 1 tablet by mouth Every 8 (Eight) Hours As Needed for Anxiety.  Dispense: 90 tablet; Refill: 3  -     hydrOXYzine (ATARAX) 50 MG tablet; Take 1 tablet by mouth Every 8 (Eight) Hours As Needed for Anxiety.  Dispense: 90 tablet; Refill: 3    follow up in 3 months and as needed.         Follow Up   Return in about 3 months (around 6/14/2023).  Patient was given instructions and counseling regarding his  condition or for health maintenance advice. Please see specific information pulled into the AVS if appropriate.

## 2023-03-20 DIAGNOSIS — E29.1 SECONDARY MALE HYPOGONADISM: ICD-10-CM

## 2023-03-20 RX ORDER — TESTOSTERONE 16.2 MG/G
GEL TRANSDERMAL
Qty: 75 G | OUTPATIENT
Start: 2023-03-20

## 2023-04-14 RX ORDER — TORSEMIDE 20 MG/1
TABLET ORAL
Qty: 60 TABLET | Refills: 0 | Status: SHIPPED | OUTPATIENT
Start: 2023-04-14

## 2023-04-14 NOTE — TELEPHONE ENCOUNTER
Caller: Ronald Bond    Relationship: Self    Best call back number: [phone]    Requested Prescriptions:   Requested Prescriptions     Pending Prescriptions Disp Refills   • torsemide (Demadex) 20 MG tablet 60 tablet 0     Sig: Take one tablet twice a day for one week and then decrease to one tablet daily      Pharmacy where request should be sent: [facility] [address] [phone] [facility] [phone] FX     Last office visit with prescribing clinician: 3/14/2023   Last telemedicine visit with prescribing clinician: 6/14/2023   Next office visit with prescribing clinician: 6/14/2023     Additional details provided by patient: PATIENT IS WANTING TO KNOW IF HE NEEDS TO START THIS MEDICATION. PATIENT IS STATING HE CURRENTLY HAS SWELLING.     Does the patient have less than a 3 day supply:  [x] Yes  [] No    Would you like a call back once the refill request has been completed: [x] Yes [] No    If the office needs to give you a call back, can they leave a voicemail: [x] Yes [] No    Sylvia Montiel Rep   04/14/23 17:53 EDT

## 2023-05-02 DIAGNOSIS — I10 ESSENTIAL HYPERTENSION: ICD-10-CM

## 2023-05-02 RX ORDER — CARVEDILOL 25 MG/1
TABLET ORAL
Qty: 60 TABLET | Refills: 1 | Status: SHIPPED | OUTPATIENT
Start: 2023-05-02

## 2023-05-02 NOTE — TELEPHONE ENCOUNTER
Rx Refill Note  Requested Prescriptions     Pending Prescriptions Disp Refills   • carvedilol (COREG) 25 MG tablet [Pharmacy Med Name: CARVEDILOL 25 MG TABLET] 60 tablet 1     Sig: TAKE 1 TABLET BY MOUTH TWO TIMES A DAY WITH MEALS      Last office visit with prescribing clinician: 3/14/2023   Last telemedicine visit with prescribing clinician: 6/14/2023   Next office visit with prescribing clinician: 6/14/2023                         Would you like a call back once the refill request has been completed: [] Yes [] No    If the office needs to give you a call back, can they leave a voicemail: [] Yes [] No    Ximena Boyer MA  05/02/23, 11:05 EDT  
Yes

## 2023-05-08 ENCOUNTER — TELEPHONE (OUTPATIENT)
Dept: FAMILY MEDICINE CLINIC | Facility: CLINIC | Age: 78
End: 2023-05-08

## 2023-05-08 NOTE — TELEPHONE ENCOUNTER
Caller: Ronald Bond    Relationship: Self    Best call back number: [phone]    Additional notes: PATIENT STATES HIS FEET HAVE BEGAN SWELLING AGAIN. PATIENT STATES HE THINKS HE NEEDS TO RE START THE WATER PILL TERESA PONCE PRESCRIBED HIM.

## 2023-05-08 NOTE — TELEPHONE ENCOUNTER
Caller: Ronald Bond    Relationship: Self    Best call back number: [phone]    What is the medical concern/diagnosis: CATARACTS    What specialty or service is being requested: OPTHALMOLOGY/SURGERY

## 2023-05-09 DIAGNOSIS — Z13.5 SCREENING FOR EYE CONDITION: ICD-10-CM

## 2023-05-09 DIAGNOSIS — R60.0 LOWER LEG EDEMA: Primary | ICD-10-CM

## 2023-05-09 RX ORDER — TORSEMIDE 20 MG/1
20 TABLET ORAL DAILY
Qty: 30 TABLET | Refills: 1 | Status: SHIPPED | OUTPATIENT
Start: 2023-05-09

## 2023-05-30 DIAGNOSIS — I10 ESSENTIAL HYPERTENSION: ICD-10-CM

## 2023-05-30 DIAGNOSIS — R60.0 LOWER LEG EDEMA: ICD-10-CM

## 2023-05-30 RX ORDER — CARVEDILOL 25 MG/1
25 TABLET ORAL 2 TIMES DAILY WITH MEALS
Qty: 60 TABLET | Refills: 1 | Status: SHIPPED | OUTPATIENT
Start: 2023-05-30

## 2023-05-30 RX ORDER — TORSEMIDE 20 MG/1
20 TABLET ORAL DAILY
Qty: 30 TABLET | Refills: 1 | Status: SHIPPED | OUTPATIENT
Start: 2023-05-30

## 2023-05-30 NOTE — TELEPHONE ENCOUNTER
Caller: [facility] - [address] [phone] [facility] [phone] FX    Relationship: Pharmacy    Requested Prescriptions:   Requested Prescriptions     Pending Prescriptions Disp Refills   • torsemide (Demadex) 20 MG tablet 30 tablet 1     Sig: Take 1 tablet by mouth Daily.   • carvedilol (COREG) 25 MG tablet 60 tablet 1     Sig: Take 1 tablet by mouth 2 (Two) Times a Day With Meals.        Pharmacy where request should be sent:      Last office visit with prescribing clinician: 3/14/2023   Last telemedicine visit with prescribing clinician: Visit date not found   Next office visit with prescribing clinician: 6/14/2023     Additional details provided by patient: PATIENT WOULD LIKE A 90 DAY SUPPLY    Does the patient have less than a 3 day supply:  [x] Yes  [] No    Would you like a call back once the refill request has been completed: [] Yes [x] No    If the office needs to give you a call back, can they leave a voicemail: [] Yes [x] No    Sylvia Clarke Rep   05/30/23 15:22 EDT

## 2023-06-14 ENCOUNTER — OFFICE VISIT (OUTPATIENT)
Dept: FAMILY MEDICINE CLINIC | Facility: CLINIC | Age: 78
End: 2023-06-14
Payer: MEDICARE

## 2023-06-14 VITALS
WEIGHT: 209 LBS | TEMPERATURE: 97.8 F | SYSTOLIC BLOOD PRESSURE: 90 MMHG | BODY MASS INDEX: 33.59 KG/M2 | HEIGHT: 66 IN | DIASTOLIC BLOOD PRESSURE: 60 MMHG | HEART RATE: 70 BPM | RESPIRATION RATE: 22 BRPM | OXYGEN SATURATION: 94 %

## 2023-06-14 DIAGNOSIS — R60.0 LOWER LEG EDEMA: ICD-10-CM

## 2023-06-14 DIAGNOSIS — F41.9 ANXIETY: Primary | ICD-10-CM

## 2023-06-14 DIAGNOSIS — L98.9 SKIN LESION OF FACE: ICD-10-CM

## 2023-06-14 DIAGNOSIS — R05.3 CHRONIC COUGH: ICD-10-CM

## 2023-06-14 PROBLEM — H26.9 BILATERAL CATARACTS: Status: ACTIVE | Noted: 2023-06-14

## 2023-06-14 PROCEDURE — 3074F SYST BP LT 130 MM HG: CPT | Performed by: NURSE PRACTITIONER

## 2023-06-14 PROCEDURE — 99214 OFFICE O/P EST MOD 30 MIN: CPT | Performed by: NURSE PRACTITIONER

## 2023-06-14 PROCEDURE — 3078F DIAST BP <80 MM HG: CPT | Performed by: NURSE PRACTITIONER

## 2023-06-14 RX ORDER — TORSEMIDE 20 MG/1
20 TABLET ORAL DAILY
Qty: 30 TABLET | Refills: 1 | Status: SHIPPED | OUTPATIENT
Start: 2023-06-14

## 2023-06-14 RX ORDER — HYDROXYZINE 50 MG/1
50 TABLET, FILM COATED ORAL EVERY 8 HOURS PRN
Qty: 90 TABLET | Refills: 3 | Status: SHIPPED | OUTPATIENT
Start: 2023-06-14

## 2023-06-14 NOTE — PROGRESS NOTES
"Chief Complaint  Anxiety (3 mo fu)    Subjective          Ronald Bond presents to [facility]  History of Present Illness  Patient is a 76 yo male. He is here for complaint of anxiety. He has been referred to see psychiatry at his last visit. He is currently taking prn hydroxyzine. Paxil and cymbalta have both been discontinued for increase in confusion.     Going to methadone clinic.   Left facial lesion wants to see dermatology.   He has a planned cataract surgery for 06/29/23.       The following portions of the patient's history were reviewed and updated as appropriate: allergies, current medications, past family history, past medical history, past social history, past surgical history and problem list.    Review of Systems   Constitutional: Positive for activity change.   HENT: Negative.    Cardiovascular: Negative.    Gastrointestinal: Negative.    Genitourinary: Negative.    Musculoskeletal: Negative.    Skin: Positive for color change.        Right side of lower face has a dry skin patch that is worsening and has been there for years.      Allergic/Immunologic: Negative.    Neurological: Negative.    Hematological: Negative.    Psychiatric/Behavioral: The patient is nervous/anxious.          Objective   Vital Signs:   BP 90/60   Pulse 70   Temp 97.8 °F (36.6 °C) (Temporal)   Resp 22   Ht 167.6 cm (65.98\")   Wt 94.8 kg (209 lb)   SpO2 94%   BMI 33.75 kg/m²           PHQ-2/9 Depression Screening  PHQ-9 Total Score:      JEFFERSON-7 Anxiety Screening  JEFFERSON-7              Physical Exam  Vitals reviewed.   HENT:      Head: Normocephalic.      Nose: Nose normal.   Pulmonary:      Effort: Pulmonary effort is normal.      Breath sounds: Examination of the right-middle field reveals rhonchi. Examination of the right-lower field reveals rhonchi. Rhonchi present.   Abdominal:      General: Bowel sounds are normal.      Palpations: Abdomen is soft.   Musculoskeletal:      Right lower " "leg: Edema present.      Left lower leg: Edema present.   Skin:     General: Skin is warm and dry.      Capillary Refill: Capillary refill takes less than 2 seconds.      Findings: Lesion present.      Comments: Worsening dry raised skin patch to right side of  lower face.in his beard area.   \"it is getting worse and it has been there for years\".          Neurological:      Mental Status: He is oriented to person, place, and time.      Gait: Gait abnormal.   Psychiatric:         Mood and Affect: Mood normal.         Behavior: Behavior normal.        Result Review :            SCANNED - LABS (09/14/2106)  COMPREHENSIVE METABOLIC PANEL (02/27/2023 23:35)  CBC AND DIFFERENTIAL (01/12/2023 02:58)       Assessment and Plan    Diagnoses and all orders for this visit:    1. Anxiety (Primary)  -     hydrOXYzine (ATARAX) 50 MG tablet; Take 1 tablet by mouth Every 8 (Eight) Hours As Needed for Anxiety.  Dispense: 90 tablet; Refill: 3    2. Lower leg edema  -     torsemide (Demadex) 20 MG tablet; Take 1 tablet by mouth Daily.  Dispense: 30 tablet; Refill: 1    3. Chronic cough  -     XR Chest PA & Lateral; Future    4. Skin lesion of face  -     Ambulatory Referral to Dermatology    referral dermatology   Checking Xr has small amount of right lung rhonchi.   Discussed using his inhalers.   Follow up as needed and in 3 months.       Follow Up   Return in about 3 months (around 9/14/2023), or CHF and anxiety.  Patient was given instructions and counseling regarding his condition or for health maintenance advice. Please see specific information pulled into the AVS if appropriate.       "

## 2023-08-17 DIAGNOSIS — F41.9 ANXIETY: ICD-10-CM

## 2023-08-17 DIAGNOSIS — E78.5 HYPERLIPIDEMIA LDL GOAL <100: ICD-10-CM

## 2023-08-17 DIAGNOSIS — R60.0 LOWER LEG EDEMA: ICD-10-CM

## 2023-08-17 DIAGNOSIS — I10 ESSENTIAL HYPERTENSION: ICD-10-CM

## 2023-08-17 RX ORDER — LISINOPRIL 5 MG/1
TABLET ORAL
Qty: 90 TABLET | Refills: 1 | Status: SHIPPED | OUTPATIENT
Start: 2023-08-17

## 2023-08-17 RX ORDER — TORSEMIDE 20 MG/1
20 TABLET ORAL DAILY
Qty: 46 TABLET | Refills: 0 | Status: SHIPPED | OUTPATIENT
Start: 2023-08-17

## 2023-08-17 RX ORDER — CARVEDILOL 25 MG/1
TABLET ORAL
Qty: 92 TABLET | Refills: 0 | Status: SHIPPED | OUTPATIENT
Start: 2023-08-17

## 2023-08-17 RX ORDER — HYDROXYZINE 50 MG/1
TABLET, FILM COATED ORAL
Qty: 138 TABLET | Refills: 0 | Status: SHIPPED | OUTPATIENT
Start: 2023-08-17

## 2023-08-17 RX ORDER — SIMVASTATIN 10 MG
10 TABLET ORAL
Qty: 90 TABLET | Refills: 1 | Status: SHIPPED | OUTPATIENT
Start: 2023-08-17

## 2023-08-17 NOTE — TELEPHONE ENCOUNTER
Rx Refill Note  Requested Prescriptions     Pending Prescriptions Disp Refills    hydrOXYzine (ATARAX) 50 MG tablet [Pharmacy Med Name: HYDROXYZINE HCL 50 MG TABLET] 138 tablet      Sig: TAKE 1 TABLET BY MOUTH EVERY 8 HOURS AS NEEDED FOR ANXIETY    carvedilol (COREG) 25 MG tablet [Pharmacy Med Name: CARVEDILOL 25 MG TABLET] 92 tablet      Sig: TAKE 1 TABLET BY MOUTH TWO TIMES A DAY WITH MEALS    torsemide (DEMADEX) 20 MG tablet [Pharmacy Med Name: TORSEMIDE 20 MG TABLET] 46 tablet      Sig: TAKE 1 TABLET BY MOUTH DAILY    simvastatin (ZOCOR) 10 MG tablet [Pharmacy Med Name: SIMVASTATIN 10 MG TABLET] 90 tablet 1     Sig: TAKE 1 TABLET BY MOUTH EVERY NIGHT AT BEDTIME    lisinopril (PRINIVIL,ZESTRIL) 5 MG tablet [Pharmacy Med Name: LISINOPRIL 5 MG TABLET] 90 tablet 1     Sig: TAKE 1 TABLET BY MOUTH ONCE DAILY      Last office visit with prescribing clinician: 6/14/2023   Last telemedicine visit with prescribing clinician: Visit date not found   Next office visit with prescribing clinician: 9/15/2023                         Would you like a call back once the refill request has been completed: [] Yes [] No    If the office needs to give you a call back, can they leave a voicemail: [] Yes [] No    Aminah Lyons  08/17/23, 15:07 EDT

## 2023-08-18 ENCOUNTER — TELEPHONE (OUTPATIENT)
Dept: FAMILY MEDICINE CLINIC | Facility: CLINIC | Age: 78
End: 2023-08-18

## 2023-08-18 NOTE — TELEPHONE ENCOUNTER
Spoke with justice  He advise there was no note and he did not see anything missing with the medications that were pending

## 2023-08-18 NOTE — TELEPHONE ENCOUNTER
PHARMACY HAS CALLED REQUESTING A CALL BACK TO GET CLARIFICATION ON THE QUANTITIES OF 3 MEDICATIONS.    CALL BACK NUMBER [phone]

## 2023-09-06 ENCOUNTER — OFFICE VISIT (OUTPATIENT)
Dept: FAMILY MEDICINE CLINIC | Facility: CLINIC | Age: 78
End: 2023-09-06
Payer: MEDICARE

## 2023-09-06 VITALS
SYSTOLIC BLOOD PRESSURE: 140 MMHG | OXYGEN SATURATION: 96 % | HEART RATE: 74 BPM | WEIGHT: 203.6 LBS | BODY MASS INDEX: 32.72 KG/M2 | DIASTOLIC BLOOD PRESSURE: 82 MMHG | HEIGHT: 66 IN | TEMPERATURE: 98.7 F

## 2023-09-06 DIAGNOSIS — J30.89 NON-SEASONAL ALLERGIC RHINITIS, UNSPECIFIED TRIGGER: ICD-10-CM

## 2023-09-06 DIAGNOSIS — J20.9 ACUTE BRONCHITIS, UNSPECIFIED ORGANISM: ICD-10-CM

## 2023-09-06 DIAGNOSIS — R05.9 COUGH, UNSPECIFIED TYPE: Primary | ICD-10-CM

## 2023-09-06 LAB
EXPIRATION DATE: NORMAL
EXPIRATION DATE: NORMAL
FLUAV AG NPH QL: NEGATIVE
FLUBV AG NPH QL: NEGATIVE
INTERNAL CONTROL: NORMAL
INTERNAL CONTROL: NORMAL
Lab: NORMAL
Lab: NORMAL
SARS-COV-2 AG UPPER RESP QL IA.RAPID: NORMAL

## 2023-09-06 RX ORDER — AZITHROMYCIN 250 MG/1
TABLET, FILM COATED ORAL
Qty: 6 TABLET | Refills: 0 | Status: CANCELLED | OUTPATIENT
Start: 2023-09-06

## 2023-09-06 RX ORDER — LORATADINE 10 MG/1
10 TABLET ORAL DAILY
Qty: 30 TABLET | Refills: 11 | Status: SHIPPED | OUTPATIENT
Start: 2023-09-06 | End: 2023-09-06 | Stop reason: SDUPTHER

## 2023-09-06 RX ORDER — BENZONATATE 100 MG/1
100 CAPSULE ORAL 3 TIMES DAILY PRN
Qty: 30 CAPSULE | Refills: 0 | Status: SHIPPED | OUTPATIENT
Start: 2023-09-06

## 2023-09-06 RX ORDER — METHYLPREDNISOLONE 4 MG/1
TABLET ORAL
Qty: 21 TABLET | Refills: 0 | Status: SHIPPED | OUTPATIENT
Start: 2023-09-06 | End: 2023-09-12

## 2023-09-06 RX ORDER — AMOXICILLIN AND CLAVULANATE POTASSIUM 875; 125 MG/1; MG/1
1 TABLET, FILM COATED ORAL 2 TIMES DAILY
Qty: 14 TABLET | Refills: 0 | Status: SHIPPED | OUTPATIENT
Start: 2023-09-06 | End: 2023-09-13

## 2023-09-06 RX ORDER — LORATADINE 10 MG/1
10 TABLET ORAL DAILY
Qty: 30 TABLET | Refills: 11 | Status: SHIPPED | OUTPATIENT
Start: 2023-09-06

## 2023-09-06 NOTE — PROGRESS NOTES
"Chief Complaint  Cough (SOB, weakness)    Subjective          Ronald Bond presents to [facility]  History of Present Illness  Patient is a 78 yo male. He is here as a same day sick visit. He is complaining of cough, yellow drainage, some shortness of breath, very tired.   He denies fever, chills, nausea, vomiting.         The following portions of the patient's history were reviewed and updated as appropriate: allergies, current medications, past family history, past medical history, past social history, past surgical history and problem list.    Review of Systems   Constitutional:  Positive for fatigue.   HENT:  Positive for congestion.    Respiratory:  Positive for cough and chest tightness.    Cardiovascular: Negative.    Gastrointestinal: Negative.    Musculoskeletal: Negative.    Skin: Negative.        Objective   Vital Signs:   /82   Pulse 74   Temp 98.7 °F (37.1 °C) (Infrared)   Ht 167.6 cm (65.98\")   Wt 92.4 kg (203 lb 9.6 oz)   SpO2 96%   BMI 32.88 kg/m²         PHQ-2/9 Depression Screening  PHQ-9 Total Score: 0            Physical Exam  Vitals reviewed.   Constitutional:       Appearance: He is well-developed. He is not ill-appearing.   HENT:      Head: Normocephalic.      Right Ear: Tympanic membrane, ear canal and external ear normal.      Left Ear: Tympanic membrane, ear canal and external ear normal.      Nose: Nose normal.      Mouth/Throat:      Mouth: Mucous membranes are moist.   Eyes:      Conjunctiva/sclera: Conjunctivae normal.      Pupils: Pupils are equal, round, and reactive to light.   Cardiovascular:      Rate and Rhythm: Normal rate and regular rhythm.      Heart sounds: Normal heart sounds.   Pulmonary:      Effort: Pulmonary effort is normal.      Breath sounds: Wheezing present.   Abdominal:      General: Bowel sounds are normal.      Palpations: Abdomen is soft.   Musculoskeletal:         General: Normal range of motion.      Cervical " back: Normal range of motion.   Feet:      Comments: 1 + bilateral pedal edema   Lymphadenopathy:      Cervical: No cervical adenopathy.   Skin:     General: Skin is warm and dry.      Capillary Refill: Capillary refill takes less than 2 seconds.   Neurological:      Mental Status: He is alert and oriented to person, place, and time.      Gait: Gait abnormal.   Psychiatric:         Mood and Affect: Mood normal.         Speech: Speech normal.         Behavior: Behavior is cooperative.    Generalized weakness     Result Review :                 Assessment and Plan    Diagnoses and all orders for this visit:    1. Cough, unspecified type (Primary)  -     POCT Influenza A/B  -     POCT SARS-CoV-2 Antigen CRISSY  -     benzonatate (Tessalon Perles) 100 MG capsule; Take 1 capsule by mouth 3 (Three) Times a Day As Needed for Cough.  Dispense: 30 capsule; Refill: 0    2. Non-seasonal allergic rhinitis, unspecified trigger  -     Discontinue: loratadine (CLARITIN) 10 MG tablet; Take 1 tablet by mouth Daily.  Dispense: 30 tablet; Refill: 11  -     loratadine (CLARITIN) 10 MG tablet; Take 1 tablet by mouth Daily.  Dispense: 30 tablet; Refill: 11    3. Acute bronchitis, unspecified organism  -     methylPREDNISolone (MEDROL) 4 MG dose pack; Take as directed on package instructions.  Dispense: 21 tablet; Refill: 0  -     amoxicillin-clavulanate (AUGMENTIN) 875-125 MG per tablet; Take 1 tablet by mouth 2 (Two) Times a Day for 7 days.  Dispense: 14 tablet; Refill: 0    POCT influenza A/B: negative  POCT Sars COV 2: negative   Treating for acute bronchitis   Go to ER for any severe shortness of breath or chest pain.   Otherwise follow up at already planned appointment.         Follow Up   Return in about 4 weeks (around 10/4/2023), or if symptoms worsen or fail to improve.  Patient was given instructions and counseling regarding his condition or for health maintenance advice. Please see specific information pulled into the AVS if  appropriate.

## 2023-09-11 ENCOUNTER — TELEPHONE (OUTPATIENT)
Dept: FAMILY MEDICINE CLINIC | Facility: CLINIC | Age: 78
End: 2023-09-11
Payer: MEDICARE

## 2023-10-30 ENCOUNTER — OFFICE VISIT (OUTPATIENT)
Dept: FAMILY MEDICINE CLINIC | Facility: CLINIC | Age: 78
End: 2023-10-30
Payer: MEDICARE

## 2023-10-30 ENCOUNTER — LAB (OUTPATIENT)
Dept: LAB | Facility: HOSPITAL | Age: 78
End: 2023-10-30
Payer: MEDICARE

## 2023-10-30 VITALS
TEMPERATURE: 96.9 F | DIASTOLIC BLOOD PRESSURE: 82 MMHG | SYSTOLIC BLOOD PRESSURE: 140 MMHG | HEART RATE: 67 BPM | BODY MASS INDEX: 34.07 KG/M2 | OXYGEN SATURATION: 95 % | HEIGHT: 66 IN | WEIGHT: 212 LBS

## 2023-10-30 DIAGNOSIS — Z13.29 SCREENING FOR THYROID DISORDER: ICD-10-CM

## 2023-10-30 DIAGNOSIS — I10 ESSENTIAL HYPERTENSION: ICD-10-CM

## 2023-10-30 DIAGNOSIS — Z12.11 SCREENING FOR MALIGNANT NEOPLASM OF COLON: ICD-10-CM

## 2023-10-30 DIAGNOSIS — I10 HYPERTENSION, UNSPECIFIED TYPE: ICD-10-CM

## 2023-10-30 DIAGNOSIS — J45.20 MILD INTERMITTENT ASTHMA WITHOUT COMPLICATION: ICD-10-CM

## 2023-10-30 DIAGNOSIS — R79.0 LOW IRON STORES: ICD-10-CM

## 2023-10-30 DIAGNOSIS — D64.9 ANEMIA, UNSPECIFIED TYPE: ICD-10-CM

## 2023-10-30 DIAGNOSIS — Z53.20 COLONOSCOPY REFUSED: ICD-10-CM

## 2023-10-30 DIAGNOSIS — Z76.0 ENCOUNTER FOR MEDICATION REFILL: ICD-10-CM

## 2023-10-30 DIAGNOSIS — Z23 IMMUNIZATION DUE: Primary | ICD-10-CM

## 2023-10-30 DIAGNOSIS — Z12.5 SCREENING PSA (PROSTATE SPECIFIC ANTIGEN): ICD-10-CM

## 2023-10-30 DIAGNOSIS — E78.5 HYPERLIPIDEMIA LDL GOAL <100: ICD-10-CM

## 2023-10-30 DIAGNOSIS — K59.00 CONSTIPATION, UNSPECIFIED CONSTIPATION TYPE: ICD-10-CM

## 2023-10-30 DIAGNOSIS — R60.0 LOWER LEG EDEMA: ICD-10-CM

## 2023-10-30 DIAGNOSIS — J30.89 NON-SEASONAL ALLERGIC RHINITIS, UNSPECIFIED TRIGGER: ICD-10-CM

## 2023-10-30 DIAGNOSIS — F41.9 ANXIETY: ICD-10-CM

## 2023-10-30 DIAGNOSIS — K21.9 GASTROESOPHAGEAL REFLUX DISEASE WITHOUT ESOPHAGITIS: Chronic | ICD-10-CM

## 2023-10-30 LAB
ALBUMIN SERPL-MCNC: 4.2 G/DL (ref 3.5–5.2)
ALBUMIN/GLOB SERPL: 1.3 G/DL
ALP SERPL-CCNC: 79 U/L (ref 39–117)
ALT SERPL W P-5'-P-CCNC: 13 U/L (ref 1–41)
ANION GAP SERPL CALCULATED.3IONS-SCNC: 7 MMOL/L (ref 5–15)
AST SERPL-CCNC: 21 U/L (ref 1–40)
BASOPHILS # BLD AUTO: 0.07 10*3/MM3 (ref 0–0.2)
BASOPHILS NFR BLD AUTO: 1 % (ref 0–1.5)
BILIRUB SERPL-MCNC: 0.3 MG/DL (ref 0–1.2)
BILIRUB UR QL STRIP: NEGATIVE
BUN SERPL-MCNC: 20 MG/DL (ref 8–23)
BUN/CREAT SERPL: 17.2 (ref 7–25)
CALCIUM SPEC-SCNC: 9.8 MG/DL (ref 8.6–10.5)
CHLORIDE SERPL-SCNC: 96 MMOL/L (ref 98–107)
CHOLEST SERPL-MCNC: 179 MG/DL (ref 0–200)
CLARITY UR: CLEAR
CO2 SERPL-SCNC: 35 MMOL/L (ref 22–29)
COLOR UR: YELLOW
CREAT SERPL-MCNC: 1.16 MG/DL (ref 0.76–1.27)
DEPRECATED RDW RBC AUTO: 39.6 FL (ref 37–54)
EGFRCR SERPLBLD CKD-EPI 2021: 64.9 ML/MIN/1.73
EOSINOPHIL # BLD AUTO: 0.97 10*3/MM3 (ref 0–0.4)
EOSINOPHIL NFR BLD AUTO: 14.4 % (ref 0.3–6.2)
ERYTHROCYTE [DISTWIDTH] IN BLOOD BY AUTOMATED COUNT: 12.2 % (ref 12.3–15.4)
GLOBULIN UR ELPH-MCNC: 3.2 GM/DL
GLUCOSE SERPL-MCNC: 106 MG/DL (ref 65–99)
GLUCOSE UR STRIP-MCNC: NEGATIVE MG/DL
HCT VFR BLD AUTO: 38.1 % (ref 37.5–51)
HDLC SERPL-MCNC: 41 MG/DL (ref 40–60)
HGB BLD-MCNC: 13.2 G/DL (ref 13–17.7)
HGB UR QL STRIP.AUTO: NEGATIVE
HOLD SPECIMEN: NORMAL
IMM GRANULOCYTES # BLD AUTO: 0.02 10*3/MM3 (ref 0–0.05)
IMM GRANULOCYTES NFR BLD AUTO: 0.3 % (ref 0–0.5)
IRON 24H UR-MRATE: 70 MCG/DL (ref 59–158)
IRON SATN MFR SERPL: 21 % (ref 20–50)
KETONES UR QL STRIP: NEGATIVE
LDLC SERPL CALC-MCNC: 99 MG/DL (ref 0–100)
LDLC/HDLC SERPL: 2.26 {RATIO}
LEUKOCYTE ESTERASE UR QL STRIP.AUTO: NEGATIVE
LYMPHOCYTES # BLD AUTO: 1.34 10*3/MM3 (ref 0.7–3.1)
LYMPHOCYTES NFR BLD AUTO: 19.9 % (ref 19.6–45.3)
MCH RBC QN AUTO: 31.2 PG (ref 26.6–33)
MCHC RBC AUTO-ENTMCNC: 34.6 G/DL (ref 31.5–35.7)
MCV RBC AUTO: 90.1 FL (ref 79–97)
MONOCYTES # BLD AUTO: 1.01 10*3/MM3 (ref 0.1–0.9)
MONOCYTES NFR BLD AUTO: 15 % (ref 5–12)
NEUTROPHILS NFR BLD AUTO: 3.32 10*3/MM3 (ref 1.7–7)
NEUTROPHILS NFR BLD AUTO: 49.4 % (ref 42.7–76)
NITRITE UR QL STRIP: NEGATIVE
NRBC BLD AUTO-RTO: 0 /100 WBC (ref 0–0.2)
PH UR STRIP.AUTO: 6 [PH] (ref 5–8)
PLATELET # BLD AUTO: 181 10*3/MM3 (ref 140–450)
PMV BLD AUTO: 10.8 FL (ref 6–12)
POTASSIUM SERPL-SCNC: 4.8 MMOL/L (ref 3.5–5.2)
PROT SERPL-MCNC: 7.4 G/DL (ref 6–8.5)
PROT UR QL STRIP: NEGATIVE
PSA SERPL-MCNC: 0.49 NG/ML (ref 0–4)
RBC # BLD AUTO: 4.23 10*6/MM3 (ref 4.14–5.8)
SODIUM SERPL-SCNC: 138 MMOL/L (ref 136–145)
SP GR UR STRIP: 1.01 (ref 1–1.03)
TIBC SERPL-MCNC: 334 MCG/DL (ref 298–536)
TRANSFERRIN SERPL-MCNC: 224 MG/DL (ref 200–360)
TRIGL SERPL-MCNC: 227 MG/DL (ref 0–150)
TSH SERPL DL<=0.05 MIU/L-ACNC: 2.26 UIU/ML (ref 0.27–4.2)
UROBILINOGEN UR QL STRIP: NORMAL
VLDLC SERPL-MCNC: 39 MG/DL (ref 5–40)
WBC NRBC COR # BLD: 6.73 10*3/MM3 (ref 3.4–10.8)

## 2023-10-30 PROCEDURE — 80053 COMPREHEN METABOLIC PANEL: CPT

## 2023-10-30 PROCEDURE — 80061 LIPID PANEL: CPT

## 2023-10-30 PROCEDURE — 85025 COMPLETE CBC W/AUTO DIFF WBC: CPT

## 2023-10-30 PROCEDURE — 81003 URINALYSIS AUTO W/O SCOPE: CPT

## 2023-10-30 PROCEDURE — 84466 ASSAY OF TRANSFERRIN: CPT

## 2023-10-30 PROCEDURE — 83540 ASSAY OF IRON: CPT

## 2023-10-30 PROCEDURE — 84443 ASSAY THYROID STIM HORMONE: CPT

## 2023-10-30 PROCEDURE — G0103 PSA SCREENING: HCPCS

## 2023-10-30 RX ORDER — SIMVASTATIN 10 MG
10 TABLET ORAL
Qty: 90 TABLET | Refills: 1 | Status: SHIPPED | OUTPATIENT
Start: 2023-10-30

## 2023-10-30 RX ORDER — TORSEMIDE 20 MG/1
20 TABLET ORAL DAILY
Qty: 90 TABLET | Refills: 1 | Status: SHIPPED | OUTPATIENT
Start: 2023-10-30

## 2023-10-30 RX ORDER — CARVEDILOL 25 MG/1
25 TABLET ORAL 2 TIMES DAILY WITH MEALS
Qty: 92 TABLET | Refills: 1 | Status: SHIPPED | OUTPATIENT
Start: 2023-10-30 | End: 2023-10-31

## 2023-10-30 RX ORDER — POLYETHYLENE GLYCOL 3350 17 G/17G
17 POWDER, FOR SOLUTION ORAL DAILY
Qty: 90 PACKET | Refills: 3 | Status: SHIPPED | OUTPATIENT
Start: 2023-10-30

## 2023-10-30 RX ORDER — LORATADINE 10 MG/1
10 TABLET ORAL DAILY
Qty: 90 TABLET | Refills: 3 | Status: SHIPPED | OUTPATIENT
Start: 2023-10-30

## 2023-10-30 RX ORDER — HYDROXYZINE 50 MG/1
50 TABLET, FILM COATED ORAL EVERY 8 HOURS PRN
Qty: 90 TABLET | Refills: 2 | Status: SHIPPED | OUTPATIENT
Start: 2023-10-30

## 2023-10-30 RX ORDER — ASPIRIN 81 MG/1
81 TABLET ORAL DAILY
Qty: 90 TABLET | Refills: 3 | Status: SHIPPED | OUTPATIENT
Start: 2023-10-30

## 2023-10-30 RX ORDER — CARVEDILOL 25 MG/1
25 TABLET ORAL 2 TIMES DAILY WITH MEALS
Qty: 92 TABLET | Refills: 1 | Status: CANCELLED | OUTPATIENT
Start: 2023-10-30

## 2023-10-30 RX ORDER — ALBUTEROL SULFATE 90 UG/1
2 AEROSOL, METERED RESPIRATORY (INHALATION) EVERY 4 HOURS PRN
Qty: 18 G | Refills: 5 | Status: SHIPPED | OUTPATIENT
Start: 2023-10-30

## 2023-10-30 RX ORDER — FERROUS GLUCONATE 324(38)MG
324 TABLET ORAL
Qty: 90 TABLET | Refills: 3 | Status: SHIPPED | OUTPATIENT
Start: 2023-10-30

## 2023-10-30 RX ORDER — PANTOPRAZOLE SODIUM 20 MG/1
20 TABLET, DELAYED RELEASE ORAL DAILY PRN
Qty: 90 TABLET | Refills: 1 | Status: SHIPPED | OUTPATIENT
Start: 2023-10-30

## 2023-10-30 NOTE — PROGRESS NOTES
The ABCs of the Annual Wellness Visit  Subsequent Medicare Wellness Visit    Subjective    Ronald Bond is a 78 y.o. male who presents for a Subsequent Medicare Wellness Visit.    The following portions of the patient's history were reviewed and   updated as appropriate: allergies, current medications, past family history, past medical history, past social history, past surgical history, and problem list.    Compared to one year ago, the patient feels his physical   health is the same.    Compared to one year ago, the patient feels his mental   health is better.    Recent Hospitalizations:  He was admitted within the past 365 days at [place], [place].       Current Medical Providers:  Patient Care Team:  Eri Baez APRN as PCP - General (Family Medicine)  Andrade Waite MD as Consulting Physician (Orthopedic Surgery)  Barron Santana MD as Consulting Physician (Otolaryngology)  Melanie Neal MD as Consulting Physician (Endocrinology)  Dallas Jaquez MD as Consulting Physician (Gastroenterology)  Morena Vences APRN as Nurse Practitioner (Nurse Practitioner)  Barrington Saavedra MD as Consulting Physician (Cardiology)    Outpatient Medications Prior to Visit   Medication Sig Dispense Refill    benzonatate (Tessalon Perles) 100 MG capsule Take 1 capsule by mouth 3 (Three) Times a Day As Needed for Cough. 30 capsule 0    clotrimazole (Clotrimazole Athletes Foot) 1 % cream Apply  topically to the appropriate area as directed 2 (Two) Times a Day. 60 g 1    Diclofenac Sodium (VOLTAREN) 1 % gel gel Apply 4 g topically to the appropriate area as directed 4 (Four) Times a Day As Needed (arm pain). 100 g 1    fluticasone (FLOVENT HFA) 44 MCG/ACT inhaler Inhale 1 puff 2 (Two) Times a Day As Needed (shortness of air). 1 each 0    hydrALAZINE (APRESOLINE) 50 MG tablet       lisinopril (PRINIVIL,ZESTRIL) 5 MG tablet TAKE 1 TABLET BY MOUTH ONCE DAILY 90 tablet 1     methadone (METHADOSE) 40 MG disintegrating tablet Take 2 tablets by mouth Daily.      naloxone (NARCAN) 4 MG/0.1ML nasal spray 1 spray into the nostril(s) as directed by provider.      Probiotic Product capsule Take 1 capsule by mouth Daily.      senna (SENOKOT) 8.6 MG tablet Take 1 tablet by mouth Daily As Needed for Constipation. 30 tablet 11    albuterol sulfate  (90 Base) MCG/ACT inhaler Inhale 2 puffs Every 4 (Four) Hours As Needed for Wheezing or Shortness of Air. 18 g 1    aspirin (aspirin) 81 MG EC tablet Take 1 tablet by mouth Daily. 90 tablet 3    carvedilol (COREG) 25 MG tablet TAKE 1 TABLET BY MOUTH TWO TIMES A DAY WITH MEALS 92 tablet 0    ferrous gluconate (FERGON) 324 MG tablet Take 1 tablet by mouth Daily With Breakfast. 90 tablet 3    hydrOXYzine (ATARAX) 50 MG tablet TAKE 1 TABLET BY MOUTH EVERY 8 HOURS AS NEEDED FOR ANXIETY 138 tablet 0    loratadine (CLARITIN) 10 MG tablet Take 1 tablet by mouth Daily. 30 tablet 11    pantoprazole (Protonix) 20 MG EC tablet Take 1 tablet by mouth Daily As Needed for Heartburn or Indigestion. 90 tablet 1    polyethylene glycol (MIRALAX) 17 g packet Take 17 g by mouth Daily. 90 packet 3    simvastatin (ZOCOR) 10 MG tablet TAKE 1 TABLET BY MOUTH EVERY NIGHT AT BEDTIME 90 tablet 1    torsemide (DEMADEX) 20 MG tablet TAKE 1 TABLET BY MOUTH DAILY 46 tablet 0    Testosterone 20.25 MG/ACT (1.62%) gel APPLY 2 PUMPS TO ARMS AND SHOULDERS DAILY (Patient not taking: Reported on 10/30/2023) 75 g 5     No facility-administered medications prior to visit.       Opioid medication/s are on active medication list.  and I have evaluated his active treatment plan and pain score trends (see table).  Vitals:    10/30/23 1019   PainSc:   5   PainLoc: Neck     I have reviewed the chart for potential of high risk medication and harmful drug interactions in the elderly.          Aspirin is on active medication list. Aspirin use is indicated based on review of current medical  "condition/s. Pros and cons of this therapy have been discussed today. Benefits of this medication outweigh potential harm.  Patient has been encouraged to continue taking this medication.  .      Patient Active Problem List   Diagnosis    Depression with anxiety    Hypertension    Asthma    H/O chronic hepatitis    Polysubstance abuse    GERD (gastroesophageal reflux disease)    CAD (coronary artery disease)    Secondary male hypogonadism    Localized edema    Phimosis of penis    Hyperlipidemia LDL goal <100    Prolonged Q-T interval on ECG    Hiatal hernia    Dysphagia    Tremors of nervous system    Iron deficiency anemia    Low iron    Bilateral cataracts     Advance Care Planning   Advance Care Planning     Advance Directive is not on file.  ACP discussion was held with the patient during this visit. Patient does not have an advance directive, information provided.     Objective    Vitals:    10/30/23 1019   BP: 140/82   BP Location: Right arm   Patient Position: Sitting   Cuff Size: Adult   Pulse: 67   Temp: 96.9 °F (36.1 °C)   TempSrc: Infrared   SpO2: 95%   Weight: 96.2 kg (212 lb)   Height: 167.6 cm (65.98\")   PainSc:   5   PainLoc: Neck     Estimated body mass index is 34.24 kg/m² as calculated from the following:    Height as of this encounter: 167.6 cm (65.98\").    Weight as of this encounter: 96.2 kg (212 lb).           Does the patient have evidence of cognitive impairment? No   ACE III mini .    Lab Results   Component Value Date    TRIG 227 (H) 10/30/2023    HDL 41 10/30/2023    LDL 99 10/30/2023    VLDL 39 10/30/2023        HEALTH RISK ASSESSMENT    Smoking Status:  Social History     Tobacco Use   Smoking Status Former    Packs/day: 1.00    Years: 10.00    Additional pack years: 0.00    Total pack years: 10.00    Types: Cigarettes    Start date:     Quit date:     Years since quittin.9    Passive exposure: Past   Smokeless Tobacco Never   Tobacco Comments    pt quit about 40-50 " years ago      Alcohol Consumption:  Social History     Substance and Sexual Activity   Alcohol Use Not Currently    Comment: 2 per month     Fall Risk Screen:    DARLENE Fall Risk Assessment was completed, and patient is at LOW risk for falls.Assessment completed on:10/30/2023    Depression Screening:      10/30/2023    11:10 AM   PHQ-2/PHQ-9 Depression Screening   Little Interest or Pleasure in Doing Things 0-->not at all   Feeling Down, Depressed or Hopeless 0-->not at all   PHQ-9: Brief Depression Severity Measure Score 0       Health Habits and Functional and Cognitive Screening:      10/30/2023    10:25 AM   Functional & Cognitive Status   Do you have difficulty preparing food and eating? No   Do you have difficulty bathing yourself, getting dressed or grooming yourself? No   Do you have difficulty using the toilet? No   Do you have difficulty moving around from place to place? No   Do you have trouble with steps or getting out of a bed or a chair? No   Current Diet Frequent Junk Food   Dental Exam Not up to date   Eye Exam Up to date   Exercise (times per week) 4 times per week   Current Exercises Include Light Weights;Walking   Do you need help using the phone?  No   Are you deaf or do you have serious difficulty hearing?  No   Do you need help to go to places out of walking distance? No   Do you need help shopping? No   Do you need help preparing meals?  No   Do you need help with housework?  No   Do you need help with laundry? No   Do you need help taking your medications? No   Do you need help managing money? No   Do you ever drive or ride in a car without wearing a seat belt? No   Have you felt unusual stress, anger or loneliness in the last month? No   Who do you live with? Alone   If you need help, do you have trouble finding someone available to you? No   Have you been bothered in the last four weeks by sexual problems? No   Do you have difficulty concentrating, remembering or making decisions? No        Age-appropriate Screening Schedule:  Refer to the list below for future screening recommendations based on patient's age, sex and/or medical conditions. Orders for these recommended tests are listed in the plan section. The patient has been provided with a written plan.    Health Maintenance   Topic Date Due    COLORECTAL CANCER SCREENING  03/19/2022    COVID-19 Vaccine (6 - 2023-24 season) 09/01/2023    BMI FOLLOWUP  03/14/2024    ANNUAL WELLNESS VISIT  10/30/2024    LIPID PANEL  10/30/2024    TDAP/TD VACCINES (4 - Td or Tdap) 05/06/2028    HEPATITIS C SCREENING  Completed    INFLUENZA VACCINE  Completed    Pneumococcal Vaccine 65+  Completed    Hepatitis B  Discontinued    DXA SCAN  Discontinued    ZOSTER VACCINE  Discontinued                  CMS Preventative Services Quick Reference  Risk Factors Identified During Encounter  Chronic Pain: Chronic Pain Educational material Discussed and Shared in After Visit Summary for Patient.  OTC analgesics as needed. Proper dosing schedule discussed.     Drug Use/Abuse Identified or Suspected:  currently attending methadone clinic.     Fall Risk-High or Moderate:  discussed safety   Immunizations Discussed/Encouraged: Influenza  Polypharmacy: Medication List reviewed  Dental Screening Recommended  Vision Screening Recommended  The above risks/problems have been discussed with the patient.  Pertinent information has been shared with the patient in the After Visit Summary.  An After Visit Summary and PPPS were made available to the patient.    Follow Up:   Next Medicare Wellness visit to be scheduled in 1 year.       Additional E&M Note during same encounter follows:  Patient has multiple medical problems which are significant and separately identifiable that require additional work above and beyond the Medicare Wellness Visit.      Chief Complaint  Medicare Wellness-subsequent    Subjective        Patient is a 79 yo male. He is here for a medicare wellness - subsequent.  "He has HTN, CAD, HLD,hypogonadism, GERD, iron deficiency anemia, depression with anxiety, polysubstance abuse. He currently attends a methadone clinic, tremors, and asthma.             Review of Systems   Constitutional: Negative.    HENT: Negative.     Respiratory: Negative.     Cardiovascular: Negative.    Gastrointestinal: Negative.    Genitourinary:  Negative for discharge, dysuria, frequency, penile pain, penile swelling and urgency.   Musculoskeletal:  Positive for arthralgias, back pain and myalgias.   Skin: Negative.    Allergic/Immunologic: Positive for environmental allergies.   Neurological:  Positive for tremors.   Hematological: Negative.    Psychiatric/Behavioral:  Negative for self-injury, sleep disturbance and suicidal ideas. The patient is nervous/anxious.        Objective   Vital Signs:  /82 (BP Location: Right arm, Patient Position: Sitting, Cuff Size: Adult)   Pulse 67   Temp 96.9 °F (36.1 °C) (Infrared)   Ht 167.6 cm (65.98\")   Wt 96.2 kg (212 lb)   SpO2 95%   BMI 34.24 kg/m²     Physical Exam  Vitals reviewed.   Constitutional:       Appearance: He is obese.   HENT:      Nose: Nose normal.      Mouth/Throat:      Mouth: Mucous membranes are moist.   Eyes:      Pupils: Pupils are equal, round, and reactive to light.      Comments: Wears glasses    Pulmonary:      Effort: Pulmonary effort is normal.   Abdominal:      General: Bowel sounds are normal.      Palpations: Abdomen is soft.   Musculoskeletal:         General: Normal range of motion.   Skin:     General: Skin is warm and dry.      Capillary Refill: Capillary refill takes less than 2 seconds.   Neurological:      Mental Status: He is alert and oriented to person, place, and time.   Psychiatric:         Mood and Affect: Mood normal.         Behavior: Behavior normal.                    SCANNED - LABS (09/14/2106)  Comprehensive Metabolic Panel (02/14/2023 12:29)  COMPREHENSIVE METABOLIC PANEL (02/27/2023 23:35)  CBC AND " DIFFERENTIAL (02/27/2023 23:35)  LIPASE (02/27/2023 23:35)  MAGNESIUM (02/27/2023 23:35)  PHOSPHORUS (02/27/2023 23:35)     Assessment and Plan   Diagnoses and all orders for this visit:    1. Immunization due (Primary)  -     Fluzone High-Dose 65+yrs    2. Screening for malignant neoplasm of colon  -     Cologuard - Stool, Per Rectum; Future    3. Colonoscopy refused    4. Mild intermittent asthma without complication  -     albuterol sulfate  (90 Base) MCG/ACT inhaler; Inhale 2 puffs Every 4 (Four) Hours As Needed for Wheezing or Shortness of Air.  Dispense: 18 g; Refill: 5    5. Hypertension, unspecified type  -     aspirin 81 MG EC tablet; Take 1 tablet by mouth Daily.  Dispense: 90 tablet; Refill: 3  -     Comprehensive Metabolic Panel; Future  -     Urinalysis With Culture If Indicated -; Future    6. Essential hypertension  -     aspirin 81 MG EC tablet; Take 1 tablet by mouth Daily.  Dispense: 90 tablet; Refill: 3  -     Discontinue: carvedilol (COREG) 25 MG tablet; Take 1 tablet by mouth 2 (Two) Times a Day With Meals.  Dispense: 92 tablet; Refill: 1    7. Anemia, unspecified type  -     ferrous gluconate (FERGON) 324 MG tablet; Take 1 tablet by mouth Daily With Breakfast.  Dispense: 90 tablet; Refill: 3  -     CBC & Differential; Future  -     Iron Profile; Future  Chronic - continue medication at current dose pending labs.   8. Low iron stores  -     ferrous gluconate (FERGON) 324 MG tablet; Take 1 tablet by mouth Daily With Breakfast.  Dispense: 90 tablet; Refill: 3    9. Anxiety  -     hydrOXYzine (ATARAX) 50 MG tablet; Take 1 tablet by mouth Every 8 (Eight) Hours As Needed for Anxiety. for anxiety  Dispense: 90 tablet; Refill: 2  Chronic stable   10. Non-seasonal allergic rhinitis, unspecified trigger  -     loratadine (CLARITIN) 10 MG tablet; Take 1 tablet by mouth Daily.  Dispense: 90 tablet; Refill: 3    11. Gastroesophageal reflux disease without esophagitis  -     pantoprazole (Protonix) 20  MG EC tablet; Take 1 tablet by mouth Daily As Needed for Heartburn or Indigestion.  Dispense: 90 tablet; Refill: 1  Chronic - stable   12. Encounter for medication refill  -     pantoprazole (Protonix) 20 MG EC tablet; Take 1 tablet by mouth Daily As Needed for Heartburn or Indigestion.  Dispense: 90 tablet; Refill: 1    13. Constipation, unspecified constipation type  -     polyethylene glycol (MIRALAX) 17 g packet; Take 17 g by mouth Daily.  Dispense: 90 packet; Refill: 3    14. Hyperlipidemia LDL goal <100  -     simvastatin (ZOCOR) 10 MG tablet; Take 1 tablet by mouth every night at bedtime.  Dispense: 90 tablet; Refill: 1  -     Lipid Panel; Future    15. Lower leg edema  -     torsemide (DEMADEX) 20 MG tablet; Take 1 tablet by mouth Daily.  Dispense: 90 tablet; Refill: 1    16. Screening PSA (prostate specific antigen)  -     PSA Screen; Future    17. Screening for thyroid disorder  -     TSH Rfx On Abnormal To Free T4; Future    Checking labs   Follow up in 6 months   See PPPS                  Follow Up   Return in about 6 months (around 4/30/2024), or HTN, Asthma.  Patient was given instructions and counseling regarding his condition or for health maintenance advice. Please see specific information pulled into the AVS if appropriate.

## 2023-10-30 NOTE — TELEPHONE ENCOUNTER
Pharmacy called and needs a new prescription of Carvedilol for 180 count for 90 days. The other prescription is coming out to 92

## 2023-10-31 DIAGNOSIS — I10 ESSENTIAL HYPERTENSION: ICD-10-CM

## 2023-10-31 RX ORDER — CARVEDILOL 25 MG/1
25 TABLET ORAL 2 TIMES DAILY WITH MEALS
Qty: 180 TABLET | Refills: 0 | Status: SHIPPED | OUTPATIENT
Start: 2023-10-31

## 2023-11-13 NOTE — PATIENT INSTRUCTIONS
Medicare Wellness  Personal Prevention Plan of Service     Date of Office Visit:    Encounter Provider:  KELLEN Kennedy  Place of Service:  [facility]  Patient Name: Ronald Bond  :  1945    As part of the Medicare Wellness portion of your visit today, we are providing you with this personalized preventive plan of services (PPPS). This plan is based upon recommendations of the [place] Preventive Services Task Force (USPSTF) and the Advisory Committee on Immunization Practices (ACIP).    This lists the preventive care services that should be considered, and provides dates of when you are due. Items listed as completed are up-to-date and do not require any further intervention.    Health Maintenance   Topic Date Due    COLORECTAL CANCER SCREENING  2022    COVID-19 Vaccine ( season) 2023    BMI FOLLOWUP  2024    ANNUAL WELLNESS VISIT  10/30/2024    LIPID PANEL  10/30/2024    TDAP/TD VACCINES (4 - Td or Tdap) 2028    HEPATITIS C SCREENING  Completed    INFLUENZA VACCINE  Completed    Pneumococcal Vaccine 65+  Completed    Hepatitis B  Discontinued    DXA SCAN  Discontinued    ZOSTER VACCINE  Discontinued       Orders Placed This Encounter   Procedures    Fluzone High-Dose 65+yrs    Cologuard - Stool, Per Rectum     Standing Status:   Future     Number of Occurrences:   1     Standing Expiration Date:   10/30/2024     Order Specific Question:   Release to patient     Answer:   Routine Release [[id number]]    Comprehensive Metabolic Panel     Standing Status:   Future     Number of Occurrences:   1     Standing Expiration Date:   10/30/2024     Order Specific Question:   Release to patient     Answer:   Routine Release [[id number]]    Lipid Panel     Standing Status:   Future     Number of Occurrences:   1     Standing Expiration Date:   10/30/2024     Order Specific Question:   Release to patient     Answer:   Routine Release  [[id number]]    Urinalysis With Culture If Indicated -     Standing Status:   Future     Number of Occurrences:   1     Standing Expiration Date:   10/30/2024     Order Specific Question:   Release to patient     Answer:   Routine Release [[id number]]    PSA Screen     Standing Status:   Future     Number of Occurrences:   1     Standing Expiration Date:   10/30/2024     Order Specific Question:   Release to patient     Answer:   Routine Release [[id number]]    TSH Rfx On Abnormal To Free T4     Standing Status:   Future     Number of Occurrences:   1     Order Specific Question:   Release to patient     Answer:   Routine Release [[id number]]    Iron Profile     Standing Status:   Future     Number of Occurrences:   1     Standing Expiration Date:   10/30/2024     Order Specific Question:   Release to patient     Answer:   Routine Release [[id number]]    CBC & Differential     Standing Status:   Future     Number of Occurrences:   1     Standing Expiration Date:   10/30/2024     Order Specific Question:   Manual Differential     Answer:   No     Order Specific Question:   Release to patient     Answer:   Routine Release [[id number]]       Return in about 6 months (around 4/30/2024), or HTN, Asthma.

## 2023-11-20 ENCOUNTER — TELEPHONE (OUTPATIENT)
Dept: FAMILY MEDICINE CLINIC | Facility: CLINIC | Age: 78
End: 2023-11-20
Payer: MEDICARE

## 2023-11-20 DIAGNOSIS — R19.5 POSITIVE COLORECTAL CANCER SCREENING USING COLOGUARD TEST: Primary | ICD-10-CM

## 2023-11-20 NOTE — TELEPHONE ENCOUNTER
Called patient regarding his cologuard results.   It has been reported as positive.     Cologuard - Stool, Per Rectum (11/13/2023 14:30) resulted on 11/19/2023.      Patient updated. This does not specify if it is an abnormal cell or an blood it detected.   Will refer patient to see a GI specialist for positive cologuard results.   He has seen Dr. Dallas Jaquez in the past.   He is agreeable for a consult.   He currently denies any GI pain or bleeding.     Eri Baez, APRN

## 2023-12-05 ENCOUNTER — PREP FOR SURGERY (OUTPATIENT)
Dept: OTHER | Facility: HOSPITAL | Age: 78
End: 2023-12-05
Payer: MEDICARE

## 2023-12-05 DIAGNOSIS — R19.5 POSITIVE COLORECTAL CANCER SCREENING USING COLOGUARD TEST: Primary | ICD-10-CM

## 2023-12-11 ENCOUNTER — TELEPHONE (OUTPATIENT)
Dept: GASTROENTEROLOGY | Facility: CLINIC | Age: 78
End: 2023-12-11

## 2023-12-11 NOTE — TELEPHONE ENCOUNTER
Please seek cardiac clearance for patient who is scheduled a colonoscopy with Dr. Jaquez 03/19/24 in the hospital endoscopy suites.    Patient with concerning cardiac hx, Dr. Jaquez has personally requested we obtain clearance prior to patient's procedure.    Patient not sure who cardiologist is, but believes it to be:  Morena Vences  Phone: [phone]  Fax: [phone]

## 2024-01-11 NOTE — TELEPHONE ENCOUNTER
08/06/2021 1:58 pm   Called patient regarding his message. No answer.  Left message to call office.  Into his request patient has been on Paxil 20 mg since 2018 it was changed to 40mg daily  Daily. 11/06/2018.  He is also on methadone daily.  He had abnormal EKG.  Cardiology is requesting him to be changed from Paxil to Cymbalta due to a prolonged QT interval on his EKG.  This was in June 2021.  Discussed with patient his Paxil was weaned.  He was started on Cymbalta.  And he is currently off of Paxil.  Totally discontinued on 07/02/2021.    KELLEN Kennedy        Provider: TERESA PONCE  Caller: BERTIN RÍOS  Relationship to Patient: SELF  Phone Number: [phone]  Reason for Call: PATIENT NEEDS A STATEMENT STATING WHY HE  TAKES PAXIL AND HOW LONG HE'S BEEN TAKING IT AND HOW OFTEN HE TAKES IT. HE WOULD LIKE IT FAXED TO THE BELOW NUMBER.    LILLY CHRISTINE  FAX NUMBER [phone]  PHONE NUMBER [phone] EXT [phone]   [facility] - [facility] 8th Fl  Neurosurgery    SUBJECTIVE:     History of Present Illness:  Alexi Noguera is a 66 y.o. male with hx of *** who presents     Alexi Noguera is a 66 y.o.  male with a PMHx of essential HTN, HLD, PE, meningioma (recurrent of spine) and kidney cancer, who is referred to me by Pat Dobson NP, for spine surgical consult for asymptomatic C2 meningioma. Today pt reports that he is feeling fine and asymptomatic overall. Patient denies any neck pain, weakness, paraesthesia, numbness, gait problem, urinary issues, or  other complaints at this time.       Symptom onset:   Location:   Pain level:   Inciting factors:  Relieving factors:   Numbness:   Weakness:     Treatments tried:  -PT:   -RONN:    -Gabapentin:   -OTC medications:  -Muscle relaxer:   -Rx pain medications:   -Spine surgery:      Blood thinners:   Hx of DVT or PE:  Smoking Hx:  Alcohol Use:  Illicit Drug Use:       Review of patient's allergies indicates:   Allergen Reactions    Lisinopril Other (See Comments)     Dry cough       Past Medical History:   Diagnosis Date    Cancer of kidney     Left    Hyperlipidemia     Hypertension        Past Surgical History:   Procedure Laterality Date    LYSIS OF ADHESIONS N/A 12/9/2019    Procedure: LYSIS, ADHESIONS;  Surgeon: Tr Jorgensen MD;  Location: [facility] OR [address] [place] [address];  Service: General;  Laterality: N/A;    NEPHRECTOMY Left 12/9/2019    Procedure: Nephrectomy OPEN;  Surgeon: José Manuel Buchanan MD;  Location: [facility] OR [address] [place] [address];  Service: Urology;  Laterality: Left;  4hrs GEN WITH REGIONAL    THROMBECTOMY OF VENA CAVA  12/9/2019    Procedure: THROMBECTOMY, VENA CAVA;  Surgeon: José Manuel Buchanan MD;  Location: [facility] OR [address] [place] [address];  Service: Urology;;    THROMBECTOMY OF VENA CAVA  12/9/2019    Procedure: THROMBECTOMY, VENA CAVA;  Surgeon: KIRSTEN Atkins III, MD;  Location: [facility] OR [address] [place] [address];  Service: Peripheral Vascular;;        Family History   Problem Relation Age of Onset    No  Known Problems Mother     No Known Problems Father     No Known Problems Sister     Heart disease Brother     Diabetes Paternal Aunt     Heart disease Paternal Uncle     Cirrhosis Neg Hx        Social History     Socioeconomic History    Marital status: Legally    Tobacco Use    Smoking status: Never    Smokeless tobacco: Never   Substance and Sexual Activity    Alcohol use: No     Alcohol/week: 0.0 standard drinks of alcohol    Drug use: No       Review of Systems:  Per HPI    OBJECTIVE:     Vital Signs (Most Recent):  Vitals:    01/11/24 1009   BP: 134/86   Pulse: 75       Physical Exam:  General: well developed, well nourished, no distress.   Head: normocephalic, atraumatic  Neurologic: Alert and oriented. Thought content appropriate.  GCS: Motor: 6/Verbal: 5/Eyes: 4 GCS Total: 15  Mental Status: Awake, Alert, Oriented x 4  Language: No aphasia  Speech: No dysarthria  Cranial nerves: face symmetric, tongue midline, CN II-XII grossly intact.   Eyes: pupils equal, round, reactive to light with accomodation, EOMI.  Pulmonary: normal respirations, no signs of respiratory distress  Abdomen: soft, non-distended, not tender to palpation  Sensory: intact to light touch throughout    Motor Strength: Moves all extremities spontaneously with good tone.  Full strength upper and lower extremities. No abnormal movements seen.     Strength  Deltoids Triceps Biceps Wrist Extension Wrist Flexion Hand    Upper: R 5/5 5/5 5/5 5/5 5/5 5/5    L 5/5 5/5 5/5 5/5 5/5 5/5     Iliopsoas Quadriceps Knee  Flexion Tibialis  anterior Gastro- cnemius EHL   Lower: R 5/5 5/5 5/5 5/5 5/5 5/5    L 5/5 5/5 5/5 5/5 5/5 5/5     DTR's: 2 + and symmetric in UE and LE  Pronator Drift: no drift noted  Finger-to-nose: Intact bilaterally  Bernal: absent  Clonus: absent  Babinski: absent  Pulses: 2+ and symmetric radial and dorsalis pedis.  Skin: Skin is warm, dry and intact.  Straight leg raise: negative  Gait: normal  Tandem Gait: No  difficulty         Able to walk on heels & toes  Cervical ROM: full  Lumbar ROM: full   SI Joint tenderness: Negative   Pain on Hip ROM: Negative  Echo test: Negative  Spurling test: Negative  No midline tenderness to palpation. No surrounding paraspinal muscle tenderness.    Diagnostic Results:  I have personally reviewed all pertinent imaging.      ASSESSMENT/PLAN:     Alexi Noguera is a 66 y.o. male    -       Christen Miner PA-C  Neurosurgery      Note dictated with voice recognition software, please excuse any grammatical errors.

## 2024-02-19 DIAGNOSIS — I10 ESSENTIAL HYPERTENSION: ICD-10-CM

## 2024-02-19 DIAGNOSIS — F41.9 ANXIETY: ICD-10-CM

## 2024-02-19 RX ORDER — LISINOPRIL 5 MG/1
TABLET ORAL
Qty: 90 TABLET | Refills: 1 | Status: SHIPPED | OUTPATIENT
Start: 2024-02-19

## 2024-02-19 RX ORDER — HYDROXYZINE 50 MG/1
50 TABLET, FILM COATED ORAL EVERY 8 HOURS PRN
Qty: 90 TABLET | Refills: 0 | Status: SHIPPED | OUTPATIENT
Start: 2024-02-19

## 2024-02-19 NOTE — TELEPHONE ENCOUNTER
Rx Refill Note  Requested Prescriptions     Pending Prescriptions Disp Refills    hydrOXYzine (ATARAX) 50 MG tablet [Pharmacy Med Name: HYDROXYZINE HCL 50 MG TABLET] 180 tablet      Sig: TAKE 1 TABLET BY MOUTH EVERY 8 HOURS AS NEEDED FOR ANXIETY      Last office visit with prescribing clinician: 10/30/2023   Last telemedicine visit with prescribing clinician: Visit date not found   Next office visit with prescribing clinician: Visit date not found                         Would you like a call back once the refill request has been completed: [] Yes [] No    If the office needs to give you a call back, can they leave a voicemail: [] Yes [] No    Ximena Boyer MA  02/19/24, 10:58 EST

## 2024-03-05 ENCOUNTER — TELEPHONE (OUTPATIENT)
Dept: GASTROENTEROLOGY | Facility: CLINIC | Age: 79
End: 2024-03-05

## 2024-03-05 NOTE — TELEPHONE ENCOUNTER
Caller: Varun Ronald HILARY    Relationship: Self    Best call back number: [phone]; [place] [facility]    What orders are you requesting (i.e. lab or imaging): EGD    In what timeframe would the patient need to come in: 03/19/24    Where will you receive your lab/imaging services: [facility]    Additional notes: PATIENT HAS HAD EGS IN THE PAST AND WOULD LIKE TO SCHEDULE ANOTHER ONE TO BE DONE WITH HIS COLONOSCOPY. PLEASE CONTACT PATIENT FOR SCHEDULING.

## 2024-03-12 ENCOUNTER — PRE-ADMISSION TESTING (OUTPATIENT)
Dept: PREADMISSION TESTING | Facility: HOSPITAL | Age: 79
End: 2024-03-12
Payer: MEDICARE

## 2024-03-12 VITALS — BODY MASS INDEX: 34.36 KG/M2 | WEIGHT: 218.92 LBS | HEIGHT: 67 IN

## 2024-03-12 LAB
DEPRECATED RDW RBC AUTO: 42.6 FL (ref 37–54)
ERYTHROCYTE [DISTWIDTH] IN BLOOD BY AUTOMATED COUNT: 12.9 % (ref 12.3–15.4)
HCT VFR BLD AUTO: 38.8 % (ref 37.5–51)
HGB BLD-MCNC: 12.8 G/DL (ref 13–17.7)
MCH RBC QN AUTO: 30.3 PG (ref 26.6–33)
MCHC RBC AUTO-ENTMCNC: 33 G/DL (ref 31.5–35.7)
MCV RBC AUTO: 91.7 FL (ref 79–97)
PLATELET # BLD AUTO: 215 10*3/MM3 (ref 140–450)
PMV BLD AUTO: 9.6 FL (ref 6–12)
POTASSIUM SERPL-SCNC: 4.2 MMOL/L (ref 3.5–5.2)
RBC # BLD AUTO: 4.23 10*6/MM3 (ref 4.14–5.8)
WBC NRBC COR # BLD AUTO: 5.36 10*3/MM3 (ref 3.4–10.8)

## 2024-03-12 PROCEDURE — 93005 ELECTROCARDIOGRAM TRACING: CPT

## 2024-03-12 PROCEDURE — 84132 ASSAY OF SERUM POTASSIUM: CPT

## 2024-03-12 PROCEDURE — 85027 COMPLETE CBC AUTOMATED: CPT

## 2024-03-12 PROCEDURE — 36415 COLL VENOUS BLD VENIPUNCTURE: CPT

## 2024-03-12 RX ORDER — SODIUM PICOSULFATE, MAGNESIUM OXIDE, AND ANHYDROUS CITRIC ACID 10; 3.5; 12 MG/160ML; G/160ML; G/160ML
350 LIQUID ORAL TAKE AS DIRECTED
Qty: 350 ML | Refills: 0 | Status: SHIPPED | OUTPATIENT
Start: 2024-03-12

## 2024-03-14 LAB
QT INTERVAL: 490 MS
QTC INTERVAL: 501 MS

## 2024-03-15 ENCOUNTER — TELEPHONE (OUTPATIENT)
Dept: GASTROENTEROLOGY | Facility: CLINIC | Age: 79
End: 2024-03-15
Payer: MEDICARE

## 2024-03-15 NOTE — TELEPHONE ENCOUNTER
I SPOKE WITH MR RÍOS. I EXPLAINED TO PATIENT THAT HIS BOWEL PREP HAS BEEN SENT TO HIS PHARMACY. HE WILL BE ABLE START PREP ON 3/18/2024 AT 6PM AND 11PM. PATIENT VOICED UNDERSTANDING.

## 2024-03-18 ENCOUNTER — ANESTHESIA EVENT (OUTPATIENT)
Dept: GASTROENTEROLOGY | Facility: HOSPITAL | Age: 79
End: 2024-03-18
Payer: MEDICARE

## 2024-03-18 RX ORDER — FAMOTIDINE 20 MG/1
20 TABLET, FILM COATED ORAL ONCE
Status: CANCELLED | OUTPATIENT
Start: 2024-03-18 | End: 2024-03-18

## 2024-03-18 RX ORDER — LIDOCAINE HYDROCHLORIDE 10 MG/ML
0.5 INJECTION, SOLUTION EPIDURAL; INFILTRATION; INTRACAUDAL; PERINEURAL ONCE AS NEEDED
Status: CANCELLED | OUTPATIENT
Start: 2024-03-18

## 2024-03-18 RX ORDER — SODIUM CHLORIDE, SODIUM LACTATE, POTASSIUM CHLORIDE, CALCIUM CHLORIDE 600; 310; 30; 20 MG/100ML; MG/100ML; MG/100ML; MG/100ML
9 INJECTION, SOLUTION INTRAVENOUS CONTINUOUS
Status: CANCELLED | OUTPATIENT
Start: 2024-03-18

## 2024-03-18 RX ORDER — FAMOTIDINE 10 MG/ML
20 INJECTION, SOLUTION INTRAVENOUS ONCE
Status: CANCELLED | OUTPATIENT
Start: 2024-03-18 | End: 2024-03-18

## 2024-03-18 RX ORDER — SODIUM CHLORIDE 0.9 % (FLUSH) 0.9 %
10 SYRINGE (ML) INJECTION EVERY 12 HOURS SCHEDULED
Status: CANCELLED | OUTPATIENT
Start: 2024-03-18

## 2024-03-18 RX ORDER — DROPERIDOL 2.5 MG/ML
0.62 INJECTION, SOLUTION INTRAMUSCULAR; INTRAVENOUS ONCE AS NEEDED
Status: CANCELLED | OUTPATIENT
Start: 2024-03-18

## 2024-03-18 RX ORDER — HYDROMORPHONE HYDROCHLORIDE 1 MG/ML
0.5 INJECTION, SOLUTION INTRAMUSCULAR; INTRAVENOUS; SUBCUTANEOUS
Status: CANCELLED | OUTPATIENT
Start: 2024-03-18

## 2024-03-18 RX ORDER — MIDAZOLAM HYDROCHLORIDE 1 MG/ML
0.5 INJECTION INTRAMUSCULAR; INTRAVENOUS
Status: CANCELLED | OUTPATIENT
Start: 2024-03-18

## 2024-03-18 RX ORDER — SODIUM CHLORIDE 0.9 % (FLUSH) 0.9 %
10 SYRINGE (ML) INJECTION AS NEEDED
Status: CANCELLED | OUTPATIENT
Start: 2024-03-18

## 2024-03-18 RX ORDER — SODIUM CHLORIDE 9 MG/ML
40 INJECTION, SOLUTION INTRAVENOUS AS NEEDED
Status: CANCELLED | OUTPATIENT
Start: 2024-03-18

## 2024-03-18 RX ORDER — FENTANYL CITRATE 50 UG/ML
50 INJECTION, SOLUTION INTRAMUSCULAR; INTRAVENOUS
Status: CANCELLED | OUTPATIENT
Start: 2024-03-18

## 2024-03-19 ENCOUNTER — ANESTHESIA (OUTPATIENT)
Dept: GASTROENTEROLOGY | Facility: HOSPITAL | Age: 79
End: 2024-03-19
Payer: MEDICARE

## 2024-03-19 ENCOUNTER — HOSPITAL ENCOUNTER (OUTPATIENT)
Facility: HOSPITAL | Age: 79
Setting detail: HOSPITAL OUTPATIENT SURGERY
Discharge: HOME | End: 2024-03-19
Attending: INTERNAL MEDICINE | Admitting: INTERNAL MEDICINE
Payer: MEDICARE

## 2024-03-19 VITALS
TEMPERATURE: 97 F | OXYGEN SATURATION: 91 % | SYSTOLIC BLOOD PRESSURE: 147 MMHG | RESPIRATION RATE: 16 BRPM | DIASTOLIC BLOOD PRESSURE: 86 MMHG | WEIGHT: 218 LBS | BODY MASS INDEX: 34.21 KG/M2 | HEIGHT: 67 IN | HEART RATE: 64 BPM

## 2024-03-19 DIAGNOSIS — R19.5 POSITIVE COLORECTAL CANCER SCREENING USING COLOGUARD TEST: ICD-10-CM

## 2024-03-19 PROCEDURE — 88305 TISSUE EXAM BY PATHOLOGIST: CPT | Performed by: INTERNAL MEDICINE

## 2024-03-19 PROCEDURE — 25810000003 LACTATED RINGERS PER 1000 ML: Performed by: NURSE ANESTHETIST, CERTIFIED REGISTERED

## 2024-03-19 PROCEDURE — 25010000002 PROPOFOL 10 MG/ML EMULSION: Performed by: NURSE ANESTHETIST, CERTIFIED REGISTERED

## 2024-03-19 RX ORDER — LIDOCAINE HYDROCHLORIDE 10 MG/ML
INJECTION, SOLUTION EPIDURAL; INFILTRATION; INTRACAUDAL; PERINEURAL AS NEEDED
Status: DISCONTINUED | OUTPATIENT
Start: 2024-03-19 | End: 2024-03-19 | Stop reason: SURG

## 2024-03-19 RX ORDER — PROPOFOL 10 MG/ML
VIAL (ML) INTRAVENOUS AS NEEDED
Status: DISCONTINUED | OUTPATIENT
Start: 2024-03-19 | End: 2024-03-19 | Stop reason: SURG

## 2024-03-19 RX ORDER — EPHEDRINE SULFATE 50 MG/ML
INJECTION INTRAVENOUS AS NEEDED
Status: DISCONTINUED | OUTPATIENT
Start: 2024-03-19 | End: 2024-03-19 | Stop reason: SURG

## 2024-03-19 RX ORDER — SODIUM CHLORIDE, SODIUM LACTATE, POTASSIUM CHLORIDE, CALCIUM CHLORIDE 600; 310; 30; 20 MG/100ML; MG/100ML; MG/100ML; MG/100ML
INJECTION, SOLUTION INTRAVENOUS CONTINUOUS PRN
Status: DISCONTINUED | OUTPATIENT
Start: 2024-03-19 | End: 2024-03-19 | Stop reason: SURG

## 2024-03-19 RX ADMIN — LIDOCAINE HYDROCHLORIDE 50 MG: 10 INJECTION, SOLUTION EPIDURAL; INFILTRATION; INTRACAUDAL; PERINEURAL at 11:05

## 2024-03-19 RX ADMIN — LIDOCAINE HYDROCHLORIDE 20 MG: 10 INJECTION, SOLUTION EPIDURAL; INFILTRATION; INTRACAUDAL; PERINEURAL at 11:17

## 2024-03-19 RX ADMIN — EPHEDRINE SULFATE 10 MG: 50 INJECTION INTRAVENOUS at 11:19

## 2024-03-19 RX ADMIN — PROPOFOL 50 MG: 10 INJECTION, EMULSION INTRAVENOUS at 11:05

## 2024-03-19 RX ADMIN — SODIUM CHLORIDE, POTASSIUM CHLORIDE, SODIUM LACTATE AND CALCIUM CHLORIDE: 600; 310; 30; 20 INJECTION, SOLUTION INTRAVENOUS at 10:58

## 2024-03-19 RX ADMIN — LIDOCAINE HYDROCHLORIDE 20 MG: 10 INJECTION, SOLUTION EPIDURAL; INFILTRATION; INTRACAUDAL; PERINEURAL at 11:11

## 2024-03-19 NOTE — H&P
[facility]    Referring Provider: Dallas Jaquez MD    Reason for Consultation: here for egd/colonoscopy    Chief complaint here for egd/colonoscopy    History of present illness:  Ronald Bond is a 78 y.o. male who presents to inpatient endoscopy for egd/colonoscopy. + cologuard. Reports intermittent dysphagia for dry substances. H/o hiatal hernia and following at [facility]. Last seen in clinic 2021 where he reported that he was in litigation with [facility].    Allergies:  Acetaminophen, Ketorolac, and Lyrica [pregabalin]    Scheduled Meds:       Infusions:  No current facility-administered medications for this encounter.      PRN Meds:      Home Meds:  Medications Prior to Admission   Medication Sig Dispense Refill Last Dose    albuterol sulfate  (90 Base) MCG/ACT inhaler Inhale 2 puffs Every 4 (Four) Hours As Needed for Wheezing or Shortness of Air. 18 g 5 Past Week    aspirin 81 MG EC tablet Take 1 tablet by mouth Daily. 90 tablet 3 3/18/2024    carvedilol (COREG) 25 MG tablet TAKE 1 TABLET BY MOUTH TWO TIMES A DAY WITH MEALS 180 tablet 0 3/18/2024    ferrous gluconate (FERGON) 324 MG tablet Take 1 tablet by mouth Daily With Breakfast. 90 tablet 3 3/18/2024    hydrOXYzine (ATARAX) 50 MG tablet TAKE 1 TABLET BY MOUTH EVERY 8 HOURS AS NEEDED FOR ANXIETY 90 tablet 0 Past Week    lisinopril (PRINIVIL,ZESTRIL) 5 MG tablet TAKE 1 TABLET BY MOUTH ONCE DAILY. (Patient taking differently: Take 1 tablet by mouth Daily.) 90 tablet 1 3/18/2024    loratadine (CLARITIN) 10 MG tablet Take 1 tablet by mouth Daily. 90 tablet 3 3/18/2024    methadone (METHADOSE) 40 MG disintegrating tablet Take 110 mg by mouth Daily.   3/18/2024    pantoprazole (Protonix) 20 MG EC tablet Take 1 tablet by mouth Daily As Needed for Heartburn or Indigestion. 90 tablet 1 3/18/2024    polyethylene glycol (MIRALAX) 17 g packet Take 17 g by mouth Daily. 90 packet 3 3/18/2024    simvastatin (ZOCOR) 10 MG tablet  Take 1 tablet by mouth every night at bedtime. 90 tablet 1 3/18/2024    torsemide (DEMADEX) 20 MG tablet Take 1 tablet by mouth Daily. 90 tablet 1 3/18/2024    clotrimazole (Clotrimazole Athletes Foot) 1 % cream Apply  topically to the appropriate area as directed 2 (Two) Times a Day. 60 g 1 More than a month    fluticasone (FLOVENT HFA) 44 MCG/ACT inhaler Inhale 1 puff 2 (Two) Times a Day As Needed (shortness of air). 1 each 0     hydrALAZINE (APRESOLINE) 50 MG tablet        Probiotic Product capsule Take 1 capsule by mouth Daily.       Sod Picosulfate-Mag Ox-Cit Acd (Clenpiq) 10-3.5-12 MG-GM -GM/160ML solution Take 350 mL by mouth Take As Directed. 350 mL 0        ROS: Review of Systems  All other systems reviewed and are negative.    PAST MED HX: Pt  has a past medical history of Asthma, Broken ribs, CAD (coronary artery disease), Depression with anxiety, GERD (gastroesophageal reflux disease), H/O chronic hepatitis, H/O traumatic subdural hematoma (01/09/2015), Hepatitis C infection, Hiatal hernia, History of arm fracture, Hypertension, Hypogonadism in male (06/19/2016), Osteoarthritis, Osteoporosis, Polysubstance abuse, Redundant colon, and SBO (small bowel obstruction) (10/2011).  PAST SURG HX: Pt  has a past surgical history that includes Cholecystectomy; Ventral hernia repair (08/2011); Wrist surgery (Right, 05/2014); Cervical laminectomy; Fracture surgery (Left); Nissen fundoplication (06/2016); Abdominal adhesion surgery (10/2011); Bone graft (Right, 2008); Exploratory Laparotomy (N/A, 04/23/2018); Small Bowel Resection (N/A, 04/23/2018); Finger fracture surgery; Colonoscopy; and Cataract extraction (Right, 09/2023).  FAM HX: family history includes Cirrhosis in his brother; Heart disease in his mother; Hepatitis in his brother; Hypertension in his mother; Multiple sclerosis in his father; No Known Problems in his maternal grandfather, maternal grandmother, paternal grandfather, and paternal grandmother;  "Prostate cancer in his maternal uncle; Stroke in his mother.  SOC HX: Pt  reports that he quit smoking about 55 years ago. His smoking use included cigarettes. He started smoking about 65 years ago. He has a 10 pack-year smoking history. He has been exposed to tobacco smoke. He has never used smokeless tobacco. He reports current alcohol use of about 2.0 standard drinks of alcohol per week. He reports that he does not use drugs.    BP (!) 196/115 (BP Location: Left arm, Patient Position: Lying)   Pulse 82   Temp 97.6 °F (36.4 °C) (Tympanic)   Resp 18   Ht 170.2 cm (67\")   Wt 98.9 kg (218 lb)   SpO2 92%   BMI 34.14 kg/m²     Physical Exam  Wt Readings from Last 3 Encounters:   03/19/24 98.9 kg (218 lb)   03/12/24 99.3 kg (218 lb 14.7 oz)   10/30/23 96.2 kg (212 lb)   ,body mass index is 34.14 kg/m².    General Appearance:  Vitals as above. no acute distress  Head/face:  Normocephalic, atraumatic  Eyes:   EOMI, no conjunctivitis or icterus   Nose/Sinuses:  Nares patent bilaterally without discharge or lesions  Mouth/Throat:  Normal oral movements without dyskinesia  Neck:  trachea is midline, no thyromegaly  Lungs:  Normal work of breathing effort, no overt rales  Heart:  Regular rate, no overt palpable thrill or grade VI M  Abdomen:  Nondistended, no guarding or rebound tenderness  Neurologic:  Alert; no focal deficits; age appropriate behavior and speech  Psychiatric: mood and affect are congruent  Vascular: extremities without edema  Skin: no rash or cyanosis.          Results Review:   I reviewed the patient's new clinical results.    Lab Results   Component Value Date    WBC 5.36 03/12/2024    HGB 12.8 (L) 03/12/2024    HCT 38.8 03/12/2024    MCV 91.7 03/12/2024     03/12/2024       Lab Results   Component Value Date    GLUCOSE 106 (H) 10/30/2023    BUN 20 10/30/2023    CREATININE 1.16 10/30/2023    EGFRIFNONA >60 02/23/2022    EGFRIFAFRI >60 02/23/2022    BCR 17.2 10/30/2023    CO2 35.0 (H) " 10/30/2023    CALCIUM 9.8 10/30/2023    PROTENTOTREF 7.4 09/16/2021    ALBUMIN 4.2 10/30/2023    LABIL2 1.5 09/16/2021    AST 21 10/30/2023    ALT 13 10/30/2023       ASSESSMENTS/PLANS  1.) Dysphagia  To egd    2.) + cologuard  To colonoscopy. H/o refusing screening colonoscopy per 2021 note    3.) Hiatal hernia  Followed previously with [facility]    4.) h/o hep c  Treated with ifn based therapy with Dr. Wetzel.        The risk of endoscopy was discussed including the risk of anesthesia, bowel perforation, bleeding, missed lesion, infection, and death should a severe complication occur.  The patient determined that the diagnostic benefit outweighed the risk.         I discussed the patient's findings and my recommendations with the patient    Dallas Jaquez MD  03/19/24  10:56 EDT

## 2024-03-19 NOTE — ANESTHESIA POSTPROCEDURE EVALUATION
Patient: Ronald Bond    Procedure Summary       Date: 03/19/24 Room / [facility]:  [facility]    Anesthesia Start: 1058 Anesthesia Stop: 1130    Procedures:       COLONOSCOPY      ESOPHAGOGASTRODUODENOSCOPY Diagnosis:       Positive colorectal cancer screening using Cologuard test      (Positive colorectal cancer screening using Cologuard test [R19.5])    Surgeons: Dallas Jaquez MD Provider: Sam Cabral MD    Anesthesia Type: general ASA Status: 3            Anesthesia Type: general    Vitals  No vitals data found for the desired time range.          Post Anesthesia Care and Evaluation    Patient location during evaluation: PACU  Patient participation: complete - patient participated  Level of consciousness: awake and alert  Pain management: adequate    Airway patency: patent  Anesthetic complications: No anesthetic complications  PONV Status: none  Cardiovascular status: hemodynamically stable and acceptable  Respiratory status: nonlabored ventilation, acceptable and nasal cannula  Hydration status: acceptable    Comments: 147/86 97% rr15 hr 63 97.0

## 2024-03-19 NOTE — ANESTHESIA PREPROCEDURE EVALUATION
Anesthesia Evaluation     Patient summary reviewed and Nursing notes reviewed                Airway   Mallampati: II  TM distance: >3 FB  Neck ROM: full  No difficulty expected  Dental - normal exam   (+) edentulous    Pulmonary - normal exam   (+) a smoker Former, asthma,  Cardiovascular - normal exam    (+) hypertension, CAD, hyperlipidemia      Neuro/Psych  (+) tremors, psychiatric history Anxiety and Depression  GI/Hepatic/Renal/Endo    (+) hiatal hernia, GERD, hepatitis C, liver disease    Musculoskeletal     Abdominal  - normal exam    Bowel sounds: normal.   Substance History   (+) drug use (H/O POLYSUBSTANCE ABUSE ON METHADONE)     OB/GYN negative ob/gyn ROS         Other   arthritis,                 Anesthesia Plan    ASA 3     general     (PROPOFOL)  intravenous induction     Anesthetic plan, risks, benefits, and alternatives have been provided, discussed and informed consent has been obtained with: patient.    Plan discussed with CRNA.    CODE STATUS:

## 2024-03-20 PROBLEM — K22.9 ESOPHAGUS DISORDER: Status: ACTIVE | Noted: 2024-03-20

## 2024-04-27 NOTE — PROGRESS NOTES
Problem: Adult Inpatient Plan of Care  Goal: Optimal Comfort and Wellbeing  Outcome: Progressing     Problem: Adult Inpatient Plan of Care  Goal: Plan of Care Review  Flowsheets (Taken 4/27/2024 1059)  Plan of Care Reviewed With: patient   Goal Outcome Evaluation:      Plan of Care Reviewed With: patient    Incontinent diarrhea continues, breakdown noted on coccyx from frequent incontinent stools, barrier cream applied, patient has multiple rashes, requested antifungal powder from Provider, purewick used at times for moisture control, 5 lpm via NC to keep SpO2 90-92% though patient at times drops to 89% for short periods, poor appetite, able to make needs known                 "Subjective   Ronald Bond is a 73 y.o. male.   Chief Complaint   Patient presents with   • Establish Care     pt states that he thinks he has been having anxiety problems and would like to get back on paxil. pt wants to discuss other medications      History of Present Illness   Patient is here to establish care.   Patient reports he currently lives at the [facility] - has been there about 3 months.   He reports he is on methadone - goes every day to the clinic.    cannot get an apartment at this time due to the cost of the methadone.       The following portions of the patient's history were reviewed and updated as appropriate: allergies, current medications, past family history, past medical history, past social history, past surgical history and problem list.    Review of Systems   Constitutional: Negative.    HENT: Positive for dental problem.         Patient has no teeth.   He would like dentures.      Eyes: Negative.         Needs glasses purchased.    Has filled out paper work to obtain from the [employer].        Respiratory: Negative.    Cardiovascular: Negative.    Gastrointestinal: Positive for constipation.        Patient has an issue with constipation.  It has resolved.   Has miralax as needed.        Skin: Negative.    Psychiatric/Behavioral: The patient is nervous/anxious.         \" feel like I need to get back on the paxil\"   \"I would like to get started back on lyrica for may pain\".          Objective   No Known Allergies  Visit Vitals  /60   Pulse 74   Temp 98.3 °F (36.8 °C)   Resp 18   Ht 177.8 cm (70\")   Wt 78.5 kg (173 lb)   SpO2 97%   BMI 24.82 kg/m²       Physical Exam   Constitutional: He is oriented to person, place, and time. Vital signs are normal. He appears well-developed and well-nourished. He is cooperative. He does not appear ill.   HENT:   Head: Normocephalic.   Right Ear: External ear normal.   Left Ear: External ear normal.   Nose: Nose normal. Right sinus exhibits no " maxillary sinus tenderness and no frontal sinus tenderness. Left sinus exhibits no maxillary sinus tenderness and no frontal sinus tenderness.   Mouth/Throat: Uvula is midline and oropharynx is clear and moist. Abnormal dentition.   No teeth    Eyes: Pupils are equal, round, and reactive to light.   Neck: Normal range of motion.   Cardiovascular: Normal rate, regular rhythm and intact distal pulses.    Pulmonary/Chest: Effort normal and breath sounds normal.   Abdominal: Soft. Bowel sounds are normal.   Musculoskeletal: Normal range of motion. He exhibits tenderness. He exhibits no edema.   Right hand fingers 2 and 3 - are currently in a splint.   Coban tape was removed.   Very odiferous   Patient washed his hands then soaked them in Hibiclens for several minutes . Rinsed and then dried.   Both fingers are slightly swollen - no drainage seen.   Fingers are warm.   Rewrapped in finger splints by HEYDI Torres.            Lymphadenopathy:     He has no cervical adenopathy.   Neurological: He is alert and oriented to person, place, and time.   Skin: Skin is warm and dry. Capillary refill takes less than 2 seconds.   Psychiatric: He has a normal mood and affect. His behavior is normal.       Assessment/Plan   Ronald was seen today for establish care.    Diagnoses and all orders for this visit:    Depression with anxiety  -     PARoxetine (PAXIL) 20 MG tablet; Take 1 tablet by mouth Every Morning.  -     TSH    Polysubstance abuse (CMS/HCC)    GERD with esophagitis    Hypertension, unspecified type  -     amLODIPine (NORVASC) 10 MG tablet; Take 1 tablet by mouth Daily. Per Mauricio last filled 12/2017  -     Comprehensive Metabolic Panel  -     Lipid Panel  -     POC Urinalysis Dipstick, Automated    Age-related osteoporosis without current pathological fracture    Encounter to establish care  -     Comprehensive Metabolic Panel  -     Lipid Panel  -     POC Urinalysis Dipstick, Automated  -     TSH    History of  positive hepatitis C    Osteoporosis without current pathological fracture, unspecified osteoporosis type  -     TSH  -     Ambulatory Referral to Pain Management    Right hand fracture, closed, initial encounter  -     CBC (No Diff)    FPatient Counseling:  --Nutrition: Stressed importance of moderation in sodium/caffeine intake, saturated fat and cholesterol, caloric balance, sufficient intake of fresh fruits, vegetables, fiber, calcium, iron  -daily use of baby aspirin.  --Exercise: Stressed the importance of regular exercise.   --Substance Abuse: Discussed cessation/primary prevention of tobacco, alcohol, or other drug use; driving or other dangerous activities under the influence; availability of treatment for abuse.    --Sexuality: Discussed sexually transmitted diseases, partner selection, use of condoms, avoidance of unintended pregnancy and contraceptive alternatives.   --Injury prevention: Discussed safety belts, safety helmets, smoke detector, smoking near bedding or upholstery.   --Dental health: Discussed importance of regular tooth brushing, flossing, and dental visits.  --Immunizations reviewed.  --Discussed benefits of screening colonoscopy.  --After hours’ service discussed with patien    Follow up in 1 month for reevaluation of labs, and paxil - may need to adjust dose.   Patient reports  He has a follow up appointment with his surgeon on 10/22/18.     Will need to review immunizations with patient -  --Discussed benefits of screening colonoscopy.             Eri Baez, APRN

## 2024-04-30 ENCOUNTER — LAB (OUTPATIENT)
Dept: LAB | Facility: HOSPITAL | Age: 79
End: 2024-04-30
Payer: MEDICARE

## 2024-04-30 ENCOUNTER — OFFICE VISIT (OUTPATIENT)
Dept: FAMILY MEDICINE CLINIC | Facility: CLINIC | Age: 79
End: 2024-04-30
Payer: MEDICARE

## 2024-04-30 VITALS
DIASTOLIC BLOOD PRESSURE: 60 MMHG | TEMPERATURE: 98.6 F | WEIGHT: 216.6 LBS | SYSTOLIC BLOOD PRESSURE: 108 MMHG | HEIGHT: 67 IN | BODY MASS INDEX: 34 KG/M2 | HEART RATE: 71 BPM | OXYGEN SATURATION: 97 %

## 2024-04-30 DIAGNOSIS — J45.20 MILD INTERMITTENT ASTHMA WITHOUT COMPLICATION: ICD-10-CM

## 2024-04-30 DIAGNOSIS — I10 ESSENTIAL HYPERTENSION: ICD-10-CM

## 2024-04-30 DIAGNOSIS — L98.9 SKIN LESION OF FACE: ICD-10-CM

## 2024-04-30 DIAGNOSIS — I10 HYPERTENSION, UNSPECIFIED TYPE: ICD-10-CM

## 2024-04-30 DIAGNOSIS — R60.0 LOWER LEG EDEMA: ICD-10-CM

## 2024-04-30 DIAGNOSIS — E78.5 HYPERLIPIDEMIA LDL GOAL <100: ICD-10-CM

## 2024-04-30 DIAGNOSIS — L81.9 ATYPICAL PIGMENTED SKIN LESION: Primary | ICD-10-CM

## 2024-04-30 DIAGNOSIS — K21.9 GASTROESOPHAGEAL REFLUX DISEASE WITHOUT ESOPHAGITIS: Chronic | ICD-10-CM

## 2024-04-30 DIAGNOSIS — Z76.0 ENCOUNTER FOR MEDICATION REFILL: ICD-10-CM

## 2024-04-30 PROCEDURE — 3074F SYST BP LT 130 MM HG: CPT | Performed by: NURSE PRACTITIONER

## 2024-04-30 PROCEDURE — 1125F AMNT PAIN NOTED PAIN PRSNT: CPT | Performed by: NURSE PRACTITIONER

## 2024-04-30 PROCEDURE — 99214 OFFICE O/P EST MOD 30 MIN: CPT | Performed by: NURSE PRACTITIONER

## 2024-04-30 PROCEDURE — 80053 COMPREHEN METABOLIC PANEL: CPT

## 2024-04-30 PROCEDURE — 3078F DIAST BP <80 MM HG: CPT | Performed by: NURSE PRACTITIONER

## 2024-04-30 RX ORDER — TORSEMIDE 20 MG/1
20 TABLET ORAL DAILY
Qty: 90 TABLET | Refills: 1 | Status: SHIPPED | OUTPATIENT
Start: 2024-04-30

## 2024-04-30 RX ORDER — SIMVASTATIN 10 MG
10 TABLET ORAL
Qty: 90 TABLET | Refills: 1 | Status: SHIPPED | OUTPATIENT
Start: 2024-04-30

## 2024-04-30 RX ORDER — ALBUTEROL SULFATE 90 UG/1
2 AEROSOL, METERED RESPIRATORY (INHALATION) EVERY 4 HOURS PRN
Qty: 18 G | Refills: 5 | Status: SHIPPED | OUTPATIENT
Start: 2024-04-30

## 2024-04-30 RX ORDER — ASPIRIN 81 MG/1
81 TABLET ORAL DAILY
Qty: 90 TABLET | Refills: 3 | Status: SHIPPED | OUTPATIENT
Start: 2024-04-30

## 2024-04-30 RX ORDER — PANTOPRAZOLE SODIUM 20 MG/1
20 TABLET, DELAYED RELEASE ORAL DAILY PRN
Qty: 90 TABLET | Refills: 1 | Status: SHIPPED | OUTPATIENT
Start: 2024-04-30

## 2024-04-30 RX ORDER — FLUTICASONE PROPIONATE 44 UG/1
1 AEROSOL, METERED RESPIRATORY (INHALATION) 2 TIMES DAILY PRN
Qty: 1 EACH | Refills: 0 | Status: SHIPPED | OUTPATIENT
Start: 2024-04-30 | End: 2025-04-30

## 2024-04-30 RX ORDER — CARVEDILOL 25 MG/1
25 TABLET ORAL 2 TIMES DAILY WITH MEALS
Qty: 180 TABLET | Refills: 0 | Status: SHIPPED | OUTPATIENT
Start: 2024-04-30

## 2024-05-01 ENCOUNTER — TELEPHONE (OUTPATIENT)
Dept: FAMILY MEDICINE CLINIC | Facility: CLINIC | Age: 79
End: 2024-05-01

## 2024-05-01 LAB
ALBUMIN SERPL-MCNC: 4.1 G/DL (ref 3.5–5.2)
ALBUMIN/GLOB SERPL: 1.4 G/DL
ALP SERPL-CCNC: 87 U/L (ref 39–117)
ALT SERPL W P-5'-P-CCNC: 12 U/L (ref 1–41)
ANION GAP SERPL CALCULATED.3IONS-SCNC: 6 MMOL/L (ref 5–15)
AST SERPL-CCNC: 26 U/L (ref 1–40)
BILIRUB SERPL-MCNC: 0.3 MG/DL (ref 0–1.2)
BUN SERPL-MCNC: 17 MG/DL (ref 8–23)
BUN/CREAT SERPL: 13.9 (ref 7–25)
CALCIUM SPEC-SCNC: 9.6 MG/DL (ref 8.6–10.5)
CHLORIDE SERPL-SCNC: 95 MMOL/L (ref 98–107)
CO2 SERPL-SCNC: 36 MMOL/L (ref 22–29)
CREAT SERPL-MCNC: 1.22 MG/DL (ref 0.76–1.27)
EGFRCR SERPLBLD CKD-EPI 2021: 61.1 ML/MIN/1.73
GLOBULIN UR ELPH-MCNC: 3 GM/DL
GLUCOSE SERPL-MCNC: 93 MG/DL (ref 65–99)
POTASSIUM SERPL-SCNC: 4 MMOL/L (ref 3.5–5.2)
PROT SERPL-MCNC: 7.1 G/DL (ref 6–8.5)
SODIUM SERPL-SCNC: 137 MMOL/L (ref 136–145)

## 2024-05-01 NOTE — TELEPHONE ENCOUNTER
Caller: Bertin oBnd    Relationship: Self    Best call back number: [phone]     What is the best time to reach you: ANYTIME        Do you know the name of the person who called: BERTIN    What was the call regarding: PATIENT WAS SEEN IN OFFICE YESTERDAY AND SOMEHOW PICKED UP SOMEONE ELSE'S SUNGLASSES.  ADVISED NO ONE HAS CALLED LOOKING FOR THEM YET. I ADVISED PATIENT AND HE STATED HE DOES NOT DRIVE AND IF SOMEONE CALLS FOR THEM HE HAS THEM    Is it okay if the provider responds through MyChart: CALLBACK

## 2024-05-07 ENCOUNTER — OFFICE VISIT (OUTPATIENT)
Dept: ENDOCRINOLOGY | Facility: CLINIC | Age: 79
End: 2024-05-07
Payer: MEDICARE

## 2024-05-07 VITALS
BODY MASS INDEX: 33.98 KG/M2 | DIASTOLIC BLOOD PRESSURE: 76 MMHG | HEART RATE: 61 BPM | WEIGHT: 217 LBS | SYSTOLIC BLOOD PRESSURE: 118 MMHG | OXYGEN SATURATION: 94 %

## 2024-05-07 DIAGNOSIS — M81.0 AGE-RELATED OSTEOPOROSIS WITHOUT CURRENT PATHOLOGICAL FRACTURE: ICD-10-CM

## 2024-05-07 DIAGNOSIS — E29.1 HYPOGONADISM IN MALE: Primary | ICD-10-CM

## 2024-05-07 LAB
25(OH)D3 SERPL-MCNC: 83.4 NG/ML (ref 30–100)
ALBUMIN SERPL-MCNC: 4.2 G/DL (ref 3.5–5.2)
ANION GAP SERPL CALCULATED.3IONS-SCNC: 10.8 MMOL/L (ref 5–15)
BUN SERPL-MCNC: 13 MG/DL (ref 8–23)
BUN/CREAT SERPL: 11.3 (ref 7–25)
CALCIUM SPEC-SCNC: 9.5 MG/DL (ref 8.6–10.5)
CHLORIDE SERPL-SCNC: 92 MMOL/L (ref 98–107)
CO2 SERPL-SCNC: 34.2 MMOL/L (ref 22–29)
CREAT SERPL-MCNC: 1.15 MG/DL (ref 0.76–1.27)
EGFRCR SERPLBLD CKD-EPI 2021: 65.5 ML/MIN/1.73
FSH SERPL-ACNC: 15.6 MIU/ML
GLUCOSE SERPL-MCNC: 87 MG/DL (ref 65–99)
LH SERPL-ACNC: 6.96 MIU/ML
PHOSPHATE SERPL-MCNC: 4.4 MG/DL (ref 2.5–4.5)
POTASSIUM SERPL-SCNC: 3.5 MMOL/L (ref 3.5–5.2)
PTH-INTACT SERPL-MCNC: 74.4 PG/ML (ref 15–65)
SODIUM SERPL-SCNC: 137 MMOL/L (ref 136–145)

## 2024-05-07 PROCEDURE — 80069 RENAL FUNCTION PANEL: CPT | Performed by: INTERNAL MEDICINE

## 2024-05-07 PROCEDURE — 84403 ASSAY OF TOTAL TESTOSTERONE: CPT | Performed by: INTERNAL MEDICINE

## 2024-05-07 PROCEDURE — 36415 COLL VENOUS BLD VENIPUNCTURE: CPT | Performed by: INTERNAL MEDICINE

## 2024-05-07 PROCEDURE — 3074F SYST BP LT 130 MM HG: CPT | Performed by: INTERNAL MEDICINE

## 2024-05-07 PROCEDURE — 99214 OFFICE O/P EST MOD 30 MIN: CPT | Performed by: INTERNAL MEDICINE

## 2024-05-07 PROCEDURE — 3078F DIAST BP <80 MM HG: CPT | Performed by: INTERNAL MEDICINE

## 2024-05-07 PROCEDURE — 84270 ASSAY OF SEX HORMONE GLOBUL: CPT | Performed by: INTERNAL MEDICINE

## 2024-05-07 PROCEDURE — 82306 VITAMIN D 25 HYDROXY: CPT | Performed by: INTERNAL MEDICINE

## 2024-05-07 PROCEDURE — 83002 ASSAY OF GONADOTROPIN (LH): CPT | Performed by: INTERNAL MEDICINE

## 2024-05-07 PROCEDURE — 83970 ASSAY OF PARATHORMONE: CPT | Performed by: INTERNAL MEDICINE

## 2024-05-07 PROCEDURE — 83001 ASSAY OF GONADOTROPIN (FSH): CPT | Performed by: INTERNAL MEDICINE

## 2024-05-07 PROCEDURE — 84402 ASSAY OF FREE TESTOSTERONE: CPT | Performed by: INTERNAL MEDICINE
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
## 2024-05-08 LAB — SHBG SERPL-SCNC: 37.5 NMOL/L (ref 19.3–76.4)

## 2024-05-11 LAB
TESTOST FREE MFR SERPL: 1.67 % (ref 1.5–4.2)
TESTOST FREE SERPL-MCNC: 0.17 NG/DL (ref 5–21)
TESTOST SERPL-MCNC: 10.4 NG/DL (ref 264–916)

## 2024-05-17 ENCOUNTER — HOSPITAL ENCOUNTER (OUTPATIENT)
Dept: BONE DENSITY | Facility: HOSPITAL | Age: 79
Discharge: HOME | End: 2024-05-17
Payer: MEDICARE

## 2024-05-17 PROCEDURE — 77080 DXA BONE DENSITY AXIAL: CPT

## 2024-05-18 DIAGNOSIS — J45.20 MILD INTERMITTENT ASTHMA WITHOUT COMPLICATION: ICD-10-CM

## 2024-05-18 RX ORDER — FLUTICASONE PROPIONATE 44 UG/1
1 AEROSOL, METERED RESPIRATORY (INHALATION) 2 TIMES DAILY PRN
Qty: 1 EACH | Refills: 0 | Status: SHIPPED | OUTPATIENT
Start: 2024-05-18 | End: 2025-05-18

## 2024-05-23 ENCOUNTER — TELEPHONE (OUTPATIENT)
Dept: FAMILY MEDICINE CLINIC | Facility: CLINIC | Age: 79
End: 2024-05-23
Payer: MEDICARE

## 2024-05-23 DIAGNOSIS — J45.20 MILD INTERMITTENT ASTHMA, UNSPECIFIED WHETHER COMPLICATED: Primary | Chronic | ICD-10-CM

## 2024-05-23 NOTE — TELEPHONE ENCOUNTER
Caller: [facility] - [address] [phone] [facility] [phone] FX    Relationship: Pharmacy    Best call back number: [phone]    What test/procedure requested: PA FOR MEDICATION    fluticasone (FLOVENT HFA) 44 MCG/ACT inhaler     When is it needed: ASAP    Where is the test/procedure going to be performed:     Additional information or concerns:

## 2024-05-23 NOTE — TELEPHONE ENCOUNTER
PA Key: [id number]    The patient currently has access to the requested medication and a Prior Authorization is not needed for the patient/medication.      Called pharmacy, they are saying Arnuity is the preferred drug. I am unable to process the prior authorization for Flovent. HFA. Can we try Flovent Discus as that is a preferred as well

## 2024-05-24 ENCOUNTER — TELEPHONE (OUTPATIENT)
Dept: FAMILY MEDICINE CLINIC | Facility: CLINIC | Age: 79
End: 2024-05-24
Payer: MEDICARE

## 2024-05-24 DIAGNOSIS — J45.20 MILD INTERMITTENT ASTHMA, UNSPECIFIED WHETHER COMPLICATED: Primary | ICD-10-CM

## 2024-05-24 NOTE — TELEPHONE ENCOUNTER
Pharmacy Name: [facility] - [address] - [phone]  - [phone]      Pharmacy representative name: DAT    Pharmacy representative phone number: [phone]    What medication are you calling in regards to: fluticasone (FLOVENT HFA) 44 MCG/ACT inhaler     What question does the pharmacy have: MEDICATION IS NO LONGER IN PRODUCTION.   CAN IT BE CHANGED TO SOMETHING ELSE     Who is the provider that prescribed the medication: PONCE

## 2024-05-24 NOTE — TELEPHONE ENCOUNTER
Pharmacy notified we sent in discus today. It appears they can no longer get that either so we will need to send Arnuity.

## 2024-05-29 ENCOUNTER — TELEPHONE (OUTPATIENT)
Dept: ENDOCRINOLOGY | Facility: CLINIC | Age: 79
End: 2024-05-29
Payer: MEDICARE

## 2024-05-29 DIAGNOSIS — E29.1 HYPOGONADISM IN MALE: Primary | ICD-10-CM

## 2024-05-29 RX ORDER — TESTOSTERONE 16.2 MG/G
GEL TRANSDERMAL
Qty: 75 G | Refills: 5 | Status: SHIPPED | OUTPATIENT
Start: 2024-05-29

## 2024-05-30 ENCOUNTER — TELEPHONE (OUTPATIENT)
Dept: ENDOCRINOLOGY | Facility: CLINIC | Age: 79
End: 2024-05-30
Payer: MEDICARE

## 2024-05-30 NOTE — TELEPHONE ENCOUNTER
Spoke with patient to let him know his testosterone levels was low and testosterone gel was sent to  clinic pharmacy. Pt did states he does prefer that pharmacy. Patient voiced understanding.

## 2024-06-03 ENCOUNTER — PRIOR AUTHORIZATION (OUTPATIENT)
Dept: ENDOCRINOLOGY | Facility: CLINIC | Age: 79
End: 2024-06-03
Payer: MEDICARE

## 2024-06-03 NOTE — TELEPHONE ENCOUNTER
Outcome  Additional Information Required  The brand name, Androgel, is available without prior authorization.Please have the pharmacy process the claim for the brand Androgel.If there is a clinical reason the patient cannot use the brand formulation (such as allergy to an ingredient found in the brand that is not in the generic) then please fax the appropriate PA request along with the confirming documentation directly to [facility] at [phone].  Drug  Testosterone 1.62% gel  ePA cloud logo  Form  [employer] [place] Medicaid ePA Form 2017 NCPDP

## 2024-06-05 DIAGNOSIS — E29.1 HYPOGONADISM IN MALE: ICD-10-CM

## 2024-06-05 RX ORDER — TESTOSTERONE 16.2 MG/G
GEL TRANSDERMAL
Qty: 75 G | Refills: 5 | Status: SHIPPED | OUTPATIENT
Start: 2024-06-05

## 2024-06-06 ENCOUNTER — OFFICE VISIT (OUTPATIENT)
Dept: FAMILY MEDICINE CLINIC | Facility: CLINIC | Age: 79
End: 2024-06-06
Payer: MEDICARE

## 2024-06-06 ENCOUNTER — LAB (OUTPATIENT)
Dept: LAB | Facility: HOSPITAL | Age: 79
End: 2024-06-06
Payer: MEDICARE

## 2024-06-06 VITALS
WEIGHT: 217 LBS | SYSTOLIC BLOOD PRESSURE: 132 MMHG | BODY MASS INDEX: 34.06 KG/M2 | DIASTOLIC BLOOD PRESSURE: 82 MMHG | HEIGHT: 67 IN | OXYGEN SATURATION: 96 % | HEART RATE: 66 BPM

## 2024-06-06 DIAGNOSIS — Z51.81 MEDICATION MONITORING ENCOUNTER: ICD-10-CM

## 2024-06-06 DIAGNOSIS — D50.9 IRON DEFICIENCY ANEMIA, UNSPECIFIED IRON DEFICIENCY ANEMIA TYPE: ICD-10-CM

## 2024-06-06 DIAGNOSIS — I10 PRIMARY HYPERTENSION: Primary | Chronic | ICD-10-CM

## 2024-06-06 DIAGNOSIS — R19.5 POSITIVE COLORECTAL CANCER SCREENING USING COLOGUARD TEST: ICD-10-CM

## 2024-06-06 LAB
ALBUMIN SERPL-MCNC: 4 G/DL (ref 3.5–5.2)
ALBUMIN/GLOB SERPL: 1.1 G/DL
ALP SERPL-CCNC: 83 U/L (ref 39–117)
ALT SERPL W P-5'-P-CCNC: 6 U/L (ref 1–41)
ANION GAP SERPL CALCULATED.3IONS-SCNC: 10.4 MMOL/L (ref 5–15)
AST SERPL-CCNC: 18 U/L (ref 1–40)
BILIRUB SERPL-MCNC: 0.3 MG/DL (ref 0–1.2)
BUN SERPL-MCNC: 16 MG/DL (ref 8–23)
BUN/CREAT SERPL: 15.2 (ref 7–25)
CALCIUM SPEC-SCNC: 9.5 MG/DL (ref 8.6–10.5)
CHLORIDE SERPL-SCNC: 94 MMOL/L (ref 98–107)
CO2 SERPL-SCNC: 32.6 MMOL/L (ref 22–29)
CREAT SERPL-MCNC: 1.05 MG/DL (ref 0.76–1.27)
DEPRECATED RDW RBC AUTO: 44.1 FL (ref 37–54)
EGFRCR SERPLBLD CKD-EPI 2021: 73.1 ML/MIN/1.73
ERYTHROCYTE [DISTWIDTH] IN BLOOD BY AUTOMATED COUNT: 13.1 % (ref 12.3–15.4)
GLOBULIN UR ELPH-MCNC: 3.6 GM/DL
GLUCOSE SERPL-MCNC: 104 MG/DL (ref 65–99)
HCT VFR BLD AUTO: 39 % (ref 37.5–51)
HGB BLD-MCNC: 13.1 G/DL (ref 13–17.7)
MCH RBC QN AUTO: 31.1 PG (ref 26.6–33)
MCHC RBC AUTO-ENTMCNC: 33.6 G/DL (ref 31.5–35.7)
MCV RBC AUTO: 92.6 FL (ref 79–97)
PLATELET # BLD AUTO: 192 10*3/MM3 (ref 140–450)
PMV BLD AUTO: 10.4 FL (ref 6–12)
POTASSIUM SERPL-SCNC: 4 MMOL/L (ref 3.5–5.2)
PROT SERPL-MCNC: 7.6 G/DL (ref 6–8.5)
RBC # BLD AUTO: 4.21 10*6/MM3 (ref 4.14–5.8)
SODIUM SERPL-SCNC: 137 MMOL/L (ref 136–145)
WBC NRBC COR # BLD AUTO: 7 10*3/MM3 (ref 3.4–10.8)

## 2024-06-06 PROCEDURE — 1160F RVW MEDS BY RX/DR IN RCRD: CPT

## 2024-06-06 PROCEDURE — 3079F DIAST BP 80-89 MM HG: CPT

## 2024-06-06 PROCEDURE — 1159F MED LIST DOCD IN RCRD: CPT

## 2024-06-06 PROCEDURE — 3075F SYST BP GE 130 - 139MM HG: CPT

## 2024-06-06 PROCEDURE — 1126F AMNT PAIN NOTED NONE PRSNT: CPT

## 2024-06-06 PROCEDURE — 99214 OFFICE O/P EST MOD 30 MIN: CPT

## 2024-06-06 PROCEDURE — 85027 COMPLETE CBC AUTOMATED: CPT

## 2024-06-06 PROCEDURE — 80053 COMPREHEN METABOLIC PANEL: CPT

## 2024-06-06 RX ORDER — FERROUS GLUCONATE 324(38)MG
324 TABLET ORAL
Qty: 90 TABLET | Refills: 3 | Status: SHIPPED | OUTPATIENT
Start: 2024-06-06
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
## 2024-06-07 ENCOUNTER — PREP FOR SURGERY (OUTPATIENT)
Dept: OTHER | Facility: HOSPITAL | Age: 79
End: 2024-06-07
Payer: MEDICARE

## 2024-06-07 DIAGNOSIS — R19.5 POSITIVE COLORECTAL CANCER SCREENING USING COLOGUARD TEST: Primary | ICD-10-CM

## 2024-06-07 NOTE — TELEPHONE ENCOUNTER
Approvedon June 6  Your request has been approved  Drug  AndroGel Pump 20.25 MG/ACT(1.62%) gel  Form  [employer] Electronic PA Form (2017 NCPDP)

## 2024-06-10 ENCOUNTER — TELEPHONE (OUTPATIENT)
Dept: FAMILY MEDICINE CLINIC | Facility: CLINIC | Age: 79
End: 2024-06-10
Payer: MEDICARE

## 2024-06-10 NOTE — TELEPHONE ENCOUNTER
----- Message from Tatiana Maxwell sent at 6/9/2024 12:03 PM EDT -----  Iron supplement maintaining his blood levels. Not anemic currently. Will continue to take this.   Please let patient know that I placed the referral for his repeat colonoscopy. They will call to schedule with him.     Unable to reach patient.     Please relay

## 2024-06-10 NOTE — TELEPHONE ENCOUNTER
Tatiana Maxwell PA-C  You3 minutes ago (9:32 AM)       Called to clarify with patient.  The chart was marked that he had had a colonoscopy done, but once I was looking into it they failed to do the colonoscopy due to stool burden.  They recommended he come back in a few months to try it again.  Patient understands.  Sent in his iron replacement the other day.

## 2024-06-10 NOTE — TELEPHONE ENCOUNTER
Name: Ronald Bond    Relationship: Self    Best Callback Number: [phone]     HUB PROVIDED THE RELAY MESSAGE FROM THE OFFICE   PATIENT HAS FURTHER QUESTIONS AND WOULD LIKE A CALL BACK AT THE FOLLOWING PHONE NUMBER [phone]    ADDITIONAL INFORMATION: PATIENT CALLED AND I RELAYED THE MESSAGE. PATIENT STATED HE WAS TOLD HE DIDN'T NEED ANOTHER COLONOSCOPY FOR 10 YEARS SO HE WOULD LIKE TO KNOW WHY HE NEEDS ANOTHER ONE.    ALSO PATIENT STATED HE NEEDS A REFILL ON HIS IRON SUPPLEMENT AS HE IS COMPLETELY OUT    ferrous gluconate (FERGON) 324 MG tablet    [facility] - [address] [phone] [facility] [phone] FX

## 2024-07-22 DIAGNOSIS — I10 ESSENTIAL HYPERTENSION: ICD-10-CM

## 2024-07-22 DIAGNOSIS — J45.20 MILD INTERMITTENT ASTHMA, UNSPECIFIED WHETHER COMPLICATED: ICD-10-CM

## 2024-07-22 RX ORDER — CARVEDILOL 25 MG/1
25 TABLET ORAL 2 TIMES DAILY
Qty: 180 TABLET | Refills: 0 | Status: SHIPPED | OUTPATIENT
Start: 2024-07-22

## 2024-07-23 RX ORDER — FLUTICASONE FUROATE 50 UG/1
1 POWDER RESPIRATORY (INHALATION) DAILY
Qty: 30 EACH | Refills: 0 | Status: SHIPPED | OUTPATIENT
Start: 2024-07-23

## 2024-07-23 RX ORDER — SODIUM PICOSULFATE, MAGNESIUM OXIDE, AND ANHYDROUS CITRIC ACID 10; 3.5; 12 MG/160ML; G/160ML; G/160ML
350 LIQUID ORAL TAKE AS DIRECTED
Qty: 350 ML | Refills: 0 | Status: SHIPPED | OUTPATIENT
Start: 2024-07-23

## 2024-07-24 ENCOUNTER — PRE-ADMISSION TESTING (OUTPATIENT)
Dept: PREADMISSION TESTING | Facility: HOSPITAL | Age: 79
End: 2024-07-24
Payer: MEDICARE

## 2024-07-24 VITALS — WEIGHT: 211.53 LBS | BODY MASS INDEX: 35.24 KG/M2 | HEIGHT: 65 IN

## 2024-07-24 LAB
DEPRECATED RDW RBC AUTO: 41.5 FL (ref 37–54)
ERYTHROCYTE [DISTWIDTH] IN BLOOD BY AUTOMATED COUNT: 12 % (ref 12.3–15.4)
HCT VFR BLD AUTO: 40.6 % (ref 37.5–51)
HGB BLD-MCNC: 13.5 G/DL (ref 13–17.7)
MCH RBC QN AUTO: 31.2 PG (ref 26.6–33)
MCHC RBC AUTO-ENTMCNC: 33.3 G/DL (ref 31.5–35.7)
MCV RBC AUTO: 93.8 FL (ref 79–97)
PLATELET # BLD AUTO: 235 10*3/MM3 (ref 140–450)
PMV BLD AUTO: 10.2 FL (ref 6–12)
POTASSIUM SERPL-SCNC: 4.5 MMOL/L (ref 3.5–5.2)
RBC # BLD AUTO: 4.33 10*6/MM3 (ref 4.14–5.8)
WBC NRBC COR # BLD AUTO: 6.07 10*3/MM3 (ref 3.4–10.8)

## 2024-07-24 PROCEDURE — 85027 COMPLETE CBC AUTOMATED: CPT

## 2024-07-24 PROCEDURE — 36415 COLL VENOUS BLD VENIPUNCTURE: CPT

## 2024-07-24 PROCEDURE — 84132 ASSAY OF SERUM POTASSIUM: CPT

## 2024-07-24 RX ORDER — LANOLIN ALCOHOL/MO/W.PET/CERES
1000 CREAM (GRAM) TOPICAL DAILY
COMMUNITY

## 2024-07-24 NOTE — PAT
An arrival time for procedure was not provided during PAT visit. If patient had any questions or concerns about their arrival time, they were instructed to contact their surgeon/physician.  Additionally, if the patient referred to an arrival time that was acquired from their my chart account, patient was encouraged to verify that time with their surgeon/physician. Arrival times are NOT provided in Pre Admission Testing Department.    EKG in [facility] from 3/12/24 and cleared per Dr. Simms, see PAT note from 3/12.

## 2024-07-29 ENCOUNTER — OFFICE VISIT (OUTPATIENT)
Dept: FAMILY MEDICINE CLINIC | Facility: CLINIC | Age: 79
End: 2024-07-29
Payer: MEDICARE

## 2024-07-29 ENCOUNTER — HOSPITAL ENCOUNTER (OUTPATIENT)
Dept: GENERAL RADIOLOGY | Facility: HOSPITAL | Age: 79
Discharge: HOME | End: 2024-07-29
Payer: MEDICARE

## 2024-07-29 VITALS
DIASTOLIC BLOOD PRESSURE: 90 MMHG | HEIGHT: 65 IN | TEMPERATURE: 98.7 F | SYSTOLIC BLOOD PRESSURE: 132 MMHG | BODY MASS INDEX: 35.25 KG/M2 | WEIGHT: 211.6 LBS | OXYGEN SATURATION: 92 % | HEART RATE: 78 BPM

## 2024-07-29 DIAGNOSIS — K59.00 CONSTIPATION, UNSPECIFIED CONSTIPATION TYPE: ICD-10-CM

## 2024-07-29 DIAGNOSIS — J06.9 VIRAL URI: Primary | ICD-10-CM

## 2024-07-29 LAB
EXPIRATION DATE: NORMAL
EXPIRATION DATE: NORMAL
FLUAV AG UPPER RESP QL IA.RAPID: NOT DETECTED
FLUBV AG UPPER RESP QL IA.RAPID: NOT DETECTED
INTERNAL CONTROL: NORMAL
INTERNAL CONTROL: NORMAL
Lab: NORMAL
Lab: NORMAL
S PYO AG THROAT QL: NEGATIVE
SARS-COV-2 AG UPPER RESP QL IA.RAPID: NOT DETECTED

## 2024-07-29 PROCEDURE — 3078F DIAST BP <80 MM HG: CPT

## 2024-07-29 PROCEDURE — 87880 STREP A ASSAY W/OPTIC: CPT

## 2024-07-29 PROCEDURE — 99214 OFFICE O/P EST MOD 30 MIN: CPT

## 2024-07-29 PROCEDURE — 1126F AMNT PAIN NOTED NONE PRSNT: CPT

## 2024-07-29 PROCEDURE — 1160F RVW MEDS BY RX/DR IN RCRD: CPT

## 2024-07-29 PROCEDURE — 87428 SARSCOV & INF VIR A&B AG IA: CPT

## 2024-07-29 PROCEDURE — 1159F MED LIST DOCD IN RCRD: CPT

## 2024-07-29 PROCEDURE — 74018 RADEX ABDOMEN 1 VIEW: CPT

## 2024-07-29 PROCEDURE — 3075F SYST BP GE 130 - 139MM HG: CPT
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
## 2024-07-30 ENCOUNTER — TELEPHONE (OUTPATIENT)
Dept: GASTROENTEROLOGY | Facility: CLINIC | Age: 79
End: 2024-07-30
Payer: MEDICARE

## 2024-07-30 ENCOUNTER — TELEPHONE (OUTPATIENT)
Dept: FAMILY MEDICINE CLINIC | Facility: CLINIC | Age: 79
End: 2024-07-30
Payer: MEDICARE

## 2024-07-30 DIAGNOSIS — K59.00 CONSTIPATION, UNSPECIFIED CONSTIPATION TYPE: Primary | ICD-10-CM

## 2024-07-30 DIAGNOSIS — E29.1 HYPOGONADISM IN MALE: ICD-10-CM

## 2024-07-30 RX ORDER — TESTOSTERONE 16.2 MG/G
GEL TRANSDERMAL
Qty: 75 G | Refills: 5 | Status: SHIPPED | OUTPATIENT
Start: 2024-07-30

## 2024-07-30 NOTE — TELEPHONE ENCOUNTER
Caller: Ronald Bond    Relationship: Self    Best call back number: [phone]    Caller requesting test results: YES    What test was performed: X-RAY    When was the test performed: MONDAY 07/29/2024    Where was the test performed: [address]    Additional notes:

## 2024-07-30 NOTE — TELEPHONE ENCOUNTER
Patient left voicemail wanting to cancel endo procedure for today. I believe this was already taken care of, but patient may have meant to r/s his missed procedure and PAT today.    Thanks!

## 2024-07-30 NOTE — TELEPHONE ENCOUNTER
Rx Refill Note    Requested Prescriptions     Pending Prescriptions Disp Refills    AndroGel Pump 20.25 MG/ACT (1.62%) gel 75 g 5     Sig: Apply 40.5 mg (2 pumps) to shoulders daily        Last office visit with prescribing clinician: 5/7/2024         Next office visit with prescribing clinician: 11/11/2024

## 2024-07-30 NOTE — TELEPHONE ENCOUNTER
Patient would like script for Linzess sent to the [facility] pharmacy. He was informed of results and had no further questions or concerns.

## 2024-07-30 NOTE — TELEPHONE ENCOUNTER
No signs of obstruction.  Mild stool burden.  Recommend trying Linzess to help with bowel movements.  If you are unable to have a bowel movement or begin having frequent nausea or vomiting and unable to keep food down recommend going to ER.  Keep me posted on your symptoms.   Written by Tatiana Maxwell PA-C on 7/29/2024  3:42 PM EDT

## 2024-07-31 NOTE — TELEPHONE ENCOUNTER
Pt appt did get canceled. I called the PT to reschedule his appt and did not get an answer and he has no voicemail set up.

## 2024-08-13 ENCOUNTER — TELEPHONE (OUTPATIENT)
Dept: FAMILY MEDICINE CLINIC | Facility: CLINIC | Age: 79
End: 2024-08-13
Payer: MEDICARE

## 2024-08-13 NOTE — TELEPHONE ENCOUNTER
Caller: Ronald Bond    Relationship: Self    Best call back number: [phone]       What was the call regarding: PATIENT STATES THAT HE WAS PRESCRIBED linaclotide (Linzess) 72 MCG capsule capsule BUT HE ALREADY TAKES A CAP FULL OF MIRALAX DAILY AND WANTS TO KNOW IF HE SHOULD BE TAKING THESE MEDICATIONS TOGETHER AND IF SO WHAT IS THE AMOUNT HE SHOULD BETAKING    Is it okay if the provider responds through MyChart:

## 2024-08-13 NOTE — TELEPHONE ENCOUNTER
Tatiana Maxwell PA-C  You4 minutes ago (11:09 AM)       No, would recommend just trying the Linzess or Miralax, but not both at same time unless he is not having success with one.   Attempted to call patient. Could not hear on the other end. Will try again later.    Please relay

## 2024-08-13 NOTE — TELEPHONE ENCOUNTER
Name: Ronald Bond HILARY      Relationship: Self      Best Callback Number: [phone]      HUB PROVIDED THE RELAY MESSAGE FROM THE OFFICE      PATIENT: VOICED UNDERSTANDING AND HAS NO FURTHER QUESTIONS AT THIS TIME    ADDITIONAL INFORMATION:

## 2024-08-14 ENCOUNTER — PREP FOR SURGERY (OUTPATIENT)
Dept: OTHER | Facility: HOSPITAL | Age: 79
End: 2024-08-14
Payer: MEDICARE

## 2024-08-14 DIAGNOSIS — R19.5 POSITIVE COLORECTAL CANCER SCREENING USING DNA-BASED STOOL TEST: Primary | ICD-10-CM

## 2024-08-15 PROBLEM — R19.5 POSITIVE COLORECTAL CANCER SCREENING USING DNA-BASED STOOL TEST: Status: ACTIVE | Noted: 2024-08-14

## 2024-08-23 ENCOUNTER — HOSPITAL ENCOUNTER (OUTPATIENT)
Dept: GENERAL RADIOLOGY | Facility: HOSPITAL | Age: 79
Discharge: HOME | End: 2024-08-23
Payer: MEDICARE

## 2024-08-23 ENCOUNTER — OFFICE VISIT (OUTPATIENT)
Dept: FAMILY MEDICINE CLINIC | Facility: CLINIC | Age: 79
End: 2024-08-23
Payer: MEDICARE

## 2024-08-23 ENCOUNTER — TELEPHONE (OUTPATIENT)
Dept: FAMILY MEDICINE CLINIC | Facility: CLINIC | Age: 79
End: 2024-08-23

## 2024-08-23 VITALS
DIASTOLIC BLOOD PRESSURE: 96 MMHG | WEIGHT: 216.4 LBS | HEIGHT: 65 IN | OXYGEN SATURATION: 97 % | HEART RATE: 66 BPM | SYSTOLIC BLOOD PRESSURE: 128 MMHG | BODY MASS INDEX: 36.06 KG/M2

## 2024-08-23 DIAGNOSIS — M54.2 NECK PAIN ON RIGHT SIDE: Primary | ICD-10-CM

## 2024-08-23 DIAGNOSIS — M54.9 UPPER BACK PAIN: ICD-10-CM

## 2024-08-23 DIAGNOSIS — M54.2 NECK PAIN ON RIGHT SIDE: ICD-10-CM

## 2024-08-23 PROCEDURE — 99214 OFFICE O/P EST MOD 30 MIN: CPT

## 2024-08-23 PROCEDURE — 1125F AMNT PAIN NOTED PAIN PRSNT: CPT

## 2024-08-23 PROCEDURE — 1160F RVW MEDS BY RX/DR IN RCRD: CPT

## 2024-08-23 PROCEDURE — 3074F SYST BP LT 130 MM HG: CPT

## 2024-08-23 PROCEDURE — 3080F DIAST BP >= 90 MM HG: CPT

## 2024-08-23 PROCEDURE — 72072 X-RAY EXAM THORAC SPINE 3VWS: CPT

## 2024-08-23 PROCEDURE — 1159F MED LIST DOCD IN RCRD: CPT

## 2024-08-23 PROCEDURE — 72040 X-RAY EXAM NECK SPINE 2-3 VW: CPT
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
## 2024-08-23 NOTE — TELEPHONE ENCOUNTER
Caller: Ronald Bond    Relationship: Self    Best call back number: [phone]     What orders are you requesting : NECK XRAY ORDER    In what timeframe would the patient need to come in: ASAP    Where will you receive your lab/imaging services: OFFICE    Additional notes: PATIENT THINKS HE HURT IT EXERCISING AND HIS NECK IS MAKING A CLICKING NOISE    PLEASE CALL WHEN ORDER IS ENTERED - WOULD LIKE TO GET THIS DONE TODAY

## 2024-08-26 ENCOUNTER — TELEPHONE (OUTPATIENT)
Dept: CARDIOLOGY | Facility: HOSPITAL | Age: 79
End: 2024-08-26
Payer: MEDICARE

## 2024-08-26 NOTE — TELEPHONE ENCOUNTER
Returned patient call regarding needing a copy of his EKG, I informed patient the last EKG done in our office was over two years ago, patient was requesting for the EKG completed with Dr. Jaquez I informed patient he would have to reach out to that practice for a copy of that EKG. Patient acknowledged and understood.

## 2024-09-10 DIAGNOSIS — Z98.890 HISTORY OF LAMINECTOMY: Primary | ICD-10-CM

## 2024-09-10 DIAGNOSIS — M54.9 UPPER BACK PAIN: ICD-10-CM

## 2024-09-10 DIAGNOSIS — S22.000A COMPRESSION FRACTURE OF BODY OF THORACIC VERTEBRA: ICD-10-CM

## 2024-09-10 DIAGNOSIS — M51.34 DDD (DEGENERATIVE DISC DISEASE), THORACIC: ICD-10-CM

## 2024-09-10 DIAGNOSIS — M54.2 NECK PAIN ON RIGHT SIDE: ICD-10-CM

## 2024-09-11 ENCOUNTER — TELEPHONE (OUTPATIENT)
Dept: FAMILY MEDICINE CLINIC | Facility: CLINIC | Age: 79
End: 2024-09-11
Payer: MEDICARE

## 2024-09-11 DIAGNOSIS — Z98.890 HISTORY OF LAMINECTOMY: ICD-10-CM

## 2024-09-11 DIAGNOSIS — M54.9 UPPER BACK PAIN: Primary | ICD-10-CM

## 2024-09-11 DIAGNOSIS — S22.000A COMPRESSION FRACTURE OF BODY OF THORACIC VERTEBRA: ICD-10-CM

## 2024-09-11 DIAGNOSIS — M51.34 DDD (DEGENERATIVE DISC DISEASE), THORACIC: ICD-10-CM

## 2024-09-11 DIAGNOSIS — M24.80 CERVICAL SPINE CREPITUS: ICD-10-CM

## 2024-09-11 DIAGNOSIS — M54.2 NECK PAIN ON RIGHT SIDE: ICD-10-CM

## 2024-09-11 NOTE — TELEPHONE ENCOUNTER
Spoke to patient.  Neurosurgery has asked for MRIs of cervical spine and thoracic spine.  Will go ahead and order these.  Patient denies any changes in his symptoms.  He states he is okay with doing an MRI and has had these done in the past.  Denies any metal implants or pacemakers.

## 2024-09-12 NOTE — TELEPHONE ENCOUNTER
Patient called back to get number to CS to schedule MRI; ph:  [phone]    Patient would also like a reminder about what was on X-ray Results; patient would like a call back.

## 2024-09-12 NOTE — TELEPHONE ENCOUNTER
Left voicemail for patient to call back.  No acute changes in imaging of patient's neck or upper back.  Shows his previous surgeries. The clicking he hears is likely arthritis changes. I do think its reasonable to see neurosurgery with hx of of spinal surgeries and to reestablish. Not sure if you are a good candidate for a surgical intervention but establishing is a good idea.  There are also some chronic compression fractures in his thoracic spine likely causing pain.  The neurosurgeon requested MRIs in order to establish sooner.

## 2024-09-14 DIAGNOSIS — I10 ESSENTIAL HYPERTENSION: ICD-10-CM

## 2024-09-16 ENCOUNTER — TELEPHONE (OUTPATIENT)
Dept: FAMILY MEDICINE CLINIC | Facility: CLINIC | Age: 79
End: 2024-09-16
Payer: MEDICARE

## 2024-09-17 RX ORDER — LISINOPRIL 5 MG/1
TABLET ORAL
Qty: 90 TABLET | Refills: 1 | Status: SHIPPED | OUTPATIENT
Start: 2024-09-17

## 2024-09-26 RX ORDER — SODIUM PICOSULFATE, MAGNESIUM OXIDE, AND ANHYDROUS CITRIC ACID 10; 3.5; 12 MG/160ML; G/160ML; G/160ML
350 LIQUID ORAL TAKE AS DIRECTED
Qty: 350 ML | Refills: 0 | Status: SHIPPED | OUTPATIENT
Start: 2024-09-26

## 2024-09-30 ENCOUNTER — PRE-ADMISSION TESTING (OUTPATIENT)
Dept: PREADMISSION TESTING | Facility: HOSPITAL | Age: 79
End: 2024-09-30
Payer: MEDICARE

## 2024-09-30 VITALS — HEIGHT: 69 IN | BODY MASS INDEX: 32.13 KG/M2 | WEIGHT: 216.93 LBS

## 2024-09-30 LAB
DEPRECATED RDW RBC AUTO: 45.6 FL (ref 37–54)
ERYTHROCYTE [DISTWIDTH] IN BLOOD BY AUTOMATED COUNT: 13.2 % (ref 12.3–15.4)
HCT VFR BLD AUTO: 40.6 % (ref 37.5–51)
HGB BLD-MCNC: 13.4 G/DL (ref 13–17.7)
MCH RBC QN AUTO: 31.1 PG (ref 26.6–33)
MCHC RBC AUTO-ENTMCNC: 33 G/DL (ref 31.5–35.7)
MCV RBC AUTO: 94.2 FL (ref 79–97)
PLATELET # BLD AUTO: 191 10*3/MM3 (ref 140–450)
PMV BLD AUTO: 10.1 FL (ref 6–12)
RBC # BLD AUTO: 4.31 10*6/MM3 (ref 4.14–5.8)
WBC NRBC COR # BLD AUTO: 6.99 10*3/MM3 (ref 3.4–10.8)

## 2024-09-30 PROCEDURE — 36415 COLL VENOUS BLD VENIPUNCTURE: CPT

## 2024-09-30 PROCEDURE — 85027 COMPLETE CBC AUTOMATED: CPT

## 2024-09-30 PROCEDURE — 93005 ELECTROCARDIOGRAM TRACING: CPT

## 2024-09-30 NOTE — PAT
An arrival time for procedure was not provided during PAT visit. If patient had any questions or concerns about their arrival time, they were instructed to contact their surgeon/physician.  Additionally, if the patient referred to an arrival time that was acquired from their my chart account, patient was encouraged to verify that time with their surgeon/physician. Arrival times are NOT provided in Pre Admission Testing Department.    Per Anesthesia Request, patient instructed not to take their ACE/ARB medications on the AM of surgery.

## 2024-10-01 ENCOUNTER — HOSPITAL ENCOUNTER (OUTPATIENT)
Dept: MRI IMAGING | Facility: HOSPITAL | Age: 79
Discharge: HOME | End: 2024-10-01
Payer: MEDICARE

## 2024-10-01 DIAGNOSIS — Z98.890 HISTORY OF LAMINECTOMY: ICD-10-CM

## 2024-10-01 DIAGNOSIS — M51.34 DDD (DEGENERATIVE DISC DISEASE), THORACIC: ICD-10-CM

## 2024-10-01 DIAGNOSIS — M24.80 CERVICAL SPINE CREPITUS: ICD-10-CM

## 2024-10-01 DIAGNOSIS — M54.2 NECK PAIN ON RIGHT SIDE: ICD-10-CM

## 2024-10-01 DIAGNOSIS — S22.000A COMPRESSION FRACTURE OF BODY OF THORACIC VERTEBRA: ICD-10-CM

## 2024-10-01 DIAGNOSIS — M54.9 UPPER BACK PAIN: ICD-10-CM

## 2024-10-01 PROCEDURE — 72146 MRI CHEST SPINE W/O DYE: CPT

## 2024-10-01 PROCEDURE — 72141 MRI NECK SPINE W/O DYE: CPT

## 2024-10-02 LAB
QT INTERVAL: 478 MS
QTC INTERVAL: 481 MS

## 2024-10-04 ENCOUNTER — TELEPHONE (OUTPATIENT)
Dept: FAMILY MEDICINE CLINIC | Facility: CLINIC | Age: 79
End: 2024-10-04
Payer: MEDICARE

## 2024-10-04 NOTE — TELEPHONE ENCOUNTER
Spoke to patient regarding his MRI results.    Significant arthritis and narrowing of his neuroforamen in the cervical spine.  No stenosis of his spine.    His thoracic spine does show some multilevel arthritis and degenerative disc disease as well that is likely chronic.    Patient states he continues to hear a clicking like sound when he turns his head to 1 side and another.  States pain is okay and decently manageable with Tylenol.  Gave him the scheduling phone number to call back to reschedule with neurosurgery for further evaluation.  Discussed with him they may recommend physical therapy or other pain management options, but I would like him to be evaluated by them first with the changes in his symptoms.  He states he will call them today.  Patient to notify me of any new or acute changes.    Patient would states he would also like to discuss weight loss medications at his next appointment.  Advised him to call his insurance company and to see what coverage looks like for him specifically.  He verbalized understanding and agreed to plan.

## 2024-10-08 ENCOUNTER — ANESTHESIA (OUTPATIENT)
Dept: GASTROENTEROLOGY | Facility: HOSPITAL | Age: 79
End: 2024-10-08
Payer: MEDICARE

## 2024-10-08 ENCOUNTER — ANESTHESIA EVENT (OUTPATIENT)
Dept: GASTROENTEROLOGY | Facility: HOSPITAL | Age: 79
End: 2024-10-08
Payer: MEDICARE

## 2024-10-08 ENCOUNTER — HOSPITAL ENCOUNTER (OUTPATIENT)
Facility: HOSPITAL | Age: 79
Setting detail: HOSPITAL OUTPATIENT SURGERY
Discharge: HOME | End: 2024-10-08
Attending: INTERNAL MEDICINE | Admitting: INTERNAL MEDICINE
Payer: MEDICARE

## 2024-10-08 VITALS
HEIGHT: 69 IN | TEMPERATURE: 98.3 F | HEART RATE: 65 BPM | RESPIRATION RATE: 21 BRPM | OXYGEN SATURATION: 92 % | DIASTOLIC BLOOD PRESSURE: 98 MMHG | BODY MASS INDEX: 29.62 KG/M2 | SYSTOLIC BLOOD PRESSURE: 157 MMHG | WEIGHT: 200 LBS

## 2024-10-08 DIAGNOSIS — R19.5 POSITIVE COLORECTAL CANCER SCREENING USING DNA-BASED STOOL TEST: ICD-10-CM

## 2024-10-08 LAB — POTASSIUM SERPL-SCNC: 3.2 MMOL/L (ref 3.5–5.2)

## 2024-10-08 PROCEDURE — 25010000002 PROPOFOL 10 MG/ML EMULSION: Performed by: NURSE ANESTHETIST, CERTIFIED REGISTERED

## 2024-10-08 PROCEDURE — 25810000003 LACTATED RINGERS PER 1000 ML: Performed by: NURSE ANESTHETIST, CERTIFIED REGISTERED

## 2024-10-08 PROCEDURE — 84132 ASSAY OF SERUM POTASSIUM: CPT | Performed by: STUDENT IN AN ORGANIZED HEALTH CARE EDUCATION/TRAINING PROGRAM

## 2024-10-08 PROCEDURE — 25810000003 SODIUM CHLORIDE 0.9 % SOLUTION: Performed by: STUDENT IN AN ORGANIZED HEALTH CARE EDUCATION/TRAINING PROGRAM

## 2024-10-08 PROCEDURE — 88305 TISSUE EXAM BY PATHOLOGIST: CPT | Performed by: INTERNAL MEDICINE

## 2024-10-08 PROCEDURE — 25010000002 LIDOCAINE PF 1% 1 % SOLUTION: Performed by: NURSE ANESTHETIST, CERTIFIED REGISTERED

## 2024-10-08 RX ORDER — PROPOFOL 10 MG/ML
VIAL (ML) INTRAVENOUS AS NEEDED
Status: DISCONTINUED | OUTPATIENT
Start: 2024-10-08 | End: 2024-10-08 | Stop reason: SURG

## 2024-10-08 RX ORDER — SODIUM CHLORIDE, SODIUM LACTATE, POTASSIUM CHLORIDE, CALCIUM CHLORIDE 600; 310; 30; 20 MG/100ML; MG/100ML; MG/100ML; MG/100ML
9 INJECTION, SOLUTION INTRAVENOUS CONTINUOUS
Status: DISCONTINUED | OUTPATIENT
Start: 2024-10-08 | End: 2024-10-08 | Stop reason: HOSPADM

## 2024-10-08 RX ORDER — SODIUM CHLORIDE 0.9 % (FLUSH) 0.9 %
10 SYRINGE (ML) INJECTION EVERY 12 HOURS SCHEDULED
Status: DISCONTINUED | OUTPATIENT
Start: 2024-10-08 | End: 2024-10-08 | Stop reason: HOSPADM

## 2024-10-08 RX ORDER — FAMOTIDINE 10 MG/ML
20 INJECTION, SOLUTION INTRAVENOUS ONCE
Status: COMPLETED | OUTPATIENT
Start: 2024-10-08 | End: 2024-10-08

## 2024-10-08 RX ORDER — MIDAZOLAM HYDROCHLORIDE 1 MG/ML
0.5 INJECTION INTRAMUSCULAR; INTRAVENOUS
Status: DISCONTINUED | OUTPATIENT
Start: 2024-10-08 | End: 2024-10-08 | Stop reason: HOSPADM

## 2024-10-08 RX ORDER — LIDOCAINE HYDROCHLORIDE 10 MG/ML
INJECTION, SOLUTION EPIDURAL; INFILTRATION; INTRACAUDAL; PERINEURAL AS NEEDED
Status: DISCONTINUED | OUTPATIENT
Start: 2024-10-08 | End: 2024-10-08 | Stop reason: SURG

## 2024-10-08 RX ORDER — LIDOCAINE HYDROCHLORIDE 10 MG/ML
0.5 INJECTION, SOLUTION EPIDURAL; INFILTRATION; INTRACAUDAL; PERINEURAL ONCE AS NEEDED
Status: DISCONTINUED | OUTPATIENT
Start: 2024-10-08 | End: 2024-10-08 | Stop reason: HOSPADM

## 2024-10-08 RX ORDER — FAMOTIDINE 20 MG/1
20 TABLET, FILM COATED ORAL ONCE
Status: DISCONTINUED | OUTPATIENT
Start: 2024-10-08 | End: 2024-10-08 | Stop reason: HOSPADM

## 2024-10-08 RX ORDER — SODIUM CHLORIDE 9 MG/ML
50 INJECTION, SOLUTION INTRAVENOUS CONTINUOUS
Status: DISCONTINUED | OUTPATIENT
Start: 2024-10-08 | End: 2024-10-08 | Stop reason: HOSPADM

## 2024-10-08 RX ORDER — SODIUM CHLORIDE, SODIUM LACTATE, POTASSIUM CHLORIDE, CALCIUM CHLORIDE 600; 310; 30; 20 MG/100ML; MG/100ML; MG/100ML; MG/100ML
INJECTION, SOLUTION INTRAVENOUS CONTINUOUS PRN
Status: DISCONTINUED | OUTPATIENT
Start: 2024-10-08 | End: 2024-10-08 | Stop reason: SURG

## 2024-10-08 RX ORDER — SODIUM CHLORIDE 9 MG/ML
40 INJECTION, SOLUTION INTRAVENOUS AS NEEDED
Status: DISCONTINUED | OUTPATIENT
Start: 2024-10-08 | End: 2024-10-08 | Stop reason: HOSPADM

## 2024-10-08 RX ORDER — SODIUM CHLORIDE 0.9 % (FLUSH) 0.9 %
10 SYRINGE (ML) INJECTION AS NEEDED
Status: DISCONTINUED | OUTPATIENT
Start: 2024-10-08 | End: 2024-10-08 | Stop reason: HOSPADM

## 2024-10-08 RX ADMIN — PROPOFOL 50 MG: 10 INJECTION, EMULSION INTRAVENOUS at 12:33

## 2024-10-08 RX ADMIN — PROPOFOL 80 MG: 10 INJECTION, EMULSION INTRAVENOUS at 12:08

## 2024-10-08 RX ADMIN — FAMOTIDINE 20 MG: 10 INJECTION, SOLUTION INTRAVENOUS at 12:04

## 2024-10-08 RX ADMIN — PROPOFOL 50 MG: 10 INJECTION, EMULSION INTRAVENOUS at 12:28

## 2024-10-08 RX ADMIN — PROPOFOL 50 MG: 10 INJECTION, EMULSION INTRAVENOUS at 12:24

## 2024-10-08 RX ADMIN — PROPOFOL 40 MG: 10 INJECTION, EMULSION INTRAVENOUS at 12:14

## 2024-10-08 RX ADMIN — SODIUM CHLORIDE 50 ML/HR: 9 INJECTION, SOLUTION INTRAVENOUS at 12:04

## 2024-10-08 RX ADMIN — PROPOFOL 30 MG: 10 INJECTION, EMULSION INTRAVENOUS at 12:11

## 2024-10-08 RX ADMIN — PROPOFOL 50 MG: 10 INJECTION, EMULSION INTRAVENOUS at 12:17

## 2024-10-08 RX ADMIN — PROPOFOL 50 MG: 10 INJECTION, EMULSION INTRAVENOUS at 12:20

## 2024-10-08 RX ADMIN — SODIUM CHLORIDE, POTASSIUM CHLORIDE, SODIUM LACTATE AND CALCIUM CHLORIDE: 600; 310; 30; 20 INJECTION, SOLUTION INTRAVENOUS at 12:06

## 2024-10-08 RX ADMIN — LIDOCAINE HYDROCHLORIDE 50 MG: 10 INJECTION, SOLUTION EPIDURAL; INFILTRATION; INTRACAUDAL; PERINEURAL at 12:08

## 2024-10-08 NOTE — ANESTHESIA POSTPROCEDURE EVALUATION
Patient: Ronald Bond    Procedure Summary       Date: 10/08/24 Room / Location:  [facility]    Anesthesia Start: 1206 Anesthesia Stop: 1245    Procedure: COLONOSCOPY Diagnosis:       Positive colorectal cancer screening using DNA-based stool test      (Positive colorectal cancer screening using DNA-based stool test [R19.5])    Surgeons: Dallas Jaquez MD Provider: Ralph Buckley MD    Anesthesia Type: general ASA Status: 3            Anesthesia Type: general    Vitals  No vitals data found for the desired time range.          Post Anesthesia Care and Evaluation    Patient location during evaluation: PACU  Patient participation: complete - patient participated  Level of consciousness: awake and alert  Pain management: adequate    Airway patency: patent  Anesthetic complications: No anesthetic complications  PONV Status: none  Cardiovascular status: hemodynamically stable and acceptable  Respiratory status: nonlabored ventilation, acceptable and nasal cannula  Hydration status: acceptable

## 2024-10-08 NOTE — H&P
[facility]    Referring Provider: Dallas Jaquez MD    Reason for Consultation: here for colonoscopy    Chief complaint here for colonoscopy    History of present illness:  Ronald Bond is a 79 y.o. male who presents to inpatient endoscopy for colonoscopy. Cologuard +. H/o inadequate prep, this is a rescheduled colonoscopy. He thinks he took enough of the 2 day bowel prep.    Allergies:  Acetaminophen, Ketorolac, and Lyrica [pregabalin]    Scheduled Meds:  famotidine, 20 mg, Oral, Once  sodium chloride, 10 mL, Intravenous, Q12H         Infusions:  lactated ringers, 9 mL/hr  sodium chloride, 50 mL/hr, Last Rate: 50 mL/hr (10/08/24 1204)        PRN Meds:    lidocaine PF 1%    midazolam    sodium chloride    sodium chloride    Home Meds:  Medications Prior to Admission   Medication Sig Dispense Refill Last Dose    albuterol sulfate  (90 Base) MCG/ACT inhaler Inhale 2 puffs Every 4 (Four) Hours As Needed for Wheezing or Shortness of Air. 18 g 5 Past Month    AndroGel Pump 20.25 MG/ACT (1.62%) gel Apply 40.5 mg (2 pumps) to shoulders daily 75 g 5 10/8/2024    Arnuity Ellipta 50 MCG/ACT aerosol powder  INHALE 1 PUFF BY MOUTH DAILY 30 each 0 10/7/2024    aspirin 81 MG EC tablet Take 1 tablet by mouth Daily. 90 tablet 3 10/7/2024    carvedilol (COREG) 25 MG tablet TAKE 1 TABLET BY MOUTH TWO TIMES A DAY WITH FOOD 180 tablet 0 10/7/2024    ferrous gluconate (FERGON) 324 MG tablet Take 1 tablet by mouth Daily With Breakfast. 90 tablet 3 10/7/2024    fluticasone (FLOVENT HFA) 44 MCG/ACT inhaler Inhale 1 puff 2 (Two) Times a Day As Needed (shortness of air). 1 each 0 Past Month    hydrALAZINE (APRESOLINE) 50 MG tablet    Past Month    hydrOXYzine (ATARAX) 50 MG tablet TAKE 1 TABLET BY MOUTH EVERY 8 HOURS AS NEEDED FOR ANXIETY 90 tablet 0 Past Month    linaclotide (Linzess) 72 MCG capsule capsule Take 1 capsule by mouth Every Morning Before Breakfast. 30 capsule 0 Past Month    lisinopril  (PRINIVIL,ZESTRIL) 5 MG tablet TAKE 1 TABLET BY MOUTH ONCE DAILY. 90 tablet 1 10/7/2024    loratadine (CLARITIN) 10 MG tablet Take 1 tablet by mouth Daily. 90 tablet 3 10/7/2024    METHADONE HCL PO Take 110 mg by mouth Daily.   10/7/2024    multivitamin with minerals (ONE-A-DAY 50 PLUS PO) Take 1 tablet by mouth Daily.   10/7/2024    pantoprazole (Protonix) 20 MG EC tablet Take 1 tablet by mouth Daily As Needed for Heartburn or Indigestion. 90 tablet 1 10/7/2024    polyethylene glycol (MIRALAX) 17 g packet Take 17 g by mouth Daily. 90 packet 3 Past Week    Probiotic Product capsule Take 1 capsule by mouth Daily.   Past Week    simvastatin (ZOCOR) 10 MG tablet Take 1 tablet by mouth every night at bedtime. 90 tablet 1 10/7/2024    vitamin B-12 (CYANOCOBALAMIN) 1000 MCG tablet Take 1 tablet by mouth Daily.   Past Week    vitamin D3 125 MCG (5000 UT) capsule capsule Take 1 capsule by mouth Daily.   Past Week    Sod Picosulfate-Mag Ox-Cit Acd (Clenpiq) 10-3.5-12 MG-GM -GM/160ML solution Take 350 mL by mouth Take As Directed. 350 mL 0     Sod Picosulfate-Mag Ox-Cit Acd (Clenpiq) 10-3.5-12 MG-GM -GM/160ML solution Take 350 mL by mouth Take As Directed. 350 mL 0     Sod Picosulfate-Mag Ox-Cit Acd (Clenpiq) 10-3.5-12 MG-GM -GM/160ML solution Take 350 mL by mouth Take As Directed. 350 mL 0     torsemide (DEMADEX) 20 MG tablet Take 1 tablet by mouth Daily. 90 tablet 1        ROS: Review of Systems  All other systems reviewed and are negative.    PAST MED HX: Pt  has a past medical history of Asthma, Broken ribs, CAD (coronary artery disease), Congestive heart failure, Depression with anxiety, Endocarditis, GERD (gastroesophageal reflux disease), H/O chronic hepatitis, H/O traumatic subdural hematoma (01/09/2015), Hepatitis C infection, Hiatal hernia, History of arm fracture, Hypertension, Hypogonadism in male (06/19/2016), Osteoarthritis, Osteoporosis, Polysubstance abuse, Redundant colon, and SBO (small bowel obstruction)  "(10/2011).  PAST SURG HX: Pt  has a past surgical history that includes Cholecystectomy; Ventral hernia repair (08/2011); Wrist surgery (Right, 05/2014); Cervical laminectomy; Fracture surgery (Left); Nissen fundoplication (06/2016); Abdominal adhesion surgery (10/2011); Bone graft (Right, 2008); Exploratory Laparotomy (N/A, 04/23/2018); Small Bowel Resection (N/A, 04/23/2018); Finger fracture surgery; Colonoscopy; Cataract extraction (Right, 09/2023); Colonoscopy (N/A, 3/19/2024); and Esophagogastroduodenoscopy (N/A, 3/19/2024).  FAM HX: family history includes Cirrhosis in his brother; Heart disease in his mother; Hepatitis in his brother; Hypertension in his mother; Multiple sclerosis in his father; No Known Problems in his maternal grandfather, maternal grandmother, paternal grandfather, and paternal grandmother; Prostate cancer in his maternal uncle; Stroke in his mother.  SOC HX: Pt  reports that he quit smoking about 55 years ago. His smoking use included cigarettes. He started smoking about 65 years ago. He has a 10 pack-year smoking history. He has been exposed to tobacco smoke. He has never used smokeless tobacco. He reports current alcohol use of about 2.0 standard drinks of alcohol per week. He reports that he does not use drugs.    /100 (BP Location: Left arm, Patient Position: Lying)   Pulse 69   Temp 98.2 °F (36.8 °C) (Temporal)   Resp 22   Ht 175.3 cm (69\")   Wt 90.7 kg (200 lb)   SpO2 91%   BMI 29.53 kg/m²     Physical Exam  Wt Readings from Last 3 Encounters:   10/08/24 90.7 kg (200 lb)   09/30/24 98.4 kg (216 lb 14.9 oz)   08/23/24 98.2 kg (216 lb 6.4 oz)   ,body mass index is 29.53 kg/m².    General Appearance:  Vitals as above. no acute distress  Head/face:  Normocephalic, atraumatic  Eyes:   EOMI, no conjunctivitis or icterus   Nose/Sinuses:  Nares patent bilaterally without discharge or lesions  Mouth/Throat:  Normal oral movements without dyskinesia  Neck:  trachea is midline, no " thyromegaly  Lungs:  Normal work of breathing effort, no overt rales  Heart:  Regular rate, no overt palpable thrill or grade VI M  Abdomen:  Nondistended, no guarding or rebound tenderness  Neurologic:  Alert; no focal deficits; age appropriate behavior and speech  Psychiatric: mood and affect are congruent  Vascular: extremities without edema  Skin: no rash or cyanosis.          Results Review:   I reviewed the patient's new clinical results.    Lab Results   Component Value Date    WBC 6.99 09/30/2024    HGB 13.4 09/30/2024    HCT 40.6 09/30/2024    MCV 94.2 09/30/2024     09/30/2024       Lab Results   Component Value Date    GLUCOSE 104 (H) 06/06/2024    BUN 16 06/06/2024    CREATININE 1.05 06/06/2024    EGFRIFNONA >60 02/23/2022    EGFRIFAFRI >60 02/23/2022    BCR 15.2 06/06/2024    CO2 32.6 (H) 06/06/2024    CALCIUM 9.5 06/06/2024    PROTENTOTREF 7.4 09/16/2021    ALBUMIN 4.0 06/06/2024    LABIL2 1.5 09/16/2021    AST 18 06/06/2024    ALT 6 06/06/2024       ASSESSMENTS/PLANS    1.)  + cologuard   2) history of inadequate prep  To colonoscopy  The risk of endoscopy was discussed including the risk of anesthesia, bowel perforation, bleeding, missed lesion, infection, and death should a severe complication occur.  The patient determined that the diagnostic benefit outweighed the risk.           I discussed the patient's findings and my recommendations with the patient    Dallas Jaquez MD  10/08/24  12:06 EDT

## 2024-10-08 NOTE — ANESTHESIA PREPROCEDURE EVALUATION
Anesthesia Evaluation     Patient summary reviewed and Nursing notes reviewed   no history of anesthetic complications:   NPO Solid Status: > 8 hours  NPO Liquid Status: > 2 hours           Airway   Mallampati: II  TM distance: >3 FB  Neck ROM: full  No difficulty expected  Dental    (+) edentulous    Pulmonary - normal exam    breath sounds clear to auscultation  (+) a smoker (Remote; quit 1969) Former, asthma (childhood),  Cardiovascular - normal exam    ECG reviewed  Rhythm: regular  Rate: normal    (+) hypertension, CAD (nonobstructive), CHF Diastolic >=55%, hyperlipidemia    ROS comment: ECHO 01/2023: EF 51%, G1DD, Mild TR    Neuro/Psych  (+) tremors, psychiatric history Anxiety and Depression  GI/Hepatic/Renal/Endo    (+) obesity, hiatal hernia, GERD, hepatitis (s/p treatment; virus cleared per [facility]ology) C, liver disease (Chronic hepatitis)    ROS Comment: Dysphagia    Musculoskeletal     Abdominal    Substance History   (+) drug use (hx of polysubstance abuse; on methadone)     OB/GYN          Other   arthritis,                   Anesthesia Plan    ASA 3     general   total IV anesthesia  (Propofol infusion)  intravenous induction     Anesthetic plan, risks, benefits, and alternatives have been provided, discussed and informed consent has been obtained with: patient.    Plan discussed with CRNA.    CODE STATUS:

## 2024-10-25 ENCOUNTER — OFFICE VISIT (OUTPATIENT)
Dept: NEUROSURGERY | Facility: CLINIC | Age: 79
End: 2024-10-25
Payer: MEDICARE

## 2024-10-25 VITALS — HEIGHT: 69 IN | BODY MASS INDEX: 32.14 KG/M2 | TEMPERATURE: 97.5 F | WEIGHT: 217 LBS

## 2024-10-25 DIAGNOSIS — M50.30 DEGENERATION OF CERVICAL INTERVERTEBRAL DISC: Primary | ICD-10-CM
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
## 2024-10-30 ENCOUNTER — TELEPHONE (OUTPATIENT)
Dept: FAMILY MEDICINE CLINIC | Facility: CLINIC | Age: 79
End: 2024-10-30

## 2024-10-30 ENCOUNTER — OFFICE VISIT (OUTPATIENT)
Dept: FAMILY MEDICINE CLINIC | Facility: CLINIC | Age: 79
End: 2024-10-30
Payer: MEDICARE

## 2024-10-30 VITALS
BODY MASS INDEX: 31.99 KG/M2 | DIASTOLIC BLOOD PRESSURE: 90 MMHG | HEART RATE: 62 BPM | OXYGEN SATURATION: 90 % | HEIGHT: 69 IN | SYSTOLIC BLOOD PRESSURE: 138 MMHG | WEIGHT: 216 LBS

## 2024-10-30 DIAGNOSIS — I10 PRIMARY HYPERTENSION: Primary | Chronic | ICD-10-CM

## 2024-10-30 DIAGNOSIS — M50.30 DEGENERATION OF CERVICAL INTERVERTEBRAL DISC: ICD-10-CM

## 2024-10-30 PROBLEM — R19.5 POSITIVE COLORECTAL CANCER SCREENING USING DNA-BASED STOOL TEST: Status: RESOLVED | Noted: 2024-08-14 | Resolved: 2024-10-30

## 2024-10-30 PROCEDURE — 1159F MED LIST DOCD IN RCRD: CPT

## 2024-10-30 PROCEDURE — 3080F DIAST BP >= 90 MM HG: CPT

## 2024-10-30 PROCEDURE — 1160F RVW MEDS BY RX/DR IN RCRD: CPT

## 2024-10-30 PROCEDURE — 3075F SYST BP GE 130 - 139MM HG: CPT

## 2024-10-30 PROCEDURE — 1125F AMNT PAIN NOTED PAIN PRSNT: CPT

## 2024-10-30 PROCEDURE — 99214 OFFICE O/P EST MOD 30 MIN: CPT

## 2024-10-30 RX ORDER — LISINOPRIL 10 MG/1
10 TABLET ORAL DAILY
Qty: 90 TABLET | Refills: 1 | Status: SHIPPED | OUTPATIENT
Start: 2024-10-30

## 2024-10-30 NOTE — ASSESSMENT & PLAN NOTE
Hypertension is borderline  Medication changes per orders.  Dietary sodium restriction.  Weight loss.  Regular aerobic exercise.  Monitor blood pressure at home.  Patient to contact me if greater than 130/80.  Plan to repeat CMP in 2 weeks at his visit with the increase in dosage.    Blood pressure will be reassessedin 2 weeks.

## 2024-10-30 NOTE — TELEPHONE ENCOUNTER
RELAY:  ATTEMPTED TO CALL TO SCHEDULE A   Return in about 2 weeks (around 11/13/2024) for recheck BP , Medicare Wellness.
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
## 2024-11-01 DIAGNOSIS — E78.5 HYPERLIPIDEMIA LDL GOAL <100: ICD-10-CM

## 2024-11-01 DIAGNOSIS — R60.0 LOWER LEG EDEMA: ICD-10-CM

## 2024-11-01 DIAGNOSIS — Z76.0 ENCOUNTER FOR MEDICATION REFILL: ICD-10-CM

## 2024-11-01 DIAGNOSIS — K21.9 GASTROESOPHAGEAL REFLUX DISEASE WITHOUT ESOPHAGITIS: Chronic | ICD-10-CM

## 2024-11-01 RX ORDER — SIMVASTATIN 10 MG
10 TABLET ORAL
Qty: 30 TABLET | Refills: 1 | Status: SHIPPED | OUTPATIENT
Start: 2024-11-01

## 2024-11-01 RX ORDER — TORSEMIDE 20 MG/1
20 TABLET ORAL DAILY
Qty: 30 TABLET | Refills: 1 | Status: SHIPPED | OUTPATIENT
Start: 2024-11-01

## 2024-11-01 RX ORDER — PANTOPRAZOLE SODIUM 20 MG/1
20 TABLET, DELAYED RELEASE ORAL DAILY PRN
Qty: 30 TABLET | Refills: 1 | Status: SHIPPED | OUTPATIENT
Start: 2024-11-01

## 2024-11-01 NOTE — TELEPHONE ENCOUNTER
Rx Refill Note  Requested Prescriptions     Pending Prescriptions Disp Refills    simvastatin (ZOCOR) 10 MG tablet [Pharmacy Med Name: SIMVASTATIN 10 MG TABLET] 90 tablet 1     Sig: TAKE 1 TABLET BY MOUTH EVERY NIGHT AT BEDTIME    pantoprazole (PROTONIX) 20 MG EC tablet [Pharmacy Med Name: PANTOPRAZOLE SOD DR 20 MG TAB] 90 tablet 1     Sig: TAKE 1 TABLET BY MOUTH DAILY AS NEEDED FOR HEARTBURN OR INDIGESTION    torsemide (DEMADEX) 20 MG tablet [Pharmacy Med Name: TORSEMIDE 20 MG TABLET] 90 tablet 1     Sig: TAKE 1 TABLET BY MOUTH DAILY      Last office visit with prescribing clinician: 4/30/2024   Last telemedicine visit with prescribing clinician: Visit date not found   Next office visit with prescribing clinician: Visit date not found                         Would you like a call back once the refill request has been completed: [] Yes [] No    If the office needs to give you a call back, can they leave a voicemail: [] Yes [] No    Giovana Waite MA  11/01/24, 13:10 EDT

## 2024-11-07 ENCOUNTER — TELEPHONE (OUTPATIENT)
Dept: FAMILY MEDICINE CLINIC | Facility: CLINIC | Age: 79
End: 2024-11-07

## 2024-11-07 NOTE — TELEPHONE ENCOUNTER
Caller: Ronald Bond    Relationship: Self    Best call back number:       [phone] (Mobile)     What is the best time to reach you:     ANY TIME    Who are you requesting to speak with (clinical staff, provider,  specific staff member):     CATHERINE MAXWELL    What was the call regarding:     PATIENT REQUESTED A CALL BACK FROM CATHERINE MAXWELL TO DISCUSS FURTHER AN EKG ORDER

## 2024-11-07 NOTE — TELEPHONE ENCOUNTER
Spoke to patient.  Requesting cardiologist number.  He states his methadone provider would like him to discuss increasing his dose with his cardiologist. Number given. Patient has no further questions at this time.

## 2024-11-11 ENCOUNTER — OFFICE VISIT (OUTPATIENT)
Dept: ENDOCRINOLOGY | Facility: CLINIC | Age: 79
End: 2024-11-11
Payer: MEDICARE

## 2024-11-11 ENCOUNTER — TELEPHONE (OUTPATIENT)
Dept: CARDIOLOGY | Facility: HOSPITAL | Age: 79
End: 2024-11-11
Payer: MEDICARE

## 2024-11-11 ENCOUNTER — TELEPHONE (OUTPATIENT)
Dept: FAMILY MEDICINE CLINIC | Facility: CLINIC | Age: 79
End: 2024-11-11
Payer: MEDICARE

## 2024-11-11 VITALS
SYSTOLIC BLOOD PRESSURE: 142 MMHG | OXYGEN SATURATION: 94 % | HEART RATE: 64 BPM | BODY MASS INDEX: 32.5 KG/M2 | DIASTOLIC BLOOD PRESSURE: 86 MMHG | HEIGHT: 69 IN | WEIGHT: 219.4 LBS

## 2024-11-11 DIAGNOSIS — Z12.5 SCREENING PSA (PROSTATE SPECIFIC ANTIGEN): ICD-10-CM

## 2024-11-11 DIAGNOSIS — E29.1 SECONDARY MALE HYPOGONADISM: Primary | ICD-10-CM

## 2024-11-11 DIAGNOSIS — Z51.81 MEDICATION MONITORING ENCOUNTER: Primary | ICD-10-CM

## 2024-11-11 DIAGNOSIS — E55.9 VITAMIN D DEFICIENCY: ICD-10-CM

## 2024-11-11 DIAGNOSIS — N25.81 SECONDARY HYPERPARATHYROIDISM: ICD-10-CM

## 2024-11-11 DIAGNOSIS — M81.0 AGE-RELATED OSTEOPOROSIS WITHOUT CURRENT PATHOLOGICAL FRACTURE: ICD-10-CM

## 2024-11-11 LAB
25(OH)D3 SERPL-MCNC: 79.2 NG/ML (ref 30–100)
ALBUMIN SERPL-MCNC: 4.1 G/DL (ref 3.5–5.2)
ANION GAP SERPL CALCULATED.3IONS-SCNC: 6.5 MMOL/L (ref 5–15)
BUN SERPL-MCNC: 15 MG/DL (ref 8–23)
BUN/CREAT SERPL: 12.3 (ref 7–25)
CALCIUM SPEC-SCNC: 9 MG/DL (ref 8.6–10.5)
CHLORIDE SERPL-SCNC: 97 MMOL/L (ref 98–107)
CO2 SERPL-SCNC: 33.5 MMOL/L (ref 22–29)
CREAT SERPL-MCNC: 1.22 MG/DL (ref 0.76–1.27)
DEPRECATED RDW RBC AUTO: 46 FL (ref 37–54)
EGFRCR SERPLBLD CKD-EPI 2021: 60.7 ML/MIN/1.73
ERYTHROCYTE [DISTWIDTH] IN BLOOD BY AUTOMATED COUNT: 13.6 % (ref 12.3–15.4)
GLUCOSE SERPL-MCNC: 83 MG/DL (ref 65–99)
HCT VFR BLD AUTO: 40.5 % (ref 37.5–51)
HGB BLD-MCNC: 13.7 G/DL (ref 13–17.7)
MAGNESIUM SERPL-MCNC: 2.4 MG/DL (ref 1.6–2.4)
MCH RBC QN AUTO: 31.6 PG (ref 26.6–33)
MCHC RBC AUTO-ENTMCNC: 33.8 G/DL (ref 31.5–35.7)
MCV RBC AUTO: 93.5 FL (ref 79–97)
PHOSPHATE SERPL-MCNC: 4.2 MG/DL (ref 2.5–4.5)
PLATELET # BLD AUTO: 191 10*3/MM3 (ref 140–450)
PMV BLD AUTO: 11 FL (ref 6–12)
POTASSIUM SERPL-SCNC: 3.5 MMOL/L (ref 3.5–5.2)
PSA SERPL-MCNC: 1.14 NG/ML (ref 0–4)
PTH-INTACT SERPL-MCNC: 94.7 PG/ML (ref 15–65)
RBC # BLD AUTO: 4.33 10*6/MM3 (ref 4.14–5.8)
SODIUM SERPL-SCNC: 137 MMOL/L (ref 136–145)
TESTOST SERPL-MCNC: 182 NG/DL (ref 193–740)
WBC NRBC COR # BLD AUTO: 8.68 10*3/MM3 (ref 3.4–10.8)

## 2024-11-11 PROCEDURE — 3077F SYST BP >= 140 MM HG: CPT | Performed by: INTERNAL MEDICINE

## 2024-11-11 PROCEDURE — 36415 COLL VENOUS BLD VENIPUNCTURE: CPT | Performed by: INTERNAL MEDICINE

## 2024-11-11 PROCEDURE — 83970 ASSAY OF PARATHORMONE: CPT | Performed by: INTERNAL MEDICINE

## 2024-11-11 PROCEDURE — 99214 OFFICE O/P EST MOD 30 MIN: CPT | Performed by: INTERNAL MEDICINE

## 2024-11-11 PROCEDURE — 80069 RENAL FUNCTION PANEL: CPT | Performed by: INTERNAL MEDICINE

## 2024-11-11 PROCEDURE — 3079F DIAST BP 80-89 MM HG: CPT | Performed by: INTERNAL MEDICINE

## 2024-11-11 PROCEDURE — 83735 ASSAY OF MAGNESIUM: CPT | Performed by: INTERNAL MEDICINE

## 2024-11-11 PROCEDURE — G0103 PSA SCREENING: HCPCS | Performed by: INTERNAL MEDICINE

## 2024-11-11 PROCEDURE — 82306 VITAMIN D 25 HYDROXY: CPT | Performed by: INTERNAL MEDICINE

## 2024-11-11 PROCEDURE — 84403 ASSAY OF TOTAL TESTOSTERONE: CPT | Performed by: INTERNAL MEDICINE

## 2024-11-11 PROCEDURE — 85027 COMPLETE CBC AUTOMATED: CPT | Performed by: INTERNAL MEDICINE

## 2024-11-11 NOTE — TELEPHONE ENCOUNTER
Caller: Ronald Bond    Relationship: Self    Best call back number: [phone]     What is the medical concern/diagnosis: HIGH BLOOD PRESSURE      What is the provider, practice or medical service name: [facility]        Any additional details: PATIENT IS REQUESTING A REFERRAL SO HE CAN GET CLEARED FOR AN INCREASE IN HIS [facility] MEDICATION.

## 2024-11-11 NOTE — TELEPHONE ENCOUNTER
Call to patient to per provider:    can you let this patient know that Morena Vences is his HF cardiologist but he needs to see a regular cardiologist. Looks like Morena has referred him but he didn't go. I will put new referral in.  -Tatiana Maxwell PA-C    Pt voiced understanding and said he had called to request the referral.

## 2024-11-12 NOTE — TELEPHONE ENCOUNTER
Marco Antonio Montemayor    Nursing Report ED to Floor:  Mental status: axo4  Ambulatory status: assistx1  Oxygen Therapy:  2L  Cardiac Rhythm: afib  Admitted from: home  Safety Concerns:  fall risk  Social Issues: none  ED Room #:  30    ED Nurse Phone Extension - [phone] or may call [phone].      HPI:   Chief Complaint   Patient presents with    Shortness of Breath    Exposure To Known Illness       Past Medical History:  Past Medical History:   Diagnosis Date    Abnormal ECG     Arrhythmia     Arthritis     Asthma     Cancer     skin cancer removed from various locations     Chronic diastolic congestive heart failure 09/14/2018    CKD (chronic kidney disease)     closer to stage 5    Coronary artery disease     Dry skin     Dyslipidemia 07/07/2016    Edema     Heart murmur     Hematoma     Hemodialysis access site with arteriovenous graft     History of transfusion     no transfusion reaction    Hypertension     Iron deficiency anemia     Long term current use of anticoagulant     ZULEIKA treated with BiPAP     Persistent atrial fibrillation 07/07/2016     Patient notes dyspnea, fatigue and palpitations during episodes of afib.  multiple external cardioversionsChads vasc 2, anticoagulated with Coumadin    Tinnitus     Wears glasses     readers        Past Surgical History:  Past Surgical History:   Procedure Laterality Date    ABLATION OF DYSRHYTHMIC FOCUS      APPENDECTOMY      ARTERIOVENOUS FISTULA/SHUNT SURGERY Left 08/04/2022    Procedure: UPPER EXTREMITY BRACHIOCEPHALIC ARTERIOVENOUS FISTULA FORMATION LEFT;  Surgeon: Jakob Reza MD;  Location:  [facility];  Service: Vascular;  Laterality: Left;    ARTERIOVENOUS FISTULA/SHUNT SURGERY Left 11/03/2022    Procedure: ELEVATION LEFT UPPER EXTREMITY AV FISTULA;  Surgeon: Jakob Reza MD;  Location:  [facility];  Service: Vascular;  Laterality: Left;    CARDIAC ELECTROPHYSIOLOGY PROCEDURE N/A 05/18/2018    Procedure: PPM generator change - dual       [facility] [place]/  PLEASE CONTACT PATIENT -[phone] TO MAKE AN APPOINTMENT WITH MARIELLE   please schedule in 8 weeks;  Surgeon: Johnathan Taylor MD;  Location: [facility] INVASIVE LOCATION;  Service: Cardiovascular    CATARACT EXTRACTION      bilat wiht lens     COLONOSCOPY      ENDOSCOPY      Pt denies    INSERT / REPLACE / REMOVE PACEMAKER  about 20 years back    SKIN CANCER EXCISION      various locations     TONSILLECTOMY      VEIN SURGERY  for dialysis    WISDOM TOOTH EXTRACTION          Admitting Doctor:   Roro AYOUB MD    Consulting Provider(s):  Consults       No orders found from 10/14/2024 to 11/13/2024.             Admitting Diagnosis:   The encounter diagnosis was Pneumonia due to COVID-19 virus.    Most Recent Vitals:   Vitals:    11/12/24 1604 11/12/24 1605 11/12/24 1630 11/12/24 1700   BP: 146/75  159/72 139/81   BP Location:       Patient Position:       Pulse:   91 98   Resp:       Temp:       TempSrc:       SpO2:  96% 98% 92%   Weight:       Height:           Active LDAs/IV Access:   Lines, Drains & Airways       Active LDAs       Name Placement date Placement time Site Days    Peripheral IV 11/12/24 1613 Right Antecubital 11/12/24  1613  Antecubital  less than 1                    Labs (abnormal labs have a star):   Labs Reviewed   COMPREHENSIVE METABOLIC PANEL - Abnormal; Notable for the following components:       Result Value    Glucose 112 (*)     Creatinine 5.61 (*)     Chloride 96 (*)     CO2 30.0 (*)     Alkaline Phosphatase 123 (*)     BUN/Creatinine Ratio 3.9 (*)     eGFR 9.5 (*)     All other components within normal limits    Narrative:     GFR Normal >60  Chronic Kidney Disease <60  Kidney Failure <15    The GFR formula is only valid for adults with stable renal function between ages 18 and 70.   BNP (IN-HOUSE) - Abnormal; Notable for the following components:    proBNP 21,630.0 (*)     All other components within normal limits    Narrative:     This assay is used as an aid in the diagnosis of individuals suspected of having heart failure. It can be used as an aid in  the diagnosis of acute decompensated heart failure (ADHF) in patients presenting with signs and symptoms of ADHF to the emergency department (ED). In addition, NT-proBNP of <300 pg/mL indicates ADHF is not likely.    Age Range Result Interpretation  NT-proBNP Concentration (pg/mL:      <50             Positive            >450                   Gray                 300-450                    Negative             <300    50-75           Positive            >900                  Gray                300-900                  Negative            <300      >75             Positive            >1800                  Gray                300-1800                  Negative            <300   SINGLE HS TROPONIN T - Abnormal; Notable for the following components:    HS Troponin T 207 (*)     All other components within normal limits    Narrative:     High Sensitive Troponin T Reference Range:  <14.0 ng/L- Negative Female for AMI  <22.0 ng/L- Negative Male for AMI  >=14 - Abnormal Female indicating possible myocardial injury.  >=22 - Abnormal Male indicating possible myocardial injury.   Clinicians would have to utilize clinical acumen, EKG, Troponin, and serial changes to determine if it is an Acute Myocardial Infarction or myocardial injury due to an underlying chronic condition.        CBC WITH AUTO DIFFERENTIAL - Abnormal; Notable for the following components:    RBC 3.46 (*)     Hemoglobin 11.8 (*)     .2 (*)     MCH 34.1 (*)     MCHC 31.2 (*)     RDW 16.0 (*)     RDW-SD 64.7 (*)     Platelets 129 (*)     Neutrophil % 80.9 (*)     Lymphocyte % 7.7 (*)     Eosinophil % 0.2 (*)     Immature Grans % 0.7 (*)     Lymphocytes, Absolute 0.46 (*)     nRBC 1.2 (*)     All other components within normal limits   PROTIME-INR - Abnormal; Notable for the following components:    Protime 23.1 (*)     INR 2.04 (*)     All other components within normal limits   PROCALCITONIN - Abnormal; Notable for the following components:     "Procalcitonin 0.63 (*)     All other components within normal limits    Narrative:     As a Marker for Sepsis (Non-Neonates):    1. <0.5 ng/mL represents a low risk of severe sepsis and/or septic shock.  2. >2 ng/mL represents a high risk of severe sepsis and/or septic shock.    As a Marker for Lower Respiratory Tract Infections that require antibiotic therapy:    PCT on Admission    Antibiotic Therapy       6-12 Hrs later    >0.5                Strongly Recommended  >0.25 - <0.5        Recommended   0.1 - 0.25          Discouraged              Remeasure/reassess PCT  <0.1                Strongly Discouraged     Remeasure/reassess PCT    As 28 day mortality risk marker: \"Change in Procalcitonin Result\" (>80% or <=80%) if Day 0 (or Day 1) and Day 4 values are available. Refer to [email or web address]    Change in PCT <=80%  A decrease of PCT levels below or equal to 80% defines a positive change in PCT test result representing a higher risk for 28-day all-cause mortality of patients diagnosed with severe sepsis for septic shock.    Change in PCT >80%  A decrease of PCT levels of more than 80% defines a negative change in PCT result representing a lower risk for 28-day all-cause mortality of patients diagnosed with severe sepsis or septic shock.      BLOOD GAS, ARTERIAL W/CO-OXIMETRY - Abnormal; Notable for the following components:    pCO2, Arterial 53.0 (*)     pO2, Arterial 126.0 (*)     HCO3, Arterial 31.7 (*)     Base Excess, Arterial 5.4 (*)     Hemoglobin, Blood Gas 11.7 (*)     Hematocrit, Blood Gas 36.0 (*)     CO2 Content 33.3 (*)     pCO2, Temperature Corrected 53.0 (*)     pO2, Temperature Corrected 126 (*)     All other components within normal limits   LACTIC ACID, PLASMA - Normal   BLOOD CULTURE   BLOOD CULTURE   RAINBOW DRAW    Narrative:     The following orders were created for panel order [place] Draw.  Procedure                               Abnormality         Status                 "     ---------                               -----------         ------                     Green Top (Gel)[[id number]]                                  Final result               Lavender Top[[id number]]                                     Final result               Gold Top - SST[[id number]]                                   Final result               Gray Top[[id number]]                                         Final result               Light Blue Top[[id number]]                                   Final result                 Please view results for these tests on the individual orders.   BLOOD GAS, ARTERIAL   HIGH SENSITIVITIY TROPONIN T 2HR   CBC AND DIFFERENTIAL    Narrative:     The following orders were created for panel order CBC & Differential.  Procedure                               Abnormality         Status                     ---------                               -----------         ------                     CBC Auto Differential[[id number]]        Abnormal            Final result               Scan Slide[[id number]]                                                                    Please view results for these tests on the individual orders.   GREEN TOP   LAVENDER TOP   GOLD TOP - SST   GRAY TOP   LIGHT BLUE TOP       Meds Given in ED:   Medications   sodium chloride 0.9 % flush 10 mL (has no administration in time range)   cefTRIAXone (ROCEPHIN) 2,000 mg in sodium chloride 0.9 % 100 mL MBP (0 mg Intravenous Stopped 11/12/24 1734)   doxycycline (MONODOX) capsule 100 mg (100 mg Oral Given 11/12/24 1642)   ondansetron (ZOFRAN) injection 4 mg (4 mg Intravenous Given 11/12/24 1649)           Last NIH score:                                                          Dysphagia screening results:  Patient Factors Component (Dysphagia:Stroke or Rule-out)  Best Eye Response: 4-->(E4) spontaneous (11/12/24 1614)  Best Motor Response: 6-->(M6) obeys commands (11/12/24 1614)  Best Verbal Response: 5-->(V5) oriented  (11/12/24 1614)  Sanderson Coma Scale Score: 15 (11/12/24 1614)     Sanderson Coma Scale:  No data recorded     CIWA:        Restraint Type:            Isolation Status:  Contact and Airborne

## 2024-11-19 ENCOUNTER — TELEPHONE (OUTPATIENT)
Dept: FAMILY MEDICINE CLINIC | Facility: CLINIC | Age: 79
End: 2024-11-19
Payer: MEDICARE

## 2024-11-19 NOTE — TELEPHONE ENCOUNTER
Caller: Ronald Bond    Relationship: Self    Best call back number: [phone]         Who are you requesting to speak with (clinical staff, provider,  specific staff member): CATHERINE MAXWELL        What was the call regarding: PATIENT WANTS A CALLBACK TO DISCUSS A REFERRAL AND SOME OTHER THINGS WITH CATHERINE MAXWELL    Is it okay if the provider responds through MyChart:

## 2024-11-19 NOTE — TELEPHONE ENCOUNTER
Unable to reach Ronald Bond by phone or leave message.    Hub may relay message and document.    I attempted to reach out to patient to see if I could relay any information back to PCP. Any questions or concerns he has?  PCP is in clinic seeing patients and may not be able to return call for awhile. Please get any information if patient is willing.

## 2024-11-20 NOTE — TELEPHONE ENCOUNTER
Unable to reach Ronald Bond by phone or leave message.     Hub may relay message and document.     I attempted to reach out to patient to see if I could relay any information back to PCP. Any questions or concerns he has?  PCP is in clinic seeing patients and may not be able to return call for awhile. Please get any information if patient is willing

## 2024-11-21 ENCOUNTER — TELEPHONE (OUTPATIENT)
Dept: FAMILY MEDICINE CLINIC | Facility: CLINIC | Age: 79
End: 2024-11-21
Payer: MEDICARE

## 2024-11-21 NOTE — TELEPHONE ENCOUNTER
Caller: Varun Ronald L    Relationship: Self    Best call back number: [phone]     What medication are you requesting: THC TABLET     What are your current symptoms: OSTEOPOROSIS     How long have you been experiencing symptoms: ONGOING     Have you had these symptoms before:    [x] Yes  [] No    Have you been treated for these symptoms before:   [x] Yes  [] No    If a prescription is needed, what is your preferred pharmacy and phone number: [facility] - [address] [phone] [facility] [phone] FX     Additional notes:

## 2024-11-22 NOTE — TELEPHONE ENCOUNTER
Unable to reach Ronald Bond by phone or leave message.    Hub may relay message and document.     Tatiana Maxwell PA-C  You1 hour ago (7:51 AM)       This is not currently legal in the [facility].  I am not exactly sure on what [facility] policy will be at this point regarding THC.  He would like to discuss other pain control options for his arthritis we can discuss in an appointment.

## 2024-11-23 PROBLEM — M81.0 AGE-RELATED OSTEOPOROSIS WITHOUT CURRENT PATHOLOGICAL FRACTURE: Status: ACTIVE | Noted: 2024-11-23

## 2024-11-23 PROBLEM — N25.81 SECONDARY HYPERPARATHYROIDISM: Status: ACTIVE | Noted: 2024-11-23

## 2024-11-23 RX ORDER — ZOLEDRONIC ACID 0.05 MG/ML
5 INJECTION, SOLUTION INTRAVENOUS ONCE
Status: SHIPPED | OUTPATIENT
Start: 2024-12-06

## 2024-11-25 NOTE — TELEPHONE ENCOUNTER
Unable to reach Ronald Bond by phone or leave message.     Hub may relay message and document.      Tatiana Maxwell PA-C  You1 hour ago (7:51 AM)         This is not currently legal in the [facility].  I am not exactly sure on what [facility] policy will be at this point regarding THC.  He would like to discuss other pain control options for his arthritis we can discuss in an appointment.

## 2024-11-26 DIAGNOSIS — I10 PRIMARY HYPERTENSION: Primary | ICD-10-CM

## 2024-11-26 NOTE — TELEPHONE ENCOUNTER
Unable to reach Ronald Bond by phone or leave message.     Hub may relay message and document.      Tatiana Maxwell PA-C  You1 hour ago (7:51 AM)         This is not currently legal in the [facility].  I am not exactly sure on what [facility] policy will be at this point regarding THC.  He would like to discuss other pain control options for his arthritis we can discuss in an appointment.

## 2024-12-02 ENCOUNTER — TELEPHONE (OUTPATIENT)
Dept: FAMILY MEDICINE CLINIC | Facility: CLINIC | Age: 79
End: 2024-12-02
Payer: MEDICARE

## 2024-12-02 NOTE — TELEPHONE ENCOUNTER
Pt has questions about THC pain management medication - please advise - would like a call back if possible

## 2024-12-10 NOTE — PROGRESS NOTES
[facility]   [address], [address]  [place], [place], [address] [phone]  [email or web address]           OUTPATIENT CLINIC FOLLOW UP NOTE    Patient Care Team:  Patient Care Team:  Tatiana Maxwell PA-C as PCP - General (Physician Assistant)  Andrade Waite MD as Consulting Physician (Orthopedic Surgery)  Barron Santana MD as Consulting Physician (Otolaryngology)  Melanie Neal MD as Consulting Physician (Endocrinology)  Dallas Jaquez MD as Consulting Physician (Gastroenterology)  Morena Vences APRN as Nurse Practitioner (Nurse Practitioner)  Barrington Saavedra MD as Consulting Physician (Cardiology)  Arcadio Armstrong MD as Consulting Physician (Cardiology)  Anabel Ariza APRN as Nurse Practitioner (Cardiology)    Subjective:   Chief Complaint:   Chief Complaint   Patient presents with    Hypertension         Ronald Bond is a 79 y.o. male.  Cardiac focused, problem list:  History of CAD workup  Possible cath 2018? Data deficit  Stress test, 7/15/2022:  No evidence of ischemia. Low risk study.   HFpEF  Echocardiogram 6/29/2021:  LVEF 50%. Moderately dilated RV. Moderately increased LA volume. Normal RVSP.   Echocardiogram, 1/11/2023:  LVEF 51%. Grade I diastolic dysfunction. Mildly dilated RV.   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   Hypertension   Hyperlipidemia   COPD   History of DVT  CKD   GERD  Hyperparathyroidism   History of chronic hepatitis  Iron deficiency anemia   Asthma   History of polysubstance abuse   Chronic methadone    HPI:    Patient presents today for follow up.     Patient is hoping to have an increase in his methadone dose.  His behavioral health specialist, Dr. Ferrera, has requested if that would be appropriate from a cardiac standpoint.  Exercises 3 times a week at the [facility] without chest pain or dyspnea.  Recent increase in lisinopril due to elevated BPs.    Review of Systems:  As noted above in the HPI      PFSH:  Patient Active Problem List   Diagnosis    Depression with anxiety    Hypertension    Asthma    H/O chronic hepatitis    Polysubstance abuse    GERD (gastroesophageal reflux disease)    CAD (coronary artery disease)    Secondary male hypogonadism    Localized edema    Phimosis of penis    Hyperlipidemia LDL goal <100    Prolonged Q-T interval on ECG    Hiatal hernia    Dysphagia    Tremors of nervous system    Iron deficiency anemia    Low iron    Bilateral cataracts    Positive colorectal cancer screening using Cologuard test    Esophagus disorder    Age-related osteoporosis without current pathological fracture    Secondary hyperparathyroidism         Current Outpatient Medications:     albuterol sulfate  (90 Base) MCG/ACT inhaler, Inhale 2 puffs Every 4 (Four) Hours As Needed for Wheezing or Shortness of Air., Disp: 18 g, Rfl: 5    AndroGel Pump 20.25 MG/ACT (1.62%) gel, Apply 40.5 mg (2 pumps) to shoulders daily, Disp: 75 g, Rfl: 5    Arnuity Ellipta 50 MCG/ACT aerosol powder , INHALE 1 PUFF BY MOUTH DAILY, Disp: 30 each, Rfl: 0    aspirin 81 MG EC tablet, Take 1 tablet by mouth Daily., Disp: 90 tablet, Rfl: 3    carvedilol (COREG) 25 MG tablet, TAKE 1 TABLET BY MOUTH TWO TIMES A DAY WITH FOOD, Disp: 180 tablet, Rfl: 0    ferrous gluconate (FERGON) 324 MG tablet, Take 1 tablet by mouth Daily With Breakfast., Disp: 90 tablet, Rfl: 3    fluticasone (FLOVENT HFA) 44 MCG/ACT inhaler, Inhale 1 puff 2 (Two) Times a Day As Needed (shortness of air)., Disp: 1 each, Rfl: 0    hydrOXYzine (ATARAX) 50 MG tablet, TAKE 1 TABLET BY MOUTH EVERY 8 HOURS AS NEEDED FOR ANXIETY, Disp: 90 tablet, Rfl: 0    lisinopril (PRINIVIL,ZESTRIL) 10 MG tablet, Take 1 tablet by mouth Daily., Disp: 90 tablet, Rfl: 1    loratadine (CLARITIN) 10 MG tablet, Take 1 tablet by mouth Daily., Disp: 90 tablet, Rfl: 3    METHADONE HCL PO, Take 110 mg by mouth Daily. (Patient taking differently: Take 120 mg by mouth Daily.), Disp: , Rfl:  "    multivitamin with minerals (ONE-A-DAY 50 PLUS PO), Take 1 tablet by mouth Daily., Disp: , Rfl:     pantoprazole (PROTONIX) 20 MG EC tablet, TAKE 1 TABLET BY MOUTH DAILY AS NEEDED FOR HEARTBURN OR INDIGESTION, Disp: 30 tablet, Rfl: 1    polyethylene glycol (MIRALAX) 17 g packet, Take 17 g by mouth Daily., Disp: 90 packet, Rfl: 3    Probiotic Product capsule, Take 1 capsule by mouth Daily., Disp: , Rfl:     simvastatin (ZOCOR) 10 MG tablet, TAKE 1 TABLET BY MOUTH EVERY NIGHT AT BEDTIME, Disp: 30 tablet, Rfl: 1    torsemide (DEMADEX) 20 MG tablet, TAKE 1 TABLET BY MOUTH DAILY, Disp: 30 tablet, Rfl: 1    vitamin B-12 (CYANOCOBALAMIN) 1000 MCG tablet, Take 1 tablet by mouth Daily., Disp: , Rfl:     vitamin D3 125 MCG (5000 UT) capsule capsule, Take 1 capsule by mouth Daily., Disp: , Rfl:     linaclotide (Linzess) 72 MCG capsule capsule, Take 1 capsule by mouth Every Morning Before Breakfast. (Patient not taking: Reported on 12/16/2024), Disp: 30 capsule, Rfl: 0    Current Facility-Administered Medications:     zoledronic acid (RECLAST) infusion 5 mg, 5 mg, Intravenous, Once, Melanie Neal MD     reports that he quit smoking about 55 years ago. His smoking use included cigarettes. He started smoking about 66 years ago. He has a 10 pack-year smoking history. He has been exposed to tobacco smoke. He has never used smokeless tobacco.      Objective:   Physical exam:  /90 (BP Location: Left arm, Patient Position: Sitting)   Pulse 68   Ht 170.2 cm (67\")   Wt 101 kg (223 lb)   SpO2 95%   BMI 34.93 kg/m²   CONSTITUTIONAL: No acute distress  RESPIRATORY: Normal effort. Clear to auscultation bilaterally without wheezing or rales  CARDIOVASCULAR: Regular rate and rhythm with normal S1 and S2. Without murmur.  PERIPHERAL VASCULAR: No carotid bruit bilaterally.  Normal radial pulse.     Labs:    Lab Results   Component Value Date    LDL 99 10/30/2023     No components found for: \"LDLDIRECTC\"    Diagnostic Data:  "     ECG 12 Lead    Date/Time: 12/16/2024 11:55 AM  Performed by: Arcadio Armstrong MD    Authorized by: Arcadio Armstrong MD  Comparison: compared with previous ECG from 9/30/2024  Similar to previous ECG  Comparison to previous ECG: Nonspecific T wave abnormality.  QTc 457  Rhythm: sinus rhythm          Results for orders placed during the hospital encounter of 06/28/21    Adult Transthoracic Echo Complete W/ Cont if Necessary Per Protocol    Interpretation Summary  · The right ventricular cavity is moderately dilated.  · Left atrial volume is moderately increased.  · Estimated right ventricular systolic pressure from tricuspid regurgitation is normal (<35 mmHg). Calculated right ventricular systolic pressure from tricuspid regurgitation is 27 mmHg.  · Estimated left ventricular EF = 50% Left ventricular systolic function is normal.      Assessment and Plan:     Chronic HFpEF  Dilated left atrium  LVH  Nonspecific T wave abnormality  Essential hypertension  Mixed hyperlipidemia  -EKG/QTc reviewed.  Acceptable normal  -Continue lifestyle and risk factor modification, medical therapy including aspirin, carvedilol, statin.  Recently had lisinopril increased by PCP for elevated BP.  Continue up titration as warranted    Chronic methadone use  -If a higher dose is clinically warranted, no current restriction from a cardiac standpoint   -Will forward a copy of this note to Dr Alicia's office    - Return if symptoms worsen or fail to improve.

## 2024-12-11 ENCOUNTER — TELEPHONE (OUTPATIENT)
Dept: ENDOCRINOLOGY | Facility: CLINIC | Age: 79
End: 2024-12-11

## 2024-12-11 ENCOUNTER — TELEPHONE (OUTPATIENT)
Dept: FAMILY MEDICINE CLINIC | Facility: CLINIC | Age: 79
End: 2024-12-11
Payer: MEDICARE

## 2024-12-11 NOTE — TELEPHONE ENCOUNTER
Caller: Bertin Bond    Relationship: Self    Best call back number: [phone]     What is the best time to reach you: TODAY    Who are you requesting to speak with (clinical staff, provider,  specific staff member): PCP    Do you know the name of the person who called: BERTIN    What was the call regarding: PATIENT WANTS TO TALK ABOUT THE INFUSION HE HAS FOR THE 12/19/24. HE KNOWS WHO REFERRED IT AND I TOLD HIM IF HE WANTED TO KNOW WHY IT WAS REFERRED HE WOULD NEED TO CALL THAT OFFICE. HE WANTS TO TALK TO PCP TO SEE HER THOUGHT ON IT HE STATED. HE WASN'T AWARE THAT REFERRAL HAD BEEN PUT IN HE STATED    Is it okay if the provider responds through MyChart: CALLBACK

## 2024-12-11 NOTE — TELEPHONE ENCOUNTER
Spoke with patient and reiterated to call Dr. Neal regarding the referral for infusion. He states he did put in call to Dr. Neal's office and is waiting for a call back.

## 2024-12-11 NOTE — TELEPHONE ENCOUNTER
Provider: MARIELLE    Caller: Ronald Bond    Relationship to Patient: Self    Reason for Call: PATIENT WOULD LIKE TO KNOW WHY HE HAS THIS 12/19/24 APPT. HE'S NOT SURE OF WHAT IT IS, AND WHY HE HAS TO HAVE IT. PLEASE CALL PATIENT

## 2024-12-16 ENCOUNTER — OFFICE VISIT (OUTPATIENT)
Dept: CARDIOLOGY | Facility: CLINIC | Age: 79
End: 2024-12-16
Payer: MEDICARE

## 2024-12-16 ENCOUNTER — PATIENT MESSAGE (OUTPATIENT)
Dept: CARDIOLOGY | Facility: CLINIC | Age: 79
End: 2024-12-16
Payer: MEDICARE

## 2024-12-16 VITALS
OXYGEN SATURATION: 95 % | WEIGHT: 223 LBS | HEART RATE: 68 BPM | DIASTOLIC BLOOD PRESSURE: 90 MMHG | SYSTOLIC BLOOD PRESSURE: 160 MMHG | BODY MASS INDEX: 35 KG/M2 | HEIGHT: 67 IN

## 2024-12-16 DIAGNOSIS — I10 ESSENTIAL HYPERTENSION: Primary | ICD-10-CM

## 2024-12-16 DIAGNOSIS — E78.2 MIXED HYPERLIPIDEMIA: ICD-10-CM

## 2024-12-16 DIAGNOSIS — I50.9 CHRONIC CONGESTIVE HEART FAILURE, UNSPECIFIED HEART FAILURE TYPE: ICD-10-CM

## 2024-12-17 RX ORDER — ZOLEDRONIC ACID 0.05 MG/ML
5 INJECTION, SOLUTION INTRAVENOUS ONCE
OUTPATIENT
Start: 2024-12-19

## 2024-12-17 RX ORDER — SODIUM CHLORIDE 9 MG/ML
20 INJECTION, SOLUTION INTRAVENOUS ONCE
OUTPATIENT
Start: 2024-12-19

## 2025-01-03 ENCOUNTER — OFFICE VISIT (OUTPATIENT)
Dept: FAMILY MEDICINE CLINIC | Facility: CLINIC | Age: 80
End: 2025-01-03
Payer: MEDICARE

## 2025-01-03 VITALS
HEIGHT: 67 IN | WEIGHT: 220 LBS | HEART RATE: 66 BPM | OXYGEN SATURATION: 92 % | DIASTOLIC BLOOD PRESSURE: 90 MMHG | SYSTOLIC BLOOD PRESSURE: 142 MMHG | BODY MASS INDEX: 34.53 KG/M2

## 2025-01-03 DIAGNOSIS — G89.29 CHRONIC NECK PAIN: ICD-10-CM

## 2025-01-03 DIAGNOSIS — M54.2 CHRONIC NECK PAIN: ICD-10-CM

## 2025-01-03 DIAGNOSIS — K08.109 NO NATURAL TEETH: Primary | ICD-10-CM

## 2025-01-03 PROCEDURE — 3077F SYST BP >= 140 MM HG: CPT

## 2025-01-03 PROCEDURE — 1160F RVW MEDS BY RX/DR IN RCRD: CPT

## 2025-01-03 PROCEDURE — 99214 OFFICE O/P EST MOD 30 MIN: CPT

## 2025-01-03 PROCEDURE — 1125F AMNT PAIN NOTED PAIN PRSNT: CPT

## 2025-01-03 PROCEDURE — 3080F DIAST BP >= 90 MM HG: CPT

## 2025-01-03 PROCEDURE — 1159F MED LIST DOCD IN RCRD: CPT
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
## 2025-01-14 ENCOUNTER — TELEPHONE (OUTPATIENT)
Dept: FAMILY MEDICINE CLINIC | Facility: CLINIC | Age: 80
End: 2025-01-14
Payer: MEDICARE
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
## 2025-01-14 NOTE — TELEPHONE ENCOUNTER
"Tatiana Maxwell PA-C  You11 minutes ago (12:15 PM)       I have not seen him for this.  If he is having worsening pain, swelling and bruising since his fall he needs to be evaluated in office, urgent care or in the ER.   Patient informed of recommendations from PCP ,.  Patient stated he is feeling better its just \"ugly\"    He refused an appt.   He will call the office back if things worsen           "

## 2025-01-22 ENCOUNTER — TELEPHONE (OUTPATIENT)
Dept: CARDIOLOGY | Facility: CLINIC | Age: 80
End: 2025-01-22
Payer: MEDICARE

## 2025-01-22 DIAGNOSIS — I10 ESSENTIAL HYPERTENSION: ICD-10-CM

## 2025-01-22 RX ORDER — CARVEDILOL 25 MG/1
25 TABLET ORAL 2 TIMES DAILY
Qty: 180 TABLET | Refills: 1 | Status: SHIPPED | OUTPATIENT
Start: 2025-01-22

## 2025-01-22 NOTE — TELEPHONE ENCOUNTER
Caller: Varun Ronald HILARY    Relationship: Self    Best call back number: [phone]     What form or medical record are you requesting: EKG FROM 12/16/24    Who is requesting this form or medical record from you: DR. TURNER    How would you like to receive the form or medical records (pick-up, mail, fax): FAX  If fax, what is the fax number: [phone]    Timeframe paperwork needed: ASAP    Additional notes: PATIENT NEEDS LAST EKG SENT OVER TO DR. TURNER FROM LAST VISIT.

## 2025-02-03 ENCOUNTER — OFFICE VISIT (OUTPATIENT)
Dept: FAMILY MEDICINE CLINIC | Facility: CLINIC | Age: 80
End: 2025-02-03
Payer: MEDICARE

## 2025-02-03 VITALS
BODY MASS INDEX: 34.53 KG/M2 | OXYGEN SATURATION: 91 % | HEART RATE: 70 BPM | HEIGHT: 67 IN | SYSTOLIC BLOOD PRESSURE: 128 MMHG | WEIGHT: 220 LBS | DIASTOLIC BLOOD PRESSURE: 70 MMHG

## 2025-02-03 DIAGNOSIS — R09.02 HYPOXIA: ICD-10-CM

## 2025-02-03 DIAGNOSIS — R06.02 SHORTNESS OF BREATH: ICD-10-CM

## 2025-02-03 DIAGNOSIS — R07.1 CHEST PAIN ON BREATHING: ICD-10-CM

## 2025-02-03 DIAGNOSIS — R11.2 NAUSEA AND VOMITING, UNSPECIFIED VOMITING TYPE: Primary | ICD-10-CM

## 2025-02-03 LAB
BILIRUB BLD-MCNC: ABNORMAL MG/DL
CLARITY, POC: CLEAR
COLOR UR: ABNORMAL
EXPIRATION DATE: ABNORMAL
EXPIRATION DATE: NORMAL
EXPIRATION DATE: NORMAL
FLUAV AG UPPER RESP QL IA.RAPID: NOT DETECTED
FLUBV AG UPPER RESP QL IA.RAPID: NOT DETECTED
GLUCOSE UR STRIP-MCNC: ABNORMAL MG/DL
INTERNAL CONTROL: NORMAL
INTERNAL CONTROL: NORMAL
KETONES UR QL: NEGATIVE
LEUKOCYTE EST, POC: NEGATIVE
Lab: ABNORMAL
Lab: NORMAL
Lab: NORMAL
NITRITE UR-MCNC: NEGATIVE MG/ML
PH UR: 5.5 [PH] (ref 5–8)
PROT UR STRIP-MCNC: ABNORMAL MG/DL
RBC # UR STRIP: ABNORMAL /UL
S PYO AG THROAT QL: NEGATIVE
SARS-COV-2 AG UPPER RESP QL IA.RAPID: NOT DETECTED
SP GR UR: 1.01 (ref 1–1.03)
UROBILINOGEN UR QL: ABNORMAL

## 2025-02-03 PROCEDURE — 99214 OFFICE O/P EST MOD 30 MIN: CPT

## 2025-02-03 PROCEDURE — 3074F SYST BP LT 130 MM HG: CPT

## 2025-02-03 PROCEDURE — 1125F AMNT PAIN NOTED PAIN PRSNT: CPT

## 2025-02-03 PROCEDURE — 87880 STREP A ASSAY W/OPTIC: CPT

## 2025-02-03 PROCEDURE — 87428 SARSCOV & INF VIR A&B AG IA: CPT

## 2025-02-03 PROCEDURE — 3078F DIAST BP <80 MM HG: CPT

## 2025-02-03 PROCEDURE — 81003 URINALYSIS AUTO W/O SCOPE: CPT
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
## 2025-02-04 ENCOUNTER — TELEPHONE (OUTPATIENT)
Dept: FAMILY MEDICINE CLINIC | Facility: CLINIC | Age: 80
End: 2025-02-04

## 2025-02-04 NOTE — TELEPHONE ENCOUNTER
Caller: Ronald Bond    Relationship: Self    Best call back number: [phone]      What test was performed: BLOOD WORK    When was the test performed: 02/03/2025     Where was the test performed: [facility]     Additional notes: PLEASE CALL WITH TEST RESULTS    HE IS CURRENTLY IN THE [facility] POSSIBLY DISCHARGING TODAY 02/04/2025

## 2025-02-05 NOTE — TELEPHONE ENCOUNTER
Name: Ronald Bond    Relationship: Self    Best Callback Number:      [phone] (Mobile)       HUB PROVIDED THE RELAY MESSAGE FROM THE OFFICE   PATIENT VOICED UNDERSTANDING AND HAS NO FURTHER QUESTIONS AT THIS TIME    ADDITIONAL INFORMATION:  PATIENT THINKS HE SHOULD BE DISCHARGED FROM THE HOSPITAL TODAY     THEY SAID HE HAD TO MUCH CREATINE IN HIS KIDNEYS   AND HE WAS DEHYDRATED

## 2025-02-05 NOTE — TELEPHONE ENCOUNTER
Unable to reach Ronald RICHARD Varun by phone or leave message.    Hub may relay message and document.    Tatiana Maxwell PA-C  You15 hours ago (4:42 PM)       We did not do blood work yesterday.

## 2025-02-06 ENCOUNTER — READMISSION MANAGEMENT (OUTPATIENT)
Dept: CALL CENTER | Facility: HOSPITAL | Age: 80
End: 2025-02-06
Payer: MEDICARE

## 2025-02-06 NOTE — OUTREACH NOTE
Prep Survey      Flowsheet Row Responses   [facility] discharged from? Non-[facility]   Is LACE score < 7 ? Non-BH Discharge   Eligibility [facility]   Date of Admission 02/03/25   Date of Discharge 02/06/25   Discharge Disposition Home or Self Care   Discharge diagnosis Shortness of breath (Primary Dx),  Hypoxia,  Acute hypoxic respiratory failure   Does the patient have one of the following disease processes/diagnoses(primary or secondary)? Other   Does the patient have Home health ordered? No   Is there a DME ordered? No   Prep survey completed? Yes            ANGELITA MISTRY - Registered Nurse

## 2025-02-07 ENCOUNTER — TRANSITIONAL CARE MANAGEMENT TELEPHONE ENCOUNTER (OUTPATIENT)
Dept: CALL CENTER | Facility: HOSPITAL | Age: 80
End: 2025-02-07
Payer: MEDICARE

## 2025-02-07 NOTE — OUTREACH NOTE
Call Center TCM Note      Flowsheet Row Responses   [facility] patient discharged from? Non-  []   Does the patient have one of the following disease processes/diagnoses(primary or secondary)? Other   TCM attempt successful? Yes   Call end time 1159   Discharge diagnosis Shortness of breath (Primary Dx),  Hypoxia,  Acute hypoxic respiratory failure   Person spoke with today (if not patient) and relationship patient   Meds reviewed with patient/caregiver? Yes   Is the patient having any side effects they believe may be caused by any medication additions or changes? No   Does the patient have all medications ordered at discharge? Yes   Is the patient taking all medications as directed (includes completed medication regime)? Yes   Comments Patient was hospitalized at [facility]. He declines scheduling PCP appt at this time.   Does the patient have an appointment with their PCP within 7-14 days of discharge? No   Nursing Interventions Patient declined scheduling/rescheduling appointment at this time   Psychosocial issues? No   Did the patient receive a copy of their discharge instructions? Yes   Nursing interventions Reviewed instructions with patient   What is the patient's perception of their health status since discharge? Improving   Is the patient/caregiver able to teach back signs and symptoms related to disease process for when to call PCP? Yes   Is the patient/caregiver able to teach back signs and symptoms related to disease process for when to call 911? Yes   Is the patient/caregiver able to teach back the hierarchy of who to call/visit for symptoms/problems? PCP, Specialist, Home health nurse, Urgent Care, ED, 911 Yes   If the patient is a current smoker, are they able to teach back resources for cessation? Not a smoker   TCM call completed? Yes   Wrap up additional comments Reports he is feeling better.   Call end time 1159   Would this patient benefit from a Referral to [facility] Social Work? No   Is  the patient interested in additional calls from an ambulatory ? No            Mack Frazier RN    2/7/2025, 12:00 EST

## 2025-02-10 DIAGNOSIS — E29.1 HYPOGONADISM IN MALE: ICD-10-CM

## 2025-02-10 RX ORDER — TESTOSTERONE 16.2 MG/G
GEL TRANSDERMAL
Qty: 75 G | Refills: 5 | Status: SHIPPED | OUTPATIENT
Start: 2025-02-10

## 2025-02-10 NOTE — TELEPHONE ENCOUNTER
Rx Refill Note  Requested Prescriptions     Pending Prescriptions Disp Refills    AndroGel Pump 20.25 MG/ACT (1.62%) gel 75 g 1     Sig: Apply 40.5 mg (2 pumps) to shoulders daily      Last office visit with prescribing clinician: 11/11/2024     Next office visit with prescribing clinician: 5/12/2025       Keesha Summers  02/10/25, 10:46 EST

## 2025-02-11 ENCOUNTER — PRIOR AUTHORIZATION (OUTPATIENT)
Dept: ENDOCRINOLOGY | Facility: CLINIC | Age: 80
End: 2025-02-11
Payer: MEDICARE

## 2025-02-11 NOTE — TELEPHONE ENCOUNTER
PA started on CMM for AndroGel Pump 20.25 MG/ACT(1.62%) gel    Information regarding your request  The patient currently has access to the requested medication and a Prior Authorization is not needed for the patient/medication.

## 2025-02-18 ENCOUNTER — TELEPHONE (OUTPATIENT)
Dept: FAMILY MEDICINE CLINIC | Facility: CLINIC | Age: 80
End: 2025-02-18
Payer: MEDICARE

## 2025-02-18 ENCOUNTER — TELEPHONE (OUTPATIENT)
Dept: GASTROENTEROLOGY | Facility: CLINIC | Age: 80
End: 2025-02-18

## 2025-02-18 NOTE — TELEPHONE ENCOUNTER
,  Do you want to go ahead and scheduled for EGD? Please advise?  Physical Therapy  Visit Type: treatment  Co-treat with: Occupational therapist  Precautions:  Medical precautions:  fall risk; standard precautions.      Patient admitted from due to right knee pain, was home less than 24 hours from [facility].    Hx of GERD, EDIN, PAD, paroxysmal atrial fibrillation, RA, chronic R knee pain, DM II, colitis  Lines:     Basic: capped IV      Lines in chart and on patient reviewed, precautions maintained throughout session.  Safety Measures: bed alarm and chair alarm    SUBJECTIVE  Patient agreed to participate in therapy this date.  Patient pleasant this session and kept thanking the therapists for working with her     OBJECTIVE      Bed Mobility:        Supine to sit: moderate assist    Sit to supine: minimal assist  Training completed:      Education details: patient requires additional training    Patient insisted the head of bed was elevated all the way during bed mobility and she required mod assist at the trunk to get complete.  Transfers:    Assistive devices: gait belt and 2 person (bilateral platform walker)    Sit to stand: 2 persons and total assist - dependent  (mod assist x2)    Stand to sit: maximal assist  Training completed:    Tasks: sit to stand, stand to sit and stand pivot    Education details: patient requires additional training    Mod assist of 2 to perform transfers at the edge of the bed. Patient needs assist with holding the walker down during tranfers as well as significant amount of force generation to stand.   Multiple transfers at the edge of the bed after ambulation as patient was incontinent of stool. She was unable to assist with any pericares.  Gait/Ambulation:     Assistance: 2 persons (mod assist of 2)  Training Completed:      Education details: patient requires additional training    Mod assist of 2; one person assisting with weight shift to help the patient advance the left leg and second person with hand on gait belt and assist to negotiate the walker.  The patient was having difficulty advancing the left LE and keeping a wide enough base of support for safety. Extra cueing verbal and tactile             ASSESSMENT   Patient needing moderate assist of 2 people for transfers and 12 feet of ambulation. She is limited by functional weakness and requires head of bed up all the way for bed mobility still requiring mod assist to sit upright. She is unable to progress gait due to decreased activity tolerance. Total assist to manage pericares and was incontinent of stool.     The patient will need SALVADOR due to weakness and inability to walk. She is unable to care for herself at home and is needing 2 people for all mobility and cares at this time. Patient will be seen 5 days a week with discharge recommendation as SALVADOR. Goals were reviewed and continue to be appropriate.          Discharge Recommendations  Recommendation for Discharge Location: PT WI: Post-acute facility with therapy needs  Recommendation for Discharge Support: PT WI: 24 Hour assist  PT/OT Mobility Equipment for Discharge: personal platform walker in room  PT/OT ADL Equipment for Discharge: monitor for additional needs  PT Identified Barriers to Discharge: needs total assist for standing transfers; fall risk, weakness, unable to care for self        Progress: slow progress, decreased activity tolerance    • Skilled therapy is required to address these limitations in attempt to maximize the patient's independence.    Education Provided:   Learning Assessment:  - Primary learner: patient  - Are they ready to learn: yes  - Preferred learning style: verbal  - Barriers to learning: no barriers apparent at this time    Patient at End of Session:   Location: in bed  Safety measures: alarm system in place/re-engaged, bed rails x2, call light within reach and lines intact    PLAN   Suggestions for next session as indicated: Co-treat, progress transfers, bed mob, gait    PT Frequency: 5 days/week  Frequency Comments:  co-treatCHRIS 1/5 by 8/15 (8/8)          GOALS  Review Date: 8/15/2022  Long Term Goals: (to be met by time of discharge from hospital)  Sit to supine: Patient will complete sit to supine minimal assist.  Supine to sit: Patient will complete supine to sit minimal assist.  Sit to stand: Patient will complete sit to stand transfer with 2-wheeled walker, minimal assist.   Stand to sit: Patient will complete stand to sit transfer with 2-wheeled walker, minimal assist.   Ambulation (even): Patient will ambulate on even surface for 10 feet with 2-wheeled walker, moderate assist.     Documented in the chart in the following areas: Pain. Assessment.      Therapy procedure time and total treatment time can be found documented on the Time Entry flowsheet

## 2025-02-18 NOTE — TELEPHONE ENCOUNTER
Hub staff attempted to follow warm transfer process and was unsuccessful     Caller: Ronald Bond    Relationship to patient: Self    Best call back number: [phone]    Patient is needing: PATIENT CALLED WOULD LIKE A SOONER APPT THAN JULY TO BE SEEN HE IS HAVING TROUBLE SWALLOWING AND FEELS LIKE HE NEEDS TO HAVE AN ENDOSCOPY AND HIS THROAT STRETCHED AGAIN

## 2025-02-18 NOTE — TELEPHONE ENCOUNTER
Spoke to patient for follow up.     Has not established with dentistry for dentures.                                                                                                                                                               He still struggles sometimes with swallowing whole foods that are difficult for him to chew and has not reach out to schedule with GI with his history of esophageal strictures/narrowing.   Advised patient of the importance of seeing GI due to risk of choking on food. He verbalized understanding and agreed to plan.   Advised him of ER precautions including difficulty speaking, dizziness, worsening or if symptoms start occurring more frequently.

## 2025-02-28 ENCOUNTER — TELEPHONE (OUTPATIENT)
Dept: ENDOCRINOLOGY | Facility: CLINIC | Age: 80
End: 2025-02-28
Payer: MEDICARE

## 2025-02-28 DIAGNOSIS — E29.1 HYPOGONADISM IN MALE: Primary | ICD-10-CM

## 2025-02-28 NOTE — TELEPHONE ENCOUNTER
[place] [facility] IS CALLING CHECKING THE STATUS OF PRIOR AUTH FOR ULYSSES GEL. PHONE NUMBER [phone]

## 2025-02-28 NOTE — TELEPHONE ENCOUNTER
Called and spoke to Michel I told him per CMM it stated no PA required.    He is going to call ins.

## 2025-02-28 NOTE — TELEPHONE ENCOUNTER
[place] [facility] called stating they contacted the insurance provider and a PA is required. Insurance told them Covermymeds is not working correctly. He requested we call insurance @  Phone # [phone]

## 2025-03-03 DIAGNOSIS — E87.6 HYPOKALEMIA: Primary | ICD-10-CM

## 2025-03-03 DIAGNOSIS — R60.0 LOWER LEG EDEMA: ICD-10-CM

## 2025-03-03 RX ORDER — TORSEMIDE 20 MG/1
20 TABLET ORAL DAILY
Qty: 60 TABLET | OUTPATIENT
Start: 2025-03-03

## 2025-03-03 RX ORDER — TESTOSTERONE 20.25 MG/1.25G
GEL TOPICAL
Qty: 75 G | Refills: 5 | Status: SHIPPED | OUTPATIENT
Start: 2025-03-03

## 2025-03-03 NOTE — TELEPHONE ENCOUNTER
Rx Refill Note  Requested Prescriptions     Pending Prescriptions Disp Refills    torsemide (DEMADEX) 20 MG tablet [Pharmacy Med Name: TORSEMIDE 20 MG TABLET] 60 tablet      Sig: TAKE 1 TABLET BY MOUTH DAILY      Last office visit with prescribing clinician: 2/3/2025   Last telemedicine visit with prescribing clinician: Visit date not found   Next office visit with prescribing clinician: Visit date not found                         Would you like a call back once the refill request has been completed: [] Yes [] No    If the office needs to give you a call back, can they leave a voicemail: [] Yes [] No    Giovana Waite MA  03/03/25, 14:20 EST

## 2025-03-03 NOTE — TELEPHONE ENCOUNTER
Rx for generic testosterone gel 1.62% (40.5 mg) daily sent to pharmacy.   Electronically Signed: Melanie Neal MD

## 2025-03-03 NOTE — TELEPHONE ENCOUNTER
April with [facility] pharmacy called Loyda please call at [phone]. See previous messages

## 2025-03-03 NOTE — TELEPHONE ENCOUNTER
Called the phar and they stated the Androgel is no longer available. She would like to know about the pt taking generic.    Please advise.

## 2025-03-03 NOTE — TELEPHONE ENCOUNTER
Called ins and spoke to Kate DIAZ. She looked at why the script would not go through on ins is due [facility] is not running correct NDC. The correct NDC is [phone].    Called [facility] and spoke to April. She ran the NDC and she said that is code they were using.    She is going to [facility] and try to get the script fixed.

## 2025-03-04 ENCOUNTER — PREP FOR SURGERY (OUTPATIENT)
Dept: OTHER | Facility: HOSPITAL | Age: 80
End: 2025-03-04
Payer: MEDICARE

## 2025-03-04 DIAGNOSIS — R13.10 DYSPHAGIA, UNSPECIFIED TYPE: Primary | ICD-10-CM

## 2025-03-07 ENCOUNTER — LAB (OUTPATIENT)
Dept: LAB | Facility: HOSPITAL | Age: 80
End: 2025-03-07
Payer: MEDICARE

## 2025-03-07 DIAGNOSIS — E78.5 HYPERLIPIDEMIA LDL GOAL <100: Chronic | ICD-10-CM

## 2025-03-07 DIAGNOSIS — E87.6 HYPOKALEMIA: ICD-10-CM

## 2025-03-07 LAB
ALBUMIN SERPL-MCNC: 4.1 G/DL (ref 3.5–5.2)
ALBUMIN/GLOB SERPL: 1.2 G/DL
ALP SERPL-CCNC: 99 U/L (ref 39–117)
ALT SERPL W P-5'-P-CCNC: 11 U/L (ref 1–41)
ANION GAP SERPL CALCULATED.3IONS-SCNC: 8.4 MMOL/L (ref 5–15)
AST SERPL-CCNC: 24 U/L (ref 1–40)
BILIRUB SERPL-MCNC: 0.5 MG/DL (ref 0–1.2)
BUN SERPL-MCNC: 10 MG/DL (ref 8–23)
BUN/CREAT SERPL: 8.7 (ref 7–25)
CALCIUM SPEC-SCNC: 9.5 MG/DL (ref 8.6–10.5)
CHLORIDE SERPL-SCNC: 96 MMOL/L (ref 98–107)
CHOLEST SERPL-MCNC: 170 MG/DL (ref 0–200)
CO2 SERPL-SCNC: 33.6 MMOL/L (ref 22–29)
CREAT SERPL-MCNC: 1.15 MG/DL (ref 0.76–1.27)
EGFRCR SERPLBLD CKD-EPI 2021: 65.1 ML/MIN/1.73
GLOBULIN UR ELPH-MCNC: 3.5 GM/DL
GLUCOSE SERPL-MCNC: 97 MG/DL (ref 65–99)
HDLC SERPL-MCNC: 40 MG/DL (ref 40–60)
LDLC SERPL CALC-MCNC: 106 MG/DL (ref 0–100)
LDLC/HDLC SERPL: 2.57 {RATIO}
POTASSIUM SERPL-SCNC: 4.4 MMOL/L (ref 3.5–5.2)
PROT SERPL-MCNC: 7.6 G/DL (ref 6–8.5)
SODIUM SERPL-SCNC: 138 MMOL/L (ref 136–145)
TRIGL SERPL-MCNC: 137 MG/DL (ref 0–150)
VLDLC SERPL-MCNC: 24 MG/DL (ref 5–40)

## 2025-03-07 PROCEDURE — 80053 COMPREHEN METABOLIC PANEL: CPT

## 2025-03-07 PROCEDURE — 80061 LIPID PANEL: CPT

## 2025-03-12 DIAGNOSIS — R60.0 LOWER LEG EDEMA: ICD-10-CM

## 2025-03-12 RX ORDER — TORSEMIDE 20 MG/1
20 TABLET ORAL DAILY
Qty: 30 TABLET | Refills: 1 | Status: SHIPPED | OUTPATIENT
Start: 2025-03-12

## 2025-03-25 ENCOUNTER — TELEPHONE (OUTPATIENT)
Dept: ENDOCRINOLOGY | Facility: CLINIC | Age: 80
End: 2025-03-25
Payer: MEDICARE

## 2025-03-25 NOTE — TELEPHONE ENCOUNTER
PT CALLED STATING HE IS STILL UNABLE TO GET HIS TESTOSTERONE. HE HAS BEEN CALLING SINCE FEBRUARY. HE REQUESTED WE LOOK INTO THIS AND REACH OUT TO HIM.

## 2025-03-25 NOTE — TELEPHONE ENCOUNTER
I called the pt and he is going to call [facility] and find out what is going on since he can not the Androgel.    I explained that I had not received anything from the phar telling me it was denied.    He verbalized understanding.

## 2025-03-26 ENCOUNTER — PRIOR AUTHORIZATION (OUTPATIENT)
Dept: ENDOCRINOLOGY | Facility: CLINIC | Age: 80
End: 2025-03-26
Payer: MEDICARE

## 2025-03-26 NOTE — TELEPHONE ENCOUNTER
BERTIN RÍOS (Key: [id number])  PA Case ID #: [id number]  Need Help? Call us at [phone]  Outcome  Approved today by [employer] NCPDP 2017  Your request has been approved  Effective Date: 1/1/2025  Authorization Expiration Date: 12/31/2025  Drug  Testosterone 1.62% gel  ePA cloud logo  Form  [employer] Electronic PA Form (2017 NCPDP)    Pt notified.

## 2025-04-16 ENCOUNTER — PRE-ADMISSION TESTING (OUTPATIENT)
Dept: PREADMISSION TESTING | Facility: HOSPITAL | Age: 80
End: 2025-04-16
Payer: MEDICARE

## 2025-04-16 VITALS — WEIGHT: 216.93 LBS | BODY MASS INDEX: 32.13 KG/M2 | HEIGHT: 69 IN

## 2025-04-16 LAB
DEPRECATED RDW RBC AUTO: 43.9 FL (ref 37–54)
ERYTHROCYTE [DISTWIDTH] IN BLOOD BY AUTOMATED COUNT: 13.1 % (ref 12.3–15.4)
HCT VFR BLD AUTO: 40.2 % (ref 37.5–51)
HGB BLD-MCNC: 13.3 G/DL (ref 13–17.7)
MCH RBC QN AUTO: 30.7 PG (ref 26.6–33)
MCHC RBC AUTO-ENTMCNC: 33.1 G/DL (ref 31.5–35.7)
MCV RBC AUTO: 92.8 FL (ref 79–97)
PLATELET # BLD AUTO: 180 10*3/MM3 (ref 140–450)
PMV BLD AUTO: 9.8 FL (ref 6–12)
POTASSIUM SERPL-SCNC: 3.6 MMOL/L (ref 3.5–5.2)
RBC # BLD AUTO: 4.33 10*6/MM3 (ref 4.14–5.8)
WBC NRBC COR # BLD AUTO: 6.1 10*3/MM3 (ref 3.4–10.8)

## 2025-04-16 PROCEDURE — 84132 ASSAY OF SERUM POTASSIUM: CPT

## 2025-04-16 PROCEDURE — 85027 COMPLETE CBC AUTOMATED: CPT

## 2025-04-16 PROCEDURE — 36415 COLL VENOUS BLD VENIPUNCTURE: CPT

## 2025-04-21 ENCOUNTER — TELEPHONE (OUTPATIENT)
Dept: GASTROENTEROLOGY | Facility: CLINIC | Age: 80
End: 2025-04-21
Payer: MEDICARE

## 2025-04-21 NOTE — TELEPHONE ENCOUNTER
Caller: Ronald Bond    Relationship to patient: Self    Best call back number: [phone] (Mobile)     Patient is needing: PT IS WANTING TO KNOW IF HE CAN UBER TOHIS PROCEDURE TOMORROW, INFORMED OF POLICY, HE SAID HE DOESN'T UNDERSTAND, WANTS TO SPEAK TO SOMEONE

## 2025-04-21 NOTE — TELEPHONE ENCOUNTER
SPOKE WITH PATIENT AND ADVISED A PERSON WILL NEED TO STY WITH HIM DURING THE PROCEDURE. PATIENT STATED THAT HE DID NOT HAVE ANYONE AVAILABLE AND WOULD CALL BACK AND RESCHEDULE ONCE HE FOUND SOMEONE

## 2025-04-23 DIAGNOSIS — I10 PRIMARY HYPERTENSION: Chronic | ICD-10-CM

## 2025-04-23 RX ORDER — LISINOPRIL 10 MG/1
10 TABLET ORAL DAILY
Qty: 90 TABLET | Refills: 1 | Status: SHIPPED | OUTPATIENT
Start: 2025-04-23

## 2025-04-23 NOTE — TELEPHONE ENCOUNTER
Rx Refill Note  Requested Prescriptions     Pending Prescriptions Disp Refills    lisinopril (PRINIVIL,ZESTRIL) 10 MG tablet [Pharmacy Med Name: LISINOPRIL 10 MG TABLET] 90 tablet 1     Sig: TAKE 1 TABLET BY MOUTH DAILY      Last office visit with prescribing clinician: 2/3/2025   Last telemedicine visit with prescribing clinician: Visit date not found   Next office visit with prescribing clinician: Visit date not found                         Would you like a call back once the refill request has been completed: [] Yes [] No    If the office needs to give you a call back, can they leave a voicemail: [] Yes [] No    Morena Muro MA  04/23/25, 14:35 EDT

## 2025-04-30 ENCOUNTER — TELEPHONE (OUTPATIENT)
Dept: FAMILY MEDICINE CLINIC | Facility: CLINIC | Age: 80
End: 2025-04-30
Payer: MEDICARE

## 2025-04-30 NOTE — TELEPHONE ENCOUNTER
"Patient is calling regarding his referral to a \"weight loss doctor\"     He states he got a packet from them but he thinks he missed his appointment.     Advised the doctor he is supposed to see is named CHRIS Huddleston. Gave patient phone number so he can call them     [facility]- [phone]   "

## 2025-05-06 ENCOUNTER — OFFICE VISIT (OUTPATIENT)
Dept: FAMILY MEDICINE CLINIC | Facility: CLINIC | Age: 80
End: 2025-05-06
Payer: MEDICARE

## 2025-05-06 VITALS
HEIGHT: 69 IN | BODY MASS INDEX: 32.32 KG/M2 | HEART RATE: 72 BPM | WEIGHT: 218.2 LBS | DIASTOLIC BLOOD PRESSURE: 90 MMHG | OXYGEN SATURATION: 98 % | SYSTOLIC BLOOD PRESSURE: 138 MMHG

## 2025-05-06 DIAGNOSIS — R13.10 DYSPHAGIA, UNSPECIFIED TYPE: ICD-10-CM

## 2025-05-06 DIAGNOSIS — G47.00 INSOMNIA, UNSPECIFIED TYPE: ICD-10-CM

## 2025-05-06 DIAGNOSIS — L98.9 SKIN LESION OF RIGHT LEG: Primary | ICD-10-CM

## 2025-05-06 PROCEDURE — 99214 OFFICE O/P EST MOD 30 MIN: CPT

## 2025-05-06 PROCEDURE — 3075F SYST BP GE 130 - 139MM HG: CPT

## 2025-05-06 PROCEDURE — 3080F DIAST BP >= 90 MM HG: CPT

## 2025-05-06 PROCEDURE — 1159F MED LIST DOCD IN RCRD: CPT

## 2025-05-06 PROCEDURE — 1160F RVW MEDS BY RX/DR IN RCRD: CPT

## 2025-05-06 PROCEDURE — 1125F AMNT PAIN NOTED PAIN PRSNT: CPT

## 2025-05-06 RX ORDER — TRIAMCINOLONE ACETONIDE 1 MG/G
1 CREAM TOPICAL 2 TIMES DAILY
Qty: 30 G | Refills: 0 | Status: SHIPPED | OUTPATIENT
Start: 2025-05-06 | End: 2025-05-20

## 2025-05-07 DIAGNOSIS — E78.5 HYPERLIPIDEMIA LDL GOAL <100: ICD-10-CM

## 2025-05-07 DIAGNOSIS — R60.0 LOWER LEG EDEMA: ICD-10-CM

## 2025-05-07 PROBLEM — G47.00 INSOMNIA: Status: ACTIVE | Noted: 2025-05-07

## 2025-05-07 PROBLEM — L98.9 SKIN LESION OF RIGHT LEG: Status: ACTIVE | Noted: 2025-05-07

## 2025-05-07 RX ORDER — SIMVASTATIN 10 MG
10 TABLET ORAL
Qty: 90 TABLET | Refills: 0 | Status: SHIPPED | OUTPATIENT
Start: 2025-05-07

## 2025-05-07 RX ORDER — TORSEMIDE 20 MG/1
20 TABLET ORAL DAILY
Qty: 90 TABLET | Refills: 0 | Status: SHIPPED | OUTPATIENT
Start: 2025-05-07

## 2025-05-07 NOTE — ASSESSMENT & PLAN NOTE
Appears to be dry wound that is either pressure related from him sitting with legs crossed or previous injury that has scarred up with hyperpigmentation.   I would like him to try Triamcinolone cream topically to the area twice daily for 2 weeks.   If not improving or resolving, then recommend that you establish with dermatology. Offered this referral today and he declined and would like to try this first.   Patient to notify me of any changes.

## 2025-05-07 NOTE — ASSESSMENT & PLAN NOTE
Counseled patient that I do not prescribe medical marijuana and [facility] providers do not. Explained there are specific clinics that manage this.   I told him I would not recommend medical marijuana in his case due to his medical history and chronic medical conditions. I did recommend trial of sleep hygiene changes and to continue with Melatonin at nighttime first.   The problem seems to be more the environment in which he is sleeping, but patient not able to afford to move.   Recommended sleep medicine referral. He declines.

## 2025-05-12 ENCOUNTER — OFFICE VISIT (OUTPATIENT)
Dept: ENDOCRINOLOGY | Facility: CLINIC | Age: 80
End: 2025-05-12
Payer: MEDICARE

## 2025-05-12 VITALS
WEIGHT: 218 LBS | BODY MASS INDEX: 34.21 KG/M2 | OXYGEN SATURATION: 94 % | DIASTOLIC BLOOD PRESSURE: 72 MMHG | SYSTOLIC BLOOD PRESSURE: 130 MMHG | HEIGHT: 67 IN | HEART RATE: 74 BPM

## 2025-05-12 DIAGNOSIS — E55.9 VITAMIN D DEFICIENCY: ICD-10-CM

## 2025-05-12 DIAGNOSIS — N25.81 SECONDARY HYPERPARATHYROIDISM: ICD-10-CM

## 2025-05-12 DIAGNOSIS — E29.1 SECONDARY MALE HYPOGONADISM: Primary | ICD-10-CM

## 2025-05-12 DIAGNOSIS — M81.0 AGE-RELATED OSTEOPOROSIS WITHOUT CURRENT PATHOLOGICAL FRACTURE: ICD-10-CM

## 2025-05-12 PROCEDURE — 3075F SYST BP GE 130 - 139MM HG: CPT | Performed by: INTERNAL MEDICINE

## 2025-05-12 PROCEDURE — 3078F DIAST BP <80 MM HG: CPT | Performed by: INTERNAL MEDICINE

## 2025-05-12 PROCEDURE — 99214 OFFICE O/P EST MOD 30 MIN: CPT | Performed by: INTERNAL MEDICINE
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
## 2025-06-02 ENCOUNTER — OFFICE VISIT (OUTPATIENT)
Dept: BARIATRICS/WEIGHT MGMT | Facility: CLINIC | Age: 80
End: 2025-06-02
Payer: MEDICARE

## 2025-06-02 VITALS
WEIGHT: 213.8 LBS | SYSTOLIC BLOOD PRESSURE: 138 MMHG | DIASTOLIC BLOOD PRESSURE: 86 MMHG | HEART RATE: 70 BPM | BODY MASS INDEX: 34.36 KG/M2 | HEIGHT: 66 IN

## 2025-06-02 DIAGNOSIS — E66.811 CLASS 1 OBESITY WITH SERIOUS COMORBIDITY AND BODY MASS INDEX (BMI) OF 34.0 TO 34.9 IN ADULT, UNSPECIFIED OBESITY TYPE: Primary | ICD-10-CM

## 2025-06-02 DIAGNOSIS — I25.119 CORONARY ARTERY DISEASE INVOLVING NATIVE HEART WITH ANGINA PECTORIS, UNSPECIFIED VESSEL OR LESION TYPE: Chronic | ICD-10-CM

## 2025-06-02 DIAGNOSIS — I10 PRIMARY HYPERTENSION: Chronic | ICD-10-CM

## 2025-06-02 DIAGNOSIS — F41.8 DEPRESSION WITH ANXIETY: ICD-10-CM

## 2025-06-02 DIAGNOSIS — D50.9 IRON DEFICIENCY ANEMIA, UNSPECIFIED IRON DEFICIENCY ANEMIA TYPE: ICD-10-CM

## 2025-06-02 DIAGNOSIS — K21.9 GASTROESOPHAGEAL REFLUX DISEASE WITHOUT ESOPHAGITIS: Chronic | ICD-10-CM

## 2025-06-02 DIAGNOSIS — R73.03 PREDIABETES: ICD-10-CM

## 2025-06-02 DIAGNOSIS — E78.5 HYPERLIPIDEMIA LDL GOAL <100: Chronic | ICD-10-CM

## 2025-06-02 PROBLEM — J44.9 CHRONIC OBSTRUCTIVE PULMONARY DISEASE: Status: ACTIVE | Noted: 2022-02-23

## 2025-06-02 PROCEDURE — 3075F SYST BP GE 130 - 139MM HG: CPT | Performed by: NURSE PRACTITIONER

## 2025-06-02 PROCEDURE — 1160F RVW MEDS BY RX/DR IN RCRD: CPT | Performed by: NURSE PRACTITIONER

## 2025-06-02 PROCEDURE — 3079F DIAST BP 80-89 MM HG: CPT | Performed by: NURSE PRACTITIONER

## 2025-06-02 PROCEDURE — 1159F MED LIST DOCD IN RCRD: CPT | Performed by: NURSE PRACTITIONER

## 2025-06-02 PROCEDURE — 99215 OFFICE O/P EST HI 40 MIN: CPT | Performed by: NURSE PRACTITIONER

## 2025-06-02 PROCEDURE — G2212 PROLONG OUTPT/OFFICE VIS: HCPCS | Performed by: NURSE PRACTITIONER

## 2025-06-02 NOTE — ASSESSMENT & PLAN NOTE
Chronic, recurrent. PHQ-9 of 9. Consider wellbutrin. Not a candidate for contrave due to methadone treatment.
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
## 2025-06-03 PROBLEM — R73.03 PREDIABETES: Status: ACTIVE | Noted: 2025-06-03

## 2025-06-03 PROBLEM — E66.811 CLASS 1 OBESITY WITH SERIOUS COMORBIDITY AND BODY MASS INDEX (BMI) OF 34.0 TO 34.9 IN ADULT: Status: ACTIVE | Noted: 2025-06-03

## 2025-06-03 LAB
ALBUMIN SERPL-MCNC: 4.3 G/DL (ref 3.8–4.8)
ALP SERPL-CCNC: 91 IU/L (ref 44–121)
ALT SERPL-CCNC: 12 IU/L (ref 0–44)
AST SERPL-CCNC: 24 IU/L (ref 0–40)
BILIRUB SERPL-MCNC: 0.5 MG/DL (ref 0–1.2)
BUN SERPL-MCNC: 12 MG/DL (ref 8–27)
BUN/CREAT SERPL: 11 (ref 10–24)
CALCIUM SERPL-MCNC: 8.8 MG/DL (ref 8.6–10.2)
CHLORIDE SERPL-SCNC: 97 MMOL/L (ref 96–106)
CHOLEST SERPL-MCNC: 171 MG/DL (ref 100–199)
CO2 SERPL-SCNC: 29 MMOL/L (ref 20–29)
CREAT SERPL-MCNC: 1.12 MG/DL (ref 0.76–1.27)
EGFRCR SERPLBLD CKD-EPI 2021: 67 ML/MIN/1.73
ERYTHROCYTE [DISTWIDTH] IN BLOOD BY AUTOMATED COUNT: 14 % (ref 11.6–15.4)
GLOBULIN SER CALC-MCNC: 2.9 G/DL (ref 1.5–4.5)
GLUCOSE SERPL-MCNC: 104 MG/DL (ref 70–99)
HBA1C MFR BLD: 5.4 % (ref 4.8–5.6)
HCT VFR BLD AUTO: 45.8 % (ref 37.5–51)
HDLC SERPL-MCNC: 39 MG/DL
HGB BLD-MCNC: 14.7 G/DL (ref 13–17.7)
LDLC SERPL CALC-MCNC: 101 MG/DL (ref 0–99)
MCH RBC QN AUTO: 31.2 PG (ref 26.6–33)
MCHC RBC AUTO-ENTMCNC: 32.1 G/DL (ref 31.5–35.7)
MCV RBC AUTO: 97 FL (ref 79–97)
PLATELET # BLD AUTO: 215 X10E3/UL (ref 150–450)
POTASSIUM SERPL-SCNC: 4.2 MMOL/L (ref 3.5–5.2)
PROT SERPL-MCNC: 7.2 G/DL (ref 6–8.5)
RBC # BLD AUTO: 4.71 X10E6/UL (ref 4.14–5.8)
SODIUM SERPL-SCNC: 140 MMOL/L (ref 134–144)
TRIGL SERPL-MCNC: 175 MG/DL (ref 0–149)
TSH SERPL DL<=0.005 MIU/L-ACNC: 1.53 UIU/ML (ref 0.45–4.5)
VLDLC SERPL CALC-MCNC: 31 MG/DL (ref 5–40)
WBC # BLD AUTO: 8.9 X10E3/UL (ref 3.4–10.8)

## 2025-06-03 NOTE — ASSESSMENT & PLAN NOTE
Patient's (Body mass index is 34.51 kg/m².) indicates that they are obese (BMI >30) with health conditions that include hypertension, coronary heart disease, impaired fasting glucose, and GERD . Weight is newly identified. BMI  is above average; BMI management plan is completed. We discussed low calorie, low carb based diet program, portion control, increasing exercise, management of depression/anxiety/stress to control compensatory eating, and food journal.    Topics of discussion included obesity as a disease, nutritional education on food groups, exercise, and medications. Patient was instructed in adequate protein, controlled carb and controlled fat intake.   Patient received instructions on using the medicines as a tool in controlling their weight with nutritional and behavioral changes. Risks and benefits were discussed. I believe the potential benefits of medication helping to decrease weight outweighs the risks. Patient is to try nutritonal/behavioral changes only first.   Patient received our clinic education booklet.   Our patient consent form was reviewed including potential risks of weight loss. We also reviewed our confidentiality and HIPPA statements. Patients current FITT score was reviewed along with current capability for exercise tolerance and a patient will work towards a FITT score of:     Frequency   Intensity Time Strength Training   []   0 None  []   0 None  []   0 None  []   0 None    []   1 (1-2x/week) []   1 (light) []   1 (<10 min) []   1 (1x/week)   [x]   2 (3-5x/week) [x]   2 (moderate) []   2 (10-20 min) [x]   2 (2x/week)   []   3 (daily)   []   3 (moderately hard)  []   4 (very hard) []   3 (20-30 min)  [x]   4 (>30 min) []   3 (3-4x/week)       Patient's past medical history was reviewed in detail and barriers to weight loss were identified and discussed. Past efforts at weight reduction on their own as well as under physician supervision were documented and discussed.  I advised  patient to continue routine care with their Primary Care Provider.     Nutritional recommendations and goals were reviewed including Calories:8402-6715 daily adjusted for exercise calories burnt, Protein: g daily, Net carbs (total carb - fiber) of 50-75g per day.  Start to keep a food journal and bring into next visit in 2 weeks for review. Practice the behavioral modification technique of mindful eating. Take one MVI daily and 2000mg fish oil daily. Take other medications and supplements as directed.  - Complete fasting labwork.   - Reviewed AOM options: zepbound and wegovy not covered unless diagnosed with diabetes. Will screen today with A1c. Does have two elevated fasting glucose levels >126 in the last 6 months. Consider: metformin, topamax (caution age and cognitive side effects), wellbutrin. No phentermine due to history of polysubstance abuse.   - Keep up current exercise effort. Work on decreasing carbs and increasing protein.   - He is at risk for sleep apnea due to hx of polysubstance abuse and obesity, has been referred twice in the past and did not complete the sleep tests that were ordered. Not interested in further evaluation at this time.   - Treatment goal 180-185lb.

## 2025-06-05 ENCOUNTER — PATIENT ROUNDING (BHMG ONLY) (OUTPATIENT)
Dept: BARIATRICS/WEIGHT MGMT | Facility: CLINIC | Age: 80
End: 2025-06-05
Payer: MEDICARE

## 2025-06-05 NOTE — PROGRESS NOTES
A [employer] message has been sent to the patient for PATIENT ROUNDING for [facility].

## 2025-06-08 RX ORDER — ZOLEDRONIC ACID 0.05 MG/ML
5 INJECTION, SOLUTION INTRAVENOUS ONCE
Status: SHIPPED | OUTPATIENT
Start: 2025-07-04

## 2025-06-09 DIAGNOSIS — Z76.0 ENCOUNTER FOR MEDICATION REFILL: ICD-10-CM

## 2025-06-09 DIAGNOSIS — K21.9 GASTROESOPHAGEAL REFLUX DISEASE WITHOUT ESOPHAGITIS: Chronic | ICD-10-CM

## 2025-06-09 RX ORDER — SODIUM CHLORIDE 9 MG/ML
20 INJECTION, SOLUTION INTRAVENOUS ONCE
OUTPATIENT
Start: 2025-06-27

## 2025-06-09 RX ORDER — ZOLEDRONIC ACID 0.05 MG/ML
5 INJECTION, SOLUTION INTRAVENOUS ONCE
OUTPATIENT
Start: 2025-06-27

## 2025-06-10 ENCOUNTER — OFFICE VISIT (OUTPATIENT)
Dept: FAMILY MEDICINE CLINIC | Facility: CLINIC | Age: 80
End: 2025-06-10
Payer: MEDICARE

## 2025-06-10 VITALS
HEART RATE: 67 BPM | SYSTOLIC BLOOD PRESSURE: 128 MMHG | OXYGEN SATURATION: 95 % | BODY MASS INDEX: 34.23 KG/M2 | TEMPERATURE: 97.8 F | HEIGHT: 66 IN | WEIGHT: 213 LBS | DIASTOLIC BLOOD PRESSURE: 96 MMHG

## 2025-06-10 DIAGNOSIS — K21.9 GASTROESOPHAGEAL REFLUX DISEASE WITHOUT ESOPHAGITIS: ICD-10-CM

## 2025-06-10 DIAGNOSIS — H61.22 IMPACTED CERUMEN OF LEFT EAR: Primary | ICD-10-CM

## 2025-06-10 DIAGNOSIS — S81.001D OPEN WOUND OF RIGHT KNEE, SUBSEQUENT ENCOUNTER: ICD-10-CM

## 2025-06-10 DIAGNOSIS — R13.10 DYSPHAGIA, UNSPECIFIED TYPE: ICD-10-CM

## 2025-06-10 PROBLEM — S81.001A OPEN WOUND OF RIGHT KNEE: Status: ACTIVE | Noted: 2025-06-10

## 2025-06-10 RX ORDER — CEPHALEXIN 500 MG/1
500 CAPSULE ORAL 2 TIMES DAILY
Qty: 14 CAPSULE | Refills: 0 | Status: SHIPPED | OUTPATIENT
Start: 2025-06-10 | End: 2025-06-17

## 2025-06-10 RX ORDER — PANTOPRAZOLE SODIUM 20 MG/1
20 TABLET, DELAYED RELEASE ORAL DAILY PRN
Qty: 60 TABLET | OUTPATIENT
Start: 2025-06-10

## 2025-06-10 RX ORDER — PANTOPRAZOLE SODIUM 40 MG/1
40 TABLET, DELAYED RELEASE ORAL
Qty: 90 TABLET | Refills: 0 | Status: SHIPPED | OUTPATIENT
Start: 2025-06-10
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
## 2025-06-10 NOTE — TELEPHONE ENCOUNTER
Rx Refill Note  Requested Prescriptions     Pending Prescriptions Disp Refills    pantoprazole (PROTONIX) 20 MG EC tablet [Pharmacy Med Name: PANTOPRAZOLE SOD DR 20 MG TAB] 60 tablet      Sig: TAKE 1 TABLET BY MOUTH DAILY AS NEEDED FOR HEARTBURN OR INDIGESTION      Last office visit with prescribing clinician: 5/6/2025   Last telemedicine visit with prescribing clinician: Visit date not found   Next office visit with prescribing clinician: 6/10/2025                         Would you like a call back once the refill request has been completed: [] Yes [] No    If the office needs to give you a call back, can they leave a voicemail: [] Yes [] No    Morena Muro MA  06/10/25, 15:14 EDT

## 2025-06-11 NOTE — ASSESSMENT & PLAN NOTE
Symptoms resolved with irrigation of left ear. Hearing improved.   Patient tolerated procedure well.   Patient to notify me of lack of improvement.

## 2025-06-11 NOTE — ASSESSMENT & PLAN NOTE
Wound has changed since last visit.   Will treat to cover for infection since it has opened some.   Start Cephalexin.   Referral to dermatology due to lack of improvement and progressive changes.

## 2025-06-16 ENCOUNTER — OFFICE VISIT (OUTPATIENT)
Dept: BARIATRICS/WEIGHT MGMT | Facility: CLINIC | Age: 80
End: 2025-06-16
Payer: MEDICARE

## 2025-06-16 VITALS
BODY MASS INDEX: 34.1 KG/M2 | DIASTOLIC BLOOD PRESSURE: 80 MMHG | WEIGHT: 212.2 LBS | HEIGHT: 66 IN | HEART RATE: 69 BPM | SYSTOLIC BLOOD PRESSURE: 138 MMHG

## 2025-06-16 DIAGNOSIS — R73.03 PREDIABETES: ICD-10-CM

## 2025-06-16 DIAGNOSIS — E66.811 CLASS 1 OBESITY WITH SERIOUS COMORBIDITY AND BODY MASS INDEX (BMI) OF 34.0 TO 34.9 IN ADULT, UNSPECIFIED OBESITY TYPE: Primary | ICD-10-CM

## 2025-06-16 PROCEDURE — 99214 OFFICE O/P EST MOD 30 MIN: CPT | Performed by: NURSE PRACTITIONER

## 2025-06-16 PROCEDURE — 3075F SYST BP GE 130 - 139MM HG: CPT | Performed by: NURSE PRACTITIONER

## 2025-06-16 PROCEDURE — 1160F RVW MEDS BY RX/DR IN RCRD: CPT | Performed by: NURSE PRACTITIONER

## 2025-06-16 PROCEDURE — 3079F DIAST BP 80-89 MM HG: CPT | Performed by: NURSE PRACTITIONER

## 2025-06-16 PROCEDURE — 1159F MED LIST DOCD IN RCRD: CPT | Performed by: NURSE PRACTITIONER

## 2025-06-16 RX ORDER — METFORMIN HYDROCHLORIDE 500 MG/1
TABLET, EXTENDED RELEASE ORAL
Qty: 180 TABLET | Refills: 0 | Status: SHIPPED | OUTPATIENT
Start: 2025-06-16 | End: 2025-06-18 | Stop reason: SDUPTHER
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
## 2025-06-16 NOTE — ASSESSMENT & PLAN NOTE
Patient's (Body mass index is 34.25 kg/m².) indicates that they are obese (BMI >30) with health conditions that include hypertension, coronary heart disease, impaired fasting glucose, dyslipidemias, and GERD . Weight is improving with lifestyle modifications. BMI  is above average; BMI management plan is completed. We discussed low calorie, low carb based diet program, portion control, increasing exercise, and pharmacologic options including metformin. Food journal.    I have instructed the patient to continue with pursuit of medical weight loss as a part of this program. Patient does meet criteria for use of anorectics at this time as BMI >30  and is not at treatment goal.     The current plan for this month includes:   - Continue current exercise efforts  - Continue to keep food journal, bring to next visit for review.   - Add premier protein shake daily (breakfast) for appetite regulation and to increase protein  - AOM options: metformin or wellbutrin. Will start metformin today, take with food. Common side effects reported include nausea, vomiting, diarrhea, abdominal pain, and loss of appetite have been frequently reported during therapy initiation and resolve spontaneously in most cases.  - Treatment goal 180-185lb.

## 2025-06-16 NOTE — ASSESSMENT & PLAN NOTE
A1c is normal at 5.4. Fasting glucose 104. Denies hypoglycemia. Continue low carb diet, regular physical activity, and weight reduction of 10-15%. Start metformin.

## 2025-06-18 DIAGNOSIS — E66.811 CLASS 1 OBESITY WITH SERIOUS COMORBIDITY AND BODY MASS INDEX (BMI) OF 34.0 TO 34.9 IN ADULT, UNSPECIFIED OBESITY TYPE: ICD-10-CM

## 2025-06-18 DIAGNOSIS — R73.03 PREDIABETES: ICD-10-CM

## 2025-06-19 RX ORDER — METFORMIN HYDROCHLORIDE 500 MG/1
TABLET, EXTENDED RELEASE ORAL
Qty: 53 TABLET | Refills: 0 | Status: SHIPPED | OUTPATIENT
Start: 2025-06-19 | End: 2025-08-16

## 2025-06-24 ENCOUNTER — TELEPHONE (OUTPATIENT)
Dept: ENDOCRINOLOGY | Facility: CLINIC | Age: 80
End: 2025-06-24
Payer: MEDICARE

## 2025-06-24 ENCOUNTER — HOSPITAL ENCOUNTER (OUTPATIENT)
Dept: GENERAL RADIOLOGY | Facility: HOSPITAL | Age: 80
Discharge: HOME | End: 2025-06-24
Payer: MEDICARE

## 2025-06-24 DIAGNOSIS — K21.9 GASTROESOPHAGEAL REFLUX DISEASE WITHOUT ESOPHAGITIS: ICD-10-CM

## 2025-06-24 DIAGNOSIS — R13.10 DYSPHAGIA, UNSPECIFIED TYPE: ICD-10-CM

## 2025-06-24 PROCEDURE — 63710000001 BARIUM SULFATE 96 % RECONSTITUTED SUSPENSION

## 2025-06-24 PROCEDURE — A9270 NON-COVERED ITEM OR SERVICE: HCPCS

## 2025-06-24 PROCEDURE — 74220 X-RAY XM ESOPHAGUS 1CNTRST: CPT

## 2025-06-24 RX ADMIN — BARIUM SULFATE 183 ML: 960 POWDER, FOR SUSPENSION ORAL at 11:11

## 2025-06-24 NOTE — TELEPHONE ENCOUNTER
Caller: Ronald Bond    Relationship to patient: Self    Best call back number: [phone]     Patient is needing: PT REQUESTS CALLBACK TO DISCUSS ADDITIONAL DETAILS REGARDING INFUSION SCHEDULED 6/25/25. PLEASE CALL TO ADVISE.

## 2025-06-24 NOTE — TELEPHONE ENCOUNTER
Spoke with patient. Answered questions about the Reclast. Asked him to take Tylenol 500 mg TID with meals the day of the infusion and for two days after.   Electronically Signed: Melanie Neal MD     76.5

## 2025-06-25 ENCOUNTER — HOSPITAL ENCOUNTER (OUTPATIENT)
Facility: HOSPITAL | Age: 80
Discharge: HOME | End: 2025-06-25
Payer: MEDICARE

## 2025-07-01 ENCOUNTER — OFFICE VISIT (OUTPATIENT)
Dept: GASTROENTEROLOGY | Facility: CLINIC | Age: 80
End: 2025-07-01
Payer: MEDICARE

## 2025-07-01 VITALS
BODY MASS INDEX: 36.38 KG/M2 | HEART RATE: 68 BPM | SYSTOLIC BLOOD PRESSURE: 130 MMHG | DIASTOLIC BLOOD PRESSURE: 86 MMHG | OXYGEN SATURATION: 98 % | WEIGHT: 226.4 LBS | TEMPERATURE: 97.7 F | HEIGHT: 66 IN | RESPIRATION RATE: 24 BRPM

## 2025-07-01 DIAGNOSIS — K74.00 LIVER FIBROSIS: Primary | ICD-10-CM
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
## 2025-07-07 ENCOUNTER — OUTSIDE FACILITY SERVICE (OUTPATIENT)
Dept: GASTROENTEROLOGY | Facility: CLINIC | Age: 80
End: 2025-07-07
Payer: MEDICARE

## 2025-07-07 PROCEDURE — 43248 EGD GUIDE WIRE INSERTION: CPT | Performed by: INTERNAL MEDICINE

## 2025-07-07 PROCEDURE — 88305 TISSUE EXAM BY PATHOLOGIST: CPT | Performed by: INTERNAL MEDICINE

## 2025-07-07 PROCEDURE — 43239 EGD BIOPSY SINGLE/MULTIPLE: CPT | Performed by: INTERNAL MEDICINE

## 2025-07-08 ENCOUNTER — LAB REQUISITION (OUTPATIENT)
Dept: LAB | Facility: HOSPITAL | Age: 80
End: 2025-07-08
Payer: MEDICARE

## 2025-07-08 DIAGNOSIS — K31.89 OTHER DISEASES OF STOMACH AND DUODENUM: ICD-10-CM

## 2025-07-08 DIAGNOSIS — R13.10 DYSPHAGIA, UNSPECIFIED: ICD-10-CM

## 2025-07-08 DIAGNOSIS — K91.89 OTHER POSTPROCEDURAL COMPLICATIONS AND DISORDERS OF DIGESTIVE SYSTEM: ICD-10-CM

## 2025-07-08 DIAGNOSIS — Q39.9 CONGENITAL MALFORMATION OF ESOPHAGUS, UNSPECIFIED: ICD-10-CM

## 2025-07-09 ENCOUNTER — RESULTS FOLLOW-UP (OUTPATIENT)
Dept: LAB | Facility: HOSPITAL | Age: 80
End: 2025-07-09
Payer: MEDICARE

## 2025-07-09 LAB
CYTO UR: NORMAL
LAB AP CASE REPORT: NORMAL
LAB AP CLINICAL INFORMATION: NORMAL
LAB AP DIAGNOSIS COMMENT: NORMAL
PATH REPORT.FINAL DX SPEC: NORMAL
PATH REPORT.GROSS SPEC: NORMAL

## 2025-07-09 NOTE — LETTER
"Ronald Bond  [address] [address]    July 9, 2025     Dear Mr. Bond:    Below are the results from your recent visit:    Resulted Orders   Tissue Pathology Exam   Result Value Ref Range    Case Report       Surgical Pathology Report                         Case: [id number]                                  Authorizing Provider:  Dallas Jaquez MD    Collected:           07/07/2025 02:05 PM          Ordering Location:     [facility]   Received:            07/08/2025 09:33 AM                                 LABORATORY                                                                   Pathologist:           Mack Hendrix MD                                                            Specimens:   1) - Stomach                                                                                        2) - Esophagus                                                                             Clinical Information       Dysphagia, unspecified  Congenital malformation of esophagus, unspecified  Other diseases of stomach and duodenum  Other postprocedural complications and disorders of digestive system      Final Diagnosis       STOMACH:  Gastric mucosa with reactive changes.   No Helicobacter pylori-like organisms seen.  Negative for dysplasia or malignancy.  2.   ESOPHAGUS:  Reactive squamous mucosa with focal scattered eosinophils (<15/hpf).  Negative for dysplasia or malignancy.  See comment.  GJK      Comment       An increase of eosinophils at the distal esophagus may also be seen in reflux.  Clinical and endoscopic correlation suggested.      Gross Description       1. Stomach.  Received in formalin labeled \"stomach\" are 2 tan soft tissue fragments aggregating 0.4 x 0.4 x 0.2 cm submitted entirely in a single cassette.    2. Esophagus.  In formalin labeled \"esophagus\" is a 0.4 x 0.2 x 0.2 cm white soft tissue fragment, filtered and submitted entirely in a single cassette. " HDM        Microscopic Description       The slides are reviewed and demonstrate histopathologic features supporting the above rendered diagnosis.         Your EGD biopsies do not show evidence of a bacterial infection.  Biopsies show reflux disease changes and endoscopy shows that your esophagus doesn't clear the reflux due to discoordinated motility.  I hope stretching your esophagus helps with your swallowing.  If you continue to have problematic symptoms, please call the GI clinic to schedule an appointment.  It was nice to see you!             Sincerely,        Dallas Jaquez MD

## 2025-07-11 ENCOUNTER — TRANSITIONAL CARE MANAGEMENT TELEPHONE ENCOUNTER (OUTPATIENT)
Dept: CALL CENTER | Facility: HOSPITAL | Age: 80
End: 2025-07-11
Payer: MEDICARE

## 2025-07-11 ENCOUNTER — READMISSION MANAGEMENT (OUTPATIENT)
Dept: CALL CENTER | Facility: HOSPITAL | Age: 80
End: 2025-07-11
Payer: MEDICARE

## 2025-07-11 ENCOUNTER — OFFICE VISIT (OUTPATIENT)
Dept: FAMILY MEDICINE CLINIC | Facility: CLINIC | Age: 80
End: 2025-07-11
Payer: MEDICARE

## 2025-07-11 VITALS
DIASTOLIC BLOOD PRESSURE: 97 MMHG | HEART RATE: 76 BPM | HEIGHT: 66 IN | SYSTOLIC BLOOD PRESSURE: 171 MMHG | OXYGEN SATURATION: 93 % | WEIGHT: 217 LBS | BODY MASS INDEX: 34.87 KG/M2

## 2025-07-11 DIAGNOSIS — E78.5 HYPERLIPIDEMIA LDL GOAL <100: ICD-10-CM

## 2025-07-11 DIAGNOSIS — R13.10 DYSPHAGIA, UNSPECIFIED TYPE: ICD-10-CM

## 2025-07-11 DIAGNOSIS — I10 PRIMARY HYPERTENSION: Chronic | ICD-10-CM

## 2025-07-11 DIAGNOSIS — J45.20 MILD INTERMITTENT ASTHMA, UNSPECIFIED WHETHER COMPLICATED: ICD-10-CM

## 2025-07-11 DIAGNOSIS — R60.0 LOWER LEG EDEMA: ICD-10-CM

## 2025-07-11 DIAGNOSIS — I10 ESSENTIAL HYPERTENSION: ICD-10-CM

## 2025-07-11 DIAGNOSIS — K21.9 GASTROESOPHAGEAL REFLUX DISEASE WITHOUT ESOPHAGITIS: ICD-10-CM

## 2025-07-11 PROCEDURE — 3077F SYST BP >= 140 MM HG: CPT

## 2025-07-11 PROCEDURE — 1125F AMNT PAIN NOTED PAIN PRSNT: CPT

## 2025-07-11 PROCEDURE — 1111F DSCHRG MED/CURRENT MED MERGE: CPT

## 2025-07-11 PROCEDURE — 1160F RVW MEDS BY RX/DR IN RCRD: CPT

## 2025-07-11 PROCEDURE — 99495 TRANSJ CARE MGMT MOD F2F 14D: CPT

## 2025-07-11 PROCEDURE — 3080F DIAST BP >= 90 MM HG: CPT

## 2025-07-11 PROCEDURE — 1159F MED LIST DOCD IN RCRD: CPT

## 2025-07-11 RX ORDER — LISINOPRIL 10 MG/1
10 TABLET ORAL DAILY
Qty: 90 TABLET | Refills: 1 | Status: SHIPPED | OUTPATIENT
Start: 2025-07-11

## 2025-07-11 RX ORDER — PREDNISONE 20 MG/1
40 TABLET ORAL DAILY
COMMUNITY
Start: 2025-07-10 | End: 2025-07-15

## 2025-07-11 RX ORDER — LIDOCAINE 50 MG/G
1 PATCH TOPICAL DAILY
COMMUNITY
Start: 2025-07-03 | End: 2025-07-13

## 2025-07-11 RX ORDER — SIMVASTATIN 10 MG
10 TABLET ORAL
Qty: 90 TABLET | Refills: 0 | Status: SHIPPED | OUTPATIENT
Start: 2025-07-11

## 2025-07-11 RX ORDER — FLUTICASONE FUROATE 50 UG/1
1 POWDER RESPIRATORY (INHALATION) DAILY
Qty: 30 EACH | Refills: 5 | Status: SHIPPED | OUTPATIENT
Start: 2025-07-11

## 2025-07-11 RX ORDER — TORSEMIDE 20 MG/1
20 TABLET ORAL DAILY
Qty: 90 TABLET | Refills: 0 | Status: SHIPPED | OUTPATIENT
Start: 2025-07-11

## 2025-07-11 RX ORDER — PANTOPRAZOLE SODIUM 40 MG/1
40 TABLET, DELAYED RELEASE ORAL
Qty: 90 TABLET | Refills: 0 | Status: SHIPPED | OUTPATIENT
Start: 2025-07-11

## 2025-07-11 RX ORDER — CARVEDILOL 25 MG/1
25 TABLET ORAL 2 TIMES DAILY
Qty: 180 TABLET | Refills: 1 | Status: SHIPPED | OUTPATIENT
Start: 2025-07-11

## 2025-07-11 RX ORDER — AZITHROMYCIN 250 MG/1
TABLET, FILM COATED ORAL
COMMUNITY
Start: 2025-07-10

## 2025-07-11 RX ORDER — METHOCARBAMOL 500 MG/1
500 TABLET, FILM COATED ORAL
COMMUNITY
Start: 2025-07-03 | End: 2025-07-13

## 2025-07-11 NOTE — PROGRESS NOTES
"Transitional Care Follow Up Visit  Subjective     Ronald Bond is a 79 y.o. male who presents for a transitional care management visit.    Within 48 business hours after discharge our office contacted him via telephone to coordinate his care and needs.      I reviewed and discussed the details of that call along with the discharge summary, hospital problems, inpatient lab results, inpatient diagnostic studies, and consultation reports with Ronald.     Current outpatient and discharge medications have been reconciled for the patient.  Reviewed by: Christy Santana PA-C          2/6/2025     3:51 PM   Date of TCM Phone Call   [facility]   Date of Admission 2/3/2025   Date of Discharge 2/6/2025   Discharge Disposition Home or Self Care     Risk for Readmission (LACE) No data recorded    History of Present Illness   Course During Hospital Stay:    \"Ronald Bond is a 79 y.o. male with PMH significant for COPD, CAD, DDD on chronic methadone and osteoporosis presenting for SOA.     Patient arrived to ED on 4 L NC.  Patient was given DuoNebs, steroids, azithromycin with improvement of dyspnea.  Patient was slowly weaned off of his oxygen and remained saturating well on room air.  We performed an ambulatory trial without Oxygen, remained saturating appropriately. Patient has PCP appointment tomorrow and feels much better.     Laboratory and imaging workup personally interpreted me shows no acutely actionable abnormalities.  Hypercapnia on VBG likely secondary to poorly controlled COPD.  Patient's blood pressure was elevated, as expected with duonebs and steroids, but responsive to home antihypertensives.\"     HPI 7/11/2025:   Patient reports he has doing better since hospitalization.  Reports that he is breathing better.  On day 3 of 5 of steroid therapy.  He was previously followed by Tatiana Maxwell PA-C in this clinic.  Now establishing care with me as new primary care provider.  Previous PCP had prescribed " "Arnuity Ellipta for maintenance COPD therapy.  Patient states that he has not been taking this.  Has been relying on albuterol inhaler as needed, which he states he has been using 5-6 times weekly.Denies chest pain, diffficulty ambulating.   Patient follows with gastroenterology, recently completed EGD for dysphagia.  Also follows with endocrinology for hypergonadism and osteoporosis.       The following portions of the patient's history were reviewed and updated as appropriate: allergies, current medications, past family history, past medical history, past social history, past surgical history, and problem list.    Review of Systems   Constitutional:  Negative for chills, fatigue and fever.   HENT:  Negative for congestion, ear pain, rhinorrhea, sinus pain and sore throat.    Respiratory:  Positive for shortness of breath. Negative for cough and wheezing.    Cardiovascular:  Negative for chest pain and leg swelling.   Gastrointestinal:  Negative for abdominal pain, constipation, diarrhea and nausea.   Musculoskeletal:  Negative for myalgias.   Neurological:  Negative for dizziness and headaches.       Objective   /97   Pulse 76   Ht 167.6 cm (65.98\")   Wt 98.4 kg (217 lb)   SpO2 93%   BMI 35.04 kg/m²   Physical Exam  Vitals reviewed.   Constitutional:       Appearance: Normal appearance.      Comments: Ambulates with cane    HENT:      Head: Normocephalic and atraumatic.      Nose: Nose normal. No congestion.      Mouth/Throat:      Mouth: Mucous membranes are moist.      Pharynx: Oropharynx is clear. No oropharyngeal exudate or posterior oropharyngeal erythema.   Eyes:      Pupils: Pupils are equal, round, and reactive to light.   Cardiovascular:      Rate and Rhythm: Normal rate and regular rhythm.      Pulses: Normal pulses.      Heart sounds: No murmur heard.  Pulmonary:      Effort: Pulmonary effort is normal. No respiratory distress.      Breath sounds: Rhonchi present.   Lymphadenopathy:      " Cervical: No cervical adenopathy.   Skin:     General: Skin is warm and dry.      Capillary Refill: Capillary refill takes less than 2 seconds.   Neurological:      Mental Status: He is alert.         Assessment & Plan   Diagnoses and all orders for this visit:    1. Mild intermittent asthma, unspecified whether complicated  -     Fluticasone Furoate (Arnuity Ellipta) 50 MCG/ACT aerosol powder ; Inhale 1 puff Daily.  Dispense: 30 each; Refill: 5    2. Essential hypertension  -     carvedilol (COREG) 25 MG tablet; Take 1 tablet by mouth 2 (Two) Times a Day. Take with food  Dispense: 180 tablet; Refill: 1    3. Primary hypertension  -     lisinopril (PRINIVIL,ZESTRIL) 10 MG tablet; Take 1 tablet by mouth Daily.  Dispense: 90 tablet; Refill: 1    4. Lower leg edema  -     torsemide (DEMADEX) 20 MG tablet; Take 1 tablet by mouth Daily.  Dispense: 90 tablet; Refill: 0    5. Dysphagia, unspecified type  -     pantoprazole (PROTONIX) 40 MG EC tablet; Take 1 tablet by mouth Every Morning Before Breakfast.  Dispense: 90 tablet; Refill: 0    6. Gastroesophageal reflux disease without esophagitis  -     pantoprazole (PROTONIX) 40 MG EC tablet; Take 1 tablet by mouth Every Morning Before Breakfast.  Dispense: 90 tablet; Refill: 0    7. Hyperlipidemia LDL goal <100  -     simvastatin (ZOCOR) 10 MG tablet; Take 1 tablet by mouth every night at bedtime.  Dispense: 90 tablet; Refill: 0        Finding patient with refills of maintenance medications.  Emphasized the need for maintenance therapy with Arnuity Ellipta.  Will recheck breathing status in 2 weeks.  If breathing status is not significantly improved with maintenance inhaler therapy, will consider PFTs for further evaluation.    Follow up: Return in about 2 weeks (around 7/25/2025) for Follow up COPD.    Christy Santana PA-C

## 2025-07-11 NOTE — OUTREACH NOTE
Prep Survey      Flowsheet Row Responses   [facility] facility patient discharged from? Non-[facility]   Is LACE score < 7 ? Non-BH Discharge   Eligibility [facility]   Date of Admission 07/09/25   Date of Discharge 07/10/25   Discharge diagnosis COPD exacerbation   Does the patient have one of the following disease processes/diagnoses(primary or secondary)? COPD   Does the patient have Home health ordered? No   Is there a DME ordered? No   Prep survey completed? Yes            ANGELITA MISTRY - Registered Nurse

## 2025-07-11 NOTE — OUTREACH NOTE
Call Center TCM Note      Flowsheet Row Responses   [facility] patient discharged from? Non-  []   Does the patient have one of the following disease processes/diagnoses(primary or secondary)? COPD   TCM attempt successful? Yes   Discharge diagnosis COPD exacerbation   TCM call completed? Yes   Wrap up additional comments Has a completed hospital follow up appt documented with PCP for today (7/11).  This fulfills TCM requirements and no phone call is needed.   Would this patient benefit from a Referral to [facility] Social Work? No   Is the patient interested in additional calls from an ambulatory ? No            Ina QUIÑONEZ - Registered Nurse    7/11/2025, 14:25 EDT

## 2025-07-22 ENCOUNTER — HOSPITAL ENCOUNTER (OUTPATIENT)
Dept: GENERAL RADIOLOGY | Facility: HOSPITAL | Age: 80
Discharge: HOME | End: 2025-07-22
Admitting: NURSE PRACTITIONER
Payer: MEDICARE

## 2025-07-22 ENCOUNTER — OFFICE VISIT (OUTPATIENT)
Dept: FAMILY MEDICINE CLINIC | Facility: CLINIC | Age: 80
End: 2025-07-22
Payer: MEDICARE

## 2025-07-22 VITALS
OXYGEN SATURATION: 91 % | WEIGHT: 224.2 LBS | BODY MASS INDEX: 36.03 KG/M2 | SYSTOLIC BLOOD PRESSURE: 164 MMHG | HEART RATE: 74 BPM | HEIGHT: 66 IN | DIASTOLIC BLOOD PRESSURE: 96 MMHG

## 2025-07-22 DIAGNOSIS — R06.02 SHORTNESS OF BREATH: Primary | ICD-10-CM

## 2025-07-22 DIAGNOSIS — R06.02 SHORTNESS OF BREATH: ICD-10-CM

## 2025-07-22 DIAGNOSIS — J44.1 COPD WITH EXACERBATION: ICD-10-CM

## 2025-07-22 PROCEDURE — 99213 OFFICE O/P EST LOW 20 MIN: CPT | Performed by: NURSE PRACTITIONER

## 2025-07-22 PROCEDURE — 3080F DIAST BP >= 90 MM HG: CPT | Performed by: NURSE PRACTITIONER

## 2025-07-22 PROCEDURE — 71046 X-RAY EXAM CHEST 2 VIEWS: CPT

## 2025-07-22 PROCEDURE — 1125F AMNT PAIN NOTED PAIN PRSNT: CPT | Performed by: NURSE PRACTITIONER

## 2025-07-22 PROCEDURE — 1160F RVW MEDS BY RX/DR IN RCRD: CPT | Performed by: NURSE PRACTITIONER

## 2025-07-22 PROCEDURE — 1159F MED LIST DOCD IN RCRD: CPT | Performed by: NURSE PRACTITIONER

## 2025-07-22 PROCEDURE — 3077F SYST BP >= 140 MM HG: CPT | Performed by: NURSE PRACTITIONER

## 2025-07-22 RX ORDER — PREDNISONE 10 MG/1
TABLET ORAL
Qty: 21 TABLET | Refills: 0 | Status: SHIPPED | OUTPATIENT
Start: 2025-07-22

## 2025-07-22 NOTE — PROGRESS NOTES
Follow Up Office Visit      Patient Name: Ronald Bond  : 1945   MRN: [id number]   Care Team: Patient Care Team:  Christy Santana PA-C as PCP - General (Family Medicine)  Andrade Waite MD as Consulting Physician (Orthopedic Surgery)  Barron Santana MD as Consulting Physician (Otolaryngology)  Melanie Neal MD as Consulting Physician (Endocrinology)  Dallas Jaquez MD as Consulting Physician (Gastroenterology)  Morena Vences APRN as Nurse Practitioner (Nurse Practitioner)  Barrington Saavedra MD as Consulting Physician (Cardiology)  Arcadio Armstrong MD as Consulting Physician (Cardiology)  Anabel Ariza APRN as Nurse Practitioner (Cardiology)    Chief Complaint:    Chief Complaint   Patient presents with    Wheezing     Ongoing for over a week. And he states he is having a hard time breathing     Sore     Pt states his neck is sore from his endoscopy        History of Present Illness: Ronald Bond is a 79 y.o. male with pertinent medical history significant for hypertension, coronary artery disease, hyperlipidemia, secondary hyperparathyroidism, GERD with recent EGD procedure, CKD stage III, iron deficiency anemia and COPD.    Presents today for shortness of breath with wheezing.  Symptoms ongoing now for about a week.  Feels that cough seems productive but mostly unable to get anything up.  Cough persists day and night although he states he is able to sleep well with taking hydroxyzine.  Denies any associated earache, headache, general ill sensation, fever or chills. He does endorse some sore throat but relates this to recent EGD procedure.    Of note, patient recently admitted at outside facility 2025 through 2025 for COPD exacerbation, treated with oral prednisone and Z-Manolo.  Transitional care appointment on 711 and was initiated on Arnuity Ellipta.  Patient reports he has been using this inhaler each day.  He feels as though his symptoms seem to improve back to his  baseline but current symptoms started soon thereafter.    He verbalizes concern that he has pneumonia.  Also relates that he is unable to drive, depending upon Lyft or Uber to get to appointments.      Patient admits difficulty with recall of dates and unable to recall names of his medication therapy.  Reports he has not taken several of his medications today yet.  Subjective          I have reviewed and the following portions of the patient's history were updated as appropriate: past family history, past medical history, past social history, past surgical history and problem list.    Medications:     Current Outpatient Medications:     albuterol sulfate  (90 Base) MCG/ACT inhaler, Inhale 2 puffs Every 4 (Four) Hours As Needed for Wheezing or Shortness of Air., Disp: 18 g, Rfl: 5    aspirin 81 MG EC tablet, Take 1 tablet by mouth Daily., Disp: 90 tablet, Rfl: 3    azithromycin (ZITHROMAX) 250 MG tablet, Take 2 tablets day 1 followed by 1 tablet daily on days 2 thru 5, Disp: , Rfl:     carvedilol (COREG) 25 MG tablet, Take 1 tablet by mouth 2 (Two) Times a Day. Take with food, Disp: 180 tablet, Rfl: 1    ferrous gluconate (FERGON) 324 MG tablet, Take 1 tablet by mouth Daily With Breakfast., Disp: 90 tablet, Rfl: 3    Fluticasone Furoate (Arnuity Ellipta) 50 MCG/ACT aerosol powder , Inhale 1 puff Daily., Disp: 30 each, Rfl: 5    hydrOXYzine (ATARAX) 50 MG tablet, TAKE 1 TABLET BY MOUTH EVERY 8 HOURS AS NEEDED FOR ANXIETY (Patient taking differently: Take 1 tablet by mouth Every 8 (Eight) Hours As Needed for Anxiety. for anxiety), Disp: 90 tablet, Rfl: 0    lisinopril (PRINIVIL,ZESTRIL) 10 MG tablet, Take 1 tablet by mouth Daily., Disp: 90 tablet, Rfl: 1    metFORMIN ER (GLUCOPHAGE-XR) 500 MG 24 hr tablet, Take 1 tablet by mouth Daily With Dinner for 7 days, THEN 2 tablets Daily With Dinner for 51 days., Disp: 53 tablet, Rfl: 0    METHADONE HCL PO, Take 110 mg by mouth Daily. (Patient taking differently: Take  "130 mg by mouth Daily.), Disp: , Rfl:     multivitamin with minerals (ONE-A-DAY 50 PLUS PO), Take 1 tablet by mouth Daily., Disp: , Rfl:     pantoprazole (PROTONIX) 40 MG EC tablet, Take 1 tablet by mouth Every Morning Before Breakfast., Disp: 90 tablet, Rfl: 0    polyethylene glycol (MIRALAX) 17 g packet, Take 17 g by mouth Daily., Disp: 90 packet, Rfl: 3    Probiotic Product capsule, Take 1 capsule by mouth Daily., Disp: , Rfl:     simvastatin (ZOCOR) 10 MG tablet, Take 1 tablet by mouth every night at bedtime., Disp: 90 tablet, Rfl: 0    Testosterone 1.62 % gel, Apply 2 pumps (40.5 mg) to shoulders daily., Disp: 75 g, Rfl: 5    torsemide (DEMADEX) 20 MG tablet, Take 1 tablet by mouth Daily., Disp: 90 tablet, Rfl: 0    vitamin B-12 (CYANOCOBALAMIN) 1000 MCG tablet, Take 1 tablet by mouth Daily., Disp: , Rfl:     vitamin D3 125 MCG (5000 UT) capsule capsule, Take 1 capsule by mouth Daily., Disp: , Rfl:     amoxicillin-clavulanate (AUGMENTIN) 875-125 MG per tablet, Take 1 tablet by mouth 2 (Two) Times a Day for 7 days., Disp: 14 tablet, Rfl: 0    predniSONE (DELTASONE) 10 MG tablet, Take 6 tablets on day 1, 5 tablets day 2, 4 tablets day 3, 3 tablets day 4, 2 tablets day 5, and 1 tablet day 6, Disp: 21 tablet, Rfl: 0    Current Facility-Administered Medications:     zoledronic acid (RECLAST) infusion 5 mg, 5 mg, Intravenous, Once, Melanie Neal MD    Allergies:   Allergies   Allergen Reactions    Acetaminophen Other (See Comments)     Pt has Hx hep c    Ketorolac Hives    Lyrica [Pregabalin] Confusion     tremors       Objective     Physical Exam:  Vital Signs:   Vitals:    07/22/25 1039   BP: 164/96   Pulse: 74   SpO2: 91%   Weight: 102 kg (224 lb 3.2 oz)   Height: 167.6 cm (65.98\")     Body mass index is 36.2 kg/m².     Physical Exam  Vitals and nursing note reviewed.   HENT:      Head: Normocephalic and atraumatic.      Mouth/Throat:      Mouth: Mucous membranes are moist.      Pharynx: Oropharynx is clear. No " oropharyngeal exudate.   Eyes:      General: No scleral icterus.     Conjunctiva/sclera: Conjunctivae normal.   Cardiovascular:      Rate and Rhythm: Normal rate and regular rhythm.   Pulmonary:      Effort: Pulmonary effort is normal. No respiratory distress.      Breath sounds: Rhonchi (left lower lobe) present. No wheezing (Expiratory wheezes throughout).   Musculoskeletal:      Right lower leg: Edema (Trace) present.      Left lower leg: Edema (Trace) present.   Skin:     General: Skin is warm and dry.   Neurological:      Mental Status: He is alert.   Psychiatric:         Mood and Affect: Mood normal.         Thought Content: Thought content normal.         Assessment / Plan      Assessment/Plan:   Problems Addressed This Visit    ICD-10-CM ICD-9-CM   1. Shortness of breath  R06.02 786.05   2. COPD with exacerbation  J44.1 491.21      Shortness of breath  - Likely COPD exacerbation but patient poor historian and unclear if he is taking prescribed medication therapies as listed.  - Chest x-ray PA LAT ordered, patient verbalizes he will have to take Uber or Lyft to obtain this imaging  - Initiating Augmentin 875/125 mg twice daily x 7 days, prednisone 10 mg Dosepak x 6 days  - Further orders pending results of imaging  - Reviewed recent lab work completed 7/9/2025 including proBNP, troponin, CBC, CMP, respiratory panel (unremarkable).  - Close follow-up with this patient with 1 week appointment    Plan of care reviewed with patient at the conclusion of today's visit. Education was provided regarding diagnosis and management.  Patient verbalizes understanding of and agreement with management plan.        Follow Up:   Return in about 1 week (around 7/29/2025).        KELLEN Bermeo  [facility] [address]    Please note that portions of this note were completed with a voice recognition program.

## 2025-07-24 RX ORDER — LEVOFLOXACIN 750 MG/1
750 TABLET, FILM COATED ORAL DAILY
Qty: 5 TABLET | Refills: 0 | Status: SHIPPED | OUTPATIENT
Start: 2025-07-24 | End: 2025-07-29

## 2025-07-29 ENCOUNTER — TELEPHONE (OUTPATIENT)
Dept: FAMILY MEDICINE CLINIC | Facility: CLINIC | Age: 80
End: 2025-07-29

## 2025-07-29 ENCOUNTER — OFFICE VISIT (OUTPATIENT)
Dept: FAMILY MEDICINE CLINIC | Facility: CLINIC | Age: 80
End: 2025-07-29
Payer: MEDICARE

## 2025-07-29 VITALS
DIASTOLIC BLOOD PRESSURE: 86 MMHG | SYSTOLIC BLOOD PRESSURE: 130 MMHG | HEIGHT: 66 IN | OXYGEN SATURATION: 90 % | BODY MASS INDEX: 36.32 KG/M2 | HEART RATE: 84 BPM | WEIGHT: 226 LBS

## 2025-07-29 DIAGNOSIS — J44.1 COPD WITH EXACERBATION: Primary | ICD-10-CM

## 2025-07-29 PROCEDURE — 3075F SYST BP GE 130 - 139MM HG: CPT

## 2025-07-29 PROCEDURE — 99214 OFFICE O/P EST MOD 30 MIN: CPT

## 2025-07-29 PROCEDURE — G2211 COMPLEX E/M VISIT ADD ON: HCPCS

## 2025-07-29 PROCEDURE — 1125F AMNT PAIN NOTED PAIN PRSNT: CPT

## 2025-07-29 PROCEDURE — 3079F DIAST BP 80-89 MM HG: CPT

## 2025-07-29 RX ORDER — ALBUTEROL SULFATE 1.25 MG/3ML
1 SOLUTION RESPIRATORY (INHALATION) EVERY 6 HOURS PRN
Qty: 30 ML | Refills: 12 | Status: SHIPPED | OUTPATIENT
Start: 2025-07-29
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
## 2025-07-30 ENCOUNTER — TELEPHONE (OUTPATIENT)
Dept: FAMILY MEDICINE CLINIC | Facility: CLINIC | Age: 80
End: 2025-07-30
Payer: MEDICARE

## 2025-07-30 NOTE — TELEPHONE ENCOUNTER
Caller: Ronald Bond    Relationship: Self    Best call back number: [phone]     Equipment requested: NEBULIZER    Reason for the request: PICKED UP THE SOLUTION AT THE PHARMACY YESTERDAY BUT THE NEBULIZER ITSELF WAS NOT CALLED IN. PLEASE SEND TODAY ASAP    Prescribing Provider: BRINA    Additional information or concerns: ANY QUESTIONS OR CONCERNS CONTACT THE PATIENR

## 2025-07-30 NOTE — TELEPHONE ENCOUNTER
Neb machine sent to [facility]. They should reach out to the patient once ready.  Patient informed.

## 2025-07-30 NOTE — TELEPHONE ENCOUNTER
Caller: Ronald Bond    Relationship: Self    Best call back number: [phone]     Equipment requested: NEBULIZER AND ALL NEEDED HOSES ETC    Reason for the request: PATIENT CALLED BACK AND STATED HE SPOKE WITH [facility] AND THEY STATED THE OFFICE NEEDS TO SEND IN THE PRESCRIPTION. IT SHOWS ON TE DATED TODAY THAT IT WAS SENT BUT PATIENT STATED HE JUST SPOKE WITH THEM. NOT SURE IF THEY HAD NOT GOTTEN THE ORDER OR DIDN'T LOOK    Prescribing Provider: BRINA    Additional information or concerns: [facility]  TELEPHONE #[phone]. FAX#-[phone]

## 2025-08-01 ENCOUNTER — TELEPHONE (OUTPATIENT)
Dept: FAMILY MEDICINE CLINIC | Facility: CLINIC | Age: 80
End: 2025-08-01
Payer: MEDICARE

## 2025-08-01 NOTE — TELEPHONE ENCOUNTER
----- Message from Christy Santana sent at 7/30/2025 12:30 PM EDT -----  Regarding: appointment change  I saw this patient yesterday, and after discussing his case with Dr. Loaiza have decided to have a sooner follow up with him.   Please call patient and arrange a follow up with me next week (8/4-8/8).

## 2025-08-01 NOTE — TELEPHONE ENCOUNTER
Attempted to call Ronald to schedule an appointment for next week between 8/4-8/8.   RUTH NEVILLE TO SCHEDULE

## 2025-08-06 DIAGNOSIS — E66.811 CLASS 1 OBESITY WITH SERIOUS COMORBIDITY AND BODY MASS INDEX (BMI) OF 34.0 TO 34.9 IN ADULT, UNSPECIFIED OBESITY TYPE: ICD-10-CM

## 2025-08-06 DIAGNOSIS — R73.03 PREDIABETES: ICD-10-CM

## 2025-08-06 DIAGNOSIS — R60.0 LOWER LEG EDEMA: ICD-10-CM

## 2025-08-06 DIAGNOSIS — D50.9 IRON DEFICIENCY ANEMIA, UNSPECIFIED IRON DEFICIENCY ANEMIA TYPE: ICD-10-CM

## 2025-08-07 RX ORDER — METFORMIN HYDROCHLORIDE 500 MG/1
1000 TABLET, EXTENDED RELEASE ORAL
Qty: 60 TABLET | Refills: 0 | Status: SHIPPED | OUTPATIENT
Start: 2025-08-07

## 2025-08-07 RX ORDER — FERROUS GLUCONATE 324(38)MG
324 TABLET ORAL
Qty: 90 TABLET | Refills: 0 | Status: SHIPPED | OUTPATIENT
Start: 2025-08-07

## 2025-08-07 RX ORDER — TORSEMIDE 20 MG/1
20 TABLET ORAL DAILY
Qty: 90 TABLET | Refills: 0 | OUTPATIENT
Start: 2025-08-07

## 2025-08-07 RX ORDER — METFORMIN HYDROCHLORIDE 500 MG/1
TABLET, EXTENDED RELEASE ORAL
Qty: 53 TABLET | Refills: 0 | OUTPATIENT
Start: 2025-08-07

## 2025-08-11 ENCOUNTER — HOSPITAL ENCOUNTER (OUTPATIENT)
Dept: ULTRASOUND IMAGING | Facility: HOSPITAL | Age: 80
Discharge: HOME | End: 2025-08-11
Admitting: INTERNAL MEDICINE
Payer: MEDICARE

## 2025-08-11 ENCOUNTER — OFFICE VISIT (OUTPATIENT)
Dept: FAMILY MEDICINE CLINIC | Facility: CLINIC | Age: 80
End: 2025-08-11
Payer: MEDICARE

## 2025-08-11 VITALS
BODY MASS INDEX: 36 KG/M2 | DIASTOLIC BLOOD PRESSURE: 90 MMHG | HEART RATE: 70 BPM | WEIGHT: 224 LBS | OXYGEN SATURATION: 90 % | SYSTOLIC BLOOD PRESSURE: 114 MMHG | HEIGHT: 66 IN

## 2025-08-11 DIAGNOSIS — J44.9 CHRONIC OBSTRUCTIVE PULMONARY DISEASE, UNSPECIFIED COPD TYPE: Primary | ICD-10-CM

## 2025-08-11 DIAGNOSIS — R41.0 CONFUSION: ICD-10-CM

## 2025-08-11 DIAGNOSIS — R09.02 HYPOXIA: ICD-10-CM

## 2025-08-11 DIAGNOSIS — F19.10 POLYSUBSTANCE ABUSE: ICD-10-CM

## 2025-08-11 DIAGNOSIS — M79.89 LEG SWELLING: ICD-10-CM

## 2025-08-11 DIAGNOSIS — K74.00 LIVER FIBROSIS: ICD-10-CM

## 2025-08-11 PROCEDURE — 76705 ECHO EXAM OF ABDOMEN: CPT

## 2025-08-11 PROCEDURE — 1160F RVW MEDS BY RX/DR IN RCRD: CPT | Performed by: PHYSICIAN ASSISTANT

## 2025-08-11 PROCEDURE — 3080F DIAST BP >= 90 MM HG: CPT | Performed by: PHYSICIAN ASSISTANT

## 2025-08-11 PROCEDURE — 1125F AMNT PAIN NOTED PAIN PRSNT: CPT | Performed by: PHYSICIAN ASSISTANT

## 2025-08-11 PROCEDURE — 99213 OFFICE O/P EST LOW 20 MIN: CPT | Performed by: PHYSICIAN ASSISTANT

## 2025-08-11 PROCEDURE — 1159F MED LIST DOCD IN RCRD: CPT | Performed by: PHYSICIAN ASSISTANT

## 2025-08-11 PROCEDURE — G2211 COMPLEX E/M VISIT ADD ON: HCPCS | Performed by: PHYSICIAN ASSISTANT

## 2025-08-11 PROCEDURE — 3074F SYST BP LT 130 MM HG: CPT | Performed by: PHYSICIAN ASSISTANT

## 2025-08-19 ENCOUNTER — TELEPHONE (OUTPATIENT)
Dept: GASTROENTEROLOGY | Facility: CLINIC | Age: 80
End: 2025-08-19
Payer: MEDICARE

## 2025-08-27 ENCOUNTER — OFFICE VISIT (OUTPATIENT)
Dept: FAMILY MEDICINE CLINIC | Facility: CLINIC | Age: 80
End: 2025-08-27
Payer: MEDICARE

## 2025-08-27 VITALS
BODY MASS INDEX: 34.72 KG/M2 | OXYGEN SATURATION: 92 % | DIASTOLIC BLOOD PRESSURE: 84 MMHG | WEIGHT: 216 LBS | HEIGHT: 66 IN | SYSTOLIC BLOOD PRESSURE: 130 MMHG | HEART RATE: 74 BPM

## 2025-08-27 DIAGNOSIS — K76.9 LIVER DISEASE: ICD-10-CM

## 2025-08-27 DIAGNOSIS — J96.02 ACUTE RESPIRATORY FAILURE WITH HYPOXIA AND HYPERCAPNIA: Primary | ICD-10-CM

## 2025-08-27 DIAGNOSIS — J44.9 CHRONIC OBSTRUCTIVE PULMONARY DISEASE, UNSPECIFIED COPD TYPE: ICD-10-CM

## 2025-08-27 DIAGNOSIS — I10 PRIMARY HYPERTENSION: ICD-10-CM

## 2025-08-27 DIAGNOSIS — R60.0 PERIPHERAL EDEMA: ICD-10-CM

## 2025-08-27 DIAGNOSIS — J96.01 ACUTE RESPIRATORY FAILURE WITH HYPOXIA AND HYPERCAPNIA: Primary | ICD-10-CM

## 2025-08-27 RX ORDER — SPIRONOLACTONE 25 MG/1
25 TABLET ORAL DAILY
COMMUNITY
Start: 2025-08-16

## 2025-08-27 RX ORDER — DIPHENOXYLATE HYDROCHLORIDE AND ATROPINE SULFATE 2.5; .025 MG/1; MG/1
1 TABLET ORAL DAILY
COMMUNITY

## (undated) DEVICE — TUBING, SUCTION, 1/4" X 10', STRAIGHT: Brand: MEDLINE

## (undated) DEVICE — COVER,LIGHT HANDLE,FLX,1/PK: Brand: MEDLINE INDUSTRIES, INC.

## (undated) DEVICE — SOLIDIFIER LIQ PREMISORB 1500CC

## (undated) DEVICE — PENCL E/S HNDSWCH ROCKRBTN HOLSTR 10FT

## (undated) DEVICE — THE BITE BLOCK MAXI, LATEX FREE STRAP IS USED TO PROTECT THE ENDOSCOPE INSERTION TUBE FROM BEING BITTEN BY THE PATIENT.

## (undated) DEVICE — SPONGE,LAP,12"X12",XR,ST,5/PK,40PK/CS: Brand: MEDLINE

## (undated) DEVICE — GLV SURG TRIUMPH ORTHO W/ALOE PF LTX 7.5 STRL

## (undated) DEVICE — SUT SILK 3/0 SH CR8 18IN C013D

## (undated) DEVICE — GOWN,REINF,POLY,ECL,PP SLV,XL: Brand: MEDLINE

## (undated) DEVICE — AIR/WATER CLEANING VALVES: Brand: AIR/WATER CLEANING VALVES

## (undated) DEVICE — ANTIBACTERIAL UNDYED BRAIDED (POLYGLACTIN 910), SYNTHETIC ABSORBABLE SUTURE: Brand: COATED VICRYL

## (undated) DEVICE — SAFELINER SUCTION CANISTER 1000CC: Brand: DEROYAL

## (undated) DEVICE — ELECTRD BLD EXT EDGE/INSUL 6IN

## (undated) DEVICE — HYBRID CO2 TUBING/CAP SET FOR OLYMPUS® SCOPES & CO2 SOURCE: Brand: ERBE

## (undated) DEVICE — JP PERF DRN SIL FLT 10MM FULL: Brand: CARDINAL HEALTH

## (undated) DEVICE — DRSNG WND BORDR/ADHS NONADHR/GZ LF 4X14IN STRL

## (undated) DEVICE — SUT VICYL PLS CTD ANTIB BR 1 27IN VIL

## (undated) DEVICE — GLV SURG DERMASSURE GRN LF PF SZ 6.5

## (undated) DEVICE — INTRO ACCSR BLNT TP

## (undated) DEVICE — LUBE JELLY FOIL PACK 1.4 OZ: Brand: MEDLINE INDUSTRIES, INC.

## (undated) DEVICE — APPL CHLORAPREP W/TINT 26ML BLU

## (undated) DEVICE — TOTAL TRAY, 16FR 10ML SIL FOLEY, URN: Brand: MEDLINE

## (undated) DEVICE — CONTN GRAD MEAS TRIANG 32OZ BLK

## (undated) DEVICE — SUT SILK 2/0 TIES 18IN A185H

## (undated) DEVICE — SUT PDS 1 CTX 36IN VIO PDP371T

## (undated) DEVICE — JACKSON-PRATT 100CC BULB RESERVOIR: Brand: CARDINAL HEALTH

## (undated) DEVICE — CLTH CLENS READYCLEANSE PERI CARE PK/5

## (undated) DEVICE — ENCORE® LATEX MICRO SIZE 6.5, STERILE LATEX POWDER-FREE SURGICAL GLOVE: Brand: ENCORE

## (undated) DEVICE — LEX GENERAL ABDOMINAL SPLIT: Brand: MEDLINE INDUSTRIES, INC.

## (undated) DEVICE — SUT PDS 1 TP1 48IN Z880G BX/12

## (undated) DEVICE — PROXIMATE RH ROTATING HEAD SKIN STAPLERS (35 WIDE) CONTAINS 35 STAINLESS STEEL STAPLES: Brand: PROXIMATE

## (undated) DEVICE — ADAPT CLN LUM OLYMP AIR/H20

## (undated) DEVICE — SPNG LAP PREWSH SFTPK 18X18IN STRL PK/5

## (undated) DEVICE — KT ORCA ORCAPOD DISP STRL

## (undated) DEVICE — FIRST STEP BEDSIDE ADD WATER KIT - RESEALABLE STAND-UP POUCH, ENDOSCOPIC CLEANING PAD - 1 POUCH: Brand: FIRST STEP BEDSIDE ADD WATER KIT - RESEALABLE STAND-UP POUCH, ENDOSCOPIC CLEANIN

## (undated) DEVICE — SAFESECURE,SECUREMENT,FOLEY CATH,STERILE: Brand: MEDLINE

## (undated) DEVICE — SYR LUERLOK 50ML

## (undated) DEVICE — GLV SURG SENSICARE MICRO PF LF 6.5 STRL

## (undated) DEVICE — SUT SILK 3/0 TIES 18IN A184H

## (undated) DEVICE — LUBE GEL ENDOGLIDE 1.1OZ

## (undated) DEVICE — SOL IRR H2O BTL 1000ML STRL

## (undated) DEVICE — SPNG GZ WOVN 4X4IN 12PLY 10/BX STRL

## (undated) DEVICE — SNAR POLYP CAPTIVATOR RND STFF 2.4 240CM 10MM 1P/U